# Patient Record
Sex: MALE | Race: WHITE | NOT HISPANIC OR LATINO | Employment: OTHER | URBAN - METROPOLITAN AREA
[De-identification: names, ages, dates, MRNs, and addresses within clinical notes are randomized per-mention and may not be internally consistent; named-entity substitution may affect disease eponyms.]

---

## 2018-01-29 PROBLEM — G20.A1 PARKINSON'S DISEASE (H): Status: ACTIVE | Noted: 2018-01-29

## 2018-01-30 ENCOUNTER — OFFICE VISIT (OUTPATIENT)
Dept: NEUROLOGY | Facility: CLINIC | Age: 73
End: 2018-01-30
Payer: COMMERCIAL

## 2018-01-30 VITALS
SYSTOLIC BLOOD PRESSURE: 132 MMHG | DIASTOLIC BLOOD PRESSURE: 59 MMHG | HEIGHT: 72 IN | WEIGHT: 182 LBS | BODY MASS INDEX: 24.65 KG/M2 | HEART RATE: 59 BPM

## 2018-01-30 DIAGNOSIS — G20.A1 PARKINSON'S DISEASE (H): Primary | ICD-10-CM

## 2018-01-30 RX ORDER — SULFASALAZINE 500 MG/1
2000 TABLET ORAL EVERY MORNING
Status: ON HOLD | COMMUNITY
Start: 2017-12-26 | End: 2019-04-28

## 2018-01-30 RX ORDER — FOLIC ACID 20 MG
800 CAPSULE ORAL
COMMUNITY
End: 2020-11-30

## 2018-01-30 RX ORDER — MULTIPLE VITAMINS W/ MINERALS TAB 9MG-400MCG
1 TAB ORAL EVERY MORNING
COMMUNITY

## 2018-01-30 RX ORDER — POLYETHYLENE GLYCOL 3350, SODIUM SULFATE ANHYDROUS, SODIUM BICARBONATE, SODIUM CHLORIDE, POTASSIUM CHLORIDE 236; 22.74; 6.74; 5.86; 2.97 G/4L; G/4L; G/4L; G/4L; G/4L
POWDER, FOR SOLUTION ORAL
COMMUNITY
Start: 2016-12-19 | End: 2019-04-12

## 2018-01-30 RX ORDER — CARBIDOPA AND LEVODOPA 50; 200 MG/1; MG/1
2 TABLET, EXTENDED RELEASE ORAL AT BEDTIME
COMMUNITY
Start: 2016-07-19 | End: 2018-11-13

## 2018-01-30 RX ORDER — CARBIDOPA AND LEVODOPA 25; 100 MG/1; MG/1
3.5 TABLET ORAL
COMMUNITY
Start: 2012-12-03 | End: 2018-05-09

## 2018-01-30 RX ORDER — PRAMIPEXOLE DIHYDROCHLORIDE 0.25 MG/1
0.62 TABLET ORAL
COMMUNITY
End: 2018-05-09

## 2018-01-30 ASSESSMENT — PAIN SCALES - GENERAL: PAINLEVEL: NO PAIN (0)

## 2018-01-30 NOTE — PATIENT INSTRUCTIONS
1.  Try increasing carbidopa/levodopa 25/100 to 4 tabs three times a day at 5:30 AM, 12 noon, 9 PM.  If not better decrease back to 3.5 tabs three times a day.  2.  Eat at 1 PM  3.  Continue regular exercise.  4.  Think about attending DBS class

## 2018-01-30 NOTE — MR AVS SNAPSHOT
After Visit Summary   1/30/2018    Ace Navarro    MRN: [id number]           Patient Information     Date Of Birth          1945        Visit Information        Provider Department      1/30/2018 10:00 AM Eric Godinez MD [facility] Neurology        Care Instructions    1.  Try increasing carbidopa/levodopa 25/100 to 4 tabs three times a day at 5:30 AM, 12 noon, 9 PM.  If not better decrease back to 3.5 tabs three times a day.  2.  Eat at 1 PM  3.  Continue regular exercise.  4.  Think about attending DBS class          Follow-ups after your visit        Follow-up notes from your care team     Return in about 3 months (around 4/30/2018).      Your next 10 appointments already scheduled     May 01, 2018  9:00 AM CDT   (Arrive by 8:45 AM)   Return Movement Disorder with Eric Godinez MD   [facility] ([facility])    [address] [place] [address] [id number] [phone]              Who to contact     Please call your clinic at [phone] to:    Ask questions about your health    Make or cancel appointments    Discuss your medicines    Learn about your test results    Speak to your doctor   If you have compliments or concerns about an experience at your clinic, or if you wish to file a complaint, please contact [facility] Patient Relations at [phone] or email us at [email or web address]         Additional Information About Your Visit        MyChart Information     [employer] is an electronic gateway that provides easy, online access to your medical records. With [employer], you can request a clinic appointment, read your test results, renew a prescription or communicate with your care team.     To sign up for As[employer]it visit the website at [email or web address]   You will be asked to enter the access code listed below, as well as some personal information. Please follow the directions to  create your username and password.     Your access code is: [email or web address]  Expires: 2018  6:30 AM     Your access code will  in 90 days. If you need help or a new code, please contact your [facility] or call [phone] for assistance.        Care EveryWhere ID     This is your Care EveryWhere ID. This could be used by other organizations to access your [place] medical records  [id number]        Your Vitals Were     Pulse Height BMI (Body Mass Index)             59 1.829 m (6') 24.68 kg/m2          Blood Pressure from Last 3 Encounters:   18 132/59    Weight from Last 3 Encounters:   18 82.6 kg (182 lb)              Today, you had the following     No orders found for display       Primary Care Provider Office Phone # Fax #    Maurilio Bonilla [phone]       [facility] [address] [id number]        Equal Access to Services     ALFREDO [facility] System[facility]YCE : Hadii aad ku hadasho Soomaali, waaxda luqadaha, qaybta kaalmada adeegyada, waxay idiin hayaan adeeg kharash la'rustyn . So [facility] [phone].    ATENCIÓN: Si habla español, tiene a rodriguez disposición servicios gratuitos de asistencia lingüística. Llame al [phone].    We comply with applicable federal civil rights laws and [place] laws. We do not discriminate on the basis of race, color, national origin, age, disability, sex, sexual orientation, or gender identity.            Thank you!     Thank you for choosing [facility]UROLOG[facility]  for your care. Our goal is always to provide you with excellent care. Hearing back from our patients is one way we can continue to improve our services. Please take a few minutes to complete the written survey that you may receive in the mail after your visit with us. Thank you!             Your Updated Medication List - Protect others around you: Learn how to safely use, store and throw away your medicines at www.disposemymeds.org.          This list is  accurate as of 1/30/18 10:33 AM.  Always use your most recent med list.                   Brand Name Dispense Instructions for use Diagnosis    * carbidopa-levodopa  MG per tablet    SINEMET     Take 3.5 tablets by mouth        * carbidopa-levodopa  MG per CR tablet    SINEMET CR     2 tablets        folic acid 20 MG Caps      Take 800 mg by mouth        Multi-vitamin Tabs tablet      Take 1 tablet by mouth daily        PEG-3350/Electrolytes 236 G Solr      As directed. Do not fill until patient contacts the pharmacy.        pramipexole 0.25 MG tablet    MIRAPEX     Take 0.625 mg by mouth        sulfaSALAzine 500 MG tablet    AZULFIDINE     Take 2,000 mg by mouth        * Notice:  This list has 2 medication(s) that are the same as other medications prescribed for you. Read the directions carefully, and ask your doctor or other care provider to review them with you.

## 2018-01-30 NOTE — PROGRESS NOTES
Department of Neurology  Movement Disorders Division   Initial Clinic Evaluation     Patient: Ace Navarro   MRN: [id number]   : 1945   Date of Visit: 2018     Referring Physician: Herbert    Reason for Referral: Parkinson's disease    Impression:   #1  Idiopathic Parkinson's disease-tremor predominant  #2  Mild wearing off phenomena  #3  Probable mild peak dose dyskinesia  #4  Restless leg syndrome    Recommendations:     #1 Try increasing carbidopa levodopa to 25/100 4 pills 3 times a day.  If side effects such as lightheadedness or hallucinations reduced back to 3.5 tablets 3 times a day.  #2 Take the afternoon dose at noon and then eat at 1 PM  #3 Continue regular exercise as he is doing  #4 Think about deep brain stimulation and the impact it would have on his daily living.  Consider attending [facility]'s deep brain stimulation class.  At this time his predominant symptoms are resting tremor and I am not sure that deep brain stimulation would have a major beneficial effect on his quality of life.    Please call or write with questions or concerns,    Sincerely yours,    Eric Godinez MD      Supporting Documentation (below)    History of Present Illness  Mr. Navarro is a 72 year old male whom I have followed for 8 years at the [facility] for his mild idiopathic Parkinson's disease.  He has done very well with this.  He has taken carbidopa levodopa 3 times a day with excellent effect.  Only he is taking carbidopa levodopa 3.5 tablets 3 times a day at 5:32 PM and 9 PM.  He notices that the noontime dose does not work well at times.  He is eating at 1 PM.  Doses after meals.  He does have some minimal dyskinesia.  He does feel a sense of on with the medicines.  He does have some very mild wearing off but it is not bothersome.  His other problem is restless leg syndrome.  He is having augmentation.  He takes pramipexole 0.5 mg at 9 PM are she has been at 10 PM and has a good night  sleep.  However he has had augmentation of his restless leg syndromes 2 was 8:53 PM.  He says it does not bother him and he really does not want further treatment for this.  He is not having any impulse control disorder with his dopamine agonist therapy.    He feels his tremor is increasing and this is visible on exam.  The tremors are resting tremor.  It does not cause great embarrassment.  It is not causing any motor disability until terms of eating drinking or performing fine motor tasks.    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  He continues to be a competitive  and will be going to [place] in a short period of time to play softball.    He denies any significant cognitive decline.  He has had no falls.  He has no autonomic symptoms.    Past Medical History:   No past medical history on file.    Past Surgical History:   No past surgical history on file.    Medications:  No current outpatient prescriptions on file.          Movement Disorder-related Medications                   5:30 AM 1 PM 9 PM 10 pm 12  MN   Carbidopa/levodopa 25/100                                                3.5 3.5 3.5            Carbidopa/levodopa 50/200 CR                      1    Pramiprexole 0.25                             2              Allergies: has no allergies on file.    Social History:   Social History     Social History     Marital status:      Spouse name: N/A     Number of children: N/A     Years of education: N/A     Social History Main Topics     Smoking status: Not on file     Smokeless tobacco: Not on file     Alcohol use Not on file     Drug use: Not on file     Sexual activity: Not on file     Other Topics Concern     Not on file     Social History Narrative       Family History:  No family history on file.    ROS:  General:  Fever : no, Chills: no, Sweats: no, Fatigue: no, Malaise: no, Lack of Appetite : no, Weight loss: no, Weight gain:  no  Cardiovascular  Chest Pains: no, Palpitations: no, Edema: no, Shortness of breath: no, Raynaud's: no, Claudication: no  Respiratory  Cough: no, Hemo ptosis: no, Wheezing: no  Gastrointestinal  Nausea: no, Vomiting: no, Diarrhea: no, Constipation: no, Heartburn: no  Genitourinary  Dysuria: no, Hematuria: no,  Frequency: no, Urgency: no,  Nocturia: no, Incontinence: no, Decreased Libido: no  Erection concerns: no  Women:  Abnormal vaginal bleeding: no, Heavy periods: no, Amenorrhea: no  Musculoskeletal  Back pain: no, Neck Pain: no  Joint pain, swelling, redness: no, Stiffness: no  Skin  Rash: no, Itching: no,  Suspicious lesions: no  Neurologic  Visual loss: no, Double vision: no, Headache: no, Loss of consciousness:  no, Seizure: no, Fainting (syncope): no, Dysarthria: no, Dysphagia:  no, Vertigo:  no, Weakness: no, Atrophy: no, Twitches: no, Lhermitte's: no, Numbness or tingling: no, Handwriting change: no, Tremors: no, Involuntary movements: no, Imbalance: no, Abnormal gait:  no, Falls :no, Memory loss: no, RBD: no, Sleep disorder: no, Hallucination: no, Loss of concentration: no,  Behavioral change: no,  Loss of motivation: no  Psychiatric  Depression: no, Anxiety/Panic: no  Endocrine  Cold intolerance: no, Heat intolerance: no, Excessive thirst: no  Hematologic/Lymphatic  Abnormal bruising, bleeding: no ,Enlarged lymph nodes: no  Allergic/Immunologic  Hives (Urticaria): no, HIV exposure: no      Comprehensive Neurologic Exam    Mental Status Exam   The patient is alert, oriented and exhibits no difficulty with cognition or memory.  A formal short test of mental status was performed. 30/30 short test of Mental status    Neurovascular    Right Left   Carotid Bruit Absent Absent   Dorsalis Pedis Pulse Normal Normal   Posterior Tibial Pulse Normal Normal     Speech   Speech is hypokinetic.    Cranial Nerve Exam                 Right Left   II                                        Normal Normal      III, IV, V!                    Normal Normal   EOM description                               Facial Sensation (V) Normal Normal   Muscles of Mastication (V) Normal Normal   Facial Strength (VII) Normal Normal   Hearing (VIII) Normal Normal   Nystagmus (VIII) Absent Absent   Gag (IX, X) Normal Normal   Sternocleidomastoid (XI) Normal Normal   Trapezius (XI) Normal Normal   Tongue (XII) Normal Normal   Other       Motor Exam  Strength (*/5 grading)  Muscle                  Right Left   Frontalis                                           Normal Normal       Orbicularis Oculi                     Normal Normal     Orbicularis Oris                         Normal Normal   Deltoid Normal Normal   Biceps Normal Normal   Triceps Normal Normal   EDC Normal Normal   Finger Flexors Normal Normal   First Dorsal Interosseous Normal Normal   Hypothenar Normal Normal   Thenar Normal Normal   Iliopsoas Normal Normal   Quadrideps Normal Normal   Anterior Tibial Normal Normal   Gastrocnemius Normal Normal   Extensor Hallucis Longus Normal Normal   Toe Extensors Normal Normal   Toe Flexor Normal Normal   Other       Reflexes   Right Left   Biceps Normal Normal   Triceps Normal Normal   Brachioradialis Normal Normal   Quadriceps Normal Normal   Ankle Normal Normal   Babkinski Flexor Flexor   Other       Tone    Right Left   Neck Normal Normal   Arm +1 +1   Leg Normal Normal   Other       AMR    Right Left   Fingers -1 -1   Hand -1 -1   Leg Normal Normal   Foot Normal Normal   Other       Coordination    Right Left   Finger nose finger Normal Normal   Drift Normal Normal   Heel Amador Normal Normal   Other       Involuntary Movements   (describe)  None    Delete table if none Right Left   Rest Tremor arm +3 +1   Postural tremor arm Normal Normal   Rest Tremor leg +1 +3       Gait and Station  Sitting Normal   Standing Normal   Gait Normal   (describe)    Posture Normal   (describe)    Romberg Absent   Tandem Normal   Other      Sensory Exam   Right Left    Pin Normal Normal   Vibration Normal Normal   Joint position Normal Normal   Other            Coding statement:     Prolonged Services: face-to-face40 min.   Time spent with patient: Greater than 50% of this 40 minute visit was spent in counseling and coordination of care.    Medical decision making is high due to the following components:    Number of diagnoses:[id number]  Management< Medications were adjusted.  Complexity of medical data:Risk  One or more chronic illnesses with severe exacerbation, progression, or side effects of treatment.

## 2018-01-30 NOTE — LETTER
2018       RE: Ace Navarro  [address] [facility] [address]     Dear Colleague,    Thank you for referring your patient, Ace Navarro, to the [facility] NEUROLOGY at [facility]. Please see a copy of my visit note below.    Department of Neurology  Movement Disorders Division   Initial Clinic Evaluation     Patient: Ace Navarro   MRN: [id number]   : 1945   Date of Visit: 2018     Referring Physician: Herbert    Reason for Referral: Parkinson's disease    Impression:   #1  Idiopathic Parkinson's disease-tremor predominant  #2  Mild wearing off phenomena  #3  Probable mild peak dose dyskinesia  #4  Restless leg syndrome    Recommendations:     #1 Try increasing carbidopa levodopa to 25/100 4 pills 3 times a day.  If side effects such as lightheadedness or hallucinations reduced back to 3.5 tablets 3 times a day.  #2 Take the afternoon dose at noon and then eat at 1 PM  #3 Continue regular exercise as he is doing  #4 Think about deep brain stimulation and the impact it would have on his daily living.  Consider attending [facility] Bapt[facility]'s deep brain stimulation class.  At this time his predominant symptoms are resting tremor and I am not sure that deep brain stimulation would have a major beneficial effect on his quality of life.    Please call or write with questions or concerns,    Sincerely yours,    Eric Godinez MD      Supporting Documentation (below)    History of Present Illness  Mr. Navarro is a 72 year old male whom I have followed for 8 years at the [facility] for his mild idiopathic Parkinson's disease.  He has done very well with this.  He has taken carbidopa levodopa 3 times a day with excellent effect.  Only he is taking carbidopa levodopa 3.5 tablets 3 times a day at 5:32 PM and 9 PM.  He notices that the noontime dose does not work well at times.  He is eating at 1 PM.  Doses after meals.  He does have some minimal  dyskinesia.  He does feel a sense of on with the medicines.  He does have some very mild wearing off but it is not bothersome.  His other problem is restless leg syndrome.  He is having augmentation.  He takes pramipexole 0.5 mg at 9 PM are she has been at 10 PM and has a good night sleep.  However he has had augmentation of his restless leg syndromes 2 was 8:53 PM.  He says it does not bother him and he really does not want further treatment for this.  He is not having any impulse control disorder with his dopamine agonist therapy.    He feels his tremor is increasing and this is visible on exam.  The tremors are resting tremor.  It does not cause great embarrassment.  It is not causing any motor disability until terms of eating drinking or performing fine motor tasks.    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  He continues to be a competitive  and will be going to [place] in a short period of time to play softball.    He denies any significant cognitive decline.  He has had no falls.  He has no autonomic symptoms.    Past Medical History:   No past medical history on file.    Past Surgical History:   No past surgical history on file.    Medications:  No current outpatient prescriptions on file.          Movement Disorder-related Medications                   5:30 AM 1 PM 9 PM 10 pm 12  MN   Carbidopa/levodopa 25/100                                                3.5 3.5 3.5            Carbidopa/levodopa 50/200 CR                      1    Pramiprexole 0.25                             2              Allergies: has no allergies on file.    Social History:   Social History     Social History     Marital status:      Spouse name: N/A     Number of children: N/A     Years of education: N/A     Social History Main Topics     Smoking status: Not on file     Smokeless tobacco: Not on file     Alcohol use Not on file     Drug use: Not on file     Sexual  activity: Not on file     Other Topics Concern     Not on file     Social History Narrative       Family History:  No family history on file.    ROS:  General:  Fever : no, Chills: no, Sweats: no, Fatigue: no, Malaise: no, Lack of Appetite : no, Weight loss: no, Weight gain: no  Cardiovascular  Chest Pains: no, Palpitations: no, Edema: no, Shortness of breath: no, Raynaud's: no, Claudication: no  Respiratory  Cough: no, Hemo ptosis: no, Wheezing: no  Gastrointestinal  Nausea: no, Vomiting: no, Diarrhea: no, Constipation: no, Heartburn: no  Genitourinary  Dysuria: no, Hematuria: no,  Frequency: no, Urgency: no,  Nocturia: no, Incontinence: no, Decreased Libido: no  Erection concerns: no  Women:  Abnormal vaginal bleeding: no, Heavy periods: no, Amenorrhea: no  Musculoskeletal  Back pain: no, Neck Pain: no  Joint pain, swelling, redness: no, Stiffness: no  Skin  Rash: no, Itching: no,  Suspicious lesions: no  Neurologic  Visual loss: no, Double vision: no, Headache: no, Loss of consciousness:  no, Seizure: no, Fainting (syncope): no, Dysarthria: no, Dysphagia:  no, Vertigo:  no, Weakness: no, Atrophy: no, Twitches: no, Lhermitte's: no, Numbness or tingling: no, Handwriting change: no, Tremors: no, Involuntary movements: no, Imbalance: no, Abnormal gait:  no, Falls :no, Memory loss: no, RBD: no, Sleep disorder: no, Hallucination: no, Loss of concentration: no,  Behavioral change: no,  Loss of motivation: no  Psychiatric  Depression: no, Anxiety/Panic: no  Endocrine  Cold intolerance: no, Heat intolerance: no, Excessive thirst: no  Hematologic/Lymphatic  Abnormal bruising, bleeding: no ,Enlarged lymph nodes: no  Allergic/Immunologic  Hives (Urticaria): no, HIV exposure: no      Comprehensive Neurologic Exam    Mental Status Exam   The patient is alert, oriented and exhibits no difficulty with cognition or memory.  A formal short test of mental status was performed. 30/30 short test of Mental status    Neurovascular     Right Left   Carotid Bruit Absent Absent   Dorsalis Pedis Pulse Normal Normal   Posterior Tibial Pulse Normal Normal     Speech   Speech is hypokinetic.    Cranial Nerve Exam                 Right Left   II                                        Normal Normal      III, IV, V!                   Normal Normal   EOM description                               Facial Sensation (V) Normal Normal   Muscles of Mastication (V) Normal Normal   Facial Strength (VII) Normal Normal   Hearing (VIII) Normal Normal   Nystagmus (VIII) Absent Absent   Gag (IX, X) Normal Normal   Sternocleidomastoid (XI) Normal Normal   Trapezius (XI) Normal Normal   Tongue (XII) Normal Normal   Other       Motor Exam  Strength (*/5 grading)  Muscle                  Right Left   Frontalis                                           Normal Normal       Orbicularis Oculi                     Normal Normal     Orbicularis Oris                         Normal Normal   Deltoid Normal Normal   Biceps Normal Normal   Triceps Normal Normal   EDC Normal Normal   Finger Flexors Normal Normal   First Dorsal Interosseous Normal Normal   Hypothenar Normal Normal   Thenar Normal Normal   Iliopsoas Normal Normal   Quadrideps Normal Normal   Anterior Tibial Normal Normal   Gastrocnemius Normal Normal   Extensor Hallucis Longus Normal Normal   Toe Extensors Normal Normal   Toe Flexor Normal Normal   Other       Reflexes   Right Left   Biceps Normal Normal   Triceps Normal Normal   Brachioradialis Normal Normal   Quadriceps Normal Normal   Ankle Normal Normal   Babkinski Flexor Flexor   Other       Tone    Right Left   Neck Normal Normal   Arm +1 +1   Leg Normal Normal   Other       AMR    Right Left   Fingers -1 -1   Hand -1 -1   Leg Normal Normal   Foot Normal Normal   Other       Coordination    Right Left   Finger nose finger Normal Normal   Drift Normal Normal   Heel Amador Normal Normal   Other       Involuntary Movements   (describe)  None    Delete table if none Right  Left   Rest Tremor arm +3 +1   Postural tremor arm Normal Normal   Rest Tremor leg +1 +3       Gait and Station  Sitting Normal   Standing Normal   Gait Normal   (describe)    Posture Normal   (describe)    Romberg Absent   Tandem Normal   Other      Sensory Exam   Right Left   Pin Normal Normal   Vibration Normal Normal   Joint position Normal Normal   Other            Coding statement:     Prolonged Services: face-to-face40 min.   Time spent with patient: Greater than 50% of this 40 minute visit was spent in counseling and coordination of care.    Medical decision making is high due to the following components:    Number of diagnoses:[id number]  Management< Medications were adjusted.  Complexity of medical data:Risk  One or more chronic illnesses with severe exacerbation, progression, or side effects of treatment.      Again, thank you for allowing me to participate in the care of your patient.      Sincerely,    Eric Godinez MD

## 2018-01-30 NOTE — NURSING NOTE
Chief Complaint   Patient presents with     RECHECK     Follow up parkinsons   Ely Velazquez MA

## 2018-02-06 ENCOUNTER — TELEPHONE (OUTPATIENT)
Dept: NEUROSURGERY | Facility: CLINIC | Age: 73
End: 2018-02-06

## 2018-02-06 NOTE — TELEPHONE ENCOUNTER
Spoke with pt about our DBS class and pt is interested in attending on April 18. Will send a letter with all pertinent information. No further questions.

## 2018-02-20 ENCOUNTER — TELEPHONE (OUTPATIENT)
Dept: NEUROSURGERY | Facility: CLINIC | Age: 73
End: 2018-02-20

## 2018-02-20 NOTE — TELEPHONE ENCOUNTER
Pt wanted to know that after he attends the April DBS class, how soon it would be before he can have DBS surgery. If pt is interested after he attends, I can f/u with Dr. Godinez to see if now is the right time for pt to go through the workup. Explained the workup process and each appointment in detail, as well as the consensus meeting process.     Pt asked if he could audio record the class, as he may have friends/family that are unable to come (also, class size is limited). My inclination is to say no to this request, as other patients will also be attending the class and their privacy/HIPAA must also be considered. Pt understands. No further questions at this time.

## 2018-04-19 ENCOUNTER — TELEPHONE (OUTPATIENT)
Dept: NEUROSURGERY | Facility: CLINIC | Age: 73
End: 2018-04-19

## 2018-04-19 DIAGNOSIS — G20.A1 PARKINSON'S DISEASE (H): Primary | ICD-10-CM

## 2018-04-19 NOTE — TELEPHONE ENCOUNTER
Patient attended the DBS class on 4/18/2018 and is now calling because he would like to initiate the DBS workup process. Dr. Godinez endorses this plan. Will f/u with pt with potential dates and then send a letter with all pertinent information once appointments are firmly scheduled. Pt in agreement with plan. No further questions.

## 2018-04-20 DIAGNOSIS — F41.9 ANXIETY: Primary | ICD-10-CM

## 2018-04-20 RX ORDER — DIAZEPAM 5 MG
TABLET ORAL
Qty: 2 TABLET | Refills: 0 | Status: SHIPPED | OUTPATIENT
Start: 2018-04-20 | End: 2018-08-01

## 2018-04-25 ENCOUNTER — TELEPHONE (OUTPATIENT)
Dept: NEUROSURGERY | Facility: CLINIC | Age: 73
End: 2018-04-25

## 2018-05-01 ENCOUNTER — OFFICE VISIT (OUTPATIENT)
Dept: NEUROLOGY | Facility: CLINIC | Age: 73
End: 2018-05-01
Payer: COMMERCIAL

## 2018-05-01 VITALS
WEIGHT: 185.9 LBS | DIASTOLIC BLOOD PRESSURE: 83 MMHG | SYSTOLIC BLOOD PRESSURE: 149 MMHG | HEIGHT: 72 IN | HEART RATE: 46 BPM | BODY MASS INDEX: 25.18 KG/M2

## 2018-05-01 DIAGNOSIS — G20.A1 PARKINSON'S DISEASE (H): Primary | ICD-10-CM

## 2018-05-01 ASSESSMENT — PAIN SCALES - GENERAL: PAINLEVEL: NO PAIN (0)

## 2018-05-01 NOTE — LETTER
5/1/2018       RE: Ace Navarro  [address] [facility] [address]     Dear Colleague,    Thank you for referring your patient, Ace Navarro, to the [facility] NEUROLOGY at [facility]. Please see a copy of my visit note below.    Movement Disorder Clinic follow up note    Patient: Ace Navarro  Medical Record Number: [id number]  Encounter Date: May 1, 2018  PCP:Maurilio Bonilla    CC: Parkinson's disease    Impression:     #1  Idiopathic Parkinson's disease    Recommendations:    #1  Patient is proceeding with deep brain stimulation workup.  He is excited about this.  He has attended the deep brain stimulation class and found this a tremendous experience.  He feels he has his expectations set properly and he is very enthusiastic about proceeding.    #2  The patient is interested in research participation.    #3  I will see him back after his deep brain stimulation procedure and take over his medication again.  For now he will continue carbidopa/levodopa 25/100, 3-1/2 tablets in the morning 4 tablets at noon 4 tablets at 9 PM.    Return to clinic:     Interval Hx:: Mr. Ace Navarro reports that he tried going up to carbidopa/levodopa 25/100, 4 tablets 3 times a day.  He found it too much in the morning.  He is taking only 3-1/2 tablets at that time.  Going up to the 4 in the afternoon did help his tremor.  He is continuing to take 4 tablets at noon and 4 at 9 PM.    He is active and feels his softball participation is at the level it is always been.  He is hitting the ball very well.    He is here with his son.  They went to the deep brain stimulation class.  He found this a tremendous experience and wants to go ahead with deep brain stimulation.  Tammie COOK and has made the appointments.  He is coming in for his drug profile 1 week from this Wednesday.      Current medications  Current Outpatient Prescriptions   Medication      carbidopa-levodopa (SINEMET CR)  MG per CR tablet     carbidopa-levodopa (SINEMET)  MG per tablet     folic acid 20 MG CAPS     multivitamin, therapeutic with minerals (MULTI-VITAMIN) TABS tablet     PEG 3350-KCl-NaBcb-NaCl-NaSulf (PEG-3350/ELECTROLYTES) 236 G SOLR     pramipexole (MIRAPEX) 0.25 MG tablet     sulfaSALAzine (AZULFIDINE) 500 MG tablet     diazepam (VALIUM) 5 MG tablet     No current facility-administered medications for this visit.        Examination:  /83 (BP Location: Left arm, Patient Position: Sitting, Cuff Size: Adult Regular)  Pulse (!) 46  Ht 1.829 m (6')  Wt 84.3 kg (185 lb 14.4 oz)  BMI 25.21 kg/m2  General- no distress, no rash, good pulses, no edema  Respiratory-auscultation over the anterior lung fields is clear  Cardiac- regular rate and rhythm    Neurologic  Mental status  Patient is alert, appropriate, speech is fluent and comprehension is intact    Cranial nerve testing  Pupils are equal and reactive to light, visual fields are full to confrontation bilaterally  Extraocular movements are full  Facial sensation is intact, face is symmetric with rest and activation  Palate rises symmetrically, tongue protrudes at midline with normal movements  Sterocleidomastoid and trapezius strength is normal and symmetric    Motor  Bulk and tone are normal  Strength is 5/5 shoulder abduction, finger abduction, arm flexion and extension, hip flexion, plantar and dorsiflexion    Sensation  Intact to pin, vibration   Proprioception intact in great toes and fingers    Tendon reflexes  Testing at brachioradialis, biceps, triceps, patella and achilles bilaterally showed normal reflexes, symmetrically, without Babinski or Zuluaga signs    Coordination  Finger nose finger testing: normalRapid alternating movements are normal.  Gait and Station: normal resting tremor 3+ right arm 2+ left leg.  No rigidity.  No bradykinesia.  \25 minutes spent with patient all counseling    Again, thank  you for allowing me to participate in the care of your patient.      Sincerely,    Eric Godinez MD

## 2018-05-01 NOTE — MR AVS SNAPSHOT
After Visit Summary   5/1/2018    Ace Navarro    MRN: [id number]           Patient Information     Date Of Birth          1945        Visit Information        Provider Department      5/1/2018 9:00 AM Eric Godinez MD [facility] Neurology        Today's Diagnoses     Parkinson's disease (H)    -  1      Care Instructions    #1  Proceed with DBS work-up  #2 I'll see him back at completion of DBS procedure for medication management.          Follow-ups after your visit        Follow-up notes from your care team     Return if symptoms worsen or fail to improve.      Your next 10 appointments already scheduled     May 09, 2018  7:00 AM CDT   (Arrive by 6:45 AM)   New Movement Disorder with NICK Wright CNP [facility] Neurology ([facility])    [address]  [address] [facility] [address] [phone]            May 09, 2018 12:00 PM CDT   MR BRAIN W/O & W CONTRAST with [id number]   [place] [facility] ([facility])    [address] [address]   [phone]           Take your medicines as usual, unless your doctor tells you not to. Bring a list of your current medicines to your exam (including vitamins, minerals and over-the-counter drugs).  You may or may not receive intravenous (IV) contrast for this exam pending the discretion of the Radiologist.  You do not need to do anything special to prepare.  The MRI machine uses a strong magnet. Please wear clothes without metal (snaps, zippers). A sweatsuit works well, or we may give you a hospital gown.  Please remove any body piercings and hair extensions before you arrive. You will also remove watches, jewelry, hairpins, wallets, dentures, partial dental plates and hearing aids. You may wear contact lenses, and you may be able to wear your rings. We have a safe place to keep your personal items, but it is safer to leave them at home.   **IMPORTANT** THE INSTRUCTIONS BELOW ARE ONLY FOR THOSE PATIENTS WHO HAVE BEEN PRESCRIBED SEDATION OR GENERAL ANESTHESIA DURING THEIR MRI PROCEDURE:  IF YOUR DOCTOR PRESCRIBED ORAL SEDATION (take medicine to help you relax during your exam):   You must get the medicine from your doctor (oral medication) before you arrive. Bring the medicine to the exam. Do not take it at home. You ll be told when to take it upon arriving for your exam.   Arrive one hour early. Bring someone who can take you home after the test. Your medicine will make you sleepy. After the exam, you may not drive, take a bus or take a taxi by yourself.  IF YOUR DOCTOR PRESCRIBED IV SEDATION:   Arrive one hour early. Bring someone who can take you home after the test. Your medicine will make you sleepy. After the exam, you may not drive, take a bus or take a taxi by yourself.   No eating 6 hours before your exam. You may have clear liquids up until 4 hours before your exam. (Clear liquids include water, clear tea, black coffee and fruit juice without pulp.)  IF YOUR DOCTOR PRESCRIBED ANESTHESIA (be asleep for your exam):   Arrive 1 1/2 hours early. Bring someone who can take you home after the test. You may not drive, take a bus or take a taxi by yourself.   No eating 8 hours before your exam. You may have clear liquids up until 4 hours before your exam. (Clear liquids include water, clear tea, black coffee and fruit juice without pulp.)   You will spend four to five hours in the recovery room.  Please call the Imaging Department at your exam site with any questions.            May 24, 2018  8:00 AM CDT   (Arrive by 7:45 AM)   Neuropsych Eval with Carole Franklin, PhD [facility] Neuropsychology ([facility])    [address] [address] [phone]            May 24, 2018 12:00 PM CDT   (Arrive by 11:45 AM)   Deep Brain Stimulation with Humberto Briones MD   [facility]  Neurosurgery ([facility])    [address]  3rd Floor  [facility] [address] [phone]              Who to contact     Please call your clinic at [phone] to:    Ask questions about your health    Make or cancel appointments    Discuss your medicines    Learn about your test results    Speak to your doctor            Additional Information About Your Visit        [employer] Information     [employer] gives you secure access to your electronic health record. If you see a primary care provider, you can also send messages to your care team and make appointments. If you have questions, please call your primary care clinic.  If you do not have a primary care provider, please call [phone] and they will assist you.      [employer] is an electronic gateway that provides easy, online access to your medical records. With [employer], you can request a clinic appointment, read your test results, renew a prescription or communicate with your care team.     To access your existing account, please contact your [facility] or call [phone] for assistance.        Care EveryWhere ID     This is your Care EveryWhere ID. This could be used by other organizations to access your [place] medical records  [id number]        Your Vitals Were     Pulse Height BMI (Body Mass Index)             46 1.829 m (6') 25.21 kg/m2          Blood Pressure from Last 3 Encounters:   05/01/18 149/83   01/30/18 132/59    Weight from Last 3 Encounters:   05/01/18 84.3 kg (185 lb 14.4 oz)   01/30/18 82.6 kg (182 lb)              Today, you had the following     No orders found for display       Primary Care Provider Office Phone # Fax #    Maurilio Bonilla [phone]       [facility] [address] [facility] [address]        Equal Access to Services     REBECA HORTON AH: shawn Harmon, janet good  antonette shinaadonald ah. So [facility] [phone].    ATENCIÓN: Si jennifer harrison, tiene a rodriguez disposición servicios gratuitos de asistencia lingüística. Melanie velez [phone].    We comply with applicable federal civil rights laws and [place] laws. We do not discriminate on the basis of race, color, national origin, age, disability, sex, sexual orientation, or gender identity.            Thank you!     Thank you for choosing [facility] NEUROLOGY  for your care. Our goal is always to provide you with excellent care. Hearing back from our patients is one way we can continue to improve our services. Please take a few minutes to complete the written survey that you may receive in the mail after your visit with us. Thank you!             Your Updated Medication List - Protect others around you: Learn how to safely use, store and throw away your medicines at www.disposemymeds.org.          This list is accurate as of 5/1/18  9:27 AM.  Always use your most recent med list.                   Brand Name Dispense Instructions for use Diagnosis    * carbidopa-levodopa  MG per tablet    SINEMET     Take 3.5 tablets by mouth        * carbidopa-levodopa  MG per CR tablet    SINEMET CR     2 tablets        diazepam 5 MG tablet    VALIUM    2 tablet    Take 1 tablet 30 minutes prior to MRI. May take additional tablet immediately before MRI as needed    Anxiety       folic acid 20 MG Caps      Take 800 mg by mouth        Multi-vitamin Tabs tablet      Take 1 tablet by mouth daily        PEG-3350/Electrolytes 236 g Solr      As directed. Do not fill until patient contacts the pharmacy.        pramipexole 0.25 MG tablet    MIRAPEX     Take 0.625 mg by mouth        sulfaSALAzine 500 MG tablet    AZULFIDINE     Take 2,000 mg by mouth        * Notice:  This list has 2 medication(s) that are the same as other medications prescribed for you. Read the directions carefully, and ask your doctor or other care provider to review them with  you.

## 2018-05-01 NOTE — PROGRESS NOTES
Movement Disorder Clinic follow up note    Patient: Ace Navarro  Medical Record Number: [id number]  Encounter Date: May 1, 2018  PCP:Maurilio Bonilla    CC: Parkinson's disease    Impression:     #1  Idiopathic Parkinson's disease    Recommendations:    #1  Patient is proceeding with deep brain stimulation workup.  He is excited about this.  He has attended the deep brain stimulation class and found this a tremendous experience.  He feels he has his expectations set properly and he is very enthusiastic about proceeding.    #2  The patient is interested in research participation.    #3  I will see him back after his deep brain stimulation procedure and take over his medication again.  For now he will continue carbidopa/levodopa 25/100, 3-1/2 tablets in the morning 4 tablets at noon 4 tablets at 9 PM.    Return to clinic:     Interval Hx:: Mr. Ace Navarro reports that he tried going up to carbidopa/levodopa 25/100, 4 tablets 3 times a day.  He found it too much in the morning.  He is taking only 3-1/2 tablets at that time.  Going up to the 4 in the afternoon did help his tremor.  He is continuing to take 4 tablets at noon and 4 at 9 PM.    He is active and feels his softball participation is at the level it is always been.  He is hitting the ball very well.    He is here with his son.  They went to the deep brain stimulation class.  He found this a tremendous experience and wants to go ahead with deep brain stimulation.  Tammie Rider R and has made the appointments.  He is coming in for his drug profile 1 week from this Wednesday.      Current medications  Current Outpatient Prescriptions   Medication     carbidopa-levodopa (SINEMET CR)  MG per CR tablet     carbidopa-levodopa (SINEMET)  MG per tablet     folic acid 20 MG CAPS     multivitamin, therapeutic with minerals (MULTI-VITAMIN) TABS tablet     PEG 3350-KCl-NaBcb-NaCl-NaSulf (PEG-3350/ELECTROLYTES) 236 G SOLR     pramipexole  (MIRAPEX) 0.25 MG tablet     sulfaSALAzine (AZULFIDINE) 500 MG tablet     diazepam (VALIUM) 5 MG tablet     No current facility-administered medications for this visit.        Examination:  /83 (BP Location: Left arm, Patient Position: Sitting, Cuff Size: Adult Regular)  Pulse (!) 46  Ht 1.829 m (6')  Wt 84.3 kg (185 lb 14.4 oz)  BMI 25.21 kg/m2  General- no distress, no rash, good pulses, no edema  Respiratory-auscultation over the anterior lung fields is clear  Cardiac- regular rate and rhythm    Neurologic  Mental status  Patient is alert, appropriate, speech is fluent and comprehension is intact    Cranial nerve testing  Pupils are equal and reactive to light, visual fields are full to confrontation bilaterally  Extraocular movements are full  Facial sensation is intact, face is symmetric with rest and activation  Palate rises symmetrically, tongue protrudes at midline with normal movements  Sterocleidomastoid and trapezius strength is normal and symmetric    Motor  Bulk and tone are normal  Strength is 5/5 shoulder abduction, finger abduction, arm flexion and extension, hip flexion, plantar and dorsiflexion    Sensation  Intact to pin, vibration   Proprioception intact in great toes and fingers    Tendon reflexes  Testing at brachioradialis, biceps, triceps, patella and achilles bilaterally showed normal reflexes, symmetrically, without Babinski or Zuluaga signs    Coordination  Finger nose finger testing: normalRapid alternating movements are normal.  Gait and Station: normal resting tremor 3+ right arm 2+ left leg.  No rigidity.  No bradykinesia.  \25 minutes spent with patient all counseling

## 2018-05-03 ENCOUNTER — TELEPHONE (OUTPATIENT)
Dept: NEUROSURGERY | Facility: CLINIC | Age: 73
End: 2018-05-03

## 2018-05-03 NOTE — TELEPHONE ENCOUNTER
Pt called me earlier today to let me know that he is interested in pursuing the 7T MRI research study. When I spoke with him, I had not realized that his DBS workup is still in progress. I called him back to let him know that he must be approved for surgery/scheduled prior to enrolling in this study. However, I did pass his name on to our research coordinators Kerline Reeder and Sada Mulligan to let them know. They will reach out to him once we know if he is a candidate. Pt understands and is in agreement with plan.

## 2018-05-06 ASSESSMENT — MOVEMENT DISORDERS SOCIETY - UNIFIED PARKINSONS DISEASE RATING SCALE (MDS-UPDRS)
CHEWING_AND_SWALLOWING: NORMAL: NO PROBLEMS.
TREMOR: SLIGHT: SHAKING OR TREMOR OCCURS BUT DOES NOT CAUSE PROBLEMS WITH ANY ACTIVITIES.
HANDWRITING: SLIGHT: MY WRITING IS SLOW, CLUMSY OR UNEVEN, BUT ALL WORDS ARE CLEAR.
HYGIENE: NORMAL: NOT AT ALL (NO PROBLEMS).
SALIVA_AND_DROOLING: MODERATE: I HAVE SOME DROOLING WHEN I AM AWAKE, BUT I USUALLY DO NOT NEED TISSUES OR A HANDKERCHIEF.
EATING_TASKS: NORMAL: NOT AT ALL (NO PROBLEMS).
GETTING_OUT_OF_BED_CAR_DEEP_CHAIR: SLIGHT: I AM SLOW OR AWKWARD, BUT I USUALLY CAN DO IT ON MY FIRST TRY.
TOTAL_SCORE: 11
FREEZING: NORMAL: NOT AT ALL (NO PROBLEMS).
SPEECH: MODERATE: MY SPEECH IS UNCLEAR ENOUGH THAT OTHERS ASK ME TO REPEAT MYSELF EVERY DAY EVEN THOUGH MOST OF MY SPEECH IS UNDERSTOOD.
TURNING_IN_BED: NORMAL: NOT AT ALL (NO PROBLEMS).
WALKING_AND_BALANCE: NORMAL: NOT AT ALL (NO PROBLEMS).
HOBBIES_AND_OTHER_ACTIVITIES: SLIGHT: I AM A BIT SLOW BUT DO THESE ACTIVITIES EASILY.
DRESSING: SLIGHT: I AM SLOW BUT I DO NOT NEED HELP.

## 2018-05-07 ENCOUNTER — TELEPHONE (OUTPATIENT)
Dept: NEUROSURGERY | Facility: CLINIC | Age: 73
End: 2018-05-07

## 2018-05-07 NOTE — TELEPHONE ENCOUNTER
Pt's son Jsoe called. I had discussed the 7T MRI with pt last week, but Jose wanted some clarification on whether they could schedule it. I explained that if pt is a candidate for DBS surgery, then our research coordinator will contact pt to see if he would like to participate in the 7T study. I have given pt's name to our research coordinator Kerline Reeder and the 7T coordinator Sada Mulligan. The 7T cannot be scheduled until we know pt will move forward with surgery. Jose expressed understanding and has no further questions. He is welcome to call with any future concerns.

## 2018-05-09 ENCOUNTER — TELEPHONE (OUTPATIENT)
Dept: NEUROLOGY | Facility: CLINIC | Age: 73
End: 2018-05-09

## 2018-05-09 ENCOUNTER — OFFICE VISIT (OUTPATIENT)
Dept: NEUROLOGY | Facility: CLINIC | Age: 73
End: 2018-05-09
Payer: COMMERCIAL

## 2018-05-09 ENCOUNTER — RADIANT APPOINTMENT (OUTPATIENT)
Dept: MRI IMAGING | Facility: CLINIC | Age: 73
End: 2018-05-09
Attending: PSYCHIATRY & NEUROLOGY
Payer: COMMERCIAL

## 2018-05-09 VITALS
SYSTOLIC BLOOD PRESSURE: 154 MMHG | DIASTOLIC BLOOD PRESSURE: 80 MMHG | WEIGHT: 184 LBS | HEART RATE: 50 BPM | RESPIRATION RATE: 18 BRPM | BODY MASS INDEX: 24.92 KG/M2 | OXYGEN SATURATION: 96 % | HEIGHT: 72 IN

## 2018-05-09 DIAGNOSIS — G20.A1 PARKINSON'S DISEASE (H): ICD-10-CM

## 2018-05-09 DIAGNOSIS — G20.A1 PARKINSON'S DISEASE (H): Primary | ICD-10-CM

## 2018-05-09 PROBLEM — K51.919 ULCERATIVE COLITIS WITH COMPLICATION (H): Status: ACTIVE | Noted: 2018-05-09

## 2018-05-09 RX ORDER — GADOBUTROL 604.72 MG/ML
7.2 INJECTION INTRAVENOUS ONCE
Status: COMPLETED | OUTPATIENT
Start: 2018-05-09 | End: 2018-05-09

## 2018-05-09 RX ORDER — PRAMIPEXOLE DIHYDROCHLORIDE 0.25 MG/1
0.5 TABLET ORAL AT BEDTIME
COMMUNITY
Start: 2018-05-09 | End: 2019-01-04

## 2018-05-09 RX ORDER — CARBIDOPA AND LEVODOPA 25; 100 MG/1; MG/1
4 TABLET ORAL 3 TIMES DAILY
COMMUNITY
Start: 2018-05-09 | End: 2018-11-13

## 2018-05-09 RX ADMIN — GADOBUTROL 7.2 ML: 604.72 INJECTION INTRAVENOUS at 11:42

## 2018-05-09 ASSESSMENT — UNIFIED PARKINSONS DISEASE RATING SCALE (UPDRS)
FREEZING_GAIT: NORMAL
LEG_AGILITY_RIGHT: SLIGHT: ANY OF THE FOLLOWING: A) THE REGULAR RHYTHM IS BROKEN WITH ONE WITH ONE OR TWO INTERRUPTIONS OR HESITATIONS OF THE MOVEMENT B) SLIGHT SLOWING C) THE AMPLITUDE DECREMENTS NEAR THE END OF THE 10 MOVEMENTS.
FREEZING_GAIT: NORMAL
GAIT: NORMAL
FACIAL_EXPRESSION: MODERATE: MASKED FACIES WITH LIPS PARTED SOME OF THE TIME WHEN THE MOUTH IS AT REST.
RIGIDITY_RLE: NORMAL
FACIAL_EXPRESSION: MODERATE: MASKED FACIES WITH LIPS PARTED SOME OF THE TIME WHEN THE MOUTH IS AT REST.
TOETAPPING_LEFT: SLIGHT: ANY OF THE FOLLOWING: A) THE REGULAR RHYTHM IS BROKEN WITH ONE WITH ONE OR TWO INTERRUPTIONS OR HESITATIONS OF THE MOVEMENT B) SLIGHT SLOWING C) THE AMPLITUDE DECREMENTS NEAR THE END OF THE 10 MOVEMENTS.
AXIAL_SCORE: 12
ARISING_CHAIR: NORMAL: ABLE TO ARISE QUICKLY WITHOUT HESITATION.
CONSTANCY_TREMOR_ATREST: SEVERE: TREMOR AT REST IS PRESENT > 75% OF THE ENTIRE EXAMINATION PERIOD.
RIGIDITY_RLE: NORMAL
TOETAPPING_RIGHT: SLIGHT: ANY OF THE FOLLOWING: A) THE REGULAR RHYTHM IS BROKEN WITH ONE WITH ONE OR TWO INTERRUPTIONS OR HESITATIONS OF THE MOVEMENT B) SLIGHT SLOWING C) THE AMPLITUDE DECREMENTS NEAR THE END OF THE 10 MOVEMENTS.
LEG_AGILITY_LEFT: SLIGHT: ANY OF THE FOLLOWING: A) THE REGULAR RHYTHM IS BROKEN WITH ONE WITH ONE OR TWO INTERRUPTIONS OR HESITATIONS OF THE MOVEMENT B) SLIGHT SLOWING C) THE AMPLITUDE DECREMENTS NEAR THE END OF THE 10 MOVEMENTS.
AXIAL_SCORE: 6
ARISING_CHAIR: NORMAL: ABLE TO ARISE QUICKLY WITHOUT HESITATION.
GAIT: NORMAL
FINGER_TAPPING_LEFT: SLIGHT: ANY OF THE FOLLOWING: A) THE REGULAR RHYTHM IS BROKEN WITH ONE WITH ONE OR TWO INTERRUPTIONS OR HESITATIONS OF THE MOVEMENT B) SLIGHT SLOWING C) THE AMPLITUDE DECREMENTS NEAR THE END OF THE 10 MOVEMENTS.
FINGER_TAPPING_RIGHT: SLIGHT: ANY OF THE FOLLOWING: A) THE REGULAR RHYTHM IS BROKEN WITH ONE WITH ONE OR TWO INTERRUPTIONS OR HESITATIONS OF THE MOVEMENT B) SLIGHT SLOWING C) THE AMPLITUDE DECREMENTS NEAR THE END OF THE 10 MOVEMENTS.
PRONATION_SUPINATION_LEFT: SLIGHT: ANY OF THE FOLLOWING: A) THE REGULAR RHYTHM IS BROKEN WITH ONE WITH ONE OR TWO INTERRUPTIONS OR HESITATIONS OF THE MOVEMENT B) SLIGHT SLOWING C) THE AMPLITUDE DECREMENTS NEAR THE END OF THE 10 MOVEMENTS.
RIGIDITY_RUE: MILD: RIGIDITY DETECTED WITHOUT THE ACTIVATION MANEUVER.  FULL RANGE OF MOTION IS EASILY ACHIEVED.
AMPLITUDE_LLE: MILD > 1 CM BUT < 3 CM IN MAXIMAL AMPLITUDE.
HANDMOVEMENTS_RIGHT: SLIGHT: ANY OF THE FOLLOWING: A) THE REGULAR RHYTHM IS BROKEN WITH ONE WITH ONE OR TWO INTERRUPTIONS OR HESITATIONS OF THE MOVEMENT B) SLIGHT SLOWING C) THE AMPLITUDE DECREMENTS NEAR THE END OF THE 10 MOVEMENTS.
POSTURAL_STABILITY: NORMAL:  RECOVERS WITH ONE OR TWO STEPS.
HANDMOVEMENTS_LEFT: SLIGHT: ANY OF THE FOLLOWING: A) THE REGULAR RHYTHM IS BROKEN WITH ONE WITH ONE OR TWO INTERRUPTIONS OR HESITATIONS OF THE MOVEMENT B) SLIGHT SLOWING C) THE AMPLITUDE DECREMENTS NEAR THE END OF THE 10 MOVEMENTS.
LEG_AGILITY_RIGHT: SLIGHT: ANY OF THE FOLLOWING: A) THE REGULAR RHYTHM IS BROKEN WITH ONE WITH ONE OR TWO INTERRUPTIONS OR HESITATIONS OF THE MOVEMENT B) SLIGHT SLOWING C) THE AMPLITUDE DECREMENTS NEAR THE END OF THE 10 MOVEMENTS.
POSTURE: 1 SLIGHT.  NOT QUITE ERECT BUT COULD BE NORMAL FOR OLDER PERSONS.
TOTAL_SCORE: 13
FINGER_TAPPING_RIGHT: SLIGHT: ANY OF THE FOLLOWING: A) THE REGULAR RHYTHM IS BROKEN WITH ONE WITH ONE OR TWO INTERRUPTIONS OR HESITATIONS OF THE MOVEMENT B) SLIGHT SLOWING C) THE AMPLITUDE DECREMENTS NEAR THE END OF THE 10 MOVEMENTS.
TOTAL_SCORE: 17
TOTAL_SCORE_LEFT: 10
RIGIDITY_LLE: NORMAL
AMPLITUDE_RUE: MODERATE: 3 - 10 CM IN MAXIMAL AMPLITUDE.
PRONATION_SUPINATION_LEFT: SLIGHT: ANY OF THE FOLLOWING: A) THE REGULAR RHYTHM IS BROKEN WITH ONE WITH ONE OR TWO INTERRUPTIONS OR HESITATIONS OF THE MOVEMENT B) SLIGHT SLOWING C) THE AMPLITUDE DECREMENTS NEAR THE END OF THE 10 MOVEMENTS.
RIGIDITY_RUE: SLIGHT: RIGIDITY ONLY DETECTED WITH ACTIVATION MANEUVER.
AMPLITUDE_RUE: NORMAL: NO TREMOR.
TOTAL_SCORE: 41
TOTAL_SCORE_LEFT: 5
FINGER_TAPPING_LEFT: SLIGHT: ANY OF THE FOLLOWING: A) THE REGULAR RHYTHM IS BROKEN WITH ONE WITH ONE OR TWO INTERRUPTIONS OR HESITATIONS OF THE MOVEMENT B) SLIGHT SLOWING C) THE AMPLITUDE DECREMENTS NEAR THE END OF THE 10 MOVEMENTS.
HANDMOVEMENTS_LEFT: SLIGHT: ANY OF THE FOLLOWING: A) THE REGULAR RHYTHM IS BROKEN WITH ONE WITH ONE OR TWO INTERRUPTIONS OR HESITATIONS OF THE MOVEMENT B) SLIGHT SLOWING C) THE AMPLITUDE DECREMENTS NEAR THE END OF THE 10 MOVEMENTS.
SPEECH: MILD: LOSS OF MODULATION, DICTION OR VOLUME, WITH A FEW WORDS UNCLEAR, BUT THE OVERALL SENTENCES EASY TO FOLLOW.
SPONTANEITY_OF_MOVEMENT: 2: MILD: MILD GLOBAL SLOWNESS AND POVERTY OF SPONTANEOUS MOVEMENTS.
AMPLITUDE_LUE: NORMAL: NO TREMOR.
AMPLITUDE_LLE: NORMAL: NO TREMOR.
RIGIDITY_NECK: NORMAL
PRONATION_SUPINATION_RIGHT: SLIGHT: ANY OF THE FOLLOWING: A) THE REGULAR RHYTHM IS BROKEN WITH ONE WITH ONE OR TWO INTERRUPTIONS OR HESITATIONS OF THE MOVEMENT B) SLIGHT SLOWING C) THE AMPLITUDE DECREMENTS NEAR THE END OF THE 10 MOVEMENTS.
PRONATION_SUPINATION_RIGHT: SLIGHT: ANY OF THE FOLLOWING: A) THE REGULAR RHYTHM IS BROKEN WITH ONE WITH ONE OR TWO INTERRUPTIONS OR HESITATIONS OF THE MOVEMENT B) SLIGHT SLOWING C) THE AMPLITUDE DECREMENTS NEAR THE END OF THE 10 MOVEMENTS.
TOETAPPING_RIGHT: SLIGHT: ANY OF THE FOLLOWING: A) THE REGULAR RHYTHM IS BROKEN WITH ONE WITH ONE OR TWO INTERRUPTIONS OR HESITATIONS OF THE MOVEMENT B) SLIGHT SLOWING C) THE AMPLITUDE DECREMENTS NEAR THE END OF THE 10 MOVEMENTS.
CONSTANCY_TREMOR_ATREST: NORMAL: NO TREMOR.
TOETAPPING_LEFT: SLIGHT: ANY OF THE FOLLOWING: A) THE REGULAR RHYTHM IS BROKEN WITH ONE WITH ONE OR TWO INTERRUPTIONS OR HESITATIONS OF THE MOVEMENT B) SLIGHT SLOWING C) THE AMPLITUDE DECREMENTS NEAR THE END OF THE 10 MOVEMENTS.
RIGIDITY_NECK: MILD: RIGIDITY DETECTED WITHOUT THE ACTIVATION MANEUVER.  FULL RANGE OF MOTION IS EASILY ACHIEVED.
POSTURAL_STABILITY: SLIGHT: 3-5 STEPS, BUT RECOVERS UNAIDED.
POSTURE: 2 MILD.  DEFINITE FLEXION, SCOLIOSIS OR LEANING OT ONE SIDE, BUT CAN CORRECT POSTURE TO NORMAL WHEN ASKED.
SPONTANEITY_OF_MOVEMENT: 0: NORMAL.  NO PROBLEMS.
SPEECH: MILD: LOSS OF MODULATION, DICTION OR VOLUME, WITH A FEW WORDS UNCLEAR, BUT THE OVERALL SENTENCES EASY TO FOLLOW.
RIGIDITY_LUE: SLIGHT: RIGIDITY ONLY DETECTED WITH ACTIVATION MANEUVER.
RIGIDITY_LUE: NORMAL
TOTAL_SCORE: 6
LEG_AGILITY_LEFT: SLIGHT: ANY OF THE FOLLOWING: A) THE REGULAR RHYTHM IS BROKEN WITH ONE WITH ONE OR TWO INTERRUPTIONS OR HESITATIONS OF THE MOVEMENT B) SLIGHT SLOWING C) THE AMPLITUDE DECREMENTS NEAR THE END OF THE 10 MOVEMENTS.
AMPLITUDE_LUE: MILD > 1 CM BUT < 3 CM IN MAXIMAL AMPLITUDE.
PARKINSONS_MEDS: OFF
AMPLITUDE_RLE: NORMAL: NO TREMOR.
HANDMOVEMENTS_RIGHT: SLIGHT: ANY OF THE FOLLOWING: A) THE REGULAR RHYTHM IS BROKEN WITH ONE WITH ONE OR TWO INTERRUPTIONS OR HESITATIONS OF THE MOVEMENT B) SLIGHT SLOWING C) THE AMPLITUDE DECREMENTS NEAR THE END OF THE 10 MOVEMENTS.
RIGIDITY_LLE: NORMAL
AMPLITUDE_LIP_JAW: MILD: > 1 BUT < 2 CM IN MAXIMAL AMPLITUDE.
PARKINSONS_MEDS: ON
AMPLITUDE_LIP_JAW: NORMAL: NO TREMOR.
AMPLITUDE_RLE: MILD > 1 CM BUT < 3 CM IN MAXIMAL AMPLITUDE.

## 2018-05-09 ASSESSMENT — PAIN SCALES - GENERAL: PAINLEVEL: NO PAIN (0)

## 2018-05-09 NOTE — PATIENT INSTRUCTIONS
May 9, 2018    Dear  Ace Cam Ramon,    Thank you for coming today.  During your visit, we have discussed the following:       __  You have completed the ON/OFF Motor Assessment.    __  Continue with your DBS workup appointments.  __  Once you're done with your workup, we'll discuss you at the DBS Consensus meeting & will let you know the decision.  __  You may contact us with any question.       For questions, you may send us a [employer] message or call [phone]    Fax number: [phone]    NICK Ford, CNP  [facility]

## 2018-05-09 NOTE — PROGRESS NOTES
"PATIENT: Ace Navarro    : 1945    NORA: May 9, 2018    REASON FOR VISIT:  Pre-DBS ON/OFF motor symptom evaluation.      HPI: Mr. Ace Navarro is a 72 year old right - handed  male who came to the [facility] neurology clinic accompanied by his son, Edd, for ON/OFF motor evaluation as part of Deep Brain Stimulation surgery work-up for management of Parkinson's disease.     He was diagnosed with Idiopathic Parkinson's disease in Sep 2010. His symptom started 6 months prior to being diagnosed with right hand tremor.  He has been followed by Dr. Herbert galvan  at [facility] & here at [facility].     His goal for DBS are improve quality of life, improve wearing off, & tremors, which at times wakes him up in the middle of the night.     Today, he reports not sleeping all night due to Restless Leg Syndrome symptoms.      Pt & son have attended the DBS education class.  He pointed out what he learned from the class that he would get DBS benefit up to 5 years, \"will get more on time,\" \"no up & down\" (fluctuation,\" \"would get the best ON time al day, improve tremor & rigidity.\"  Son acknowledged that DBS \"doesn't stop progression.\"  They asked to go over side effects again.  They asked the percentage of medication reduction post - programming.     Current antiparkinsonian medication:     PD Medications 5:30 am 12 pm 9 pm 9:35 pm 11:40 pm   Sinemet 25/100 mg  3.5 4 4     Sinemet 50/200 mg      2   Pramipexole 0.25 mg    2        Last dose of antiparkinsonian medication was taken:   Sinemet 25/100 mg- yesterday - 58 at 12 pm.  Sinemet 50/200 mg & Pramipexole - 5/7 - at HS.     In clinic, OFF motor exam was completed and patient took Sinemet 25/100 mg 4 at 7:40 am.  After 60 minutes, ON motor exam was completed.    Physical Exam:    Vital Signs:  Blood pressure 154/80, pulse 50, resp. rate 18, height 1.829 m (6'), weight 83.5 kg (184 lb), SpO2 96 %., Body mass index is 24.95 kg/(m^2).    MDS Part II  " -- Total Score: 11     -- Over the last week -- 0: Normal -- 1: Slight -- 2: Mild -- 3: Moderate -- 4: Severe     2.1 Speech: 3   2.2 Saliva and drooling: 3   2.3 Chewing and swallowin   2.4 Eating tasks: 0   2.5 Dressin   2.6 Hygiene:  0   2.7 Handwritin   2.8 Doing hobbies and other activities:  1   2.9 Turning in bed:  0   2.10 Tremor:  1   2.11 Getting out of bed/car/deep chair:   1   2.12 Walking and balance: 0   2.13 Freezin     Total:    11        MOTOR TEST: UPDRS Flowsheet was completed in Epic. Exam was videotaped.   Total OFF score = 41  Total ON score = 17    Percentage: 41 - 17 = 24 --> 24 ÷ 41 = 58.5 = 59% motor improvement.      ASSESSMENT/PLAN:    Parkinson's Disease: Mr. Navarro is a 72 year old right - handed male with a 8 year history of tremor-dominant Idiopathic Parkinson's disease who came to the clinic for ON/OFF motor evaluation as part of his Deep Brain Stimulation surgery work-up.    __  Pt & son had good knowledge about DBS.   I briefly went over DBS risks, & benefits; the possibility of 1 - 3 % serious side effects including infection, bleeding, stroke, cognitive & speech impairment, seizures, . . . . & death; the possibility of lead misplacement; appropriate DBS candidates; and DBS programming & the need to return to clinic for reprogramming.  All questions were answered.    __  He was informed that we'll contact him after the DBS team meets & discusses his work-up. He understands the plan.    The total time spent with the patient in ON/OFF motor evaluation & discussing about DBS surgery was 120 minutes and greater than 50% of the time was spent in counseling & coordination of care.    NICK Ford, CNP   [facility]rology [facility]

## 2018-05-09 NOTE — MR AVS SNAPSHOT
After Visit Summary   5/9/2018    Ace Navarro    MRN: [id number]           Patient Information     Date Of Birth          1945        Visit Information        Provider Department      5/9/2018 7:00 AM Isaias Dickens APRN CNP [facility] Neurology        Today's Diagnoses     Parkinson's disease (H)    -  1      Care Instructions    May 9, 2018    Dear Mr. Ace Navarro,    Thank you for coming today.  During your visit, we have discussed the following:       __  You have completed the ON/OFF Motor Assessment.    __  Continue with your DBS workup appointments.  __  Once you're done with your workup, we'll discuss you at the DBS Consensus meeting & will let you know the decision.  __  You may contact us with any question.       For questions, you may send us a [employer] message or call [phone]    Fax number: [phone]    NICK Ford CNP  [facility]                Follow-ups after your visit        Your next 10 appointments already scheduled     May 09, 2018 12:00 PM CDT   MR BRAIN W/O & W CONTRAST with [id number]   [facility] ([facility])    [address] [address]   [phone]           Take your medicines as usual, unless your doctor tells you not to. Bring a list of your current medicines to your exam (including vitamins, minerals and over-the-counter drugs).  You may or may not receive intravenous (IV) contrast for this exam pending the discretion of the Radiologist.  You do not need to do anything special to prepare.  The MRI machine uses a strong magnet. Please wear clothes without metal (snaps, zippers). A sweatsuit works well, or we may give you a hospital gown.  Please remove any body piercings and hair extensions before you arrive. You will also remove watches, jewelry, hairpins, wallets, dentures, partial dental plates and hearing aids. You may wear contact lenses, and you may be able  to wear your rings. We have a safe place to keep your personal items, but it is safer to leave them at home.  **IMPORTANT** THE INSTRUCTIONS BELOW ARE ONLY FOR THOSE PATIENTS WHO HAVE BEEN PRESCRIBED SEDATION OR GENERAL ANESTHESIA DURING THEIR MRI PROCEDURE:  IF YOUR DOCTOR PRESCRIBED ORAL SEDATION (take medicine to help you relax during your exam):   You must get the medicine from your doctor (oral medication) before you arrive. Bring the medicine to the exam. Do not take it at home. You ll be told when to take it upon arriving for your exam.   Arrive one hour early. Bring someone who can take you home after the test. Your medicine will make you sleepy. After the exam, you may not drive, take a bus or take a taxi by yourself.  IF YOUR DOCTOR PRESCRIBED IV SEDATION:   Arrive one hour early. Bring someone who can take you home after the test. Your medicine will make you sleepy. After the exam, you may not drive, take a bus or take a taxi by yourself.   No eating 6 hours before your exam. You may have clear liquids up until 4 hours before your exam. (Clear liquids include water, clear tea, black coffee and fruit juice without pulp.)  IF YOUR DOCTOR PRESCRIBED ANESTHESIA (be asleep for your exam):   Arrive 1 1/2 hours early. Bring someone who can take you home after the test. You may not drive, take a bus or take a taxi by yourself.   No eating 8 hours before your exam. You may have clear liquids up until 4 hours before your exam. (Clear liquids include water, clear tea, black coffee and fruit juice without pulp.)   You will spend four to five hours in the recovery room.  Please call the Imaging Department at your exam site with any questions.            May 24, 2018  8:00 AM CDT   (Arrive by 7:45 AM)   Neuropsych Eval with Carole Franklin, PhD [facility] Neuropsychology ([facility])    [address] [address] [phone]            May 24, 2018  12:00 PM CDT   (Arrive by 11:45 AM)   Deep Brain Stimulation with Humberto Briones MD   [facility] Neurosurgery ([facility])    [address] [address] [phone]              Who to contact     Please call your clinic at [phone] to:    Ask questions about your health    Make or cancel appointments    Discuss your medicines    Learn about your test results    Speak to your doctor            Additional Information About Your Visit        [employer] Information     [employer] gives you secure access to your electronic health record. If you see a primary care provider, you can also send messages to your care team and make appointments. If you have questions, please call your primary care clinic.  If you do not have a primary care provider, please call [phone] and they will assist you.      [employer] is an electronic gateway that provides easy, online access to your medical records. With [employer], you can request a clinic appointment, read your test results, renew a prescription or communicate with your care team.     To access your existing account, please contact your [facility] or call [phone] for assistance.        Care EveryWhere ID     This is your Care EveryWhere ID. This could be used by other organizations to access your [place] medical records  [id number]        Your Vitals Were     Pulse Respirations Height Pulse Oximetry BMI (Body Mass Index)       50 18 1.829 m (6') 96% 24.95 kg/m2        Blood Pressure from Last 3 Encounters:   05/09/18 154/80   05/01/18 149/83   01/30/18 132/59    Weight from Last 3 Encounters:   05/09/18 83.5 kg (184 lb)   05/01/18 84.3 kg (185 lb 14.4 oz)   01/30/18 82.6 kg (182 lb)              Today, you had the following     No orders found for display         Today's Medication Changes          These changes are accurate as of 5/9/18  9:04 AM.  If you have any questions, ask  your nurse or doctor.               These medicines have changed or have updated prescriptions.        Dose/Directions    * carbidopa-levodopa  MG per CR tablet   Commonly known as:  SINEMET CR   This may have changed:  Another medication with the same name was changed. Make sure you understand how and when to take each.   Changed by:  Isaias Dickens APRN CNP        Dose:  2 tablet   2 tablets   Refills:  0       * carbidopa-levodopa  MG per tablet   Commonly known as:  SINEMET   This may have changed:    - how much to take  - how to take this  - additional instructions   Changed by:  Isaias Dickens APRN CNP        3.5 tabs / 4 tabs / 4 tabs   Refills:  0       * Notice:  This list has 2 medication(s) that are the same as other medications prescribed for you. Read the directions carefully, and ask your doctor or other care provider to review them with you.             Primary Care Provider Office Phone # Fax #    Maurilio CRISTIAN Bonilla [phone]       [facility] [address] [address]        Equal Access to Services     [facility]: Hadii chidi joy hadasho Soomaali, waaxda luqadaha, qaybta kaalmada adeegyaelena, janet mas . So [facility] [phone].    ATENCIÓN: Si habla español, tiene a rodriguez disposición servicios gratuitos de asistencia lingüística. [facility] [phone].    We comply with applicable federal civil rights laws and [place] laws. We do not discriminate on the basis of race, color, national origin, age, disability, sex, sexual orientation, or gender identity.            Thank you!     Thank you for choosing [facility] NEUROLOGY  for your care. Our goal is always to provide you with excellent care. Hearing back from our patients is one way we can continue to improve our services. Please take a few minutes to complete the written survey that you may receive in the mail after your visit with us. Thank you!             Your  Updated Medication List - Protect others around you: Learn how to safely use, store and throw away your medicines at www.disposemymeds.org.          This list is accurate as of 5/9/18  9:04 AM.  Always use your most recent med list.                   Brand Name Dispense Instructions for use Diagnosis    * carbidopa-levodopa  MG per CR tablet    SINEMET CR     2 tablets        * carbidopa-levodopa  MG per tablet    SINEMET     3.5 tabs / 4 tabs / 4 tabs        diazepam 5 MG tablet    VALIUM    2 tablet    Take 1 tablet 30 minutes prior to MRI. May take additional tablet immediately before MRI as needed    Anxiety       folic acid 20 MG Caps      Take 800 mg by mouth        Multi-vitamin Tabs tablet      Take 1 tablet by mouth daily        PEG-3350/Electrolytes 236 g Solr      As directed. Do not fill until patient contacts the pharmacy.        pramipexole 0.25 MG tablet    MIRAPEX     Take 0.625 mg by mouth        sulfaSALAzine 500 MG tablet    AZULFIDINE     Take 2,000 mg by mouth        * Notice:  This list has 2 medication(s) that are the same as other medications prescribed for you. Read the directions carefully, and ask your doctor or other care provider to review them with you.

## 2018-05-09 NOTE — TELEPHONE ENCOUNTER
I called Mr. Navarro and let them know that he has MRI of the head was normal for age.  There is nothing on the MRI that would preclude him from having deep brain stimulation.  He is UPDRS score showed a 57% improvement on medication.  He appears to be an excellent candidate for DBS at this point.  He still needs to go through psychometric testing.  He will then meet with Dr. Briones.

## 2018-05-14 ENCOUNTER — TELEPHONE (OUTPATIENT)
Dept: NEUROSURGERY | Facility: CLINIC | Age: 73
End: 2018-05-14

## 2018-05-14 NOTE — TELEPHONE ENCOUNTER
Pt wanted to know if he would be able to play softball if he has DBS surgery. There should be no reason not to play softball. Pt should take care not to get hit in the chest where the battery sits, but otherwise, this activity should not pose any problems. No further questions at this time.

## 2018-05-21 ENCOUNTER — TELEPHONE (OUTPATIENT)
Dept: NEUROSURGERY | Facility: CLINIC | Age: 73
End: 2018-05-21

## 2018-05-21 NOTE — TELEPHONE ENCOUNTER
"Pt has neuropsych evaluation on 5/24/18 at 8:00. He knows he should be well rested for this appointment and wants to know if I think it would be too much if he plays in 3 softball games on 5/23/18. Explained that the appointment will last about 4 hours and will involve pen/paper testing as well as an \"interview\" with Dr. Franklin. He is in the best position to determine if the physical activity involved in the softball games will preclude him from being well rested and functioning at his best the following day. Pt will make the determination. No further questions at this time.   "

## 2018-05-23 ENCOUNTER — TELEPHONE (OUTPATIENT)
Dept: NEUROSURGERY | Facility: CLINIC | Age: 73
End: 2018-05-23

## 2018-05-23 NOTE — TELEPHONE ENCOUNTER
Pt's son Jose had a question about the neuropsychological evaluation scheduled for tomorrow. He is involved with pt's care and would like to be present for the interview portion of the evaluation if appropriate, but he has a work commitment from 8-9 which he can attend to by phone. He will be available after that for the duration of the appointment. I don't know when in the appointment Dr. Franklin will meet with the pt, but I will f/u with Dr. Franklin and get back to Jose. No further questions at this time. Jose is in agreement with plan.     Called Jose back to let him know that Dr. Franklin can do the interview closer to 10:00 tomorrow, so that should work. She does like to include family in this portion of the evaluation. No further questions at this time.

## 2018-05-24 ENCOUNTER — OFFICE VISIT (OUTPATIENT)
Dept: NEUROSURGERY | Facility: CLINIC | Age: 73
End: 2018-05-24
Payer: COMMERCIAL

## 2018-05-24 ENCOUNTER — OFFICE VISIT (OUTPATIENT)
Dept: NEUROPSYCHOLOGY | Facility: CLINIC | Age: 73
End: 2018-05-24
Payer: COMMERCIAL

## 2018-05-24 VITALS
WEIGHT: 182.2 LBS | SYSTOLIC BLOOD PRESSURE: 135 MMHG | DIASTOLIC BLOOD PRESSURE: 84 MMHG | HEART RATE: 50 BPM | OXYGEN SATURATION: 97 % | BODY MASS INDEX: 24.68 KG/M2 | HEIGHT: 72 IN

## 2018-05-24 DIAGNOSIS — G20.A1 PARKINSON'S DISEASE (H): Primary | ICD-10-CM

## 2018-05-24 DIAGNOSIS — F09 MENTAL DISORDER DUE TO GENERAL MEDICAL CONDITION: ICD-10-CM

## 2018-05-24 NOTE — LETTER
5/24/2018       RE: Ace Navarro  [address] [facility] [address]     Dear Colleague,    Thank you for referring your patient, Ace Navarro, to the [facility] NEUROSURGERY at [facility]. Please see a copy of my visit note below.    HISTORY AND PHYSICAL EXAM    Chief Complaint   Patient presents with     CONSULT     Deep Brain Stimulation candidacy evaluation; Parkinson's disease       HISTORY OF PRESENT ILLNESS  We saw Mr. Ace Navarro today in Neurosurgery Clinic as part of his work-up for Deep Brain Stimulation. He is a right-handed 72 year old man with Parkinson's disease. He was diagnosed in 2010 after he developed a right hand tremor. He is followed by Dr. Godienz. His tremor has remained right-sided, rarely ever having tremor in his left hand. He notes occasional tremor in his legs, precariously associated with bowel movements, but this is mild. He stays active by playing softball and boxing. The softball season ends in September.      Past Medical History:   Diagnosis Date     Parkinson's disease (H) 09/2010     RLS (restless legs syndrome)      Ulcerative colitis (H)      Vitiligo        Past Surgical History:   Procedure Laterality Date     ------------OTHER-------------      nasal     TONSILLECTOMY & ADENOIDECTOMY         Family History   Problem Relation Age of Onset     Breast Cancer Mother      HEART DISEASE Father        Social History     Social History     Marital status:      Spouse name: N/A     Number of children: N/A     Years of education: N/A     Occupational History     Not on file.     Social History Main Topics     Smoking status: Former Smoker     Smokeless tobacco: Never Used     Alcohol use No     Drug use: No     Sexual activity: No     Other Topics Concern     Not on file     Social History Narrative    Since he was diagnosed with Parkinson's disease, Mr. Navarro has not had any alcohol. Before that, he would have  alcohol one night a week while he was in a relationship for about seven years. He was never in any chemical dependency treatment programs and was never hospitalized for any alcohol related problems. He endorsed marijuana use when he was in college, and has tried it about once every three years. He has not noticed any benefit for his Parkinson's disease. He was a social smoker in his early 20s, and smoked about a pack of cigarettes a week. He last smoked 40 years ago. He denied gambling or any other compulsive behaviors.         Mr. Navarro completed a Bachelor's degree at [employer]. 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          Allergies   Allergen Reactions     Shellfish-Derived Products Swelling     LOBSTER causes throat swelling/closing  (shrimp/shellfish ok)     Aspirin Nausea       Current Outpatient Prescriptions   Medication     carbidopa-levodopa (SINEMET CR)  MG per CR tablet     carbidopa-levodopa (SINEMET)  MG per tablet     diazepam (VALIUM) 5 MG tablet     folic acid 20 MG CAPS     multivitamin, therapeutic with minerals (MULTI-VITAMIN) TABS tablet     PEG 3350-KCl-NaBcb-NaCl-NaSulf (PEG-3350/ELECTROLYTES) 236 G SOLR     pramipexole (MIRAPEX) 0.25 MG tablet     sulfaSALAzine (AZULFIDINE) 500 MG tablet     No current facility-administered medications for this visit.          REVIEW OF SYSTEMS:  General: POSITIVE for recent fatigue and weight loss. Negative for chills/sweats/fever, difficulty sleeping, or headache.  Eyes: Negative for blurred vision, crossed eyes, double vision, recent eye infections, vision flashes, or vision halos.  Ears/Nose/Mouth/Throat: Negative for bleeding gums, difficulty swallowing, earache, ear discharge, hearing loss, hoarseness, nosebleeds, tinnitus, or sinus problems.  Respiratory:Negative for chronic cough, coughing blood, night sweats, shortness of breath, Tuberculosis, or  wheezing.  Cardiovascular: Negative for chest pain, dyspnea at night, heart murmur, palpitations, pacemaker, pacemaker, poor circulation, swollen legs/feet, or varicose veins.  Gastrointestinal: Negative for melena, hematochezia, chronic diarrhea, heartburn, Hepatitis A/B/C, increasing constipation, Liver Disease, nausea, or vomiting.   Genitourinary: Negative for Urinary retention, genital discharge, urinary incontinence, prostate problems, urgency, or UTI.   Neurological: POSITIVE for tingling in legs. Negative for syncope, headaches, numbness of arms/legs, tingling in hands/arms, memory problems, or seizures.  Psychological: Negative for anxiety, depression, panic attacks, or restlessness.  Skin: Negative for chronic skin itching, color changes in hand/feet when cold, poor scarring, non-healing ulcers, skin rashes/hives, unusual moles.  Musculoskeletal: Negative for arthritis, joint swelling in hands/wrists/hips/knees/joints, muscle tenderness in arms/legs, or osteoporosis.  Endocrine: POSITIVE for loss of libido. Negative for excessive thirst/hunger, intolerance for warm rooms, multiple broken bones, rapid weight gain/loss, galactorrhea, or thyroid issues.  Hematologic/Lymphatic: Negative for easy skin bruising, significant fatigue, prolonged bleeding, tender glands/lymph nodes.  Allergies: Negative for asthma or hay fever.      PHYSICAL EXAM  /84  Pulse 50  Ht 1.829 m (6')  Wt 82.6 kg (182 lb 3.2 oz)  SpO2 97%  BMI 24.71 kg/m2    General: Awake, alert, oriented. Well nourished, well developed, no acute distress.  HEENT: Head normocephalic, atraumatic. No carotid bruit. Neck supple. Good range of motion. No palpable thyroid mass.  Heart: Regular rhythm and rate. No murmurs.  Lungs: Clear to auscultation and percussion bilaterally. No rhonchi, rales or wheeze.  Abdomen: Soft, non-tender, non-distended. No hepatosplenomegaly.  Extremity: Warm with no clubbing or cyanosis. No lower extremity  edema.    Neurological:  Awake, alert and oriented to date, time, place and person. Speech fluent.   Pupils equal, round, reactive to light.  Extraocular movement intact.  Visual fields are full on confrontation.  Hearing is grossly normal to finger rub.   Facial sensation intact.  Face symmetric.  Tongue midline.  Uvula elevates equally.    Motor: full strength throughout.  Sensation: intact to light touch and pinpoint.  Deep tendon reflexes: Trace throughout. Negative for clonus. Negative for Zuluaga's sign. No dysmetria.      IMAGING  MRI brain without/with gadolinium 5/9/2018.  He does have small ventricles.  There is some cortical atrophy but not too significant for DBS surgery.    Impression: Mild chronic small vessel ischemic disease and generalized parenchymal volume loss.        ASSESSMENT   72 year old right handed male with a history of Parkinson's disease.    After our visit today, his work-up for Deep Brain Stimulation will be complete and his case can be discussed.  We did review his MRI brain together.  I did show him the area of atrophy.  However, overall, there is no significant findings on the MRI that would be contraindicated for DBS surgery or make the surgery difficult.    During today's visit, we discussed both phase I and II of DBS surgery.  We discussed that during Phase I, we would place the DBS electrode on the left side under MAC and microelectrode recording.  He would then return one week later for the phase II with placement of the DBS generator/battery over the left chest wall under general anesthesia.  If he is a unilateral candidate or wait and see strategy candidate, then he will undergo another evaluation/discussion prior to proceeding to the right side implantation.  If  he is a bilateral candidate in a staged fashion, he would return three weeks later for the phase I and II combined for the placement of the DBS electrode on the right side under MAC and microelectrode recording  followed by connection to the previously implanted generator/battery.    Briefly, following topic was discussed.    Medtronic  MRI compatible.  Non-rechargeable and rechargeable generator/battery available.  4 contact electrode.  Communication method: have the  or patient controller on the skin directly over the generator/battery.    [employer]  Not yet MRI compatible.  Will become MRI compatible with retro-approval when FDA approves the device for MRI use.  Non-rechargeable generator/battery.  9 contact segmented/directional electrode.  Communication method: Wireless/bluetooth.    [employer]  Not MRI compatible.  Working on generator/battery that will be MRI compatible.  If patient gets an implant and needs an MRI in the future, when the device becomes available, patient can have the upgrade.  Rechargeable generator/battery.  8 contact electrode.  Independent current control at each contacts.  Communication method: Wireless.    I did discuss that the implant technique for these devices are relatively the same which was discussed again.  They all have similar risks associated with the surgery.    Risks, benefits, alternative therapies were discussed with the patient, including but not limited to infection and bleeding (intracranial), injury to the brain, stroke, death, hardware failure and possible need for more surgeries.  Surgical procedure was discussed in detail.  I also discussed possible use of ABE for neuronavigation.  All questions were answered, and he expressed understanding.  A full history and physical exam was performed during today's visit.  His case will be discussed at the Movement Disorder Consensus Group Meeting to determine whether he is a good candidate for DBS surgery.      PLAN:  1.  We will discuss his case at the Movement Disorder Consensus Group Meeting, and we will contact him regarding his DBS candidacy.       I, Nuno Little, am serving as a scribe to document services  personally performed by Humberto Briones MD, PhD, based upon my observations and the provider's statements to me. All documentation has been reviewed and edited by the aforementioned doctor prior to being entered into the official medical record.    I, Humberto Briones, attest that above named individual is acting in scribe capacity, has observed my performance of the services and has documented them in accordance with my direction. The documentation recorded by the scribe accurately reflects the service I personally performed and the decisions made by me. The document was also partially recorded by me and the entire document was edited by me as well.       65 minutes were spent face to face with the patient of which more than 50% of the time was spent counseling and discussing the above issues regarding diagnosis, surgical and device options, and steps for further evaluation.    Again, thank you for allowing me to participate in the care of your patient.      Sincerely,    Humberto Briones MD

## 2018-05-24 NOTE — MR AVS SNAPSHOT
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 If you see a primary care provider, you can also send messages to your care team and make appointments. If you have questions, please call your primary care clinic.  If you do not have a primary care provider, please call [phone] and they will assist you.      [employer] is an electronic gateway that provides easy, online access to your medical records. With [employer], you can request a clinic appointment, read your test results, renew a prescription or communicate with your care team.     To access your existing account, please contact your [facility] or call [phone] for assistance.        Care EveryWhere ID     This is your Care EveryWhere ID. This could be used by other organizations to access your [place] medical records  [id number]         Blood Pressure from Last 3 Encounters:   05/24/18 135/84   05/09/18 154/80   05/01/18 149/83    Weight from Last 3 Encounters:   05/24/18 82.6 kg (182 lb 3.2 oz)   05/09/18 83.5 kg (184 lb)   05/01/18 84.3 kg (185 lb 14.4 oz)              We Performed the Following     NEUROPSYCH TESTING BY TECH     NEUROPSYCH TESTING, PER HR/PSYCHOLOGIST         Primary Care Provider Office Phone # Fax #    Maurilio Bonilla [phone]       [facility] [address] [address]        Equal Access to Services     REBECA HORTON : Karthik joy felicity Flores, waaxda luqadaha, qaybta kaalmada roxann, janet saini laBerthaludivina garsia. So [facility] [phone].    ATENCIÓN: Si habla español, tiene a rodriguez disposición servicios gratuitos de asistencia lingüística. Llame al [phone].    We comply with applicable federal civil rights laws and [place] laws. We do not discriminate on the basis of race, color, national origin, age, disability, sex, sexual orientation, or gender identity.            Thank you!     Thank you for choosing [facility] NEUROPSYCHOLOGY  for your care. Our goal is always to provide you with excellent care. Hearing back from our patients is one way we can continue to improve our services. Please take a few minutes to complete the written survey that you may receive in the mail after your visit with us. Thank you!             Your Updated Medication List - Protect others around you: Learn how to safely use, store and throw away your medicines at www.disposemymeds.org.          This list is accurate as of 5/24/18 11:59 PM.  Always use your most recent med list.                   Brand Name Dispense Instructions for use Diagnosis    * carbidopa-levodopa  MG per CR tablet    SINEMET CR     2 tablets        * carbidopa-levodopa  MG per tablet    SINEMET     3.5 tabs / 4 tabs / 4 tabs        diazepam 5 MG tablet    VALIUM    2 tablet    Take 1 tablet 30 minutes prior to MRI. May take additional tablet immediately before MRI as needed    Anxiety       folic acid 20 MG Caps      Take 800 mg by mouth        Multi-vitamin Tabs tablet      Take 1 tablet by mouth daily        PEG-3350/Electrolytes 236 g Solr      As directed. Do not fill until patient contacts the pharmacy.        pramipexole 0.25 MG tablet    MIRAPEX      Take 2 tablets (0.5 mg) by mouth At Bedtime        sulfaSALAzine 500 MG tablet    AZULFIDINE     Take 2,000 mg by mouth        * Notice:  This list has 2 medication(s) that are the same as other medications prescribed for you. Read the directions carefully, and ask your doctor or other care provider to review them with you.

## 2018-05-24 NOTE — MR AVS SNAPSHOT
After Visit Summary   5/24/2018    Ace Navarro    MRN: [id number]           Patient Information     Date Of Birth          1945        Visit Information        Provider Department      5/24/2018 12:00 PM Humberto Briones MD [facility] Neurosurgery        Today's Diagnoses     Parkinson's disease (H)    -  1       Follow-ups after your visit        Who to contact     Please call your clinic at [phone] to:    Ask questions about your health    Make or cancel appointments    Discuss your medicines    Learn about your test results    Speak to your doctor            Additional Information About Your Visit        MyChart Information     The [employer] gives you secure access to your electronic health record. If you see a primary care provider, you can also send messages to your care team and make appointments. If you have questions, please call your primary care clinic.  If you do not have a primary care provider, please call [phone] and they will assist you.      The [employer] is an electronic gateway that provides easy, online access to your medical records. With [employer], you can request a clinic appointment, read your test results, renew a prescription or communicate with your care team.     To access your existing account, please contact your [facility] or call [phone] for assistance.        Care EveryWhere ID     This is your Care EveryWhere ID. This could be used by other organizations to access your [place] medical records  [id number]        Your Vitals Were     Pulse Height Pulse Oximetry BMI (Body Mass Index)          50 1.829 m (6') 97% 24.71 kg/m2         Blood Pressure from Last 3 Encounters:   05/24/18 135/84   05/09/18 154/80   05/01/18 149/83    Weight from Last 3 Encounters:   05/24/18 82.6 kg (182 lb 3.2 oz)   05/09/18 83.5 kg (184 lb)   05/01/18 84.3 kg (185 lb 14.4 oz)              Today, you had the following     No orders found for  display       Primary Care Provider Office Phone # Fax #    Maurilio Bonilla [phone]       [facility] [address] [id number]        Equal Access to Services     REBECA HORTON : Hadsatish chidi joy pennyo Sodevin, waaxda luqadaha, qaybta kaalmada adeandrewda, janet saini laBerthaludivina garsia. So [facility] [phone].    ATENCIÓN: Si habla español, tiene a rodriguez disposición servicios gratuitos de asistencia lingüística. Llame al [phone].    We comply with applicable federal civil rights laws and [place] laws. We do not discriminate on the basis of race, color, national origin, age, disability, sex, sexual orientation, or gender identity.            Thank you!     Thank you for choosing [facility] NEUROSURGERY  for your care. Our goal is always to provide you with excellent care. Hearing back from our patients is one way we can continue to improve our services. Please take a few minutes to complete the written survey that you may receive in the mail after your visit with us. Thank you!             Your Updated Medication List - Protect others around you: Learn how to safely use, store and throw away your medicines at www.disposemymeds.org.          This list is accurate as of 5/24/18 11:59 PM.  Always use your most recent med list.                   Brand Name Dispense Instructions for use Diagnosis    * carbidopa-levodopa  MG per CR tablet    SINEMET CR     2 tablets        * carbidopa-levodopa  MG per tablet    SINEMET     3.5 tabs / 4 tabs / 4 tabs        diazepam 5 MG tablet    VALIUM    2 tablet    Take 1 tablet 30 minutes prior to MRI. May take additional tablet immediately before MRI as needed    Anxiety       folic acid 20 MG Caps      Take 800 mg by mouth        Multi-vitamin Tabs tablet      Take 1 tablet by mouth daily        PEG-3350/Electrolytes 236 g Solr      As directed. Do not fill until patient contacts the pharmacy.        pramipexole 0.25 MG tablet     MIRAPEX     Take 2 tablets (0.5 mg) by mouth At Bedtime        sulfaSALAzine 500 MG tablet    AZULFIDINE     Take 2,000 mg by mouth        * Notice:  This list has 2 medication(s) that are the same as other medications prescribed for you. Read the directions carefully, and ask your doctor or other care provider to review them with you.

## 2018-05-24 NOTE — PROGRESS NOTES
HISTORY AND PHYSICAL EXAM    Chief Complaint   Patient presents with     CONSULT     Deep Brain Stimulation candidacy evaluation; Parkinson's disease       HISTORY OF PRESENT ILLNESS  We saw Mr. Ace Navarro today in Neurosurgery Clinic as part of his work-up for Deep Brain Stimulation. He is a right-handed 72 year old man with Parkinson's disease. He was diagnosed in 2010 after he developed a right hand tremor. He is followed by Dr. Godinez. His tremor has remained right-sided, rarely ever having tremor in his left hand. He notes occasional tremor in his legs, precariously associated with bowel movements, but this is mild. He stays active by playing softball and boxing. The softball season ends in September.      Past Medical History:   Diagnosis Date     Parkinson's disease (H) 09/2010     RLS (restless legs syndrome)      Ulcerative colitis (H)      Vitiligo        Past Surgical History:   Procedure Laterality Date     ------------OTHER-------------      nasal     TONSILLECTOMY & ADENOIDECTOMY         Family History   Problem Relation Age of Onset     Breast Cancer Mother      HEART DISEASE Father        Social History     Social History     Marital status:      Spouse name: N/A     Number of children: N/A     Years of education: N/A     Occupational History     Not on file.     Social History Main Topics     Smoking status: Former Smoker     Smokeless tobacco: Never Used     Alcohol use No     Drug use: No     Sexual activity: No     Other Topics Concern     Not on file     Social History Narrative    Since he was diagnosed with Parkinson's disease, Mr. Navarro has not had any alcohol. Before that, he would have alcohol one night a week while he was in a relationship for about seven years. He was never in any chemical dependency treatment programs and was never hospitalized for any alcohol related problems. He endorsed marijuana use when he was in college, and has tried it about once every three  years. He has not noticed any benefit for his Parkinson's disease. He was a social smoker in his early 20s, and smoked about a pack of cigarettes a week. He last smoked 40 years ago. He denied gambling or any other compulsive behaviors.         Mr. Navarro completed a Bachelor's degree at [employer]. 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          Allergies   Allergen Reactions     Shellfish-Derived Products Swelling     LOBSTER causes throat swelling/closing  (shrimp/shellfish ok)     Aspirin Nausea       Current Outpatient Prescriptions   Medication     carbidopa-levodopa (SINEMET CR)  MG per CR tablet     carbidopa-levodopa (SINEMET)  MG per tablet     diazepam (VALIUM) 5 MG tablet     folic acid 20 MG CAPS     multivitamin, therapeutic with minerals (MULTI-VITAMIN) TABS tablet     PEG 3350-KCl-NaBcb-NaCl-NaSulf (PEG-3350/ELECTROLYTES) 236 G SOLR     pramipexole (MIRAPEX) 0.25 MG tablet     sulfaSALAzine (AZULFIDINE) 500 MG tablet     No current facility-administered medications for this visit.          REVIEW OF SYSTEMS:  General: POSITIVE for recent fatigue and weight loss. Negative for chills/sweats/fever, difficulty sleeping, or headache.  Eyes: Negative for blurred vision, crossed eyes, double vision, recent eye infections, vision flashes, or vision halos.  Ears/Nose/Mouth/Throat: Negative for bleeding gums, difficulty swallowing, earache, ear discharge, hearing loss, hoarseness, nosebleeds, tinnitus, or sinus problems.  Respiratory:Negative for chronic cough, coughing blood, night sweats, shortness of breath, Tuberculosis, or wheezing.  Cardiovascular: Negative for chest pain, dyspnea at night, heart murmur, palpitations, pacemaker, pacemaker, poor circulation, swollen legs/feet, or varicose veins.  Gastrointestinal: Negative for melena, hematochezia, chronic diarrhea, heartburn, Hepatitis A/B/C, increasing  constipation, Liver Disease, nausea, or vomiting.   Genitourinary: Negative for Urinary retention, genital discharge, urinary incontinence, prostate problems, urgency, or UTI.   Neurological: POSITIVE for tingling in legs. Negative for syncope, headaches, numbness of arms/legs, tingling in hands/arms, memory problems, or seizures.  Psychological: Negative for anxiety, depression, panic attacks, or restlessness.  Skin: Negative for chronic skin itching, color changes in hand/feet when cold, poor scarring, non-healing ulcers, skin rashes/hives, unusual moles.  Musculoskeletal: Negative for arthritis, joint swelling in hands/wrists/hips/knees/joints, muscle tenderness in arms/legs, or osteoporosis.  Endocrine: POSITIVE for loss of libido. Negative for excessive thirst/hunger, intolerance for warm rooms, multiple broken bones, rapid weight gain/loss, galactorrhea, or thyroid issues.  Hematologic/Lymphatic: Negative for easy skin bruising, significant fatigue, prolonged bleeding, tender glands/lymph nodes.  Allergies: Negative for asthma or hay fever.      PHYSICAL EXAM  /84  Pulse 50  Ht 1.829 m (6')  Wt 82.6 kg (182 lb 3.2 oz)  SpO2 97%  BMI 24.71 kg/m2    General: Awake, alert, oriented. Well nourished, well developed, no acute distress.  HEENT: Head normocephalic, atraumatic. No carotid bruit. Neck supple. Good range of motion. No palpable thyroid mass.  Heart: Regular rhythm and rate. No murmurs.  Lungs: Clear to auscultation and percussion bilaterally. No rhonchi, rales or wheeze.  Abdomen: Soft, non-tender, non-distended. No hepatosplenomegaly.  Extremity: Warm with no clubbing or cyanosis. No lower extremity edema.    Neurological:  Awake, alert and oriented to date, time, place and person. Speech fluent.   Pupils equal, round, reactive to light.  Extraocular movement intact.  Visual fields are full on confrontation.  Hearing is grossly normal to finger rub.   Facial sensation intact.  Face  symmetric.  Tongue midline.  Uvula elevates equally.    Motor: full strength throughout.  Sensation: intact to light touch and pinpoint.  Deep tendon reflexes: Trace throughout. Negative for clonus. Negative for Zuluaga's sign. No dysmetria.      IMAGING  MRI brain without/with gadolinium 5/9/2018.  He does have small ventricles.  There is some cortical atrophy but not too significant for DBS surgery.    Impression: Mild chronic small vessel ischemic disease and generalized parenchymal volume loss.        ASSESSMENT   72 year old right handed male with a history of Parkinson's disease.    After our visit today, his work-up for Deep Brain Stimulation will be complete and his case can be discussed.  We did review his MRI brain together.  I did show him the area of atrophy.  However, overall, there is no significant findings on the MRI that would be contraindicated for DBS surgery or make the surgery difficult.    During today's visit, we discussed both phase I and II of DBS surgery.  We discussed that during Phase I, we would place the DBS electrode on the left side under MAC and microelectrode recording.  He would then return one week later for the phase II with placement of the DBS generator/battery over the left chest wall under general anesthesia.  If he is a unilateral candidate or wait and see strategy candidate, then he will undergo another evaluation/discussion prior to proceeding to the right side implantation.  If he is a bilateral candidate in a staged fashion, he would return three weeks later for the phase I and II combined for the placement of the DBS electrode on the right side under MAC and microelectrode recording followed by connection to the previously implanted generator/battery.    Briefly, following topic was discussed.    [employer]  MRI compatible.  Non-rechargeable and rechargeable generator/battery available.  4 contact electrode.  Communication method: have the  or patient  controller on the skin directly over the generator/battery.    [employer]  Not yet MRI compatible.  Will become MRI compatible with retro-approval when FDA approves the device for MRI use.  Non-rechargeable generator/battery.  9 contact segmented/directional electrode.  Communication method: Wireless/bluetooth.    [email or web address]  Not MRI compatible.  Working on generator/battery that will be MRI compatible.  If patient gets an implant and needs an MRI in the future, when the device becomes available, patient can have the upgrade.  Rechargeable generator/battery.  8 contact electrode.  Independent current control at each contacts.  Communication method: Wireless.    I did discuss that the implant technique for these devices are relatively the same which was discussed again.  They all have similar risks associated with the surgery.    Risks, benefits, alternative therapies were discussed with the patient, including but not limited to infection and bleeding (intracranial), injury to the brain, stroke, death, hardware failure and possible need for more surgeries.  Surgical procedure was discussed in detail.  I also discussed possible use of ABE for neuronavigation.  All questions were answered, and he expressed understanding.  A full history and physical exam was performed during today's visit.  His case will be discussed at the Movement Disorder Consensus Group Meeting to determine whether he is a good candidate for DBS surgery.      PLAN:  1.  We will discuss his case at the Movement Disorder Consensus Group Meeting, and we will contact him regarding his DBS candidacy.       I, Nuno Little, am serving as a scribe to document services personally performed by Humberto Briones MD, PhD, based upon my observations and the provider's statements to me. All documentation has been reviewed and edited by the aforementioned doctor prior to being entered into the official medical record.    I, Humberto Briones, attest that above  named individual is acting in scribe capacity, has observed my performance of the services and has documented them in accordance with my direction. The documentation recorded by the scribe accurately reflects the service I personally performed and the decisions made by me. The document was also partially recorded by me and the entire document was edited by me as well.       65 minutes were spent face to face with the patient of which more than 50% of the time was spent counseling and discussing the above issues regarding diagnosis, surgical and device options, and steps for further evaluation.

## 2018-05-24 NOTE — NURSING NOTE
The patient was seen for neuropsychological evaluation at the request of Dr. Eric Godinez for the purpose of diagnostic clarification and treatment planning.  2 hours of face to face testing were provided by this writer.  Please see Dr. Carole Franklin's report for a full interpretation of the findings.
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
## 2018-06-06 ENCOUNTER — TELEPHONE (OUTPATIENT)
Dept: NEUROLOGY | Facility: CLINIC | Age: 73
End: 2018-06-06

## 2018-06-06 NOTE — TELEPHONE ENCOUNTER
"                                                      Deep Brain Stimulation Surgery Surgical Candidacy Form    Referring Provider: Dr. Eric Godinez   Patient Information: Lives in [place], [place]  Name: Ace Navarro  YOB: 1945  Age: 72 year old  Height: 6'0\"  Weight: 182 lbs. 3.2 oz  BMI: 24.76  Blood Pressure: 135/84  Diagnosis: Parkinson's disease  Age of Onset of Symptoms: 64. Year of Onset of Symptoms: 2010.  Age of Diagnosis: 64. Year of Diagnosis: 2010.  Handedness: Right.  Side of Onset: Right upper extremity.     Disease Features: Fluctuations, Tremor and fatigue   Surgery Goals: Decrease ON/OFF fluctuations., Decrease tremor., Medication reduction. and Other: fatigue  To improve quality of life, improve wearing off, & tremors, which at times wakes him up in the middle of the night.    Medications: As of 6/6/18  Current Outpatient Prescriptions   Medication Sig Dispense Refill     carbidopa-levodopa (SINEMET CR)  MG per CR tablet 2 tablets       carbidopa-levodopa (SINEMET)  MG per tablet 3.5 tabs / 4 tabs / 4 tabs       diazepam (VALIUM) 5 MG tablet Take 1 tablet 30 minutes prior to MRI. May take additional tablet immediately before MRI as needed 2 tablet 0     folic acid 20 MG CAPS Take 800 mg by mouth       multivitamin, therapeutic with minerals (MULTI-VITAMIN) TABS tablet Take 1 tablet by mouth daily       PEG 3350-KCl-NaBcb-NaCl-NaSulf (PEG-3350/ELECTROLYTES) 236 G SOLR As directed. Do not fill until patient contacts the pharmacy.       pramipexole (MIRAPEX) 0.25 MG tablet Take 2 tablets (0.5 mg) by mouth At Bedtime       sulfaSALAzine (AZULFIDINE) 500 MG tablet Take 2,000 mg by mouth       PD Medications 5:30 am 12 pm 9 pm 9:35 pm 11:40 pm   Sinemet 25/100 mg  3.5 4 4       Sinemet 50/200 mg          2   Pramipexole 0.25 mg       2       Motor Assessment: 5/9/18  ON: 17  OFF: 41  % Change: 59%   Neuropsychological Evaluation:5/24/18    Cognitive Issues: Results " indicate slowed information processing speed, with inefficiencies in word retrieval, complex attentional processing, and retrieval of visual information. The findings do not reflect dementia at this time, and are only subtly abnormal. .    Psychiatric Issues: He denied any history of psychiatric illness. He endorsed a minimal level of depressive symptomatology. Although he denied significant anxiety, he has had some claustrophobia, and required valium for his MRI. He may benefit from having someone sit with him during the surgery. If he is particularly concerned about it, he may find it helpful to meet with a health psychologist prior to surgery, to learn strategies including deep breathing and relaxation techniques. This does not need to be a requirement prior to surgery, however.  He does not drink alcohol. He denied gambling or other compulsive behaviors.    Depression: No depression.    Cognitive Function: Mild memory difficulties or frontal deficits.    Psychosis: No hallucinations.(Episode of visual hallucinations when first diagnosed with PD, never recurred after a medication change)     MRI Date: 5/9/18    Impression:   Mild chronic small vessel ischemic disease and generalized  parenchymal volume loss.     PMH: -  Past Medical History:   Diagnosis Date     Parkinson's disease (H) 09/2010     RLS (restless legs syndrome)      Past Surgical History:   Procedure Laterality Date     ------------OTHER-------------      nasal     TONSILLECTOMY & ADENOIDECTOMY     10/23/18 DEEP BRAIN STIMULATION Surgery aborted    Soc Hx:  reports that he has quit smoking. He has never used smokeless tobacco. He reports that he does not drink alcohol or use illicit drugs.    Family Hx of Movement Disorders: family history includes Breast Cancer in his mother; HEART DISEASE in his father.    Consult Dr. Eric Godinez     Patient discussed at conference on: 6/7/18, 10/25/18, 11/15/18     DBS Meeting Notes/Further Work-up Needed:  -  1. Candidate - LSTN for right body  2. Consider health psychology but not required  3. [employer] Infinity 5  4. Unilateral for now  ------------------------  Called patient, he is interested in learning about the research opportunities with DEEP BRAIN STIMULATION especially the 7T MRI. He is willing to see a health psychologist to learn techniques to manage anxiety and stress before surgery. I sent him an email with numbers for the Health Psychologists.  -----------------------  10/23/18 Deep Brain Stimulation Surgery Aborted  -----------------------  10/25/18  Meet with Dr. Godinez bring his ex-wife - meet with neurosurgery for post op same day   Options include:  DBS under general  duodopa  Meet with Sudha Prater in follow-up  ----------------------  11/13/18 Visit with Dr. Godinez:  The patient is adamant that he still wants deep brain stimulation.  Today in talking about his goals he says he wants to have less fatigue and do better at softball.  I told him frankly that these were probably not reasonable goals for a major procedure like deep brain stimulation.  As we talked more and discussed with his son he indicates his major goals are to reduce tremor reduce off time and reduce dyskinesia.  These would be more reasonable goals.  I told him that I would take him back to the DBS consensus conference and indicate his desire to do DBS under a sleep anesthesia.  --------------------------  11/15/18  1. Yes candidate for bilateral STN under general anesthesia using [place]  2. Abbott Infinity 7  3. Venecia to call    Called patient to let him know the news. He is very pleased.

## 2018-06-08 ENCOUNTER — TELEPHONE (OUTPATIENT)
Dept: NEUROLOGY | Facility: CLINIC | Age: 73
End: 2018-06-08

## 2018-06-08 NOTE — TELEPHONE ENCOUNTER
----- Message from Tammie Rider RN sent at 6/8/2018 12:25 PM CDT -----  Regarding: Son Jose called to discuss decision  Contact: [phone]  Venecia Whaley.    I had a VM from pt's son Jose. Would like to talk with you about the DBS decision. If you have time, can you f/u with him?    Thanks,  Tammie

## 2018-06-08 NOTE — TELEPHONE ENCOUNTER
From 6/7/18 Consensus:    1. Candidate - LSTN for right body  2. Consider health psychology but not required  3. [employer] Infinity 5  4. Unilateral for now  ------------------------  Called patient, he is interested in learning about the research opportunities with DEEP BRAIN STIMULATION especially the 7T MRI. He is willing to see a health psychologist to learn techniques to manage anxiety and stress before surgery. I sent him an email with the following info:    Health Psychology    Call one of the psychologist to be seen to help you manage & deal with chronic diseases you're experiencing & other stressors in your life.  See below for contact information.  You can leave a voicemail & they'll get back to you and make the appointment.    Tamika Varela, Ph.D., L.P. [phone]  Edgar Zendejas, Ph.D., A.B.P.P., L.P. [phone]  Sudha Prater, Ph.D., L.P. [phone]

## 2018-06-12 ENCOUNTER — TELEPHONE (OUTPATIENT)
Dept: NEUROSURGERY | Facility: CLINIC | Age: 73
End: 2018-06-12

## 2018-06-12 NOTE — TELEPHONE ENCOUNTER
"Pt called to get the names of health psychologists that RN Venecia Smith mentioned in her 6/8 call. I gave him the following names/numbers:    Tamika Varela, Ph.D., L.P. [phone]  Edgar Zendejas, Ph.D., A.B.P.P., L.P. [phone]  Sudha Prater, Ph.D., L.P. [phone]    Pt indicated he would call one of them and schedule an appt. He mentioned that once he is scheduled for surgery, he would benefit from having someone in surgery to \"hold his hand\" if he needs it. I will f/u with our team, as we can provide this assistance during surgery.    Pt would also like to f/u with Dr. Franklin about something in the neuropsych evaluation. I have messaged Dr. Franklin about this.     No further questions. Pt welcome to call with future questions/concerns.   "

## 2018-06-12 NOTE — TELEPHONE ENCOUNTER
Pt left me a VM saying he spoke with Venecia Smith last week about scheduling an appt with a health psychologist but he needed some more information. I got his VM when I returned the call. Venecia's telephone note from 6/8 indicates that she was going to send pt an email with the names and contact information for several health psychologists. I asked him to check his email and to call if he doesn't have anything from Venecia. I can help with names/phone numbers in that case. Left my direct number.

## 2018-06-13 ENCOUNTER — OFFICE VISIT (OUTPATIENT)
Dept: PSYCHOLOGY | Facility: CLINIC | Age: 73
End: 2018-06-13
Payer: COMMERCIAL

## 2018-06-13 DIAGNOSIS — F40.298 SPECIFIC PHOBIA: Primary | ICD-10-CM

## 2018-06-13 NOTE — MR AVS SNAPSHOT
After Visit Summary   6/13/2018    Ace Navarro    MRN: [id number]           Patient Information     Date Of Birth          1945        Visit Information        Provider Department      6/13/2018 12:00 PM Sudha Prater, PhD [facility]        Today's Diagnoses     Specific phobia    -  1       Follow-ups after your visit        Who to contact     Please call your clinic at [phone] to:    Ask questions about your health    Make or cancel appointments    Discuss your medicines    Learn about your test results    Speak to your doctor            Additional Information About Your Visit        MyChart Information     [employer] gives you secure access to your electronic health record. If you see a primary care provider, you can also send messages to your care team and make appointments. If you have questions, please call your primary care clinic.  If you do not have a primary care provider, please call [phone] and they will assist you.      [employer] is an electronic gateway that provides easy, online access to your medical records. With [employer], you can request a clinic appointment, read your test results, renew a prescription or communicate with your care team.     To access your existing account, please contact your [facility] or call [phone] for assistance.        Care EveryWhere ID     This is your Care EveryWhere ID. This could be used by other organizations to access your [place] medical records  [id number]         Blood Pressure from Last 3 Encounters:   05/24/18 135/84   05/09/18 154/80   05/01/18 149/83    Weight from Last 3 Encounters:   05/24/18 82.6 kg (182 lb 3.2 oz)   05/09/18 83.5 kg (184 lb)   05/01/18 84.3 kg (185 lb 14.4 oz)              Today, you had the following     No orders found for display       Primary Care Provider Office Phone # Fax #    Maurilio Bonilla [phone]       [facility]  CLINIC [address] [address]        Equal Access to Services     REBECA HORTON : Hadii chidi joy felicity Mcwilliamsali, wajackieda luambermargaretha, qamaria eta karooseveltelena whitmanorlandoelena, waxay idiin hayrustydonald ayalamirandasalena garsia. So [facility] [phone].    ATENCIÓN: Si habla español, tiene a rodriguez disposición servicios gratuitos de asistencia lingüística. [facility] [phone].    We comply with applicable federal civil rights laws and [place] laws. We do not discriminate on the basis of race, color, national origin, age, disability, sex, sexual orientation, or gender identity.            Thank you!     Thank you for choosing [facility]  for your care. Our goal is always to provide you with excellent care. Hearing back from our patients is one way we can continue to improve our services. Please take a few minutes to complete the written survey that you may receive in the mail after your visit with us. Thank you!             Your Updated Medication List - Protect others around you: Learn how to safely use, store and throw away your medicines at www.disposemymeds.org.          This list is accurate as of 6/13/18 11:59 PM.  Always use your most recent med list.                   Brand Name Dispense Instructions for use Diagnosis    * carbidopa-levodopa  MG per CR tablet    SINEMET CR     2 tablets        * carbidopa-levodopa  MG per tablet    SINEMET     3.5 tabs / 4 tabs / 4 tabs        diazepam 5 MG tablet    VALIUM    2 tablet    Take 1 tablet 30 minutes prior to MRI. May take additional tablet immediately before MRI as needed    Anxiety       folic acid 20 MG Caps      Take 800 mg by mouth        Multi-vitamin Tabs tablet      Take 1 tablet by mouth daily        PEG-3350/Electrolytes 236 g Solr      As directed. Do not fill until patient contacts the pharmacy.        pramipexole 0.25 MG tablet    MIRAPEX     Take 2 tablets (0.5 mg) by mouth At Bedtime        sulfaSALAzine 500 MG tablet    AZULFIDINE      Take 2,000 mg by mouth        * Notice:  This list has 2 medication(s) that are the same as other medications prescribed for you. Read the directions carefully, and ask your doctor or other care provider to review them with you.

## 2018-06-20 ASSESSMENT — ANXIETY QUESTIONNAIRES
3. WORRYING TOO MUCH ABOUT DIFFERENT THINGS: NOT AT ALL
1. FEELING NERVOUS, ANXIOUS, OR ON EDGE: NOT AT ALL
GAD7 TOTAL SCORE: 2
4. TROUBLE RELAXING: SEVERAL DAYS
7. FEELING AFRAID AS IF SOMETHING AWFUL MIGHT HAPPEN: NOT AT ALL
GAD7 TOTAL SCORE: 2
7. FEELING AFRAID AS IF SOMETHING AWFUL MIGHT HAPPEN: NOT AT ALL
5. BEING SO RESTLESS THAT IT IS HARD TO SIT STILL: SEVERAL DAYS
GAD7 TOTAL SCORE: 2
6. BECOMING EASILY ANNOYED OR IRRITABLE: NOT AT ALL
2. NOT BEING ABLE TO STOP OR CONTROL WORRYING: NOT AT ALL

## 2018-06-20 ASSESSMENT — PATIENT HEALTH QUESTIONNAIRE - PHQ9
SUM OF ALL RESPONSES TO PHQ QUESTIONS 1-9: 2
SUM OF ALL RESPONSES TO PHQ QUESTIONS 1-9: 2
10. IF YOU CHECKED OFF ANY PROBLEMS, HOW DIFFICULT HAVE THESE PROBLEMS MADE IT FOR YOU TO DO YOUR WORK, TAKE CARE OF THINGS AT HOME, OR GET ALONG WITH OTHER PEOPLE: NOT DIFFICULT AT ALL

## 2018-06-21 ENCOUNTER — OFFICE VISIT (OUTPATIENT)
Dept: PSYCHOLOGY | Facility: CLINIC | Age: 73
End: 2018-06-21
Payer: COMMERCIAL

## 2018-06-21 DIAGNOSIS — F54 PSYCHOLOGICAL AND BEHAVIORAL FACTORS ASSOCIATED WITH DISORDERS OR DISEASES CLASSIFIED ELSEWHERE: Primary | ICD-10-CM

## 2018-06-21 DIAGNOSIS — F40.298 SPECIFIC PHOBIA: ICD-10-CM

## 2018-06-21 ASSESSMENT — ANXIETY QUESTIONNAIRES: GAD7 TOTAL SCORE: 2

## 2018-06-21 ASSESSMENT — PATIENT HEALTH QUESTIONNAIRE - PHQ9: SUM OF ALL RESPONSES TO PHQ QUESTIONS 1-9: 2

## 2018-06-21 NOTE — MR AVS SNAPSHOT
After Visit Summary   6/21/2018    Ace Navarro    MRN: [id number]           Patient Information     Date Of Birth          1945        Visit Information        Provider Department      6/21/2018 10:00 AM Sudha Prater, PhD [facility]        Today's Diagnoses     Psychological and behavioral factors associated with disorders or diseases classified elsewhere    -  1    Specific phobia           Follow-ups after your visit        Who to contact     Please call your clinic at [phone] to:    Ask questions about your health    Make or cancel appointments    Discuss your medicines    Learn about your test results    Speak to your doctor            Additional Information About Your Visit        MyChart Information     [employer] gives you secure access to your electronic health record. If you see a primary care provider, you can also send messages to your care team and make appointments. If you have questions, please call your primary care clinic.  If you do not have a primary care provider, please call [phone] and they will assist you.      [employer] is an electronic gateway that provides easy, online access to your medical records. With [employer], you can request a clinic appointment, read your test results, renew a prescription or communicate with your care team.     To access your existing account, please contact your [facility] or call [phone] for assistance.        Care EveryWhere ID     This is your Care EveryWhere ID. This could be used by other organizations to access your Mobile medical records  [id number]         Blood Pressure from Last 3 Encounters:   05/24/18 135/84   05/09/18 154/80   05/01/18 149/83    Weight from Last 3 Encounters:   05/24/18 82.6 kg (182 lb 3.2 oz)   05/09/18 83.5 kg (184 lb)   05/01/18 84.3 kg (185 lb 14.4 oz)              Today, you had the following     No orders found for display        Primary Care Provider Office Phone # Fax #    Maurilio Bonilla [phone]       [facility] [address] [address]        Equal Access to Services     REBECA HORTON : Karthik joy felicity Flores, waaxda luqadaha, qaybta kaalmada roxann, janet price geoartemio saini laBerthaludivina garsia. So [facility] [phone].    ATENCIÓN: Si habla español, tiene a rodriguez disposición servicios gratuitos de asistencia lingüística. Zhouame al [phone].    We comply with applicable federal civil rights laws and [place] laws. We do not discriminate on the basis of race, color, national origin, age, disability, sex, sexual orientation, or gender identity.            Thank you!     Thank you for choosing [facility]  for your care. Our goal is always to provide you with excellent care. Hearing back from our patients is one way we can continue to improve our services. Please take a few minutes to complete the written survey that you may receive in the mail after your visit with us. Thank you!             Your Updated Medication List - Protect others around you: Learn how to safely use, store and throw away your medicines at www.disposemymeds.org.          This list is accurate as of 6/21/18 11:59 PM.  Always use your most recent med list.                   Brand Name Dispense Instructions for use Diagnosis    * carbidopa-levodopa  MG per CR tablet    SINEMET CR     2 tablets        * carbidopa-levodopa  MG per tablet    SINEMET     3.5 tabs / 4 tabs / 4 tabs        diazepam 5 MG tablet    VALIUM    2 tablet    Take 1 tablet 30 minutes prior to MRI. May take additional tablet immediately before MRI as needed    Anxiety       folic acid 20 MG Caps      Take 800 mg by mouth        Multi-vitamin Tabs tablet      Take 1 tablet by mouth daily        PEG-3350/Electrolytes 236 g Solr      As directed. Do not fill until patient contacts the pharmacy.        pramipexole 0.25 MG tablet     MIRAPEX     Take 2 tablets (0.5 mg) by mouth At Bedtime        sulfaSALAzine 500 MG tablet    AZULFIDINE     Take 2,000 mg by mouth        * Notice:  This list has 2 medication(s) that are the same as other medications prescribed for you. Read the directions carefully, and ask your doctor or other care provider to review them with you.

## 2018-06-25 ASSESSMENT — PATIENT HEALTH QUESTIONNAIRE - PHQ9
10. IF YOU CHECKED OFF ANY PROBLEMS, HOW DIFFICULT HAVE THESE PROBLEMS MADE IT FOR YOU TO DO YOUR WORK, TAKE CARE OF THINGS AT HOME, OR GET ALONG WITH OTHER PEOPLE: NOT DIFFICULT AT ALL
SUM OF ALL RESPONSES TO PHQ QUESTIONS 1-9: 2
SUM OF ALL RESPONSES TO PHQ QUESTIONS 1-9: 2

## 2018-06-26 ASSESSMENT — PATIENT HEALTH QUESTIONNAIRE - PHQ9: SUM OF ALL RESPONSES TO PHQ QUESTIONS 1-9: 2

## 2018-06-27 ENCOUNTER — TELEPHONE (OUTPATIENT)
Dept: NEUROLOGY | Facility: CLINIC | Age: 73
End: 2018-06-27

## 2018-06-27 NOTE — TELEPHONE ENCOUNTER
Spoke to patient about the following studies:    1) Beddit sleep study  2)  intraoperative recordings  3) Clinical Core study  4) Externalization    Patient is willing to review consents for all studies and I will follow up in one week.    Patient is also interested in 7T; will forward to Sada Mulligan for consent and scheduling.    Kerline Reeder RN, MPH  Research Nurse, Movement Disorders

## 2018-06-28 ENCOUNTER — OFFICE VISIT (OUTPATIENT)
Dept: PSYCHOLOGY | Facility: CLINIC | Age: 73
End: 2018-06-28
Payer: COMMERCIAL

## 2018-06-28 ENCOUNTER — TELEPHONE (OUTPATIENT)
Dept: NEUROLOGY | Facility: CLINIC | Age: 73
End: 2018-06-28

## 2018-06-28 DIAGNOSIS — G20.A1 PARKINSON DISEASE (H): Primary | ICD-10-CM

## 2018-06-28 DIAGNOSIS — F40.298 SPECIFIC PHOBIA: ICD-10-CM

## 2018-06-29 NOTE — PROGRESS NOTES
"Health Psychology - Follow up Visit  Confidential Summary*    DATE OF VISIT:  Jun 21, 2018      REFERRAL SOURCE:  DBS for PD      CHIEF COMPLAINT/REASON FOR VISIT  Cognitive behavioral therapy and behavioral counseling in context of addressing anxiety surrounding possible claustrophobia and the impact of anxiety on his ability to successfully complete DBS surgery.  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      Patient was seen today for a 60 minute individual health and behavior intervention session.    Subjective:  Patient reported that he feels much less concerned about his possible anxiety response during the procedure secondary to our addressing his catastrophic misinterpretation on his prior claustrophobic feelings.  He reported that he now believes that he would be able to withstand the procedure and believes he has the cognitive coping strategies to assist.  We discussed the benefits of \"filling his toolbox\" with anxiety reducing strategies in the event that he has any anxiety.  Spent time in session reviewing a progressive muscle relaxation exercise.  Patient appeared to be able to fully engage in activity and described anxiety reduction.  He was given a link and encouraged to complete an assignment each day to learn which meditations work well for him.      Patient and I reviewed his remaining concerns surrounding DBS.  I attempted to answer some but felt it better to communicate with Venecia Smith for assurance of accuracy of information.      He reported that he is noticing that he believes that he is having less favorable response to medications and middle insomnia is more of an issue of late.      He continues to be active in the gym and softball and has many social relationships.  He described some concern that his thoughts become more tangential when he is \"off\" his medications.      Answers for " HPI/ROS submitted by the patient on 6/20/2018   If you checked off any problems, how difficult have these problems made it for you to do your work, take care of things at home, or get along with other people?: Not difficult at all  PHQ9 TOTAL SCORE: 2  ELIZABETH 7 TOTAL SCORE: 2  PHQ-2 Score: 0    Objective:  Patient was on time for today s session, appropriately groomed and dressed, and demonstrated good eye contact.  He appeared friendly, alert and oriented, and was experiencing some dyskinesia during the session. Mood was euthymic, with appropriate range of affect. Patient denied suicidal or assaultive ideation, plan, or intent.        Assessment:  The patient has a long history of PD and is planning for DBS.  He is being seen to address any anxiety surrounding the procedure.      Plan:  With continued guidance and education from her health care team, there are no major concerns from a psychological standpoint, and patient is probably a good candidate for DBS.  He is being taught more stress coping and meditation skills.  Patient has been provided with my contact information and knows how to contact me or any of my colleagues in Health Psychology for future follow up should she wish to do so.     Time In: 10:00  Time Out: 11:00    Diagnosis:  Axis I Anxiety disorder nos, psych factors associated with physical health   Axis II Deferred   Axis III Obesity (278.00), please see medical records for details   [place] IV Psychosocial and Environmental Stressors: Sarthak Prater, PhD, LP      *In accordance with the Rules of the [place] Board of [employer], it is noted that psychological descriptions and scientific procedures underlying psychological evaluations have limitations.  Absolute predictions cannot be made based on information in this report.

## 2018-06-29 NOTE — TELEPHONE ENCOUNTER
Patient does not have a procedure scheduled at this point in time.  Denied Diazepam refill request and sent to pharmacy.

## 2018-07-01 NOTE — PROGRESS NOTES
Health Psychology - Follow up Visit  Confidential Summary*      REFERRAL SOURCE:  DBS for PD/Neuro      CHIEF COMPLAINT/REASON FOR VISIT  Cognitive behavioral therapy and behavioral counseling in context of addressing anxiety surrounding possible claustrophobia and the impact of anxiety on his ability to successfully complete DBS surgery.  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      Patient was seen today for a 60 minute individual health and behavior intervention session.    Subjective:  Patient seen for 3rd session.  He began with report that he successfully completed all meditation assignments detailed in website sent to him.  He reported that he prefers the 12 minute guided full body meditation and believes he will be able to memorize content so that he is able to utilize during DBS if needed.  He reported that he focused on the inside of his legs and was able to experience a deep relaxation.  He also reported that he was able to complete meditations sitting and remained awake.  Suggested that he can also use when lying down (similiar to during procedure) and that they can assist in sleep disturbance.    Patient reported that sleep continues to be a challenge and that he is up several times during the night and has difficulty falling back to sleep because of movements.  Suggested he review with neuro providers.      Patient reported less anxiety regarding DBS procedure.  He continues to ask occasional detailed questions surrounding procedure.  Compiled all into list and sent to Venecia Smith for review as patient concerned that he may not recall during his neuro visit since he also intends to discuss other aspects of his treatment.    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  He has some voiced concern about the long car ride. Although he will enjoy being with his 3  team mates, he will need to stretch out and take frequent breaks to walk.  He described concern that he has reached the end of his medication effectiveness and will not make it to DBS as scheduled.  Encouraged him to review with providers.      Patient continues to describe hopefulness surrounding DBS.    Answers for HPI/ROS submitted by the patient on 6/25/2018   If you checked off any problems, how difficult have these problems made it for you to do your work, take care of things at home, or get along with other people?: Not difficult at all  PHQ9 TOTAL SCORE: 2  PHQ-2 Score: 0    Objective:  Patient was on time for today s session, appropriately groomed and dressed, and demonstrated good eye contact.  He appeared friendly, alert and oriented, and was experiencing some dyskinesia during the session. Mood was euthymic, with appropriate range of affect. Patient denied suicidal or assaultive ideation, plan, or intent.        Assessment:  The patient has a long history of PD and is planning for DBS.  He is being seen to address anxiety surrounding the procedure.      Plan:  With continued guidance and education from her health care team, there are no major concerns from a psychological standpoint, and patient is probably a good candidate for DBS.  He is being taught stress coping and meditation skills and anxiety provoking cognitions surrounding procedure are being sent to his care team as education may reduce anxiety.      Patient has been provided with my contact information and knows how to contact me or any of my colleagues in Health Psychology for future follow up should she wish to do so.     Time In: 12:00  Time Out: 1:00    Diagnosis:  Axis I Anxiety disorder nos, psych factors associated with physical health   Axis II Deferred   Axis III please see medical records for details   [place] IV Psychosocial and Environmental Stressors: Health, planned surgery         Sudha Prater, PhD, LP      *In accordance with the  Rules of the [place] Board of Psychology, it is noted that psychological descriptions and scientific procedures underlying psychological evaluations have limitations.  Absolute predictions cannot be made based on information in this report.

## 2018-07-02 NOTE — PROGRESS NOTES
WAIS-IV    Raw              Age Scaled   Similarities   23       9   Comprehension        29    13   Letter Num. Seq.   16      8   Digit Span   25    10   Matrix Reasoning     8      8     WIDE RANGE ACHIEVEMENT TEST - 4    Standard  %tile              Grade      Score  Rank              Equiv.  Reading   122     93        >12.9       WECHSLER MEMORY SCALE - REVISED    Raw Score       MAS         Information & Orientation        13   Logical Memory  Immed.   16    7     Logical Memory  30 min.   12    8      TERRAZAS VERBAL LEARNING TEST - REVISED  Form 4                                               Raw                  T                                        Trial 1               9   Trial 2               9   Trial 3  9   Total 1-3     27       56   Learning  0   Delay  7       44   Percent Retained     78       45   True Positives     12   Discrimination Index  9       42   False Positives  3     BRIEF VISUAL MEMORY TEST - REVISED  Form 1                                              Raw                  T                                        Trial 1               1   Trial 2               4   Trial 3  7   Total 1-3     12       56   Learning  6    Delay  3       44   Percent Retained     25       45 (%ile)  True Positives  5   Discrimination Index  4       42 (%ile)  False Positives  1     [place]STON NAMING TEST  Score (Correct+Stim cue)        51      MAS    6       # correct Stimulus Cue:             0           # correct Phonemic Cue:            6        D-KEFS VERBAL FLUENCY  Standard/Alternate    Raw              Age Scaled   Letter Fluency   30      9   Category Fluency        36    11   Switching Fluency    Total Correct   12    10    Switching Acc.   11    10     CLOCK DRAWING  Command  3/3        TRAIL MAKING TEST         Seconds         Errors           MAS                  A    79    0     3  .  B     153    1     6      STROOP                    Raw   +  Sanam =     Total      MAS        Word  92   +  - =     92      9    Color  67  +  - =     67    10    C-W  38  +  - =     38    11      WISCONSIN CARD SORTING TEST - 64  # Categories      5             >16    %ile  # persev err.          4         T        >80        Conc lev resp.       57        T      >80         FMS        0           MCCLENDON JUDGMENT OF LINE ORIENTATION  Form H  Raw     25  MAS         12     DEMENTIA RATING SCALE - 2 - Alternate    Raw MAS Raw     MAS   Attention  37   13   Concept  38    11   Init/Psv  37   11  Memory  22      7   Construct    6   10  Total    140/144   11     MCLEAN DEPRESSION INVENTORY - 2:  8  APATHY SCALE:   10    [facility] = [place] Older Adult Normative Study Age & Ed. Adj. Scaled Score

## 2018-07-02 NOTE — PROGRESS NOTES
Health Psychology                                      Department of Medicine                                           [facility] Mail Code 086    Kati Daley, Ph.D., L.P. [phone]  [address] [place] [address] Tamika Varela, Ph.D.,  L.P. [phone]  [place] [address]  Edgar Zendejas, Ph.D., A.B.P.P., L.P. [phone]      Sudha Prater, PhD, LP [phone]    REFERRAL SOURCE  Neurology    CHIEF COMPLAINT/REASON FOR VISIT  Psychological evaluation and treatment for anxiety related to upcoming DBS surgery.  Specifically, patient has had a history of anxiety associated with enclosed spaces.      Patient was seen today for a 60 minute standard diagnostic assessment in clinical health psychology.      HISTORY OF PRESENT ILLNESS  The patient is a very pleasant 72 year old ,  male who retired as a cable tv contractor.  He was diagnosed with PD at the age of 54.  In September, 2018, he is scheduled to have a deep brain stimulator placed to address his right sided tremor. He reported that tremor often wakes him at night and that he believes he has exhausted the benefit of medications to control his symptoms.    He reported that he is experiencing anxiety associated with the procedure stemming from concerns that he may experience anxiety while awake during the surgery with his head held in place.  He described in detail a situation that occurred in which he was on a tour of a cave and had a panic attack when the lights went out in the cave.  He iss concerned that his anxiety could elevate during the procedure and he would have a stroke from the stress.  He acknowledged some claustrophobia and required a Valium when he had an MRI. Patient reported that he has a prescription for valium and assumes that he will be able to take a medication for anxiety during the procedure but would like to learn tools to better cope with  anticipatory fear and to be able to complete the procedure without incident.      The patient denied depression or worry.      He endorsed some slowing in his cognition and described short term memory decline.  He hopes that DBS may improve these symptoms.  Given this expectation, the patient may need further education to increase the likelihood that his expectations are in line with treatment outcome.      He reported enjoying playing competitive softball and working out at the gym and  described these as his best coping strategies at this point. The patient denied suicidal ideation, is future oriented, and demonstrated appropriate range of affect during today s assessment.    SYSTEMS REVIEW  Tobacco: He was a social smoker in his early 20s, and smoked about a pack of cigarettes a week. He last smoked 40 years ago.Patient denied history or current use.   Alcohol: Since he was diagnosed with Parkinson s disease, Mr. Navarro has not had any alcohol. Before that, he would have alcohol one night a week while he was in a relationship for about seven years. He was never in any chemical dependency treatment programs and was never hospitalized for any alcohol related problems. He endorsed marijuana use when he was in college, and has tried it about once every three years. He has not noticed any benefit for his Parkinson s disease.    Illegal/recreational drug usage:  Patient denied history or current use.     PAST MEDICAL/SURGICAL HISTORY  Patient reported having seen a psychologist/counselor for marital therapy and described this as beneficial to him at the time. Patient is also prescribed and antidepressant  for symptoms of depression and reported his perception that this is helpful.     From medical record: Records indicate a history of tremor predominant idiopathic Parkinson s disease, diagnosed in September 2010, with symptom onset six months prior involving right hand tremor. He has wearing off with his carbidopa  levodopa which is mild, and he has restless leg syndrome. He is interested in undergoing deep brain stimulation (DBS) surgery for management of his symptoms.      Mr. Navarro was diagnosed with Parkinson s disease in 2000. His first symptoms involved shaking in his right hand, and it is his right side which is still affected. He is bothered by shaking in the middle of the night which wakes him up. His fatigue is also quite bothersome. His goals for surgery include reducing his fatigue, reducing his medications, and having more  on  time. He has a good understanding of the surgical procedure. He listed death and paralysis as possible risks of the procedure. He feels confident about proceeding. His son is also supportive, stating that he understands that his father is  all in  with the surgery, and that he has read about it for years. They feel that the risk/reward ratio is good because they hope his quality of life will improve.     Mr. Navarro has not noticed difficulty with cognition, including memory, word finding, comprehension, attention or concentration, decision-making, or organization. His son stated that he has had some subtle difficulty with memory over the years. He has noticed that over the last couple of years if they are having a complex discussion and too much is being said too fast it is hard for him to follow it. He lives by himself, and two of his four sons live close by. He manages his own finances, medications, driving, and cooking, apparently without difficulty. He handles his personal cares independently.     Mr. Navarro denied any history of psychiatric illness. He has not been hospitalized for psychiatric care. He described his mood as generally good, and he has not felt depressed, sad, hopeless, helpless, or worthless. When asked about feelings of guilt, he stated that he tends to go over conversations in his mind, and he can be hard on himself. He sometimes feels that he said something harsh  although his friends tell him that did not happen. He has not felt particularly anxious, and his son noted that he may even worry less about things now, and that he goes with the flow. He described his father as very positive and easy-going, and very loving and caring. He is perhaps more sentimental, welling up over happy things. His appetite has been good. He has lost 10 pounds in the last couple of years. He is more tired recently but remains very active. He exercises every day, including lifting weights for one or two hours, he plays competitive softball, he does boxing for Parkinson s disease, and he does Pilates. His interest level is good. He had visual hallucinations with the first medication that he was given for Parkinson s disease, but these have not recurred since the medication change. He denied auditory hallucinations. He denied suicidal ideation or any history of suicide attempts.      He denied gambling or any other compulsive behaviors.     PAST MEDICAL HISTORY: Medical records indicate a history of ulcerative colitis, restless leg syndrome, Parkinson s disease, bilateral inguinal hernia, vitiligo.     CURRENT MEDICATIONS:  Include carbidopa levodopa, diazepam, folic acid, multivitamins, pramipexole, sulfasalazine.     FAMILY MEDICAL HISTORY:  Significant for a father with cardiovascular disease.     SOCIAL/DEVELOPMENTAL HISTORY  Mr. Navarro completed a Bachelor s degree at [employer]. 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      Patient has been  for several years but remains close with his recent ex-wife.  He described their relationship as very supportive and he frequently socializes with she and her boyfriend.  He has 4 children and they are a close family.  Recreational activities are reported to include pilates, boxing for PD, competitive softball and working out with weights.       FAMILY HISTORY  Patient  denied any known history of psychological or psychiatric disorders in family.    MENTAL STATUS EXAMINATION  Appearance/Behavior: The patient was on time, appropriately groomed and dressed, and demonstrated good eye contact. His speech was clear and consistent, and there was no evidence of psychomotor agitation.   Consciousness/Orientation: Alert and oriented to person, place, time, and situation.   Cooperation/Reliability:  The patient appeared to honestly respond to questions about psychosocial functioning. Patient is deemed a reliable historian.   Mood/Affect: Mood euthymic; appropriate range of affect. Patient reported that he is more emotional now than he ever was in the past but denied current depression or anxiety. He stated that he has experienced claustrophobia in an MRI and cave in the past.      Appetite: Patient described good appetite with some decrease and a 10 pound loss over the past few years.    Sleep: Patient reported sometimes finding it difficult to stay asleep and he believes tremor is the reason he wakes up and occasionally has difficulty falling back to sleep.  He occasionally naps for 15 minutes and feels rested.  Body image:  Patient denied any difficulties with body image.    Speech/Language: Speech was logical and coherent. Speech was of normal rate, rhythm and volume.   Thought Form: Overall logical and organized.  Patient reported that he believes he loses track of conversations after his medications begin to wear off but this was not evident in the interview.   Thought Content:  Appropriate to interview and situation. Patient appeared focused on her health and concerns about dealing with possible anxiety during the DBS procedure.     Perception: Patient denied any difficulties with a thought disorder, including auditory or visual hallucinations. He endorsed some hallucinatory behaviors when first started treating PD but these have not recurred.  He Denied any history of  obsessive-compulsive disorder or of desean.   Cognition/Memory: No difficulties apparent upon interview; not formally assessed.  Summary of results of 5/18 neuropsych:    Current results indicate slowed psychomotor processing speed, with inefficiencies in word retrieval, complex attentional processing, and retrieval of visual information. Language, executive functioning, and visual processing abilities fall within normal limits. He endorsed minimal depressive symptomatology.     This pattern of performance does not reflect dementia at this time, and is only subtly abnormal. The findings are suggestive of frontal and subcortical system involvement, consistent with his history of Parkinson s disease. He appears to be capable of comprehending medical information and making well reasoned decisions for himself. He has a good understanding of the surgical procedure and the risks involved.      Fund of Knowledge: Consistent with age, level of education, and life experience.   Abstract Reasoning: Appropriate; not formally assessed.  Judgment: No difficulties apparent upon interview.  Insight/Motivation: Appropriate to situation. The patient is seeking assistance for coping with claustrophobia that may be experienced during DBS procedure.    Suicide/Assault: Patient denies suicidal or assaultive ideation, plan, or intent.    IMPRESSION/:  The patient is a 72 year old  male who is twice  and is currently living alone.  He has 4 children and they are a close family.  He was diagnosed with PD in 2000 and he is anticipating DBS surgery to address right sided tremor in fall, 2018.  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  He is very active and participates in pilates, weight lifting, competitive softball and enjoys socializing.  He denied current or past depression or excessive worry but endorsed some claustrophobia that has come up when in an MRI and in a cave.  During his May, 2018  neuropsychological evaluation it was suggested that he may benefit from treatment for this specific anxiety and he elected to pursue the recommendation so that he can move forward toward DBS with increased ease.      Time In:  12:00  Time Out: 1:00    DIAGNOSIS:  Axis I Specific phobia, claustrophobia   Axis II Deferred   Axis III Please see medical records for details. PD   Axis IV Psychosocial and Environmental Stressors: Health & upcoming brain surgery     PLAN:  The following recommendations were made to the patient:    1. Follow-up Services: We recommended a follow-up appointment with Dr. Sudha Prater in clinical health psychology for cognitive behavioral therapy to aid with coping tools targeting claustrophobia.    2.  Recommend continued contact with PD educational providers to address expectations for procedure.      The patient appeared amenable to these recommendations and an appointment has been set up with Dr. Sudha Prater  for treatment.  We remain available to the patient as needed.        Sudha Prater, Ph.D., L.P.

## 2018-07-03 ENCOUNTER — TELEPHONE (OUTPATIENT)
Dept: NEUROSURGERY | Facility: CLINIC | Age: 73
End: 2018-07-03

## 2018-07-03 NOTE — TELEPHONE ENCOUNTER
Received notification indirectly from patient's psychologist Sudha Prater via from RN Venecia Smith that pt has many questions about DBS surgery. Dr. Prater provided a list of questions that I reviewed with pt. However, pt said he has been doing some reading and reviewing the DBS class information and he really doesn't have questions at this time. I let patient know that if any questions about surgery come to mind, he is always welcome to call me.     I will f/u with pt once surgery orders are entered and we have dates. Pt in agreement with plan. No further questions.

## 2018-07-06 ENCOUNTER — TELEPHONE (OUTPATIENT)
Dept: NEUROLOGY | Facility: CLINIC | Age: 73
End: 2018-07-06

## 2018-07-06 NOTE — TELEPHONE ENCOUNTER
Patient left me a voicemail 7/6/18 stating he looked over paperwork I sent him (four consents for DBS studies) and said he was not interested at this time. I did call him back to confirm this and when I asked him if there was any reason in particular he changed his mind, he said he had his son look over the papers and decided it wasn't right for him. I asked him if he would still be interested in the 7T MRI scan and he stated that he was. I let him know that someone from [facility] staff will be in touch with him to schedule his 7T, and that he is welcome to call me anytime to discuss studies if he changes his mind before surgery.    Kerline Reeder, RN, MPH  Research Nurse, Movement Disorders

## 2018-07-13 ENCOUNTER — TELEPHONE (OUTPATIENT)
Dept: NEUROSURGERY | Facility: CLINIC | Age: 73
End: 2018-07-13

## 2018-07-13 NOTE — TELEPHONE ENCOUNTER
Pt has been approved for DBS and would like to know when his surgery will be. Explained that once Dr. Briones enters the surgery orders, we can then schedule surgery. Dr. Briones is out of town until 7/23 and I will f/u with him at that time about entering orders. Pt in agreement with plan.

## 2018-07-26 ENCOUNTER — HOSPITAL ENCOUNTER (OUTPATIENT)
Facility: CLINIC | Age: 73
End: 2018-07-26
Attending: NEUROLOGICAL SURGERY | Admitting: NEUROLOGICAL SURGERY
Payer: MEDICARE

## 2018-07-26 DIAGNOSIS — G20.A1 PARKINSON'S DISEASE (H): Primary | ICD-10-CM

## 2018-08-01 ENCOUNTER — TELEPHONE (OUTPATIENT)
Dept: NEUROLOGY | Facility: CLINIC | Age: 73
End: 2018-08-01

## 2018-08-01 DIAGNOSIS — F41.9 ANXIETY: ICD-10-CM

## 2018-08-01 RX ORDER — DIAZEPAM 5 MG
TABLET ORAL
Qty: 2 TABLET | Refills: 0 | Status: SHIPPED | OUTPATIENT
Start: 2018-08-01 | End: 2019-04-12

## 2018-09-05 ENCOUNTER — DOCUMENTATION ONLY (OUTPATIENT)
Dept: NEUROSURGERY | Facility: CLINIC | Age: 73
End: 2018-09-05

## 2018-09-12 ENCOUNTER — OFFICE VISIT (OUTPATIENT)
Dept: PSYCHOLOGY | Facility: CLINIC | Age: 73
End: 2018-09-12
Payer: COMMERCIAL

## 2018-09-12 DIAGNOSIS — F06.4 ANXIETY DISORDER DUE TO GENERAL MEDICAL CONDITION WITH PANIC ATTACK: ICD-10-CM

## 2018-09-12 DIAGNOSIS — G20.A1 PARKINSON DISEASE (H): Primary | ICD-10-CM

## 2018-09-12 DIAGNOSIS — F41.0 ANXIETY DISORDER DUE TO GENERAL MEDICAL CONDITION WITH PANIC ATTACK: ICD-10-CM

## 2018-09-12 NOTE — MR AVS SNAPSHOT
After Visit Summary   9/12/2018    Ace Navarro    MRN: [id number]           Patient Information     Date Of Birth          1945        Visit Information        Provider Department      9/12/2018 11:00 AM Sudha Prater, PhD [facility]        Today's Diagnoses     Parkinson disease (H)    -  1    Anxiety disorder due to general medical condition with panic attack           Follow-ups after your visit        Your next 10 appointments already scheduled     Oct 03, 2018 11:00 AM CDT   (Arrive by 10:45 AM)   Return Visit with Sudha Prater, PhD JJ   [facility] ([facility])    [address] [place] [address]  3rd [facility] [address]   [phone]            Oct 09, 2018 10:00 AM CDT   (Arrive by 9:45 AM)   PAC EVALUATION with NICK Norton [facility] Preoperative Assessment [place] ([facility])    [address] [place] [address]  4th [facility] [address]   [phone]            Oct 09, 2018 11:00 AM CDT   (Arrive by 10:45 AM)   PAC RN ASSESSMENT with  Pac Rn   [facility] Preoperative Assessment [place] ([facility])    [address] [place] [address]  4th [facility] [address]   [phone]            Oct 09, 2018 11:30 AM CDT   (Arrive by 11:15 AM)   PAC Anesthesia Consult with  Pac Anesthesiologist   [facility] Preoperative Assessment [place] ([facility])    [address] [place] [address]  4th [facility] [address]   [phone]            Oct 23, 2018   Procedure with Humberto [facility]iones MD   [facility]    [address] [place] [id number]   [phone]            Oct 23, 2018  8:40 AM CDT   CT HEAD W/O CONTRAST with UUCT1   [facility] [place] ([facility] [place])    [address] [place] [address] [phone]           How do I prepare for  my exam? (Food and drink instructions) No Food and Drink Restrictions.  How do I prepare for my exam? (Other instructions) You do not need to do anything special to prepare for this exam. For a sinus scan: Use your nose spray (nasal decongestant spray) as directed.  What should I wear: Please wear loose clothing, such as a sweat suit or jogging clothes. Avoid snaps, zippers and other metal. We may ask you to undress and put on a hospital gown.  How long does the exam take: Most scans take less than 20 minutes.  What should I bring: Please bring any scans or X-rays taken at other hospitals, if similar tests were done. Also bring a list of your medicines, including vitamins, minerals and over-the-counter drugs. It is safest to leave personal items at home.  Do I need a : No  is needed.  What do I need to tell my doctor? Be sure to tell your doctor: * If you have any allergies. * If there s any chance you are pregnant. * If you are breastfeeding.  What should I do after the exam: No restrictions, You may resume normal activities.  What is this test: A CT (computed tomography) scan is a series of pictures that allows us to look inside your body. The scanner creates images of the body in cross sections, much like slices of bread. This helps us see any problems more clearly.  Who should I call with questions: If you have any questions, please call the Imaging Department where you will have your exam. Directions, parking instructions, and other information is available on our website, [email or web address].            Oct 30, 2018   Procedure with Humberto Briones MD   [facility]    [address] [facility] [address]   [phone]            Nov 14, 2018  9:00 AM CST   (Arrive by 8:45 AM)   Return Visit with NICK [facility] ([facility] [place])    [address] [facility]  [address]rd [facility] [id number] [phone]               Future tests that were ordered for you today     Open Future Orders        Priority Expected Expires Ordered    UA with Microscopic reflex to Culture Routine  9/24/2019 9/24/2018            Who to contact     Please call your clinic at [phone] to:    Ask questions about your health    Make or cancel appointments    Discuss your medicines    Learn about your test results    Speak to your doctor            Additional Information About Your Visit        [employer] Information     [employer] gives you secure access to your electronic health record. If you see a primary care provider, you can also send messages to your care team and make appointments. If you have questions, please call your primary care clinic.  If you do not have a primary care provider, please call [phone] and they will assist you.      [employer] is an electronic gateway that provides easy, online access to your medical records. With [employer], you can request a clinic appointment, read your test results, renew a prescription or communicate with your care team.     To access your existing account, please contact your [facility] or call [phone] for assistance.        Care EveryWhere ID     This is your Care EveryWhere ID. This could be used by other organizations to access your [facility] medical records  [id number]         Blood Pressure from Last 3 Encounters:   05/24/18 135/84   05/09/18 154/80   05/01/18 149/83    Weight from Last 3 Encounters:   05/24/18 82.6 kg (182 lb 3.2 oz)   05/09/18 83.5 kg (184 lb)   05/01/18 84.3 kg (185 lb 14.4 oz)              Today, you had the following     No orders found for display       Primary Care Provider Office Phone # Fax #    Maurilio Bonilla [phone]       [facility] [address] [facility] [address]        Equal Access to Services     REBECA HORTON AH: shawn Harmon, janet good  albert geoartemio lucyanabel ladukedonald ah. So [facility] [phone].    ATENCIÓN: Si maheshla hunter, tiene a rodriguez disposición servicios gratuitos de asistencia lingüística. Melanie velez [phone].    We comply with applicable federal civil rights laws and [place] laws. We do not discriminate on the basis of race, color, national origin, age, disability, sex, sexual orientation, or gender identity.            Thank you!     Thank you for choosing [facility]  for your care. Our goal is always to provide you with excellent care. Hearing back from our patients is one way we can continue to improve our services. Please take a few minutes to complete the written survey that you may receive in the mail after your visit with us. Thank you!             Your Updated Medication List - Protect others around you: Learn how to safely use, store and throw away your medicines at www.disposemymeds.org.          This list is accurate as of 9/12/18 11:59 PM.  Always use your most recent med list.                   Brand Name Dispense Instructions for use Diagnosis    * carbidopa-levodopa  MG per CR tablet    SINEMET CR     2 tablets        * carbidopa-levodopa  MG per tablet    SINEMET     3.5 tabs / 4 tabs / 4 tabs        diazepam 5 MG tablet    VALIUM    2 tablet    Take 1 tablet 30 minutes prior to MRI. May take additional tablet immediately before MRI as needed    Anxiety       folic acid 20 MG Caps      Take 800 mg by mouth        Multi-vitamin Tabs tablet      Take 1 tablet by mouth daily        PEG-3350/Electrolytes 236 g Solr      As directed. Do not fill until patient contacts the pharmacy.        pramipexole 0.25 MG tablet    MIRAPEX     Take 2 tablets (0.5 mg) by mouth At Bedtime        sulfaSALAzine 500 MG tablet    AZULFIDINE     Take 2,000 mg by mouth        * Notice:  This list has 2 medication(s) that are the same as other medications prescribed for you. Read the directions carefully, and ask your doctor or  other care provider to review them with you.

## 2018-09-13 ENCOUNTER — PATIENT OUTREACH (OUTPATIENT)
Dept: NEUROSURGERY | Facility: CLINIC | Age: 73
End: 2018-09-13

## 2018-09-13 NOTE — PROGRESS NOTES
Returned patient's call. He received preop packet and called to discuss content of packet. Left my direct number and requested patient call me back at his convenience.

## 2018-09-17 ENCOUNTER — PATIENT OUTREACH (OUTPATIENT)
Dept: NEUROSURGERY | Facility: CLINIC | Age: 73
End: 2018-09-17

## 2018-09-17 NOTE — PROGRESS NOTES
Telephone Pre-op Teaching            Pre-op folder with specific written instructions    Left STN DBS and left side battery placement  10/23/18 at 8:00, 10/30/18 at 2:40  Dr. Briones    Discussed via telephone pre-op routine and requirements to include:  surgical procedure, post-op recovery and expectations, need for H&P (to be completed at PAC), NPO prior to OR, pre-op antibacterial showers, pain control and importance of follow-up visits. Pt reminded to hold his PD meds the morning of the 10/23/18 surgery but he may take them the morning of the 10/30/18 surgery. Surgery scheduling will coordinate OR time/date and update patient as appropriate.  3C will call with more instructions 24-48 hour pre-op.   Ample time was provided for patient questions and in-depth discussion of topics of heightened interest.  Antibacterial soap solution will be given to patient at PAC; discussed specific instructions for use. Patient verbalized understanding of instructions.  Approximately 15 minutes spent on the telephone with patient discussing and reviewing.  Patient provided triage contact number for questions or concerns that may arise prior to surgery.

## 2018-09-24 DIAGNOSIS — Z01.818 PREOPERATIVE EVALUATION TO RULE OUT SURGICAL CONTRAINDICATION: Primary | ICD-10-CM

## 2018-09-24 RX ORDER — CEFAZOLIN SODIUM 2 G/100ML
2 INJECTION, SOLUTION INTRAVENOUS
Status: CANCELLED | OUTPATIENT
Start: 2018-09-24

## 2018-09-24 RX ORDER — CEFAZOLIN SODIUM 1 G/3ML
1 INJECTION, POWDER, FOR SOLUTION INTRAMUSCULAR; INTRAVENOUS SEE ADMIN INSTRUCTIONS
Status: CANCELLED | OUTPATIENT
Start: 2018-09-24

## 2018-09-25 NOTE — PROGRESS NOTES
Health Psychology - Follow up Visit  Confidential Summary*      REFERRAL SOURCE:  DBS for PD/Neuro      CHIEF COMPLAINT/REASON FOR VISIT  Cognitive behavioral therapy and behavioral counseling in context of addressing anxiety surrounding possible claustrophobia and the impact of anxiety on his ability to successfully complete DBS surgery.  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      Patient was seen today for a 60 minute individual health and behavior intervention session.    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  He reported that his team won a national competition.  He reported that he noticed that his playing has started to suffer from his PD and he is looking forward to the relief he may experience following DBS.  He reported that he continues to have mild anxiety surrounding the procedure.  He was encouraged to make an appointment with the neurology clinic and was offered to provide assistance in making a list of his many unresolved questions.  He again asked if he could have anyone in the room.  Discussed my understanding that his son would be able to join at certain times but encouraged him to discuss with neurology team.  He also asked about recovery time and how long he would have to be away from his activities including pilates, boxing and weight lifting, again encouraged him to ask Neurology.  He reported that his surgery is scheduled for October 23.  He asked that we have one more appointment prior to procedure.  He reported that he understands that Dr. Briones has performed many of these procedures and he is comforted by this.  He would like to talk to someone who has had it.  Again, suggested he speak to neuro.      Patient described the many things that give him mona.  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  He is also looking forward to improving his softball game.     Patient endorsed some mild anxiety surrounding having DBS but reported that knowing that his son will be able to accompany him in the room will help.  He also reported that he continues to practice meditation with the goal of being proficient at using skills for relaxation when during DBS.  He was encouraged to put on his phone (download) and was encouraged to ask if he might be able to listen to tape during procedure.      Objective:  Patient was on time for today s session, appropriately groomed and dressed, and demonstrated good eye contact.  He appeared friendly, alert and oriented, and was experiencing some dyskinesia during the session. Mood was euthymic, with appropriate range of affect. Patient denied suicidal or assaultive ideation, plan, or intent.        Assessment:  The patient has a long history of PD and is planning for DBS.  He is being seen to address anxiety surrounding the procedure.      Plan:  With continued guidance and education from her health care team, there are no major concerns from a psychological standpoint, and patient is probably a good candidate for DBS.  He is being taught stress coping and meditation skills and anxiety provoking cognitions surrounding procedure are being sent to his care team as education may reduce anxiety.      Patient has been provided with my contact information and knows how to contact me or any of my colleagues in Health Psychology for future follow up should she wish to do so.     Time In: 12:00  Time Out: 1:00    Diagnosis:  Axis I Anxiety disorder nos, psych factors associated with physical health   Axis II Deferred   Axis III please see medical records for details   [place] IV Psychosocial and Environmental Stressors: Health, planned surgery         Sudha  Moi, PhD, LP      *In accordance with the Rules of the [place] [employer], it is noted that psychological descriptions and scientific procedures underlying psychological evaluations have limitations.  Absolute predictions cannot be made based on information in this report.

## 2018-10-01 ENCOUNTER — TELEPHONE (OUTPATIENT)
Dept: NEUROLOGY | Facility: CLINIC | Age: 73
End: 2018-10-01

## 2018-10-01 DIAGNOSIS — G20.A1 PARKINSON DISEASE (H): Primary | ICD-10-CM

## 2018-10-03 ENCOUNTER — OFFICE VISIT (OUTPATIENT)
Dept: PSYCHOLOGY | Facility: CLINIC | Age: 73
End: 2018-10-03
Payer: COMMERCIAL

## 2018-10-03 DIAGNOSIS — F06.4 ANXIETY DISORDER DUE TO GENERAL MEDICAL CONDITION WITH PANIC ATTACK: Primary | ICD-10-CM

## 2018-10-03 DIAGNOSIS — F41.0 ANXIETY DISORDER DUE TO GENERAL MEDICAL CONDITION WITH PANIC ATTACK: Primary | ICD-10-CM

## 2018-10-08 ENCOUNTER — PRE VISIT (OUTPATIENT)
Dept: SURGERY | Facility: CLINIC | Age: 73
End: 2018-10-08

## 2018-10-08 NOTE — TELEPHONE ENCOUNTER
RECORDS RECEIVED FROM:   DATE RECEIVED:    NOTES STATUS DETAILS   OFFICE NOTE from Cardiologist N/A    OFFICE NOTE from Pulmonoligist N/A    MEDICATION LIST Internal    DIAGNOSTIC PROCEDURES     ECHO N/A    STRESS TEST N/A    PULMONARY FUNCTION TEST N/A    CORONARY ANGIOGRAM     EKG N/A    IMAGING (DISC & REPORT)      CHEST XRAY N/A

## 2018-10-09 ENCOUNTER — OFFICE VISIT (OUTPATIENT)
Dept: SURGERY | Facility: CLINIC | Age: 73
End: 2018-10-09
Payer: COMMERCIAL

## 2018-10-09 ENCOUNTER — APPOINTMENT (OUTPATIENT)
Dept: SURGERY | Facility: CLINIC | Age: 73
End: 2018-10-09
Payer: COMMERCIAL

## 2018-10-09 ENCOUNTER — ANESTHESIA EVENT (OUTPATIENT)
Dept: SURGERY | Facility: CLINIC | Age: 73
DRG: 057 | End: 2018-10-09
Payer: MEDICARE

## 2018-10-09 VITALS
WEIGHT: 178 LBS | HEART RATE: 45 BPM | HEIGHT: 72 IN | SYSTOLIC BLOOD PRESSURE: 136 MMHG | DIASTOLIC BLOOD PRESSURE: 76 MMHG | OXYGEN SATURATION: 99 % | BODY MASS INDEX: 24.11 KG/M2

## 2018-10-09 DIAGNOSIS — Z01.818 PREOPERATIVE EVALUATION TO RULE OUT SURGICAL CONTRAINDICATION: ICD-10-CM

## 2018-10-09 DIAGNOSIS — Z01.811 PREOP PULMONARY/RESPIRATORY EXAM: Primary | ICD-10-CM

## 2018-10-09 DIAGNOSIS — Z01.811 PREOP PULMONARY/RESPIRATORY EXAM: ICD-10-CM

## 2018-10-09 LAB
ALBUMIN UR-MCNC: NEGATIVE MG/DL
ANION GAP SERPL CALCULATED.3IONS-SCNC: 6 MMOL/L (ref 3–14)
APPEARANCE UR: CLEAR
BILIRUB UR QL STRIP: NEGATIVE
BUN SERPL-MCNC: 12 MG/DL (ref 7–30)
CALCIUM SERPL-MCNC: 9.2 MG/DL (ref 8.5–10.1)
CHLORIDE SERPL-SCNC: 102 MMOL/L (ref 94–109)
CO2 SERPL-SCNC: 30 MMOL/L (ref 20–32)
COLOR UR AUTO: YELLOW
CREAT SERPL-MCNC: 0.92 MG/DL (ref 0.66–1.25)
ERYTHROCYTE [DISTWIDTH] IN BLOOD BY AUTOMATED COUNT: 13 % (ref 10–15)
GFR SERPL CREATININE-BSD FRML MDRD: 82 ML/MIN/1.7M2
GLUCOSE SERPL-MCNC: 105 MG/DL (ref 70–99)
GLUCOSE UR STRIP-MCNC: NEGATIVE MG/DL
HCT VFR BLD AUTO: 47.6 % (ref 40–53)
HGB BLD-MCNC: 15.7 G/DL (ref 13.3–17.7)
HGB UR QL STRIP: NEGATIVE
INR PPP: 1.05 (ref 0.86–1.14)
KETONES UR STRIP-MCNC: NEGATIVE MG/DL
LEUKOCYTE ESTERASE UR QL STRIP: NEGATIVE
MCH RBC QN AUTO: 29.5 PG (ref 26.5–33)
MCHC RBC AUTO-ENTMCNC: 33 G/DL (ref 31.5–36.5)
MCV RBC AUTO: 89 FL (ref 78–100)
NITRATE UR QL: NEGATIVE
PH UR STRIP: 7 PH (ref 5–7)
PLATELET # BLD AUTO: 183 10E9/L (ref 150–450)
POTASSIUM SERPL-SCNC: 5.2 MMOL/L (ref 3.4–5.3)
RBC # BLD AUTO: 5.33 10E12/L (ref 4.4–5.9)
RBC #/AREA URNS AUTO: 1 /HPF (ref 0–2)
SODIUM SERPL-SCNC: 138 MMOL/L (ref 133–144)
SOURCE: NORMAL
SP GR UR STRIP: 1.01 (ref 1–1.03)
UROBILINOGEN UR STRIP-MCNC: 0 MG/DL (ref 0–2)
WBC # BLD AUTO: 6.2 10E9/L (ref 4–11)
WBC #/AREA URNS AUTO: 1 /HPF (ref 0–5)

## 2018-10-09 NOTE — PATIENT INSTRUCTIONS
Preparing for Your Surgery      Name:  Ace Navarro   MRN:  [id number]   :  1945   Today's Date:  10/9/2018     Arriving for surgery:  Surgery date:  10/23/18  Arrival time:  6:00AM  Please come to:       [facility] [address]  [address] [place]  [address]    -   parking is available in front of the hospital from 5:15 am to 8:00 pm    -  Stop at the Information Desk in the lobby    -   Inform the information person that you are here for surgery. An escort to 3c will be provided. If you would not like an escort, please proceed to 3C on the 3rd floor. [phone]     What can I eat or drink?  -  You may have solid food or milk products until 8 hours prior to your surgery.  -  You may have water, apple juice or 7up/Sprite until 2 hours prior to your surgery.    Which medicines can I take?  Stop Aspirin, Multivitamins one week prior to surgery.  Hold Ibuprofen and Naproxen for 24 hours prior to surgery.   -  Do NOT take these medications in the morning, the day of surgery:  Hold all medications the morning of surgery        How do I prepare myself?  -  Take two showers: one the night before surgery; and one the morning of surgery.         Use Scrubcare or Hibiclens to wash from neck down.  You may use your own     shampoo and conditioner. No other hair products.   -  Do NOT use lotion, powder, deodorant, or antiperspirant the day of your surgery.  -  Do NOT wear jewelry.  -  Begin using Incentive Spirometer 1 week prior to surgery.  Use 4 times per day, up    to 5 breaths each time.  Bring Incentive Spirometer to hospital.  - Do not bring your own medications to the hospital, except for inhalers and eye   drops.  -  Bring your ID and insurance card.    Questions or Concerns:  -If you have questions or concerns regarding the day of surgery, please call [phone].     -For questions after surgery please call your surgeons office.           AFTER YOUR  SURGERY  Breathing exercises   Breathing exercises help you recover faster. Take deep breaths and let the air out slowly. This will:     Help you wake up after surgery.    Help prevent complications like pneumonia.  Preventing complications will help you go home sooner.   We may give you a breathing device (incentive spirometer) to encourage you to breathe deeply.   Nausea and vomiting   You may feel sick to your stomach after surgery; if so, let your nurse know.    Pain control:  After surgery, you may have pain. Our goal is to help you manage your pain. Pain medicine will help you feel comfortable enough to do activities that will help you heal.  These activities may include breathing exercises, walking and physical therapy.   To help your health care team treat your pain we will ask: 1) If you have pain  2) where it is located 3) describe your pain in your words  Methods of pain control include medications given by mouth, vein or by nerve block for some surgeries.  Sequential Compression Device (SCD) or Pneumo Boots:  You may need to wear SCD S on your legs or feet. These are wraps connected to a machine that pumps in air and releases it. The repeated pumping helps prevent blood clots from forming. Using an Incentive Spirometer    An incentive spirometer is a device that helps you do deep breathing exercises. These exercises expand your lungs, aid in circulation, and help prevent pneumonia. Deep breathing exercises also help you breathe better and improve the function of your lungs by:    Keeping your lungs clear    Strengthening your breathing muscles    Helping prevent respiratory complications or problems  The incentive spirometer gives you a way to take an active part in your care. A nurse or therapist will teach you breathing exercises. To do these exercises, you will breathe in through your mouth and not your nose. The incentive spirometer only works correctly if you breathe in through your mouth.    Steps  to clear lungs  Step 1. Exhale normally. Then, inhale normally.    Relax and breathe out.  Step 2. Place your lips tightly around the mouthpiece.    Make sure the device is upright and not tilted.  Step 3. Inhale as much air as you can through the mouthpiece (don't breath through your nose).    Inhale slowly and deeply.    Hold your breath long enough to keep the balls or disk raised for at least 3 to 5 seconds, or as instructed by your healthcare provider.  Step 4. Repeat the exercise regularly.  Begin using the Incentive Spirometer one week prior to your surgery, 4 times per day-5 times each.

## 2018-10-09 NOTE — MR AVS SNAPSHOT
After Visit Summary   10/9/2018    Ace Navarro    MRN: [id number]           Patient Information     Date Of Birth          1945        Visit Information        Provider Department      10/9/2018 10:00 AM Chun Marie APRN [facility]        Today's Diagnoses     Preop pulmonary/respiratory exam    -  1      Care Instructions    Preparing for Your Surgery      Name:  Ace Navarro   MRN:  [id number]   :  1945   Today's Date:  10/9/2018     Arriving for surgery:  Surgery date:  10/23/18  Arrival time:  6:00AM  Please come to:       [facility] [address]  [address] [place]  [address]    -   parking is available in front of the hospital from 5:15 am to 8:00 pm    -  Stop at the Information Desk in the lobby    -   Inform the information person that you are here for surgery. An escort to 3c will be provided. If you would not like an escort, please proceed to 3C on the 3rd floor. [phone]     What can I eat or drink?  -  You may have solid food or milk products until 8 hours prior to your surgery.  -  You may have water, apple juice or 7up/Sprite until 2 hours prior to your surgery.    Which medicines can I take?  Stop Aspirin, Multivitamins one week prior to surgery.  Hold Ibuprofen and Naproxen for 24 hours prior to surgery.   -  Do NOT take these medications in the morning, the day of surgery:  Hold all medications the morning of surgery        How do I prepare myself?  -  Take two showers: one the night before surgery; and one the morning of surgery.         Use Scrubcare or Hibiclens to wash from neck down.  You may use your own     shampoo and conditioner. No other hair products.   -  Do NOT use lotion, powder, deodorant, or antiperspirant the day of your surgery.  -  Do NOT wear jewelry.  -  Begin using Incentive Spirometer 1 week prior to surgery.  Use 4 times per day, up     to 5 breaths each time.  Bring Incentive Spirometer to hospital.  - Do not bring your own medications to the hospital, except for inhalers and eye   drops.  -  Bring your ID and insurance card.    Questions or Concerns:  -If you have questions or concerns regarding the day of surgery, please call [phone].     -For questions after surgery please call your surgeons office.           AFTER YOUR SURGERY  Breathing exercises   Breathing exercises help you recover faster. Take deep breaths and let the air out slowly. This will:     Help you wake up after surgery.    Help prevent complications like pneumonia.  Preventing complications will help you go home sooner.   We may give you a breathing device (incentive spirometer) to encourage you to breathe deeply.   Nausea and vomiting   You may feel sick to your stomach after surgery; if so, let your nurse know.    Pain control:  After surgery, you may have pain. Our goal is to help you manage your pain. Pain medicine will help you feel comfortable enough to do activities that will help you heal.  These activities may include breathing exercises, walking and physical therapy.   To help your health care team treat your pain we will ask: 1) If you have pain  2) where it is located 3) describe your pain in your words  Methods of pain control include medications given by mouth, vein or by nerve block for some surgeries.  Sequential Compression Device (SCD) or Pneumo Boots:  You may need to wear SCD S on your legs or feet. These are wraps connected to a machine that pumps in air and releases it. The repeated pumping helps prevent blood clots from forming. Using an Incentive Spirometer    An incentive spirometer is a device that helps you do deep breathing exercises. These exercises expand your lungs, aid in circulation, and help prevent pneumonia. Deep breathing exercises also help you breathe better and improve the function of your lungs by:    Keeping your lungs  clear    Strengthening your breathing muscles    Helping prevent respiratory complications or problems  The incentive spirometer gives you a way to take an active part in your care. A nurse or therapist will teach you breathing exercises. To do these exercises, you will breathe in through your mouth and not your nose. The incentive spirometer only works correctly if you breathe in through your mouth.    Steps to clear lungs  Step 1. Exhale normally. Then, inhale normally.    Relax and breathe out.  Step 2. Place your lips tightly around the mouthpiece.    Make sure the device is upright and not tilted.  Step 3. Inhale as much air as you can through the mouthpiece (don't breath through your nose).    Inhale slowly and deeply.    Hold your breath long enough to keep the balls or disk raised for at least 3 to 5 seconds, or as instructed by your healthcare provider.  Step 4. Repeat the exercise regularly.  Begin using the Incentive Spirometer one week prior to your surgery, 4 times per day-5 times each.          Follow-ups after your visit        Your next 10 appointments already scheduled     Oct 09, 2018 11:30 AM CDT   (Arrive by 11:15 AM)   PAC Anesthesia Consult with  Pac Anesthesiologist   [facility] ([facility])    [address]  4th Floor  [facility] [facility] [address] [phone]            Oct 23, 2018   Procedure with Humberto Briones MD   [facility], Same Day Surgery (--)    [address] [address]   [phone]            Oct 23, 2018  8:40 AM CDT   CT HEAD W/O CONTRAST with UUCT1   [place] ([facility] [address])    [address] [address]   [phone]           How do I prepare for my exam? (Food and drink instructions) No Food and Drink Restrictions.  How do I prepare for my exam? (Other instructions) You do not need to do anything special to prepare  for this exam. For a sinus scan: Use your nose spray (nasal decongestant spray) as directed.  What should I wear: Please wear loose clothing, such as a sweat suit or jogging clothes. Avoid snaps, zippers and other metal. We may ask you to undress and put on a hospital gown.  How long does the exam take: Most scans take less than 20 minutes.  What should I bring: Please bring any scans or X-rays taken at other hospitals, if similar tests were done. Also bring a list of your medicines, including vitamins, minerals and over-the-counter drugs. It is safest to leave personal items at home.  Do I need a : No  is needed.  What do I need to tell my doctor? Be sure to tell your doctor: * If you have any allergies. * If there s any chance you are pregnant. * If you are breastfeeding.  What should I do after the exam: No restrictions, You may resume normal activities.  What is this test: A CT (computed tomography) scan is a series of pictures that allows us to look inside your body. The scanner creates images of the body in cross sections, much like slices of bread. This helps us see any problems more clearly.  Who should I call with questions: If you have any questions, please call the Imaging Department where you will have your exam. Directions, parking instructions, and other information is available on our website, [email or web address].            Oct 30, 2018   Procedure with Humberto Briones MD   [facility], [place], Same Day Surgery (--)    [address] [facility] [address]   [phone]            Nov 14, 2018  9:00 AM CST   (Arrive by 8:45 AM)   Return Visit with NICK Britton CNP [facility] ([facility])    [address]  3rd Floor  [facility] [id number] [phone]            Nov 19, 2018  7:20 AM CST   (Arrive by 7:05 AM)   Deep Brain Stimulation with NICK Wright CNP   [facility] Neurology ([facility])    [address]  [address] [facility] [address]   [phone]            Nov 19, 2018 12:20 PM CST   CT HEAD W/O CONTRAST with UCCT1   [facility] CT ([facility])    [address]  [address] [facility] [address]   [phone]           How do I prepare for my exam? (Food and drink instructions) No Food and Drink Restrictions.  How do I prepare for my exam? (Other instructions) You do not need to do anything special to prepare for this exam. For a sinus scan: Use your nose spray (nasal decongestant spray) as directed.  What should I wear: Please wear loose clothing, such as a sweat suit or jogging clothes. Avoid snaps, zippers and other metal. We may ask you to undress and put on a hospital gown.  How long does the exam take: Most scans take less than 20 minutes.  What should I bring: Please bring any scans or X-rays taken at other hospitals, if similar tests were done. Also bring a list of your medicines, including vitamins, minerals and over-the-counter drugs. It is safest to leave personal items at home.  Do I need a : No  is needed.  What do I need to tell my doctor? Be sure to tell your doctor: * If you have any allergies. * If there s any chance you are pregnant. * If you are breastfeeding.  What should I do after the exam: No restrictions, You may resume normal activities.  What is this test: A CT (computed tomography) scan is a series of pictures that allows us to look inside your body. The scanner creates images of the body in cross sections, much like slices of bread. This helps us see any problems more clearly.  Who should I call with questions: If you have any questions, please call the Imaging Department where you will have your exam. Directions, parking instructions, and other information is available on our website, [email or web address].              Future tests that were ordered for you today     Open Future Orders         Priority Expected Expires Ordered    EKG 12-lead complete w/read - Clinics Routine 10/9/2018 11/8/2018 10/9/2018    ABO/Rh type and screen Routine 10/9/2018 11/8/2018 10/9/2018    Basic metabolic panel Routine 10/9/2018 11/8/2018 10/9/2018    CBC with platelets Routine 10/9/2018 11/8/2018 10/9/2018    INR Routine 10/9/2018 11/8/2018 10/9/2018            Who to contact     Please call your clinic at [phone] to:    Ask questions about your health    Make or cancel appointments    Discuss your medicines    Learn about your test results    Speak to your doctor            Additional Information About Your Visit        [employer] Information     [employer] gives you secure access to your electronic health record. If you see a primary care provider, you can also send messages to your care team and make appointments. If you have questions, please call your primary care clinic.  If you do not have a primary care provider, please call [phone] and they will assist you.      [employer] is an electronic gateway that provides easy, online access to your medical records. With [employer], you can request a clinic appointment, read your test results, renew a prescription or communicate with your care team.     To access your existing account, please contact your [facility] or call [phone] for assistance.        Care EveryWhere ID     This is your Care EveryWhere ID. This could be used by other organizations to access your [place] medical records  [id number]        Your Vitals Were     Pulse Height Pulse Oximetry BMI (Body Mass Index)          45 1.829 m (6') 99% 24.14 kg/m2         Blood Pressure from Last 3 Encounters:   10/09/18 136/76   05/24/18 135/84   05/09/18 154/80    Weight from Last 3 Encounters:   10/09/18 80.7 kg (178 lb)   05/24/18 82.6 kg (182 lb 3.2 oz)   05/09/18 83.5 kg (184 lb)               Primary Care Provider Office Phone # Fax #    Maurilio Bonilla [phone]  [phone]       [facility] [address] [address]        Equal Access to Services     REBECA HORTON : Hadii chidi joy felicity Flores, wajackieda alenmargaretha, vamshita karooseveltda geocandy, janet castro noridonald guardadoartemio lucyanabel tg garsia. So [facility] [phone].    ATENCIÓN: Si habla español, tiene a rodriguez disposición servicios gratuitos de asistencia lingüística. Llame al [phone].    We comply with applicable federal civil rights laws and [place] laws. We do not discriminate on the basis of race, color, national origin, age, disability, sex, sexual orientation, or gender identity.            Thank you!     Thank you for choosing [facility]  for your care. Our goal is always to provide you with excellent care. Hearing back from our patients is one way we can continue to improve our services. Please take a few minutes to complete the written survey that you may receive in the mail after your visit with us. Thank you!             Your Updated Medication List - Protect others around you: Learn how to safely use, store and throw away your medicines at www.disposemymeds.org.          This list is accurate as of 10/9/18 11:12 AM.  Always use your most recent med list.                   Brand Name Dispense Instructions for use Diagnosis    * carbidopa-levodopa  MG per CR tablet    SINEMET CR     Take 2 tablets by mouth At Bedtime        * carbidopa-levodopa  MG per tablet    SINEMET     Take 4 tablets by mouth 3 times daily 4 tabs 0500/ 4 tabs 1200 / 4 tabs 2100        diazepam 5 MG tablet    VALIUM    2 tablet    Take 1 tablet 30 minutes prior to MRI. May take additional tablet immediately before MRI as needed    Anxiety       folic acid 20 MG Caps      Take 800 mg by mouth        Multi-vitamin Tabs tablet      Take 1 tablet by mouth daily        PEG-3350/Electrolytes 236 g Solr      As directed. Do not fill until patient contacts the pharmacy.        pramipexole 0.25 MG  tablet    MIRAPEX     Take 2 tablets (0.5 mg) by mouth At Bedtime        sulfaSALAzine 500 MG tablet    AZULFIDINE     Take 2,000 mg by mouth every morning        * Notice:  This list has 2 medication(s) that are the same as other medications prescribed for you. Read the directions carefully, and ask your doctor or other care provider to review them with you.

## 2018-10-09 NOTE — H&P
Pre-Operative H & P     CC:  Preoperative exam to assess for increased cardiopulmonary risk while undergoing surgery and anesthesia.    Date of Encounter: 10/9/2018  Primary Care Physician:  Maurilio Bonilla    Reason for visit:  Preop pulmonary/respiratory exam  DBS      HPI  Ace Navarro is a 73 year old male who presents for pre-operative H & P in preparation for Left Side Deep Brain Stimulator Placement, Phase One, Placement of Left Side Deep Brain Stimulator Electrode Target Left Subthalamic Nucleus with Microelectrode Recording on 10/23/2018 and  Deep Brain Stimulator Placement, Phase 2, Placement of Deep Brain Stimulator Generator/Battery Over the Left Chest Wall on 10/30/2018 by Dr. Briones in treatment of Parkinson s disease at [facility].     History is obtained from the patient.   Parkinson s disease, he has upper extremity tremors and fatigue. He feels that his activity level is decreasing and interested in surgical interventions.      Past Medical History  Past Medical History:   Diagnosis Date     Parkinson's disease (H) 09/2010     RLS (restless legs syndrome)      Ulcerative colitis (H)      Vitiligo        Past Surgical History  Past Surgical History:   Procedure Laterality Date     ------------OTHER-------------      nasal     TONSILLECTOMY & ADENOIDECTOMY         Hx of Blood transfusions/reactions: none    Hx of abnormal bleeding or anti-platelet use: none    Menstrual history: No LMP for male patient.:     Steroid use in the last year: none    Personal or FH with difficulty with Anesthesia:  None        Prior to Admission Medications  Current Outpatient Prescriptions   Medication Sig Dispense Refill     carbidopa-levodopa (SINEMET CR)  MG per CR tablet Take 2 tablets by mouth At Bedtime        carbidopa-levodopa (SINEMET)  MG per tablet Take 4 tablets by mouth 3 times daily 4 tabs 0500/ 4 tabs 1200 / 4 tabs 2100       diazepam  (VALIUM) 5 MG tablet Take 1 tablet 30 minutes prior to MRI. May take additional tablet immediately before MRI as needed 2 tablet 0     folic acid 20 MG CAPS Take 800 mg by mouth       multivitamin, therapeutic with minerals (MULTI-VITAMIN) TABS tablet Take 1 tablet by mouth daily       PEG 3350-KCl-NaBcb-NaCl-NaSulf (PEG-3350/ELECTROLYTES) 236 G SOLR As directed. Do not fill until patient contacts the pharmacy.       pramipexole (MIRAPEX) 0.25 MG tablet Take 2 tablets (0.5 mg) by mouth At Bedtime       sulfaSALAzine (AZULFIDINE) 500 MG tablet Take 2,000 mg by mouth every morning          Allergies  Allergies   Allergen Reactions     Shellfish-Derived Products Swelling     LOBSTER causes throat swelling/closing  (shrimp/shellfish ok)     Aspirin Nausea       Social History  Social History     Social History     Marital status:      Spouse name: N/A     Number of children: N/A     Years of education: N/A     Occupational History     Not on file.     Social History Main Topics     Smoking status: Never Smoker     Smokeless tobacco: Never Used     Alcohol use No     Drug use: No     Sexual activity: No     Other Topics Concern     Not on file     Social History Narrative    Since he was diagnosed with Parkinson's disease, Mr. Navarro has not had any alcohol. Before that, he would have alcohol one night a week while he was in a relationship for about seven years. He was never in any chemical dependency treatment programs and was never hospitalized for any alcohol related problems. He endorsed marijuana use when he was in college, and has tried it about once every three years. He has not noticed any benefit for his Parkinson's disease. He was a social smoker in his early 20s, and smoked about a pack of cigarettes a week. He last smoked 40 years ago. He denied gambling or any other compulsive behaviors.         Mr. Navarro completed a Bachelor's degree at [employer]. He worked as a contractor and construction,  "retiring in 2013. He has been  twice and he has four children.       Family History  Family History   Problem Relation Age of Onset     Breast Cancer Mother      HEART DISEASE Father          Anesthesia Evaluation     . Pt has had prior anesthetic. Type: General and MAC           ROS/MED HX    ENT/Pulmonary:  - neg pulmonary ROS     Neurologic:     (+)Parkinson's disease features: fatigue and temors, other neuro RLS    Cardiovascular:  - neg cardiovascular ROS   (+) ----. : . . . :. . Previous cardiac testing date:results:date: results:ECG reviewed date:10/9/2018 results: date: results:          METS/Exercise Tolerance:  >4 METS   Hematologic:  - neg hematologic  ROS       Musculoskeletal:  - neg musculoskeletal ROS       GI/Hepatic:     (+) Inflammatory bowel disease,       Renal/Genitourinary:  - ROS Renal section negative       Endo:  - neg endo ROS       Psychiatric:         Infectious Disease:  - neg infectious disease ROS       Malignancy:      - no malignancy   Other:    (+) No chance of pregnancy C-spine cleared: N/A, no H/O Chronic Pain,no other significant disability   - neg other ROS           Physical Exam  Normal systems: cardiovascular, pulmonary and dental    Airway   Mallampati: II  TM distance: >3 FB  Neck ROM: full    Dental     Cardiovascular   Rhythm and rate: regular and normal      Pulmonary    breath sounds clear to auscultation          The complete review of systems is negative other than noted in the HPI or here.       BP: 136/76 Pulse: (!) 45     SpO2: 99 %         178 lbs 0 oz  6' 0\"   Body mass index is 24.14 kg/(m^2).       Physical Exam  Constitutional: Awake, alert, cooperative, no apparent distress, and appears stated age.  Eyes: Pupils equal, round and reactive to light, extra ocular muscles intact, sclera clear, conjunctiva normal.  HENT: Normocephalic, oral pharynx with moist mucus membranes, good dentition. No goiter appreciated.   Respiratory: Clear to auscultation " bilaterally, no crackles or wheezing.  Cardiovascular: Regular rate and rhythm, normal S1 and S2, and no murmur noted.  Carotids +2, no bruits. No edema. Palpable pulses to radial  DP and PT arteries.   GI: Normal bowel sounds, soft, non-distended, non-tender, no masses palpated, no hepatosplenomegaly.  Lymph/Hematologic: No cervical lymphadenopathy and no supraclavicular lymphadenopathy.  Genitourinary:  Deferred   Skin: Warm and dry.  No rashes at anticipated surgical site.   Musculoskeletal: Full ROM of neck. There is no redness, warmth, or swelling of the joints. Gross motor strength is normal.  UE tremors  Neurologic: Awake, alert, oriented to name, place and time. Cranial nerves II-XII are grossly intact. Gait is normal.  Neuropsychiatric: Calm, cooperative. Normal affect.     Labs: (personally reviewed)  Results for LYDIA BURROUGHS (MRN [id number]) as of 10/9/2018 14:38   Ref. Range 10/9/2018 11:43 10/9/2018 11:44 10/9/2018 12:19   Sodium Latest Ref Range: 133 - 144 mmol/L  138    Potassium Latest Ref Range: 3.4 - 5.3 mmol/L  5.2    Chloride Latest Ref Range: 94 - 109 mmol/L  102    Carbon Dioxide Latest Ref Range: 20 - 32 mmol/L  30    Urea Nitrogen Latest Ref Range: 7 - 30 mg/dL  12    Creatinine Latest Ref Range: 0.66 - 1.25 mg/dL  0.92    GFR Estimate Latest Ref Range: >60 mL/min/1.7m2  82    GFR Estimate If Black Latest Ref Range: >60 mL/min/1.7m2  >90    Calcium Latest Ref Range: 8.5 - 10.1 mg/dL  9.2    Anion Gap Latest Ref Range: 3 - 14 mmol/L  6    Glucose Latest Ref Range: 70 - 99 mg/dL  105 (H)    WBC Latest Ref Range: 4.0 - 11.0 10e9/L  6.2    Hemoglobin Latest Ref Range: 13.3 - 17.7 g/dL  15.7    Hematocrit Latest Ref Range: 40.0 - 53.0 %  47.6    Platelet Count Latest Ref Range: 150 - 450 10e9/L  183    RBC Count Latest Ref Range: 4.4 - 5.9 10e12/L  5.33    MCV Latest Ref Range: 78 - 100 fl  89    MCH Latest Ref Range: 26.5 - 33.0 pg  29.5    MCHC Latest Ref Range: 31.5 - 36.5 g/dL  33.0     RDW Latest Ref Range: 10.0 - 15.0 %  13.0    INR Latest Ref Range: 0.86 - 1.14   1.05    ABO Unknown O     RH(D) Unknown Neg     Antibody Screen Unknown Neg     Test Valid Only At Latest Units:     [facility]     Specimen Expires Unknown 10/12/2018     Blood Bank Comment Unknown Preadmit form and...     Color Urine Unknown   Yellow   Appearance Urine Unknown   Clear   Glucose Urine Latest Ref Range: NEG^Negative mg/dL   Negative   Bilirubin Urine Latest Ref Range: NEG^Negative    Negative   Ketones Urine Latest Ref Range: NEG^Negative mg/dL   Negative   Specific Gravity Urine Latest Ref Range: 1.003 - 1.035    1.009   pH Urine Latest Ref Range: 5.0 - 7.0 pH   7.0   Protein Albumin Urine Latest Ref Range: NEG^Negative mg/dL   Negative   Urobilinogen mg/dL Latest Ref Range: 0.0 - 2.0 mg/dL   0.0   Nitrite Urine Latest Ref Range: NEG^Negative    Negative   Blood Urine Latest Ref Range: NEG^Negative    Negative   Leukocyte Esterase Urine Latest Ref Range: NEG^Negative    Negative   Source Unknown   Midstream Urine   WBC Urine Latest Ref Range: 0 - 5 /HPF   1   RBC Urine Latest Ref Range: 0 - 2 /HPF   1       EKG: Personally reviewed but formal cardiology read pending: 10/9/2018 sinus koby with occasional PVC        Outside records reviewed from: care everywhere      ASSESSMENT and PLAN  Ace Navarro is a 73 year old male scheduled to undergo Left Side Deep Brain Stimulator Placement, Phase One, Placement of Left Side Deep Brain Stimulator Electrode Target Left Subthalamic Nucleus with Microelectrode Recording on 10/23/2018 and  Deep Brain Stimulator Placement, Phase 2, Placement of Deep Brain Stimulator Generator/Battery Over the Left Chest Wall on 10/30/2018 by Dr. Briones in treatment of Parkinson s disease. He has the following specific operative considerations:   - RCRI : Low serious cardiac risks.  0.4% risk of major adverse cardiac event.   - Anesthesia considerations:  Refer to PAC assessment  in anesthesia records  - VTE risk: 1.8%  - MICHAEL # of risks 2/8 = low risk  - Post-op delirium risk: high risk due to age   - Risk of PONV score = 2.  If > 2, anti-emetic intervention recommended.     Previous anesthesia without complications  1) Cardiac: No known cardiac diagnosis or symptoms. EKG today sinus koby, occasional PVC. METS >4, he still plays softball and works out every other day  2) Pulmonary: Never smoked, denies pulmonary symptoms  3) GI: UC, no exacerbation of symptoms for over 5 years on sulfasalazine.  4) Neuro: Parkinson s disease, he has upper extremity tremors and fatigue. He also complains of significant restless leg syndrome.            I spent 30 minutes with patient, greater than 50% educating on preop meds, counseling on anesthesia and coordinating care for DBS  Pt optimized for surgery. AVS with information on surgery time/arrival time, meds and NPO status given by nursing staff      Patient was discussed with Dr Pantoja.    NICK Norton CNS  Preoperative Assessment Center  [facility]  [facility]  Phone: [phone]  Fax: [phone]

## 2018-10-15 ENCOUNTER — PATIENT OUTREACH (OUTPATIENT)
Dept: NEUROSURGERY | Facility: CLINIC | Age: 73
End: 2018-10-15

## 2018-10-15 NOTE — PROGRESS NOTES
[facility]:  Care Coordination Note     SITUATION   Ace Navarro is a 73 year old male who is receiving support for:  Clinic Care Coordination - Follow-up  .    BACKGROUND     Pt is scheduled for DBS lead placement surgery on 10/23/18 and battery placement on 10/30/18. He called today to report that he has not received his patient . I explained to patient that he will receive his DBS patient  on the day of his battery placement surgery. It is not something he needs or will receive before that. We also reviewed when pt should stop his PD meds prior to the lead placement on 10/23. Pt will not take his PD meds after 9:00 p.m. Advised pt to use extra caution when moving about his home; advised using a wheelchair when he arrives at the hospital, as being off medication can make walking very difficult. Pt expressed understanding. No further questions.    ASSESSMENT           PLAN          Nursing Interventions:   See above/Education    Follow-up plan:  n/a       VAISHALI RODRIGUEZ

## 2018-10-22 NOTE — PROGRESS NOTES
"Health Psychology - Follow up Visit  Confidential Summary*      REFERRAL SOURCE:  DBS for PD/Neuro      CHIEF COMPLAINT/REASON FOR VISIT  Cognitive behavioral therapy and behavioral counseling in context of addressing anxiety surrounding possible claustrophobia and the impact of anxiety on his ability to successfully complete DBS surgery.  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      Patient was seen today for a 60 minute individual health and behavior intervention session.    Subjective:  Patient seen for last visit prior to DBS phase 1 on 10/23.  Began with report that he continues to experience some mild anxiety surrounding the procedure, but is able to reassure himself.  He reported concern that they may \"screw up and I may end up worse\".  He recognized that the data he has about success does not support his concerns.  He reported that a friend of his recently had a stroke and this event has increased his awareness of his age and the fragility of life.  He will be sure to spend time with his children and grandchildren prior to the procedure.  Overall, he is feeling much less anxious and appears to have adequate coping tools for the procedure as well as following.      He was told that there will be a provider situated in the room during the procedure that will be available to talk with him.  He also reported that he has become skilled in the use of progressive muscle relaxation and believes he will be able to draw upon this skill should he begin to experience anxiety during the procedure.     He discussed his desire to begin dating again in the future.      Objective:  Patient was on time for today s session, appropriately groomed and dressed, and demonstrated good eye contact.  He appeared friendly, alert and oriented, and was experiencing some dyskinesia during the session. Mood was euthymic, with " appropriate range of affect. Patient denied suicidal or assaultive ideation, plan, or intent.        Assessment:  The patient has a long history of PD and is planning for DBS.  He has been followed to address anxiety to decreased the possibility of anxiety during DBS.  Patient appears to have learned appropriate coping tools in treatment and disabling anxiety during DBS is not anticipated.      Plan:  With continued guidance and education from her health care team, there are no major concerns from a psychological standpoint, and patient is probably a good candidate for DBS.    Patient has been provided with my contact information and knows how to contact me or any of my colleagues in Health Psychology for future follow up should she wish to do so.     Time In: 11:00  Time Out: 12:00    Diagnosis:  Axis I Anxiety disorder associated with medical problem with history of panic attack, psych factors associated with physical health   Axis II Deferred   Axis III please see medical records for details   [place] IV Psychosocial and Environmental Stressors: Health, planned surgery         Sudha Prater, PhD, LP      *In accordance with the Rules of the [place] [employer], it is noted that psychological descriptions and scientific procedures underlying psychological evaluations have limitations.  Absolute predictions cannot be made based on information in this report.

## 2018-10-23 ENCOUNTER — APPOINTMENT (OUTPATIENT)
Dept: CT IMAGING | Facility: CLINIC | Age: 73
DRG: 057 | End: 2018-10-23
Attending: STUDENT IN AN ORGANIZED HEALTH CARE EDUCATION/TRAINING PROGRAM
Payer: MEDICARE

## 2018-10-23 ENCOUNTER — HOSPITAL ENCOUNTER (OUTPATIENT)
Dept: CT IMAGING | Facility: CLINIC | Age: 73
DRG: 057 | End: 2018-10-23
Attending: NEUROLOGICAL SURGERY | Admitting: NEUROLOGICAL SURGERY
Payer: MEDICARE

## 2018-10-23 ENCOUNTER — ANESTHESIA (OUTPATIENT)
Dept: SURGERY | Facility: CLINIC | Age: 73
DRG: 057 | End: 2018-10-23
Payer: MEDICARE

## 2018-10-23 ENCOUNTER — HOSPITAL ENCOUNTER (INPATIENT)
Facility: CLINIC | Age: 73
LOS: 1 days | Discharge: HOME | DRG: 057 | End: 2018-10-24
Attending: NEUROLOGICAL SURGERY | Admitting: NEUROLOGICAL SURGERY
Payer: MEDICARE

## 2018-10-23 ENCOUNTER — APPOINTMENT (OUTPATIENT)
Dept: GENERAL RADIOLOGY | Facility: CLINIC | Age: 73
DRG: 057 | End: 2018-10-23
Attending: STUDENT IN AN ORGANIZED HEALTH CARE EDUCATION/TRAINING PROGRAM
Payer: MEDICARE

## 2018-10-23 DIAGNOSIS — G20.A1 PARKINSON DISEASE (H): Primary | ICD-10-CM

## 2018-10-23 DIAGNOSIS — G20.A1 PARKINSON'S DISEASE (H): ICD-10-CM

## 2018-10-23 LAB
ABO + RH BLD: NORMAL
ABO + RH BLD: NORMAL
ANION GAP SERPL CALCULATED.3IONS-SCNC: 8 MMOL/L (ref 3–14)
APTT PPP: 34 SEC (ref 22–37)
BASOPHILS # BLD AUTO: 0 10E9/L (ref 0–0.2)
BASOPHILS NFR BLD AUTO: 0.2 %
BLD GP AB SCN SERPL QL: NORMAL
BLOOD BANK CMNT PATIENT-IMP: NORMAL
BLOOD BANK CMNT PATIENT-IMP: NORMAL
BUN SERPL-MCNC: 14 MG/DL (ref 7–30)
CALCIUM SERPL-MCNC: 8.8 MG/DL (ref 8.5–10.1)
CHLORIDE SERPL-SCNC: 102 MMOL/L (ref 94–109)
CO2 SERPL-SCNC: 28 MMOL/L (ref 20–32)
CREAT SERPL-MCNC: 0.89 MG/DL (ref 0.66–1.25)
DIFFERENTIAL METHOD BLD: ABNORMAL
EOSINOPHIL # BLD AUTO: 0 10E9/L (ref 0–0.7)
EOSINOPHIL NFR BLD AUTO: 0.5 %
ERYTHROCYTE [DISTWIDTH] IN BLOOD BY AUTOMATED COUNT: 13.1 % (ref 10–15)
GFR SERPL CREATININE-BSD FRML MDRD: 84 ML/MIN/1.7M2
GLUCOSE BLDC GLUCOMTR-MCNC: 108 MG/DL (ref 70–99)
GLUCOSE BLDC GLUCOMTR-MCNC: 117 MG/DL (ref 70–99)
GLUCOSE BLDC GLUCOMTR-MCNC: 120 MG/DL (ref 70–99)
GLUCOSE BLDC GLUCOMTR-MCNC: 93 MG/DL (ref 70–99)
GLUCOSE SERPL-MCNC: 101 MG/DL (ref 70–99)
HCT VFR BLD AUTO: 42 % (ref 40–53)
HGB BLD-MCNC: 14.1 G/DL (ref 13.3–17.7)
IMM GRANULOCYTES # BLD: 0 10E9/L (ref 0–0.4)
IMM GRANULOCYTES NFR BLD: 0 %
INR PPP: 1.08 (ref 0.86–1.14)
LYMPHOCYTES # BLD AUTO: 1.1 10E9/L (ref 0.8–5.3)
LYMPHOCYTES NFR BLD AUTO: 19.6 %
MCH RBC QN AUTO: 29.6 PG (ref 26.5–33)
MCHC RBC AUTO-ENTMCNC: 33.6 G/DL (ref 31.5–36.5)
MCV RBC AUTO: 88 FL (ref 78–100)
MONOCYTES # BLD AUTO: 0.5 10E9/L (ref 0–1.3)
MONOCYTES NFR BLD AUTO: 8.7 %
MRSA DNA SPEC QL NAA+PROBE: NEGATIVE
NEUTROPHILS # BLD AUTO: 3.9 10E9/L (ref 1.6–8.3)
NEUTROPHILS NFR BLD AUTO: 71 %
NRBC # BLD AUTO: 0 10*3/UL
NRBC BLD AUTO-RTO: 0 /100
PLATELET # BLD AUTO: 147 10E9/L (ref 150–450)
POTASSIUM SERPL-SCNC: 4 MMOL/L (ref 3.4–5.3)
RBC # BLD AUTO: 4.77 10E12/L (ref 4.4–5.9)
SODIUM SERPL-SCNC: 139 MMOL/L (ref 133–144)
SPECIMEN EXP DATE BLD: NORMAL
SPECIMEN SOURCE: NORMAL
WBC # BLD AUTO: 5.5 10E9/L (ref 4–11)

## 2018-10-23 PROCEDURE — 25000128 H RX IP 250 OP 636: Performed by: NEUROLOGICAL SURGERY

## 2018-10-23 PROCEDURE — 40000170 ZZH STATISTIC PRE-PROCEDURE ASSESSMENT II: Performed by: NEUROLOGICAL SURGERY

## 2018-10-23 PROCEDURE — 36000074 ZZH SURGERY LEVEL 6 1ST 30 MIN - UMMC: Performed by: NEUROLOGICAL SURGERY

## 2018-10-23 PROCEDURE — 25000128 H RX IP 250 OP 636: Performed by: NURSE ANESTHETIST, CERTIFIED REGISTERED

## 2018-10-23 PROCEDURE — 85610 PROTHROMBIN TIME: CPT | Performed by: NEUROLOGICAL SURGERY

## 2018-10-23 PROCEDURE — 80048 BASIC METABOLIC PNL TOTAL CA: CPT | Performed by: NEUROLOGICAL SURGERY

## 2018-10-23 PROCEDURE — 25000125 ZZHC RX 250: Performed by: NEUROLOGICAL SURGERY

## 2018-10-23 PROCEDURE — 40000986 XR SKULL PORT 1/3 VW: Mod: TC

## 2018-10-23 PROCEDURE — 25000125 ZZHC RX 250: Performed by: NURSE ANESTHETIST, CERTIFIED REGISTERED

## 2018-10-23 PROCEDURE — 70450 CT HEAD/BRAIN W/O DYE: CPT | Mod: 77

## 2018-10-23 PROCEDURE — 25000132 ZZH RX MED GY IP 250 OP 250 PS 637: Mod: GY | Performed by: STUDENT IN AN ORGANIZED HEALTH CARE EDUCATION/TRAINING PROGRAM

## 2018-10-23 PROCEDURE — 25000128 H RX IP 250 OP 636: Performed by: STUDENT IN AN ORGANIZED HEALTH CARE EDUCATION/TRAINING PROGRAM

## 2018-10-23 PROCEDURE — 20000004 ZZH R&B ICU UMMC

## 2018-10-23 PROCEDURE — 0WJ13ZZ INSPECTION OF CRANIAL CAVITY, PERCUTANEOUS APPROACH: ICD-10-PCS | Performed by: NEUROLOGICAL SURGERY

## 2018-10-23 PROCEDURE — 27211024 ZZHC OR SUPPLY OTHER OPNP: Performed by: NEUROLOGICAL SURGERY

## 2018-10-23 PROCEDURE — A9270 NON-COVERED ITEM OR SERVICE: HCPCS | Mod: GY | Performed by: NURSE ANESTHETIST, CERTIFIED REGISTERED

## 2018-10-23 PROCEDURE — 85730 THROMBOPLASTIN TIME PARTIAL: CPT | Performed by: NEUROLOGICAL SURGERY

## 2018-10-23 PROCEDURE — 4A11X4G MONITORING OF PERIPHERAL NERVOUS ELECTRICAL ACTIVITY, INTRAOPERATIVE, EXTERNAL APPROACH: ICD-10-PCS | Performed by: NEUROLOGICAL SURGERY

## 2018-10-23 PROCEDURE — 85025 COMPLETE CBC W/AUTO DIFF WBC: CPT | Performed by: NEUROLOGICAL SURGERY

## 2018-10-23 PROCEDURE — 27210794 ZZH OR GENERAL SUPPLY STERILE: Performed by: NEUROLOGICAL SURGERY

## 2018-10-23 PROCEDURE — 87640 STAPH A DNA AMP PROBE: CPT | Performed by: NEUROLOGICAL SURGERY

## 2018-10-23 PROCEDURE — 27810169 ZZH OR IMPLANT GENERAL: Performed by: NEUROLOGICAL SURGERY

## 2018-10-23 PROCEDURE — 37000009 ZZH ANESTHESIA TECHNICAL FEE, EACH ADDTL 15 MIN: Performed by: NEUROLOGICAL SURGERY

## 2018-10-23 PROCEDURE — 37000008 ZZH ANESTHESIA TECHNICAL FEE, 1ST 30 MIN: Performed by: NEUROLOGICAL SURGERY

## 2018-10-23 PROCEDURE — 00000146 ZZHCL STATISTIC GLUCOSE BY METER IP

## 2018-10-23 PROCEDURE — 71000016 ZZH RECOVERY PHASE 1 LEVEL 3 FIRST HR: Performed by: NEUROLOGICAL SURGERY

## 2018-10-23 PROCEDURE — 27210995 ZZH RX 272: Performed by: NEUROLOGICAL SURGERY

## 2018-10-23 PROCEDURE — 25000132 ZZH RX MED GY IP 250 OP 250 PS 637: Mod: GY | Performed by: NURSE ANESTHETIST, CERTIFIED REGISTERED

## 2018-10-23 PROCEDURE — A9270 NON-COVERED ITEM OR SERVICE: HCPCS | Mod: GY | Performed by: STUDENT IN AN ORGANIZED HEALTH CARE EDUCATION/TRAINING PROGRAM

## 2018-10-23 PROCEDURE — 8E09XBG COMPUTER ASSISTED PROCEDURE OF HEAD AND NECK REGION, WITH COMPUTERIZED TOMOGRAPHY: ICD-10-PCS | Performed by: NEUROLOGICAL SURGERY

## 2018-10-23 PROCEDURE — 25000125 ZZHC RX 250: Performed by: ANESTHESIOLOGY

## 2018-10-23 PROCEDURE — 36000076 ZZH SURGERY LEVEL 6 EA 15 ADDTL MIN - UMMC: Performed by: NEUROLOGICAL SURGERY

## 2018-10-23 PROCEDURE — 70450 CT HEAD/BRAIN W/O DYE: CPT

## 2018-10-23 PROCEDURE — 87641 MR-STAPH DNA AMP PROBE: CPT | Performed by: NEUROLOGICAL SURGERY

## 2018-10-23 RX ORDER — HYDRALAZINE HYDROCHLORIDE 20 MG/ML
INJECTION INTRAMUSCULAR; INTRAVENOUS PRN
Status: DISCONTINUED | OUTPATIENT
Start: 2018-10-23 | End: 2018-10-23

## 2018-10-23 RX ORDER — LIDOCAINE HYDROCHLORIDE AND EPINEPHRINE 10; 10 MG/ML; UG/ML
INJECTION, SOLUTION INFILTRATION; PERINEURAL PRN
Status: DISCONTINUED | OUTPATIENT
Start: 2018-10-23 | End: 2018-10-23 | Stop reason: HOSPADM

## 2018-10-23 RX ORDER — PRAMIPEXOLE DIHYDROCHLORIDE 0.5 MG/1
0.5 TABLET ORAL ONCE
Status: COMPLETED | OUTPATIENT
Start: 2018-10-23 | End: 2018-10-23

## 2018-10-23 RX ORDER — POTASSIUM CHLORIDE 1.5 G/1.58G
20-40 POWDER, FOR SOLUTION ORAL
Status: DISCONTINUED | OUTPATIENT
Start: 2018-10-23 | End: 2018-10-24 | Stop reason: HOSPADM

## 2018-10-23 RX ORDER — LABETALOL HYDROCHLORIDE 5 MG/ML
10-40 INJECTION, SOLUTION INTRAVENOUS EVERY 10 MIN PRN
Status: DISCONTINUED | OUTPATIENT
Start: 2018-10-23 | End: 2018-10-24 | Stop reason: HOSPADM

## 2018-10-23 RX ORDER — ACETAMINOPHEN 325 MG/1
650 TABLET ORAL EVERY 4 HOURS PRN
Status: DISCONTINUED | OUTPATIENT
Start: 2018-10-26 | End: 2018-10-24 | Stop reason: HOSPADM

## 2018-10-23 RX ORDER — ONDANSETRON 2 MG/ML
4-8 INJECTION INTRAMUSCULAR; INTRAVENOUS EVERY 6 HOURS PRN
Status: DISCONTINUED | OUTPATIENT
Start: 2018-10-23 | End: 2018-10-24 | Stop reason: HOSPADM

## 2018-10-23 RX ORDER — ACETAMINOPHEN 325 MG/1
975 TABLET ORAL EVERY 8 HOURS
Status: DISCONTINUED | OUTPATIENT
Start: 2018-10-23 | End: 2018-10-24 | Stop reason: HOSPADM

## 2018-10-23 RX ORDER — ONDANSETRON 4 MG/1
4-8 TABLET, ORALLY DISINTEGRATING ORAL EVERY 6 HOURS PRN
Status: DISCONTINUED | OUTPATIENT
Start: 2018-10-23 | End: 2018-10-24 | Stop reason: HOSPADM

## 2018-10-23 RX ORDER — SODIUM CHLORIDE, SODIUM LACTATE, POTASSIUM CHLORIDE, CALCIUM CHLORIDE 600; 310; 30; 20 MG/100ML; MG/100ML; MG/100ML; MG/100ML
INJECTION, SOLUTION INTRAVENOUS CONTINUOUS PRN
Status: DISCONTINUED | OUTPATIENT
Start: 2018-10-23 | End: 2018-10-23

## 2018-10-23 RX ORDER — AMOXICILLIN 250 MG
2 CAPSULE ORAL 2 TIMES DAILY
Status: DISCONTINUED | OUTPATIENT
Start: 2018-10-23 | End: 2018-10-24 | Stop reason: HOSPADM

## 2018-10-23 RX ORDER — LIDOCAINE 40 MG/G
CREAM TOPICAL
Status: DISCONTINUED | OUTPATIENT
Start: 2018-10-23 | End: 2018-10-23 | Stop reason: HOSPADM

## 2018-10-23 RX ORDER — PRAMIPEXOLE DIHYDROCHLORIDE 0.5 MG/1
0.5 TABLET ORAL EVERY EVENING
Status: DISCONTINUED | OUTPATIENT
Start: 2018-10-24 | End: 2018-10-24 | Stop reason: HOSPADM

## 2018-10-23 RX ORDER — BACITRACIN 500 [USP'U]/G
OINTMENT OPHTHALMIC PRN
Status: DISCONTINUED | OUTPATIENT
Start: 2018-10-23 | End: 2018-10-23 | Stop reason: HOSPADM

## 2018-10-23 RX ORDER — LIDOCAINE 40 MG/G
CREAM TOPICAL
Status: DISCONTINUED | OUTPATIENT
Start: 2018-10-23 | End: 2018-10-24 | Stop reason: HOSPADM

## 2018-10-23 RX ORDER — NALOXONE HYDROCHLORIDE 0.4 MG/ML
.1-.4 INJECTION, SOLUTION INTRAMUSCULAR; INTRAVENOUS; SUBCUTANEOUS
Status: DISCONTINUED | OUTPATIENT
Start: 2018-10-23 | End: 2018-10-24 | Stop reason: HOSPADM

## 2018-10-23 RX ORDER — MAGNESIUM SULFATE HEPTAHYDRATE 40 MG/ML
4 INJECTION, SOLUTION INTRAVENOUS EVERY 4 HOURS PRN
Status: DISCONTINUED | OUTPATIENT
Start: 2018-10-23 | End: 2018-10-24 | Stop reason: HOSPADM

## 2018-10-23 RX ORDER — SULFASALAZINE 500 MG/1
2000 TABLET ORAL EVERY MORNING
Status: DISCONTINUED | OUTPATIENT
Start: 2018-10-24 | End: 2018-10-24 | Stop reason: HOSPADM

## 2018-10-23 RX ORDER — POTASSIUM CHLORIDE 750 MG/1
20-40 TABLET, EXTENDED RELEASE ORAL
Status: DISCONTINUED | OUTPATIENT
Start: 2018-10-23 | End: 2018-10-24 | Stop reason: HOSPADM

## 2018-10-23 RX ORDER — MAGNESIUM HYDROXIDE 1200 MG/15ML
LIQUID ORAL PRN
Status: DISCONTINUED | OUTPATIENT
Start: 2018-10-23 | End: 2018-10-23 | Stop reason: HOSPADM

## 2018-10-23 RX ORDER — OXYCODONE HYDROCHLORIDE 5 MG/1
5-10 TABLET ORAL EVERY 4 HOURS PRN
Status: DISCONTINUED | OUTPATIENT
Start: 2018-10-23 | End: 2018-10-24 | Stop reason: HOSPADM

## 2018-10-23 RX ORDER — CEFAZOLIN SODIUM 2 G/100ML
2 INJECTION, SOLUTION INTRAVENOUS SEE ADMIN INSTRUCTIONS
Status: DISCONTINUED | OUTPATIENT
Start: 2018-10-23 | End: 2018-10-23

## 2018-10-23 RX ORDER — POTASSIUM CL/LIDO/0.9 % NACL 10MEQ/0.1L
10 INTRAVENOUS SOLUTION, PIGGYBACK (ML) INTRAVENOUS
Status: DISCONTINUED | OUTPATIENT
Start: 2018-10-23 | End: 2018-10-24 | Stop reason: HOSPADM

## 2018-10-23 RX ORDER — LABETALOL HYDROCHLORIDE 5 MG/ML
INJECTION, SOLUTION INTRAVENOUS PRN
Status: DISCONTINUED | OUTPATIENT
Start: 2018-10-23 | End: 2018-10-23

## 2018-10-23 RX ORDER — SODIUM CHLORIDE 9 MG/ML
INJECTION, SOLUTION INTRAVENOUS CONTINUOUS
Status: DISCONTINUED | OUTPATIENT
Start: 2018-10-23 | End: 2018-10-24 | Stop reason: HOSPADM

## 2018-10-23 RX ORDER — MULTIPLE VITAMINS W/ MINERALS TAB 9MG-400MCG
1 TAB ORAL DAILY
Status: DISCONTINUED | OUTPATIENT
Start: 2018-10-23 | End: 2018-10-24 | Stop reason: HOSPADM

## 2018-10-23 RX ORDER — PROPOFOL 10 MG/ML
INJECTION, EMULSION INTRAVENOUS PRN
Status: DISCONTINUED | OUTPATIENT
Start: 2018-10-23 | End: 2018-10-23

## 2018-10-23 RX ORDER — LIDOCAINE HYDROCHLORIDE 20 MG/ML
INJECTION, SOLUTION INFILTRATION; PERINEURAL PRN
Status: DISCONTINUED | OUTPATIENT
Start: 2018-10-23 | End: 2018-10-23

## 2018-10-23 RX ORDER — CEFAZOLIN SODIUM 1 G/3ML
1 INJECTION, POWDER, FOR SOLUTION INTRAMUSCULAR; INTRAVENOUS EVERY 8 HOURS
Status: DISCONTINUED | OUTPATIENT
Start: 2018-10-23 | End: 2018-10-24 | Stop reason: HOSPADM

## 2018-10-23 RX ORDER — CEFAZOLIN SODIUM 2 G/100ML
2 INJECTION, SOLUTION INTRAVENOUS
Status: COMPLETED | OUTPATIENT
Start: 2018-10-23 | End: 2018-10-23

## 2018-10-23 RX ORDER — CARBIDOPA AND LEVODOPA 25; 100 MG/1; MG/1
4 TABLET ORAL ONCE
Status: COMPLETED | OUTPATIENT
Start: 2018-10-23 | End: 2018-10-23

## 2018-10-23 RX ORDER — CARBIDOPA AND LEVODOPA 50; 200 MG/1; MG/1
2 TABLET, EXTENDED RELEASE ORAL EVERY EVENING
Status: DISCONTINUED | OUTPATIENT
Start: 2018-10-23 | End: 2018-10-24 | Stop reason: HOSPADM

## 2018-10-23 RX ORDER — HYDROMORPHONE HYDROCHLORIDE 1 MG/ML
.3-.5 INJECTION, SOLUTION INTRAMUSCULAR; INTRAVENOUS; SUBCUTANEOUS
Status: DISCONTINUED | OUTPATIENT
Start: 2018-10-23 | End: 2018-10-24 | Stop reason: HOSPADM

## 2018-10-23 RX ORDER — CARBIDOPA AND LEVODOPA 25; 100 MG/1; MG/1
4 TABLET ORAL
Status: DISCONTINUED | OUTPATIENT
Start: 2018-10-23 | End: 2018-10-24 | Stop reason: HOSPADM

## 2018-10-23 RX ORDER — CEFAZOLIN SODIUM 1 G/3ML
1 INJECTION, POWDER, FOR SOLUTION INTRAMUSCULAR; INTRAVENOUS SEE ADMIN INSTRUCTIONS
Status: DISCONTINUED | OUTPATIENT
Start: 2018-10-23 | End: 2018-10-23

## 2018-10-23 RX ORDER — POTASSIUM CHLORIDE 7.45 MG/ML
10 INJECTION INTRAVENOUS
Status: DISCONTINUED | OUTPATIENT
Start: 2018-10-23 | End: 2018-10-24 | Stop reason: HOSPADM

## 2018-10-23 RX ORDER — POLYETHYLENE GLYCOL 3350 17 G/17G
17 POWDER, FOR SOLUTION ORAL 3 TIMES DAILY
Status: DISCONTINUED | OUTPATIENT
Start: 2018-10-23 | End: 2018-10-24 | Stop reason: HOSPADM

## 2018-10-23 RX ORDER — POTASSIUM CHLORIDE 29.8 MG/ML
20 INJECTION INTRAVENOUS
Status: DISCONTINUED | OUTPATIENT
Start: 2018-10-23 | End: 2018-10-24 | Stop reason: HOSPADM

## 2018-10-23 RX ORDER — HYDRALAZINE HYDROCHLORIDE 20 MG/ML
10-20 INJECTION INTRAMUSCULAR; INTRAVENOUS EVERY 30 MIN PRN
Status: DISCONTINUED | OUTPATIENT
Start: 2018-10-23 | End: 2018-10-24 | Stop reason: HOSPADM

## 2018-10-23 RX ADMIN — PROPOFOL 20 MG: 10 INJECTION, EMULSION INTRAVENOUS at 11:27

## 2018-10-23 RX ADMIN — CEFAZOLIN SODIUM 1 G: 1 INJECTION, POWDER, FOR SOLUTION INTRAMUSCULAR; INTRAVENOUS at 17:54

## 2018-10-23 RX ADMIN — HYDRALAZINE HYDROCHLORIDE 5 MG: 20 INJECTION INTRAMUSCULAR; INTRAVENOUS at 12:49

## 2018-10-23 RX ADMIN — LIDOCAINE HYDROCHLORIDE 20 MG: 20 INJECTION, SOLUTION INFILTRATION; PERINEURAL at 11:23

## 2018-10-23 RX ADMIN — POLYETHYLENE GLYCOL 3350 17 G: 17 POWDER, FOR SOLUTION ORAL at 20:43

## 2018-10-23 RX ADMIN — NICARDIPINE HYDROCHLORIDE 5 MG/HR: 0.2 INJECTION, SOLUTION INTRAVENOUS at 13:20

## 2018-10-23 RX ADMIN — HYDRALAZINE HYDROCHLORIDE 5 MG: 20 INJECTION INTRAMUSCULAR; INTRAVENOUS at 13:00

## 2018-10-23 RX ADMIN — MULTIPLE VITAMINS W/ MINERALS TAB 1 TABLET: TAB at 17:54

## 2018-10-23 RX ADMIN — CARBIDOPA AND LEVODOPA 4 TABLET: 25; 100 TABLET ORAL at 19:58

## 2018-10-23 RX ADMIN — CEFAZOLIN SODIUM 2 G: 2 INJECTION, SOLUTION INTRAVENOUS at 12:48

## 2018-10-23 RX ADMIN — SODIUM CHLORIDE, POTASSIUM CHLORIDE, SODIUM LACTATE AND CALCIUM CHLORIDE: 600; 310; 30; 20 INJECTION, SOLUTION INTRAVENOUS at 11:21

## 2018-10-23 RX ADMIN — PRAMIPEXOLE DIHYDROCHLORIDE 0.5 MG: 0.5 TABLET ORAL at 15:23

## 2018-10-23 RX ADMIN — PROPOFOL 30 MG: 10 INJECTION, EMULSION INTRAVENOUS at 11:23

## 2018-10-23 RX ADMIN — HYDRALAZINE HYDROCHLORIDE 5 MG: 20 INJECTION INTRAMUSCULAR; INTRAVENOUS at 13:08

## 2018-10-23 RX ADMIN — CARBIDOPA AND LEVODOPA 4 TABLET: 25; 100 TABLET ORAL at 15:24

## 2018-10-23 RX ADMIN — SODIUM CHLORIDE: 9 INJECTION, SOLUTION INTRAVENOUS at 15:43

## 2018-10-23 RX ADMIN — CARBIDOPA AND LEVODOPA 2 TABLET: 50; 200 TABLET, EXTENDED RELEASE ORAL at 19:58

## 2018-10-23 RX ADMIN — ACETAMINOPHEN 975 MG: 325 TABLET, FILM COATED ORAL at 17:53

## 2018-10-23 RX ADMIN — LABETALOL HYDROCHLORIDE 5 MG: 5 INJECTION INTRAVENOUS at 12:58

## 2018-10-23 RX ADMIN — PROPOFOL 30 MG: 10 INJECTION, EMULSION INTRAVENOUS at 11:25

## 2018-10-23 RX ADMIN — DEXMEDETOMIDINE HYDROCHLORIDE 0.7 MCG: 100 INJECTION, SOLUTION INTRAVENOUS at 12:19

## 2018-10-23 RX ADMIN — PROPOFOL 30 MG: 10 INJECTION, EMULSION INTRAVENOUS at 11:40

## 2018-10-23 RX ADMIN — HYDRALAZINE HYDROCHLORIDE 5 MG: 20 INJECTION INTRAMUSCULAR; INTRAVENOUS at 12:38

## 2018-10-23 ASSESSMENT — ACTIVITIES OF DAILY LIVING (ADL): ADLS_ACUITY_SCORE: 10

## 2018-10-23 NOTE — ANESTHESIA POSTPROCEDURE EVALUATION
Anesthesia POST Procedure Evaluation    Patient: Ace Navarro   MRN:     [id number] Gender:   male   Age:    73 year old :      1945        Preoperative Diagnosis: Parkinson's Disease   Procedure(s):  Left Deep Brain Stimulator Placement, Phase One, Placement of Left Side Deep Brain Stimulator Electrode Target Left Subthalamic Nucleus with Microelectrode Recording   Postop Comments: No value filed.       Anesthesia Type:  Not documented    Reportable Event: NO     PAIN: Uncomplicated   Sign Out status: Comfortable, Well controlled pain     PONV: No PONV   Sign Out status:  No Nausea or Vomiting     Neuro/Psych: Uneventful perioperative course   Sign Out Status: Preoperative baseline; Age appropriate mentation     Airway/Resp.: Uneventful perioperative course   Sign Out Status: Non labored breathing, age appropriate RR; Resp. Status within EXPECTED Parameters     CV: Uneventful perioperative course   Sign Out status: Appropriate BP and perfusion indices; Appropriate HR/Rhythm     Disposition:   Sign Out in:  PACU  Disposition:  Phase II; Home  Recovery Course: Uneventful  Follow-Up: Not required     Comments/Narrative:  Procedure aborted as patient was not able to tolerate pain in legs 2/2 restless legs syndrome           Last Anesthesia Record Vitals:  CRNA VITALS  10/23/2018 1432 - 10/23/2018 1532      10/23/2018             NIBP: 112/64    Pulse: 69          Last PACU/Preop Vitals:  Vitals:    10/23/18 0915 10/23/18 1505 10/23/18 1515   BP: (!) 133/99 (!) 187/153 134/67   Pulse: 54     Resp: 16 18 20   Temp: 36.3  C (97.4  F)     SpO2: 97% 98% 96%         Electronically Signed By: Clayton Dos Santos MD, 2018, 3:32 PM

## 2018-10-23 NOTE — IP AVS SNAPSHOT
[address] [address]    Phone:  [phone]                                       After Visit Summary   10/23/2018    Ace Navarro    MRN: [id number]           After Visit Summary Signature Page     I have received my discharge instructions, and my questions have been answered. I have discussed any challenges I see with this plan with the nurse or doctor.    ..........................................................................................................................................  Patient/Patient Representative Signature      ..........................................................................................................................................  Patient Representative Print Name and Relationship to Patient    ..................................................               ................................................  Date                                   Time    ..........................................................................................................................................  Reviewed by Signature/Title    ...................................................              ..............................................  Date                                               Time          22EPIC Rev 08/18

## 2018-10-23 NOTE — OP NOTE
PATIENT NAME: ACE NAVARRO  YOB: 1945  MRN:   [id number]  ACCOUNT:  [id number]      DATE OF PROCEDURE:  10/23/2018    PREOPERATIVE DIAGNOSIS:  Parkinson's disease.     POSTOPERATIVE DIAGNOSIS:  Parkinson's disease.     PROCEDURES PERFORMED:   1.  Placement of CRW stereotactic headframe.   2.  Stereotactic neuronavigation planning and CT of head.   3.  Stereotactic neuronavigation using the [employer] system for surgical planning, targeting and approach. The target is left subthalamic nucleus (STN).   4.  Left side deep brain stimulator placement, phase I, placement of left side deep brain stimulator electrode, target is left subthalamic nucleus (STN) - Aborted.   5.  Use of intraoperative microelectrode recording.     ATTENDING SURGEON:  Humberto Briones MD, PhD     RESIDENT SURGEON:  Carolyn Tee MD, MS     ANESTHESIA:  Monitored anesthesia care and local anesthetic.     ESTIMATED BLOOD LOSS: 5 mL.     COMPLICATIONS:  Case aborted.  No intracranial electrode placed.  No hardware placed.     IMPLANTS:  None.     FINDINGS:  Procedure aborted per patient's wishes.     INDICATIONS FOR PROCEDURE:  Mr. Ace Navarro is a right-handed 73 year old man with Parkinson's disease.  He was diagnosed in 2010 after he developed a right hand tremor.  He is followed by Dr. Godinez.  His tremor has remained right-sided, rarely ever having tremor in his left hand.  He notes occasional tremor in his legs, precariously associated with bowel movements, but this is mild. He stays active by playing softball and boxing. The softball season ends in September.  We discussed both phase I and II of DBS surgery.  We discussed that during phase I, we would place the DBS electrode on the left side under MAC and microelectrode recording.  He would then return one week later for the phase II with placement of the DBS generator/battery over the left chest wall under general anesthesia.  If he is a unilateral candidate or  wait and see strategy candidate, then he will undergo another evaluation/discussion prior to proceeding to the right side implantation.  If he is a bilateral candidate in a staged fashion, he would return three weeks later for the phase I and II combined for the placement of the DBS electrode on the right side under MAC and microelectrode recording followed by connection to the previously implanted generator/battery.  We also discussed various device options.  He completed his DBS workup and his case was discussed at the Movement Disorder Consensus Group Meeting.  He was considered to be a good DBS candidate and left side STN implantation with Abbott device with wait and see strategy for the right side was recommended.  Risks, benefits, alternative therapies were discussed with the patient, including but not limited to infection and bleeding (intracranial), injury to the brain, stroke, death, hardware failure and possible need for more surgeries.  Surgical procedure was discussed in detail.  I also discussed possible use of ABE for neuronavigation.  All questions were answered, and he expressed understanding.    DESCRIPTION OF PROCEDURE:      CRW head frame placement and stereotactic neuronavigation.      The patient was brought to the operating room and placed in a sitting position in his bed.  Brief timeout was performed for placement of the CRW head frame.  He was given sedation to make him comfortable.  Intended pin sites, two in the front and two in the back of the head, were cleaned and were injected with local anesthetic, 1% Lidocaine with epinephrine.  Total of 27 mL were used.  Then, CRW stereotactic head frame was placed onto his head with the four pins for fixation.  Care was taken so that the fiducial box would fit over the head and the frame.  Once the head frame was on, the fiducial box was easily placed without interference from his forehead.  The patient tolerated the procedure well and his sedation  was lightened and he was awakened.     After the CRW stereotactic head frame was placed, he was taken directly to the CT scanner, at which time CT of the head stereotactic protocol was obtained.  Subsequently, the patient was taken back up to the operating room where he was placed supine on the operative bed with the CRW head frame affixed to the bed as well.  Patient was in a slight sitting up position with the neck in a neutral position and he was made comfortable.  The bed was positioned so that it would be a beach chair reclining position (head up, legs down, body trendelenburg).  All pressure points were well padded.  Care was taken to make sure that the neck was in neutral position and that the [employer] head hernandez device had room for manipulation in case more flexion or extension is needed throughout the case.    At this time, attention was turned to the neuro navigation imaging that was obtained.  The [employer] neuronavigation device was loaded with the CT head that had just been obtained.  The device also had an MRI of the head, stereotactic protocol, that was obtained prior to surgery.  CT of the head was merged with the previously obtained MRI of the brain.  The merge demonstrated good coherence/registration.  Then, using this merged image, neuronavigation was used to stereotactically target the left subthalamic nucleus (STN).  The technique used was the AC-PC line localization technique to target the STN using stereotactic coordinates and the X, Y and Z as well as the ring and arc angles for the CRW head frame were obtained for the left side.  Also, adjustments were made with 7T MRI images to better target the STN.  The entry into the skull, as well as the trajectory of the electrode were checked with a probe's eye view to avoid any sulci, ventricle or vascular structures.     The stereotactic coordinates for the left side was then transferred to the [facility] stereotactic targeting apparatus as well as the  phantom base.  The coordinates were confirmed and double checked for accuracy.  Accuracy was also confirmed using phantom base. The coordinates were X = -9.9, Y = -12.3, Z =+7.1 ring angle = 56.5 degrees anterior, and arc angle = 12.6 degrees to the left.     At this time, attention was turned to the patient.  Using a hair clipper, his hair over the left frontal area was clipped using the surgical clipper.  A semicircular incision was planned on the left side and this was marked.  Then, the surgical area was prepped and draped in routine surgical fashion.  We also prepped the area posterior to this as well as area towards the left parietal area.  The patient was also made comfortable.     Timeout was then performed confirming the patient's identity, procedure to be performed, side and site of surgery identified, imaging corresponding with the patient and administration of preoperative prophylactic antibiotic.    Left-sided electrode placement with microelectrode recording: Target left STN.     The CRW targeting apparatus was attached to the head frame on top of the sterile drapes.  The entry point was marked on the scalp on the left side.  A C-shaped incision was made with a skin knife, after the area was injected with local anesthetic, 1% Lidocaine with epinephrine and 1/4% Marcaine plain, 50:50 mix.  The area posterior to the incision was also injected as a pocket would be created.  Area posterior lateral, towards the left parietal area was also injected as the electrode connector will be tunneled here later.  Total of 13 mL of the above mentioned local anesthetic was used.  The incision was then further carried down to the pericranium.  Hemostasis was obtained using monopolar and bipolar cautery.  Thin layer of pericranial tissue was left behind on the scalp and the skin flap was reflected posteriorly.  Then, using a blunt dissection technique, a pocket was created posteriorly as well as a track that was made  towards the left parietal area for later use.    At this time, the targeting apparatus was again positioned and the entry point to the skull was marked.  Area of the intended kelley hole was cleaned and pericranial tissue removed.  Then using an acorn drill, kelley hole was created over this entry point to expose the dura.  The area was vigorously irrigated and bone wax used to control the bone bleeding.  Dura was coagulated with bipolar cautery for hemostasis, and again no active bleeding was noted.     At this time, the electrode fixation cover was fixed to the skull using two screws.  Care was taken to overlap the pericranium over the edge of the cover to provide a smooth tissue transition.  Then, the electrode drive was attached to the targeting apparatus.  The entry into the dura was again checked.  It appeared that all positions of the Curt Gun electrode hernandez were accessible without any interference from the bone edge.  Then using a Bovie cautery and a #4 Penfield instrument, opening was made into the dura, as well as a small corticectomy.  No active bleeding was noted.    Dr. Gab Velazquez and Dr. Víctor Alvarado of the Neurology Department participated in the recording and neurological testing.  During the microelectrode recording and testing, Dr. Velazquez and Dr. Alvarado took detailed notes of the electrophysiologic data and neurologic exam as well as any stimulation effects.    At this time, the electrode guides were inserted in the center and anterior Curt Gun positions and they were advanced slowly until they were fully inserted at which point the tips would be well above the target.  No abnormal resistance was noted.  Duraseal was used to seal the entry site to provide a seal and to minimize cerebrospinal fluid leak and air entry.  Then, recording microelectrodes were brought in and placed within the guide tubes and they were connected for intraoperative recording.  The tips of the electrode were now 15 mm above  target.  The patient was awakened.  Then, using a micro drive, the electrodes were slowly advanced towards the target, collecting microelectrode recording data for mapping the track.  Throughout the track, good quality expected recording was observed.  The center track had STN that was about 4.56 mm with somatosensory response activity in the elbow.  The anterior track had STN that was about 3.2 mm with somatosensory response in elbow.      Based on the electrophysiology data collected, the medial and posterior Curt Gun location needed exploration and mapping.  The microelectrode recording continued.  The electrodes were then pulled back to the initial position along with the guide tubes with the microdrive and then pulled out of the guide tubes.  The anterior and center guide tubes remained in place to stabilize the brain and new guide tubes were inserted into the medial and posterior Curt Gun positions and into the brain.  DuraSeal was used to seal the dural entry point and prevent CSF leakage.     Just as the microelectrodes were being prepared for insertion, patient began to demonstrate restlessness and confusion.  He was not exactly following commands and he was attempting to move his head.  He also would not answer our questions appropriately.  Some questions he was refusing to answer.  His face remained symmetric and he was able to move all extremities.  He did not know where he was and he did not know why he was in this position.  As we explained to him that he was in the operating room, he stated that he did not want to continue with the surgery.  He wanted us to stop the surgical procedure.  Of note, he has not had any further sedatives since the microelectrode recording began some time ago.  Because he told us that he wanted to stop the surgery and he wished not to continue with the procedure, we decided to abort the case at this time.  It was also becoming apparent that he was becoming more agitated and he  was attempting to move his head out of the frame.  Therefore, we told him that we would stop the surgery.    He was given sedation to make him comfortable and to calm him down from his agitation.  We then began our closure.  First, the guide tubes were pulled out of the brain completely.  Then, the area was generously irrigated.  Hemostasis was achieved.  We then removed the electrode fixation cover by removing the two anchoring screws.  This was done so that no hardware would remain.  Then, gelfoam was used to cover the dural opening and Duraseal was used to seal the kelley hole.    We began closing the wound.  The galeal layer was reapproximated using 3-0 Vicryl sutures in an inverted interrupted fashion and the skin was reapproximated using 4-0 Monocryl suture in a running fashion.  The wound was cleaned and dried and prepped with ChoraPrep.  Then, Dermabond was applied.    Removal of the head frame and end of procedure    First, the stereotactic targeting apparatus was removed.  Then, the sterile drapes were removed.  Subsequently, the patient was taken out of the CRW head frame.  The four pin sites were clean and dry and no active bleeding was noted.  Antibiotic ointment was applied to the pin sites.  Small Primapore dressings were placed at the two frontal pin sites.    The patient was further awakened and taken to the recovery room in stable condition.  At the conclusion of the case, all counts including needle, sponge, and instrument were correct x2.  No complications were noted other than the case being aborted at the request of the patient.     Dr. Briones was present and performed all the portions of the procedure.    -----------------------------------  Carolyn Tee MD, MS  Neurosurgery PGY-2      NEUROSURGERY ATTENDING ATTESTATION: Humberto VEGA M.D., Ph.D., Neurosurgery Attending, was present and scrubbed for the entire case and performed the key and critical portions of the case.

## 2018-10-23 NOTE — IP AVS SNAPSHOT
MRN:[id number]                      After Visit Summary   10/23/2018    Ace Navarro    MRN: [id number]           Thank you!     Thank you for choosing [facility] for your care. Our goal is always to provide you with excellent care. Hearing back from our patients is one way we can continue to improve our services. Please take a few minutes to complete the written survey that you may receive in the mail after you visit with us. Thank you!        Patient Information     Date Of Birth          1945        Designated Caregiver       Most Recent Value    Caregiver    Will someone help with your care after discharge? yes    Name of designated caregiver Jose    Phone number of caregiver [phone]    Caregiver address [address]      About your hospital stay     You were admitted on:  October 23, 2018 You last received care in the:  [address] [facility]    You were discharged on:  October 24, 2018        Reason for your hospital stay       You underwent surgery for DBS placement which was aborted due to intolerance of head frame                  Who to Call     For medical emergencies, please call 911.  For non-urgent questions about your medical care, please call your primary care provider or clinic, [phone]  For questions related to your surgery, please call your surgery clinic        Attending Provider     Provider Humberto Gay MD Neurosurgery       Primary Care Provider Office Phone # Fax #    Maurilio FOSTER Bonilla [phone]      After Care Instructions     Activity       Your activity upon discharge:  Do not do any bending, twisting, strenuous exercise, or heavy lifting (greater than 10 pounds) for 4-6 weeks. Be careful and ask for assistance when walking or going up and down stairs. Avoid any activities that could result in trauma to the surgical wound. Do not drive within 3 months of having your last seizure or while using narcotics or other  sedating medications, such as sleep aids, muscle relaxants, etc.            Diet       Follow this diet upon discharge: Orders Placed This Encounter      Advance Diet as Tolerated: Regular Diet Adult; Regular Diet Adult            Wound care and dressings       Instructions to care for your wound at home:  You should remove your dressings and bandages on post-operative day #2. You should then keep the wound undressed and open to air. You are allowed to take showers and get the wound wet starting on post-operative day #3 but you may not scrub or soak the wound or keep it submerged under water. If you do happen to get the wound wet, be sure to pat dry it rather than scrubbing it with a towel.                  Follow-up Appointments     Adult [facility] Follow-up and recommended labs and tests       Follow up in 2 weeks with Dr Humberto Briones    Appointments on [place] and/or [facility] (with [facility] or [facility] provider or service). Call [phone] if you haven't heard regarding these appointments within 7 days of discharge.                  Your next 10 appointments already scheduled     Nov 15, 2018 11:00 AM CST   (Arrive by 10:45 AM)   Return Visit with NICK Britton CNP   [facility] Neurosurgery ([facility])    [address] [id number] [phone]            Nov 19, 2018  7:20 AM CST   (Arrive by 7:05 AM)   Deep Brain Stimulation with NICK Wright CNP   [facility] Neurology ([facility] [place])    [address] [address]   [phone]            Nov 19, 2018 12:20 PM CST   CT HEAD W/O CONTRAST with UCCT1   [facility] Imaging [place] [facility] ([facility])    [address] [id number] [phone]           How do I prepare for my exam? (Food and drink instructions) No Food and Drink Restrictions.  How do I prepare for my exam?  (Other instructions) You do not need to do anything special to prepare for this exam. For a sinus scan: Use your nose spray (nasal decongestant spray) as directed.  What should I wear: Please wear loose clothing, such as a sweat suit or jogging clothes. Avoid snaps, zippers and other metal. We may ask you to undress and put on a hospital gown.  How long does the exam take: Most scans take less than 20 minutes.  What should I bring: Please bring any scans or X-rays taken at other hospitals, if similar tests were done. Also bring a list of your medicines, including vitamins, minerals and over-the-counter drugs. It is safest to leave personal items at home.  Do I need a : No  is needed.  What do I need to tell my doctor? Be sure to tell your doctor: * If you have any allergies. * If there s any chance you are pregnant. * If you are breastfeeding.  What should I do after the exam: No restrictions, You may resume normal activities.  What is this test: A CT (computed tomography) scan is a series of pictures that allows us to look inside your body. The scanner creates images of the body in cross sections, much like slices of bread. This helps us see any problems more clearly.  Who should I call with questions: If you have any questions, please call the Imaging Department where you will have your exam. Directions, parking instructions, and other information is available on our website, [email or web address].              Future tests that were ordered for you     Basic metabolic panel           CBC with platelets       Last Lab Result: No results found for: HGB            INR           Partial thromboplastin time                 Pending Results     No orders found for last 3 day(s).            Statement of Approval     Ordered          10/24/18 0844  I have reviewed and agree with all the recommendations and orders detailed in this document.  EFFECTIVE NOW     Approved and electronically signed by:  Sada Yañez  "NICK Calzada CNP             Admission Information     Date & Time Provider Department Dept. Phone    10/23/2018 Humberto Briones MD [address] [facility] [phone]      Your Vitals Were     Blood Pressure Pulse Temperature Respirations Height Weight    98/58 54 97.4  F (36.3  C) (Oral) 20 1.803 m (5' 11\") 78.5 kg (173 lb 1 oz)    Pulse Oximetry BMI (Body Mass Index)                95% 24.14 kg/m2          MyChart Information     [facility] gives you secure access to your electronic health record. If you see a primary care provider, you can also send messages to your care team and make appointments. If you have questions, please call your primary care clinic.  If you do not have a primary care provider, please call [phone] and they will assist you.        Care EveryWhere ID     This is your Care EveryWhere ID. This could be used by other organizations to access your [facility] medical records  [id number]        Equal Access to Services     REBECA HORTON : Hadii aad ku hadasho Sogalileoali, waaxda luqadaha, qaybta kaalmada adeegyada, janet mas . So [facility] [phone].    ATENCIÓN: Si habla español, tiene a rodriguez disposición servicios gratuitos de asistencia lingüística. Melanie al [phone].    We comply with applicable federal civil rights laws and [place] laws. We do not discriminate on the basis of race, color, national origin, age, disability, sex, sexual orientation, or gender identity.               Review of your medicines      START taking        Dose / Directions    oxyCODONE IR 5 MG tablet   Commonly known as:  ROXICODONE   Used for:  Parkinson disease (H)        Dose:  5-10 mg   Take 1-2 tablets (5-10 mg) by mouth every 4 hours as needed   Quantity:  30 tablet   Refills:  0         CONTINUE these medicines which have NOT CHANGED        Dose / Directions    * carbidopa-levodopa  MG per CR tablet   Commonly known as:  SINEMET CR        Dose:  2 tablet "   Take 2 tablets by mouth At Bedtime   Refills:  0       * carbidopa-levodopa  MG per tablet   Commonly known as:  SINEMET        Dose:  4 tablet   Take 4 tablets by mouth 3 times daily 4 tabs 0500/ 4 tabs 1200 / 4 tabs 2100   Refills:  0       diazepam 5 MG tablet   Commonly known as:  VALIUM   Used for:  Anxiety        Take 1 tablet 30 minutes prior to MRI. May take additional tablet immediately before MRI as needed   Quantity:  2 tablet   Refills:  0       folic acid 20 MG Caps        Dose:  800 mg   Take 800 mg by mouth   Refills:  0       Multi-vitamin Tabs tablet        Dose:  1 tablet   Take 1 tablet by mouth daily   Refills:  0       PEG-3350/Electrolytes 236 g Solr        As directed. Do not fill until patient contacts the pharmacy.   Refills:  0       pramipexole 0.25 MG tablet   Commonly known as:  MIRAPEX        Dose:  0.5 mg   Take 2 tablets (0.5 mg) by mouth At Bedtime   Refills:  0       sulfaSALAzine 500 MG tablet   Commonly known as:  AZULFIDINE        Dose:  2000 mg   Take 2,000 mg by mouth every morning   Refills:  0       * Notice:  This list has 2 medication(s) that are the same as other medications prescribed for you. Read the directions carefully, and ask your doctor or other care provider to review them with you.         Where to get your medicines      Some of these will need a paper prescription and others can be bought over the counter. Ask your nurse if you have questions.     Bring a paper prescription for each of these medications     oxyCODONE IR 5 MG tablet                Protect others around you: Learn how to safely use, store and throw away your medicines at www.disposemymeds.org.        Information about OPIOIDS     PRESCRIPTION OPIOIDS: WHAT YOU NEED TO KNOW   We gave you an opioid (narcotic) pain medicine. It is important to manage your pain, but opioids are not always the best choice. You should first try all the other options your care team gave you. Take this medicine  for as short a time (and as few doses) as possible.    Some activities can increase your pain, such as bandage changes or therapy sessions. It may help to take your pain medicine 30 to 60 minutes before these activities. Reduce your stress by getting enough sleep, working on hobbies you enjoy and practicing relaxation or meditation. Talk to your care team about ways to manage your pain beyond prescription opioids.    These medicines have risks:    DO NOT drive when on new or higher doses of pain medicine. These medicines can affect your alertness and reaction times, and you could be arrested for driving under the influence (DUI). If you need to use opioids long-term, talk to your care team about driving.    DO NOT operate heavy machinery    DO NOT do any other dangerous activities while taking these medicines.    DO NOT drink any alcohol while taking these medicines.     If the opioid prescribed includes acetaminophen, DO NOT take with any other medicines that contain acetaminophen. Read all labels carefully. Look for the word  acetaminophen  or  Tylenol.  Ask your pharmacist if you have questions or are unsure.    You can get addicted to pain medicines, especially if you have a history of addiction (chemical, alcohol or substance dependence). Talk to your care team about ways to reduce this risk.    All opioids tend to cause constipation. Drink plenty of water and eat foods that have a lot of fiber, such as fruits, vegetables, prune juice, apple juice and high-fiber cereal. Take a laxative (Miralax, milk of magnesia, Colace, Senna) if you don t move your bowels at least every other day. Other side effects include upset stomach, sleepiness, dizziness, throwing up, tolerance (needing more of the medicine to have the same effect), physical dependence and slowed breathing.    Store your pills in a secure place, locked if possible. We will not replace any lost or stolen medicine. If you don t finish your medicine,  please throw away (dispose) as directed by your pharmacist. The [place] [employer] has more information about safe disposal: https://www.pca.state.[place]/living-green/managing-unwanted-medications             Medication List: This is a list of all your medications and when to take them. Check marks below indicate your daily home schedule. Keep this list as a reference.      Medications           Morning Afternoon Evening Bedtime As Needed    * carbidopa-levodopa  MG per CR tablet   Commonly known as:  SINEMET CR   Take 2 tablets by mouth At Bedtime   Last time this was given:  2 tablets on 10/23/2018  7:58 PM                                * carbidopa-levodopa  MG per tablet   Commonly known as:  SINEMET   Take 4 tablets by mouth 3 times daily 4 tabs 0500/ 4 tabs 1200 / 4 tabs 2100   Last time this was given:  4 tablets on 10/24/2018  5:03 AM                                diazepam 5 MG tablet   Commonly known as:  VALIUM   Take 1 tablet 30 minutes prior to MRI. May take additional tablet immediately before MRI as needed                                folic acid 20 MG Caps   Take 800 mg by mouth                                Multi-vitamin Tabs tablet   Take 1 tablet by mouth daily   Last time this was given:  1 tablet on 10/24/2018  7:53 AM                                oxyCODONE IR 5 MG tablet   Commonly known as:  ROXICODONE   Take 1-2 tablets (5-10 mg) by mouth every 4 hours as needed                                PEG-3350/Electrolytes 236 g Solr   As directed. Do not fill until patient contacts the pharmacy.                                pramipexole 0.25 MG tablet   Commonly known as:  MIRAPEX   Take 2 tablets (0.5 mg) by mouth At Bedtime   Last time this was given:  0.5 mg on 10/23/2018  3:23 PM                                sulfaSALAzine 500 MG tablet   Commonly known as:  AZULFIDINE   Take 2,000 mg by mouth every morning   Last time this was given:  2,000 mg on 10/24/2018  7:53  AM                                * Notice:  This list has 2 medication(s) that are the same as other medications prescribed for you. Read the directions carefully, and ask your doctor or other care provider to review them with you.

## 2018-10-23 NOTE — ANESTHESIA CARE TRANSFER NOTE
Patient: Ace Navarro    Procedure(s):  Left Deep Brain Stimulator Placement, Phase One, Placement of Left Side Deep Brain Stimulator Electrode Target Left Subthalamic Nucleus with Microelectrode Recording    Diagnosis: Parkinson's Disease  Diagnosis Additional Information: No value filed.    Anesthesia Type:   MAC     Note:  Airway :Nasal Cannula  Patient transferred to:PACU  Comments: Pt awake, alert, responding appropriately. Stable, report to RN. Handoff Report: Identifed the Patient, Identified the Reponsible Provider, Reviewed the pertinent medical history, Discussed the surgical course, Reviewed Intra-OP anesthesia mangement and issues during anesthesia, Set expectations for post-procedure period and Allowed opportunity for questions and acknowledgement of understanding      Vitals: (Last set prior to Anesthesia Care Transfer)    CRNA VITALS  10/23/2018 1432 - 10/23/2018 1507      10/23/2018             Pulse: 93    SpO2: 95 %                Electronically Signed By: NICK Pena CRNA  October 23, 2018  3:07 PM

## 2018-10-23 NOTE — IP AVS SNAPSHOT
MRN:[id number]                      After Visit Summary   10/23/2018    Ace Navarro    MRN: [id number]           Thank you!     Thank you for choosing [facility] for your care. Our goal is always to provide you with excellent care. Hearing back from our patients is one way we can continue to improve our services. Please take a few minutes to complete the written survey that you may receive in the mail after you visit with us. Thank you!        Patient Information     Date Of Birth          1945        Designated Caregiver       Most Recent Value    Caregiver    Will someone help with your care after discharge? yes    Name of designated caregiver Jose    Phone number of caregiver [phone]    Caregiver address [address]      About your hospital stay     You were admitted on:  October 23, 2018 You last received care in the:  [address] [facility]    You were discharged on:  October 24, 2018        Reason for your hospital stay       You underwent surgery for DBS placement which was aborted due to intolerance of head frame                  Who to Call     For medical emergencies, please call 911.  For non-urgent questions about your medical care, please call your primary care provider or clinic, [phone]  For questions related to your surgery, please call your surgery clinic        Attending Provider     Provider Humberto Gay MD Neurosurgery       Primary Care Provider Office Phone # Fax #    Maurilio FOSTER Bonilla [phone]      After Care Instructions     Activity       Your activity upon discharge:  Do not do any bending, twisting, strenuous exercise, or heavy lifting (greater than 10 pounds) for 4-6 weeks. Be careful and ask for assistance when walking or going up and down stairs. Avoid any activities that could result in trauma to the surgical wound. Do not drive within 3 months of having your last seizure or while using narcotics or  other sedating medications, such as sleep aids, muscle relaxants, etc.            Diet       Follow this diet upon discharge: Orders Placed This Encounter      Advance Diet as Tolerated: Regular Diet Adult; Regular Diet Adult            Wound care and dressings       Instructions to care for your wound at home:  You should remove your dressings and bandages on post-operative day #2. You should then keep the wound undressed and open to air. You are allowed to take showers and get the wound wet starting on post-operative day #3 but you may not scrub or soak the wound or keep it submerged under water. If you do happen to get the wound wet, be sure to pat dry it rather than scrubbing it with a towel.                  Follow-up Appointments     Adult [facility] Follow-up and recommended labs and tests       Follow up in 2 weeks with Dr Humberto Briones    Appointments on [place] and/or [facility] (with [facility] or [facility] provider or service). Call [phone] if you haven't heard regarding these appointments within 7 days of discharge.                  Your next 10 appointments already scheduled     Nov 15, 2018 11:00 AM CST   (Arrive by 10:45 AM)   Return Visit with NICK Britton CNP [facility] Neurosurgery ([facility])    [address] [id number] [phone]            Nov 19, 2018  7:20 AM CST   (Arrive by 7:05 AM)   Deep Brain Stimulation with NICK Wright CNP   [facility] Neurology ([facility] [place])    [address] [id number] [phone]            Nov 19, 2018 12:20 PM CST   CT HEAD W/O CONTRAST with UCCT1   [facility] [place] CT ([facility])    [address] [id number] [phone]           How do I prepare for my exam? (Food and drink instructions) No Food and Drink Restrictions.  How do I prepare for my  exam? (Other instructions) You do not need to do anything special to prepare for this exam. For a sinus scan: Use your nose spray (nasal decongestant spray) as directed.  What should I wear: Please wear loose clothing, such as a sweat suit or jogging clothes. Avoid snaps, zippers and other metal. We may ask you to undress and put on a hospital gown.  How long does the exam take: Most scans take less than 20 minutes.  What should I bring: Please bring any scans or X-rays taken at other hospitals, if similar tests were done. Also bring a list of your medicines, including vitamins, minerals and over-the-counter drugs. It is safest to leave personal items at home.  Do I need a : No  is needed.  What do I need to tell my doctor? Be sure to tell your doctor: * If you have any allergies. * If there s any chance you are pregnant. * If you are breastfeeding.  What should I do after the exam: No restrictions, You may resume normal activities.  What is this test: A CT (computed tomography) scan is a series of pictures that allows us to look inside your body. The scanner creates images of the body in cross sections, much like slices of bread. This helps us see any problems more clearly.  Who should I call with questions: If you have any questions, please call the Imaging Department where you will have your exam. Directions, parking instructions, and other information is available on our website, [email or web address].              Future tests that were ordered for you     Basic metabolic panel           CBC with platelets       Last Lab Result: No results found for: HGB            INR           Partial thromboplastin time                 Pending Results     No orders found for last 3 day(s).            Statement of Approval     Ordered          10/24/18 0844  I have reviewed and agree with all the recommendations and orders detailed in this document.  EFFECTIVE NOW     Approved and electronically signed by:  Pari  "NICK Carranza CNP             Admission Information     Date & Time Provider Department Dept. Phone    10/23/2018 Humberto Briones MD [address] [facility] [place] [phone]      Your Vitals Were     Blood Pressure Pulse Temperature Respirations Height Weight    98/58 54 97.4  F (36.3  C) (Oral) 20 1.803 m (5' 11\") 78.5 kg (173 lb 1 oz)    Pulse Oximetry BMI (Body Mass Index)                95% 24.14 kg/m2          MyChart Information     [employer] gives you secure access to your electronic health record. If you see a primary care provider, you can also send messages to your care team and make appointments. If you have questions, please call your primary care clinic.  If you do not have a primary care provider, please call [phone] and they will assist you.        Care EveryWhere ID     This is your Care EveryWhere ID. This could be used by other organizations to access your [facility] medical records  [id number]        Equal Access to Services     REBECA HORTON : Hadii chidi joy hadasho Soomaali, waaxda luqadaha, qaybta kaalmada adeegyada, janet mas . So [facility] [phone].    ATENCIÓN: Si habla español, tiene a rodriguez disposición servicios gratuitos de asistencia lingüística. Llame al [phone].    We comply with applicable federal civil rights and [place] laws. We do not discriminate on the basis of race, color, national origin, age, disability, sex, sexual orientation or gender identity.                             Review of your medicines      START taking        Dose / Directions    oxyCODONE IR 5 MG tablet   Commonly known as:  ROXICODONE   Used for:  Parkinson disease (H)        Dose:  5-10 mg   Take 1-2 tablets (5-10 mg) by mouth every 4 hours as needed   Quantity:  30 tablet   Refills:  0         CONTINUE these medicines which have NOT CHANGED        Dose / Directions    * carbidopa-levodopa  MG per CR tablet   Commonly known as:  SINEMET CR        Dose:  " 2 tablet   Take 2 tablets by mouth At Bedtime   Refills:  0       * carbidopa-levodopa  MG per tablet   Commonly known as:  SINEMET        Dose:  4 tablet   Take 4 tablets by mouth 3 times daily 4 tabs 0500/ 4 tabs 1200 / 4 tabs 2100   Refills:  0       diazepam 5 MG tablet   Commonly known as:  VALIUM   Used for:  Anxiety        Take 1 tablet 30 minutes prior to MRI. May take additional tablet immediately before MRI as needed   Quantity:  2 tablet   Refills:  0       folic acid 20 MG Caps        Dose:  800 mg   Take 800 mg by mouth   Refills:  0       Multi-vitamin Tabs tablet        Dose:  1 tablet   Take 1 tablet by mouth daily   Refills:  0       PEG-3350/Electrolytes 236 g Solr        As directed. Do not fill until patient contacts the pharmacy.   Refills:  0       pramipexole 0.25 MG tablet   Commonly known as:  MIRAPEX        Dose:  0.5 mg   Take 2 tablets (0.5 mg) by mouth At Bedtime   Refills:  0       sulfaSALAzine 500 MG tablet   Commonly known as:  AZULFIDINE        Dose:  2000 mg   Take 2,000 mg by mouth every morning   Refills:  0       * Notice:  This list has 2 medication(s) that are the same as other medications prescribed for you. Read the directions carefully, and ask your doctor or other care provider to review them with you.         Where to get your medicines      Some of these will need a paper prescription and others can be bought over the counter. Ask your nurse if you have questions.     Bring a paper prescription for each of these medications     oxyCODONE IR 5 MG tablet                Protect others around you: Learn how to safely use, store and throw away your medicines at www.disposemymeds.org.        Information about OPIOIDS     PRESCRIPTION OPIOIDS: WHAT YOU NEED TO KNOW   We gave you an opioid (narcotic) pain medicine. Opioids can cause addiction. If you have a history of chemical dependency of any type, you are at a higher risk of becoming addicted to opioids. Only take this  medicine after all other options have been tried. Take it for as short a time and as few doses as possible.     Do not:    Drive. If you drive while taking these medicines, you could be arrested for driving under the influence (DUI).    Operate heave machinery    Do any other dangerous activities while taking these medicines.     Drink any alcohol while taking these medicines.      Take with any other medicines that contain acetaminophen. Read all labels carefully. Look for the word  acetaminophen  or  Tylenol.  Ask your pharmacist if you have questions or are unsure.    Store your pills in a secure place, locked if possible. We will not replace any lost or stolen medicine. If you don t finish your medicine, please throw away (dispose) as directed by your pharmacist. The [place] Pollution Control Agency has more information about safe disposal: https://www.pca.[facility].mn.us/living-green/managing-unwanted-medications    All opioids tend to cause constipation. Drink plenty of water and eat foods that have a lot of fiber, such as fruits, vegetables, prune juice, apple juice and high-fiber cereal. Take a laxative (Miralax, milk of magnesia, Colace, Senna) if you don t move your bowels at least every other day.                Medication List: This is a list of all your medications and when to take them. Check marks below indicate your daily home schedule. Keep this list as a reference.      Medications           Morning Afternoon Evening Bedtime As Needed    * carbidopa-levodopa  MG per CR tablet   Commonly known as:  SINEMET CR   Take 2 tablets by mouth At Bedtime   Last time this was given:  2 tablets on 10/23/2018  7:58 PM                                * carbidopa-levodopa  MG per tablet   Commonly known as:  SINEMET   Take 4 tablets by mouth 3 times daily 4 tabs 0500/ 4 tabs 1200 / 4 tabs 2100   Last time this was given:  4 tablets on 10/24/2018  5:03 AM                                diazepam 5 MG  tablet   Commonly known as:  VALIUM   Take 1 tablet 30 minutes prior to MRI. May take additional tablet immediately before MRI as needed                                folic acid 20 MG Caps   Take 800 mg by mouth                                Multi-vitamin Tabs tablet   Take 1 tablet by mouth daily   Last time this was given:  1 tablet on 10/24/2018  7:53 AM                                oxyCODONE IR 5 MG tablet   Commonly known as:  ROXICODONE   Take 1-2 tablets (5-10 mg) by mouth every 4 hours as needed                                PEG-3350/Electrolytes 236 g Solr   As directed. Do not fill until patient contacts the pharmacy.                                pramipexole 0.25 MG tablet   Commonly known as:  MIRAPEX   Take 2 tablets (0.5 mg) by mouth At Bedtime   Last time this was given:  0.5 mg on 10/23/2018  3:23 PM                                sulfaSALAzine 500 MG tablet   Commonly known as:  AZULFIDINE   Take 2,000 mg by mouth every morning   Last time this was given:  2,000 mg on 10/24/2018  7:53 AM                                * Notice:  This list has 2 medication(s) that are the same as other medications prescribed for you. Read the directions carefully, and ask your doctor or other care provider to review them with you.

## 2018-10-23 NOTE — BRIEF OP NOTE
[facility], [place]    Brief Operative Note    Pre-operative diagnosis: Parkinson's Disease  Post-operative diagnosis * No post-op diagnosis entered *  Procedure: Procedure(s):  Left Deep Brain Stimulator Placement, Phase One, Placement of Left Side Deep Brain Stimulator Electrode Target Left Subthalamic Nucleus with Microelectrode Recording  Surgeon: Surgeon(s) and Role:     * Humberto Briones MD - Primary     * Carolyn Tee MD - Resident - Assisting  Anesthesia: Combined MAC with Local   Estimated blood loss: 5ml   Drains: None  Specimens: * No specimens in log *  Findings:   None.  Complications: Surgery aborted prior to lead placement per patient's choice. No implants inserted..  Implants: None.

## 2018-10-23 NOTE — OR NURSING
Patient became more agitated as the procedure continued after neurology testing.  Requested to stop procedure.  Anesthesia offered some sedation and patient stated no, wanted to stop procedure.  Procedure aborted per his request.  See surgeon's notes.

## 2018-10-24 VITALS
OXYGEN SATURATION: 99 % | RESPIRATION RATE: 20 BRPM | WEIGHT: 173.06 LBS | HEART RATE: 54 BPM | BODY MASS INDEX: 24.23 KG/M2 | DIASTOLIC BLOOD PRESSURE: 64 MMHG | HEIGHT: 71 IN | SYSTOLIC BLOOD PRESSURE: 115 MMHG | TEMPERATURE: 97.4 F

## 2018-10-24 LAB
ANION GAP SERPL CALCULATED.3IONS-SCNC: 5 MMOL/L (ref 3–14)
BUN SERPL-MCNC: 13 MG/DL (ref 7–30)
CALCIUM SERPL-MCNC: 8.3 MG/DL (ref 8.5–10.1)
CHLORIDE SERPL-SCNC: 106 MMOL/L (ref 94–109)
CO2 SERPL-SCNC: 28 MMOL/L (ref 20–32)
CREAT SERPL-MCNC: 0.92 MG/DL (ref 0.66–1.25)
ERYTHROCYTE [DISTWIDTH] IN BLOOD BY AUTOMATED COUNT: 13.5 % (ref 10–15)
GFR SERPL CREATININE-BSD FRML MDRD: 81 ML/MIN/1.7M2
GLUCOSE SERPL-MCNC: 98 MG/DL (ref 70–99)
HCT VFR BLD AUTO: 38.4 % (ref 40–53)
HGB BLD-MCNC: 12.5 G/DL (ref 13.3–17.7)
MCH RBC QN AUTO: 29.1 PG (ref 26.5–33)
MCHC RBC AUTO-ENTMCNC: 32.6 G/DL (ref 31.5–36.5)
MCV RBC AUTO: 90 FL (ref 78–100)
PHOSPHATE SERPL-MCNC: 3.4 MG/DL (ref 2.5–4.5)
PLATELET # BLD AUTO: 136 10E9/L (ref 150–450)
POTASSIUM SERPL-SCNC: 4.3 MMOL/L (ref 3.4–5.3)
RBC # BLD AUTO: 4.29 10E12/L (ref 4.4–5.9)
SODIUM SERPL-SCNC: 140 MMOL/L (ref 133–144)
WBC # BLD AUTO: 6.1 10E9/L (ref 4–11)

## 2018-10-24 PROCEDURE — A9270 NON-COVERED ITEM OR SERVICE: HCPCS | Mod: GY | Performed by: STUDENT IN AN ORGANIZED HEALTH CARE EDUCATION/TRAINING PROGRAM

## 2018-10-24 PROCEDURE — 25000132 ZZH RX MED GY IP 250 OP 250 PS 637: Mod: GY | Performed by: STUDENT IN AN ORGANIZED HEALTH CARE EDUCATION/TRAINING PROGRAM

## 2018-10-24 PROCEDURE — 36415 COLL VENOUS BLD VENIPUNCTURE: CPT | Performed by: STUDENT IN AN ORGANIZED HEALTH CARE EDUCATION/TRAINING PROGRAM

## 2018-10-24 PROCEDURE — 25000128 H RX IP 250 OP 636: Performed by: STUDENT IN AN ORGANIZED HEALTH CARE EDUCATION/TRAINING PROGRAM

## 2018-10-24 PROCEDURE — 84100 ASSAY OF PHOSPHORUS: CPT | Performed by: STUDENT IN AN ORGANIZED HEALTH CARE EDUCATION/TRAINING PROGRAM

## 2018-10-24 PROCEDURE — 85027 COMPLETE CBC AUTOMATED: CPT | Performed by: STUDENT IN AN ORGANIZED HEALTH CARE EDUCATION/TRAINING PROGRAM

## 2018-10-24 PROCEDURE — 80048 BASIC METABOLIC PNL TOTAL CA: CPT | Performed by: STUDENT IN AN ORGANIZED HEALTH CARE EDUCATION/TRAINING PROGRAM

## 2018-10-24 RX ORDER — OXYCODONE HYDROCHLORIDE 5 MG/1
5-10 TABLET ORAL EVERY 4 HOURS PRN
Qty: 30 TABLET | Refills: 0 | Status: SHIPPED | OUTPATIENT
Start: 2018-10-24 | End: 2019-04-12

## 2018-10-24 RX ADMIN — SULFASALAZINE 2000 MG: 500 TABLET ORAL at 07:53

## 2018-10-24 RX ADMIN — MULTIPLE VITAMINS W/ MINERALS TAB 1 TABLET: TAB at 07:53

## 2018-10-24 RX ADMIN — CARBIDOPA AND LEVODOPA 4 TABLET: 25; 100 TABLET ORAL at 05:03

## 2018-10-24 RX ADMIN — CEFAZOLIN SODIUM 1 G: 1 INJECTION, POWDER, FOR SOLUTION INTRAMUSCULAR; INTRAVENOUS at 01:18

## 2018-10-24 RX ADMIN — ACETAMINOPHEN 975 MG: 325 TABLET, FILM COATED ORAL at 01:18

## 2018-10-24 RX ADMIN — CEFAZOLIN SODIUM 1 G: 1 INJECTION, POWDER, FOR SOLUTION INTRAMUSCULAR; INTRAVENOUS at 10:16

## 2018-10-24 ASSESSMENT — ACTIVITIES OF DAILY LIVING (ADL)
ADLS_ACUITY_SCORE: 10

## 2018-10-24 NOTE — PLAN OF CARE
Problem: Surgery Nonspecified (Adult)  Goal: Signs and Symptoms of Listed Potential Problems Will be Absent, Minimized or Managed (Surgery Nonspecified)  Signs and symptoms of listed potential problems will be absent, minimized or managed by discharge/transition of care (reference Surgery Nonspecified (Adult) CPG).   Outcome: Adequate for Discharge Date Met: 10/24/18  Neuro: At Pt's baseline.   Cardiac: Sinus rhythm/sinus koby. A-febrile.  Resp: Lung sounds clear, on room air.   GI: Passing gas, tolerating regular diet.  : Voiding spont.  Skin: Incision to head intact.   Muscle/Mobility: Up ad lucita.  Pain: Denies pain.  Lines/Drains: PIV removed.     PLAN: Discharge teaching complete with Patient and Patient's son Jose. All questions answered and discharge paperwork discussed with Pt and son. Pt ambulated off unit w/ family in stable condition.

## 2018-10-24 NOTE — PLAN OF CARE
Problem: Patient Care Overview  Goal: Plan of Care/Patient Progress Review  Status: patient intermittently confused overnight.   D/I: Patient on unit 4A Surgical/Neuro ICU s/p: deep brain stimulator was to be placed but patient had anxiety attack and they were not able to complete surgery.  Neuro- intact; alert and oriented x4. Patient did have intermittent periods were he was not oriented to time and place. Had to turn on bed alarm because he was attempting to get out of bed one of the times. Patient moves all extremities spontaneously and to command. He does have a baseline right handed tremor. Eyes are blood shot per patient at baseline and face is flushed per patient from Parkinson medications.   CV- VSS, afebrile. Sinus koby.   Pulm- lung sounds clear. On 2 liters nasal canula with capnography.  GI- regular diet. No bm overnight; did take miralax.  - walks to bathroom.   Skin- left shin abrasion.  Gtts- ns@75, will saline lock due to intaking oral fluids appropriately.  ID- ancef  Pain- scheduled tylenol, otherwise oxycodone and dilaudid.  Electrolytes- replaced per protocol.  See flow sheets for further interventions and assessments.  P: Continue to monitor pt closely, Notify MD of changes/concerns.

## 2018-10-24 NOTE — PLAN OF CARE
Problem: Patient Care Overview  Goal: Plan of Care/Patient Progress Review  Outcome: Improving  Neuro: R hand tremor intermittently otherwise WDL.  Cardiac: Sinus rhythm/sinus bradycardia. A-febrile.  Resp: On 2 L NC, lung sounds clear.  GI: Tolerating regular diet.   : Maldonado removed in PACU, has not Voided since arrival to .  Skin: Incision to R head CDI, primapore x2 intact.   Muscle/Mobility: Repositioning independently.   Pain: Denies pain.  Lines/Drains: L PIV infusing w/ MIV at 75cc/hr.     PLAN: Continue with plan of care, possible discharge tomorrow.

## 2018-10-24 NOTE — DISCHARGE SUMMARY
[facility] Discharge Summary and Instructions    Ace Navarro MRN# [id number]   Age: 73 year old YOB: 1945     Date of Admission:  10/23/2018  Date of Discharge::  10/24/2018 12:03 PM  Admitting Physician:  Humberto Briones MD  Discharge Physician:  Humberto Briones MD          Admission Diagnoses:   S/P deep brain stimulator placement [Z96.89]          Discharge Diagnosis:   S/P deep brain stimulator placement [Z96.89]          Procedures:   10/23/2018  1.  Placement of CRW stereotactic head frame.   2.  Stereotactic neuronavigation planning.   3.  Deep brain stimulator placement, phase 1, placement of left-sided deep brain stimulator electrode in the subthalamic nucleus. Aborted per patient's choice.               Brief History of Illness:       Patient is a 73 year old man with Parkinson's disease who was deemed a good candidate for deep brain stimulator placement targeting the subthalamic nucleus on the left side. Risks, benefits, alternative therapies were discussed with the patient including but not limited to infection, bleeding.  The surgical procedure was discussed in detail, all questions were answered.  He expressed understanding         Hospital Course:   Following surgery the patient was transferred to the Neurosurgical ICU where he remained medically and neurologically stable.  Post operative head CT revealed proper positioning of surgical leads.    The patient was tolerating diet, voiding, ambulating independently.  Patient will follow up for phase II of procedure with Dr Briones.  Discharge instructions discussed with patient who stated understanding.  Patient is aware he is to continue Sinemet medication as prescribed.           Discharge Medications:     Current Discharge Medication List      START taking these medications    Details   oxyCODONE IR (ROXICODONE) 5 MG tablet Take 1-2 tablets (5-10 mg) by mouth every 4 hours as needed  Qty: 30 tablet, Refills:  "0    Associated Diagnoses: Parkinson disease (H)         CONTINUE these medications which have NOT CHANGED    Details   carbidopa-levodopa (SINEMET CR)  MG per CR tablet Take 2 tablets by mouth At Bedtime       carbidopa-levodopa (SINEMET)  MG per tablet Take 4 tablets by mouth 3 times daily 4 tabs 0500/ 4 tabs 1200 / 4 tabs 2100      folic acid 20 MG CAPS Take 800 mg by mouth      multivitamin, therapeutic with minerals (MULTI-VITAMIN) TABS tablet Take 1 tablet by mouth daily      pramipexole (MIRAPEX) 0.25 MG tablet Take 2 tablets (0.5 mg) by mouth At Bedtime      sulfaSALAzine (AZULFIDINE) 500 MG tablet Take 2,000 mg by mouth every morning       diazepam (VALIUM) 5 MG tablet Take 1 tablet 30 minutes prior to MRI. May take additional tablet immediately before MRI as needed  Qty: 2 tablet, Refills: 0    Associated Diagnoses: Anxiety      PEG 3350-KCl-NaBcb-NaCl-NaSulf (PEG-3350/ELECTROLYTES) 236 G SOLR As directed. Do not fill until patient contacts the pharmacy.              /64  Pulse 54  Temp 97.4  F (36.3  C) (Oral)  Resp 20  Ht 1.803 m (5' 11\")  Wt 78.5 kg (173 lb 1 oz)  SpO2 99%  BMI 24.14 kg/m2  Exam:  General: Awake;  Alert, In No Acute Distress  Pulm: Breathing Comfortably on room air  Mental status: Oriented x 3  Cranial Nerves: Cranial Nerves II-XII Intact Bilaterally  Strength: 5/5 throughout  Pronator Drift: Absent  Sensory: Intact to Light Touch  Reflexes: No Hyperreflexia, Zuluaga s or Clonus Present; Toes Down-Going Bilaterally  INCISION: clean, dry, intact without significant erythema, swelling, or drainage; anterior pin sites covered with primapore dressing        Discharge Instructions and Follow-Up:   Discharge diet: Regular   Discharge activity: You may advance activity as tolerated. No strenuous exercise or heay lifting greater than 10 lbs for 4 weeks or until seen and cleared in clinic.   Discharge follow-up: Follow-up with Dr. Humberto Briones MD in 2 weeks "   Wound care: Ok to shower,however no scrubbing of the wound and no soaking of the wound, meaning no bathtubs or swimming pools. Pat dry only. Leave wound open to air.  Sutures are not absorbable and need to be removed in 2 weeks. If patient still at rehab by this time, the sutures may be removed by the rehab physician if he or she considers that the wound has healed completely.     Please call if you have:  1. increased pain, redness, drainage, swelling at your incision  2. fevers > 101.5 F degrees  3. with any questions or concerns.  You may reach the Neurosurgery clinic at [phone] during regular work hours. ER at [phone].    and ask for the Neurosurgery Resident on call at [phone], during off hours or weekends.         Discharge Disposition:   Discharged to home

## 2018-10-24 NOTE — PROGRESS NOTES
Admitted/transferred from: PACU  Reason for admission/transfer: Deep brain stimulator surgery  Patient status upon admission/transfer: Stable, alert and oriented.  Interventions: Pt settled into bed,educated on ICU plan of care.  Plan: Monitor neuro status overnight.  2 RN skin assessment: completed by Mirna Gonzalez RN and Emily Chauhan RN  Result of skin assessment and interventions/actions: L shin abrasian, surgical incisions on scalp. No intervention needed   Height, weight, drug calc weight: done  Patient belongings: At bedside in two patient belonging bags.

## 2018-10-24 NOTE — PROGRESS NOTES
"S:  Doing well, no significant headache or nausea.     O:  Exam:  General: Awake;  Alert, In No Acute Distress  Pulm: Breathing Comfortably on room air  Mental status: Oriented x 3  Cranial Nerves: Cranial Nerves II-XII Intact Bilaterally  Strength: 5/5 throughout  Pronator Drift: Absent  Sensory: Intact to Light Touch  Reflexes: No Hyperreflexia, Zuluaga s or Clonus Present; Toes Down-Going Bilaterally  INCISION: clean, dry, intact without significant erythema, swelling, or drainage; anterior pin sites covered with primapore dressing     Assessment:   #s/p attempted deep brain stimulator placement, aborted due to patient election     Plan by problem:     Neuro:   Continue home Parkinson s medications and restless leg syndrome medications     Cardiovascular: blood pressure goals: SBP < 140    Pulmonary: Incentive spirometry     Gastrointestinal: regular diet. Continue home ulcerative colitis medications     Renal: monitor intake and output     Heme: No issues     Endocrine: No issues    Infectious: kitty-op ancef x 3 doses     DVT prophylaxis: Sequential compression devices    Ulcer prophylaxis: none indicated    DISPO:  Anticipate D/C 10/24    Barriers: kitty-op ancef     Stark \"Ken\" MD Yolanda   Neurosurgery, PGY-2    Please contact neurosurgery resident on call with questions.    Dial * * [phone], enter [phone] when prompted.     "

## 2018-10-25 ENCOUNTER — PATIENT OUTREACH (OUTPATIENT)
Dept: CARE COORDINATION | Facility: CLINIC | Age: 73
End: 2018-10-25

## 2018-10-26 ENCOUNTER — CARE COORDINATION (OUTPATIENT)
Dept: NEUROSURGERY | Facility: CLINIC | Age: 73
End: 2018-10-26

## 2018-10-26 NOTE — PROGRESS NOTES
Spoke with patient   DBS lead placement surgery on 10/23/18 and battery placement on 10/30/18 aborted prior to lead placement.  Pt states doing fine, no headaches, dizziness, bladder or bowel issues.  Callback for any issues, Pt states has Tammie's number.    Voices understanding.

## 2018-10-30 ENCOUNTER — PATIENT OUTREACH (OUTPATIENT)
Dept: NEUROSURGERY | Facility: CLINIC | Age: 73
End: 2018-10-30

## 2018-10-30 NOTE — PROGRESS NOTES
Pt s/p aborted DBS lead placement on 10/23/18. Pt was unable to tolerate the awake procedure and the surgery was stopped at his request. Pt is doing well, denies pain. Denies redness, swelling, tenderness, drainage, incision opening or fever. He is aware of his post op appt with LARRY Casper on 11/15/18 at 11:00. He would like to have an appointment with Dr. Godinez to discuss the plan of care going forward. I will let the RN care coordinator in neurology know. No further questions at this time.

## 2018-11-01 ENCOUNTER — OFFICE VISIT (OUTPATIENT)
Dept: PSYCHOLOGY | Facility: CLINIC | Age: 73
End: 2018-11-01
Payer: COMMERCIAL

## 2018-11-01 DIAGNOSIS — F43.22 ADJUSTMENT DISORDER WITH ANXIETY: Primary | ICD-10-CM

## 2018-11-01 NOTE — MR AVS SNAPSHOT
After Visit Summary   11/1/2018    Ace Navarro    MRN: [id number]           Patient Information     Date Of Birth          1945        Visit Information        Provider Department      11/1/2018 12:00 PM Sudha Prater, PhD [facility]        Today's Diagnoses     Adjustment disorder with anxiety    -  1       Follow-ups after your visit        Your next 10 appointments already scheduled     Nov 13, 2018  7:30 AM CST   (Arrive by 7:15 AM)   Return Movement Disorder with Eric Godinez MD   [facility] ([facility])    [address] [place] [id number] [phone]            Nov 15, 2018 11:00 AM CST   (Arrive by 10:45 AM)   Return Visit with NICK Britton [facility] ([facility])    [address] [place] [id number] [phone]              Who to contact     Please call your clinic at [phone] to:    Ask questions about your health    Make or cancel appointments    Discuss your medicines    Learn about your test results    Speak to your doctor            Additional Information About Your Visit        Virtual Command[employer] Information     [employer] gives you secure access to your electronic health record. If you see a primary care provider, you can also send messages to your care team and make appointments. If you have questions, please call your primary care clinic.  If you do not have a primary care provider, please call [phone] and they will assist you.      [employer] is an electronic gateway that provides easy, online access to your medical records. With [employer], you can request a clinic appointment, read your test results, renew a prescription or communicate with your care team.     To access your existing account, please contact your [facility] or call [phone] for assistance.        Care  EveryWhere ID     This is your Care EveryWhere ID. This could be used by other organizations to access your [facility] medical records  [id number]         Blood Pressure from Last 3 Encounters:   10/24/18 115/64   10/09/18 136/76   05/24/18 135/84    Weight from Last 3 Encounters:   10/24/18 78.5 kg (173 lb 1 oz)   10/09/18 80.7 kg (178 lb)   05/24/18 82.6 kg (182 lb 3.2 oz)              Today, you had the following     No orders found for display       Primary Care Provider Office Phone # Fax #    Maurilio Bonilla [phone]       [facility] [address] [id number]        Equal Access to Services     REBECA HORTON : Hadii chidi franciso Sodevin, waaxda luqadaha, qaybta kaalmada adeegyada, janet garsia. So [facility] [phone].    ATENCIÓN: Si habla español, tiene a rodriguez disposición servicios gratuitos de asistencia lingüística. [facility] [phone].    We comply with applicable federal civil rights laws and [place] laws. We do not discriminate on the basis of race, color, national origin, age, disability, sex, sexual orientation, or gender identity.            Thank you!     Thank you for choosing [facility]  for your care. Our goal is always to provide you with excellent care. Hearing back from our patients is one way we can continue to improve our services. Please take a few minutes to complete the written survey that you may receive in the mail after your visit with us. Thank you!             Your Updated Medication List - Protect others around you: Learn how to safely use, store and throw away your medicines at www.disposemymeds.org.          This list is accurate as of 11/1/18 11:59 PM.  Always use your most recent med list.                   Brand Name Dispense Instructions for use Diagnosis    * carbidopa-levodopa  MG per CR tablet    SINEMET CR     Take 2 tablets by mouth At Bedtime        * carbidopa-levodopa  MG  per tablet    SINEMET     Take 4 tablets by mouth 3 times daily 4 tabs 0500/ 4 tabs 1200 / 4 tabs 2100        diazepam 5 MG tablet    VALIUM    2 tablet    Take 1 tablet 30 minutes prior to MRI. May take additional tablet immediately before MRI as needed    Anxiety       folic acid 20 MG Caps      Take 800 mg by mouth        Multi-vitamin Tabs tablet      Take 1 tablet by mouth daily        oxyCODONE IR 5 MG tablet    ROXICODONE    30 tablet    Take 1-2 tablets (5-10 mg) by mouth every 4 hours as needed    Parkinson disease (H)       PEG-3350/Electrolytes 236 g Solr      As directed. Do not fill until patient contacts the pharmacy.        pramipexole 0.25 MG tablet    MIRAPEX     Take 2 tablets (0.5 mg) by mouth At Bedtime        sulfaSALAzine 500 MG tablet    AZULFIDINE     Take 2,000 mg by mouth every morning        * Notice:  This list has 2 medication(s) that are the same as other medications prescribed for you. Read the directions carefully, and ask your doctor or other care provider to review them with you.

## 2018-11-09 NOTE — PROGRESS NOTES
"Health Psychology - Follow up Visit  Confidential Summary*      REFERRAL SOURCE:  DBS for PD/Neuro      CHIEF COMPLAINT/REASON FOR VISIT  Cognitive behavioral therapy and behavioral counseling in context of addressing anxiety surrounding possible claustrophobia and the impact of anxiety on his ability to successfully complete DBS surgery.  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      Patient was seen today for a 60 minute individual health and behavior intervention session.    Subjective:  Patient seen for first appointment following DBS procedure.  Patient provided the details that he recalls from the procedure however, he is aware that some of what he \"remembers\" may not have occurred and may have been a function of being off his PD medications, in the OR and on anesthesia.  He distinctly recalls hearing the sound of the saw but also is aware of some paranoid concerns that the group of providers in the room were not those he recognized and he believed, at the time, that they were a \"rouge\" group, out to harm him.  He very tearfully described his terror that he thought he was in a medical experiment of some sort.  Discussed the probability that his response was secondary to a dream like state.  He provided many details of things he \"recalls\" about the procedure and we examined the likelihood or the probability that these were true.  For example, he thought he recalled providers laughing and asking for his input as to the proper placement of his electrodes.  He recalls the providers encouraging him to continue and letting him know that procedures are not stopped.  He is aware that these events likely did not occur.     He is concerned that his severe akathisia in his legs may have been associated with the length of time he was off all pd medications since the start of the procedure may have been delayed. "     The patient tearfully described his disappointment in himself and his sadness that he also disappointed both his providers and his son.  He was reassured by a discussion with his son in which the son reassured him that disappointment was not a consideration.  The patient now realizes that his family is supportive of him and understands that the procedure was aborted because the patient was unable to continue.      Spent time discussing his disappointment that he may not be able to have DBS and that he waited a long time and believed he was prepared for the procedure.  He described an encouraging suggestion that his provider has made that he may be a candidate for a non-awake DBS.  Patient was provided with an article discussing the possibility.  He also asked to sign a MIHAI so that he might review the surgery notes with his family.  This was signed and faxed to the medical records department as a call to them revealed that it is not protocol to provide patient with notes from another provider.      Objective:  Patient was on time for today s session, appropriately groomed and dressed, and demonstrated good eye contact.  He appeared friendly, alert and oriented, and was experiencing some dyskinesia during the session. Mood was dysphoric and tearful throughout.  Patient denied suicidal or assaultive ideation, plan, or intent.        Assessment:  The patient has a long history of PD and is planning for DBS.  Patient seen today to process anxiety and negative mood following aborted DBS procedure, midway, at his request.      Plan:  To see Dr. PRASAD on 11/13.  Will see patient following this visit.  Offered to attend if he would find it helpful.  Patient encouraged to bring    Time In: 12:00  Time Out: 1:00    Diagnosis:  Axis I Adjustment disorder with mixed   Axis II Deferred   Axis III please see medical records for details   Belden IV Psychosocial and Environmental Stressors: Health,  surgery         Sudha Prater, PhD,  LP      *In accordance with the Rules of the [place] Board of [employer], it is noted that psychological descriptions and scientific procedures underlying psychological evaluations have limitations.  Absolute predictions cannot be made based on information in this report.

## 2018-11-12 ENCOUNTER — PATIENT OUTREACH (OUTPATIENT)
Dept: CARE COORDINATION | Facility: CLINIC | Age: 73
End: 2018-11-12

## 2018-11-13 ENCOUNTER — OFFICE VISIT (OUTPATIENT)
Dept: NEUROLOGY | Facility: CLINIC | Age: 73
End: 2018-11-13
Payer: COMMERCIAL

## 2018-11-13 VITALS
HEART RATE: 48 BPM | WEIGHT: 173 LBS | HEIGHT: 71 IN | SYSTOLIC BLOOD PRESSURE: 125 MMHG | OXYGEN SATURATION: 99 % | DIASTOLIC BLOOD PRESSURE: 80 MMHG | BODY MASS INDEX: 24.22 KG/M2 | RESPIRATION RATE: 16 BRPM | TEMPERATURE: 97.4 F

## 2018-11-13 DIAGNOSIS — G20.A1 PARKINSON'S DISEASE (H): Primary | ICD-10-CM

## 2018-11-13 RX ORDER — CARBIDOPA AND LEVODOPA 50; 200 MG/1; MG/1
2 TABLET, EXTENDED RELEASE ORAL AT BEDTIME
Qty: 180 TABLET | Refills: 3 | Status: SHIPPED | OUTPATIENT
Start: 2018-11-13 | End: 2019-09-20

## 2018-11-13 RX ORDER — CARBIDOPA AND LEVODOPA 25; 100 MG/1; MG/1
4 TABLET ORAL 3 TIMES DAILY
Qty: 360 TABLET | Refills: 11 | Status: SHIPPED | OUTPATIENT
Start: 2018-11-13 | End: 2019-07-09

## 2018-11-13 ASSESSMENT — UNIFIED PARKINSONS DISEASE RATING SCALE (UPDRS)
RIGIDITY_RLE: NORMAL
FREEZING_GAIT: NORMAL
LEG_AGILITY_LEFT: SLIGHT: ANY OF THE FOLLOWING: A) THE REGULAR RHYTHM IS BROKEN WITH ONE WITH ONE OR TWO INTERRUPTIONS OR HESITATIONS OF THE MOVEMENT B) SLIGHT SLOWING C) THE AMPLITUDE DECREMENTS NEAR THE END OF THE 10 MOVEMENTS.
RIGIDITY_LLE: NORMAL
TOTAL_SCORE: 21
POSTURE: 0 NORMAL, NO PROBLEMS
AXIAL_SCORE: 5
CONSTANCY_TREMOR_ATREST: MILD: TREMOR AT REST IS PRESENT 25-50% OF THE ENTIRE EXAMINATION PERIOD.
TOETAPPING_LEFT: SLIGHT: ANY OF THE FOLLOWING: A) THE REGULAR RHYTHM IS BROKEN WITH ONE WITH ONE OR TWO INTERRUPTIONS OR HESITATIONS OF THE MOVEMENT B) SLIGHT SLOWING C) THE AMPLITUDE DECREMENTS NEAR THE END OF THE 10 MOVEMENTS.
GAIT: NORMAL
PRONATION_SUPINATION_RIGHT: SLIGHT: ANY OF THE FOLLOWING: A) THE REGULAR RHYTHM IS BROKEN WITH ONE WITH ONE OR TWO INTERRUPTIONS OR HESITATIONS OF THE MOVEMENT B) SLIGHT SLOWING C) THE AMPLITUDE DECREMENTS NEAR THE END OF THE 10 MOVEMENTS.
TOTAL_SCORE_LEFT: 6
TOTAL_SCORE: 8
AMPLITUDE_LLE: NORMAL: NO TREMOR.
SPONTANEITY_OF_MOVEMENT: 0: NORMAL.  NO PROBLEMS.
FACIAL_EXPRESSION: MILD: IN ADDITION TO DECREASED EYE-BLINK FREQUENCY, MASKED FACIES PRESENT IN THE LOWER FACE AS WELL, NAMELY FEWER MOVEMENTS AROUND THE MOUTH, SUCH AS LESS SPONTANEOUS SMILING, BUT LIPS NOT PARTED.
LEG_AGILITY_RIGHT: SLIGHT: ANY OF THE FOLLOWING: A) THE REGULAR RHYTHM IS BROKEN WITH ONE WITH ONE OR TWO INTERRUPTIONS OR HESITATIONS OF THE MOVEMENT B) SLIGHT SLOWING C) THE AMPLITUDE DECREMENTS NEAR THE END OF THE 10 MOVEMENTS.
AMPLITUDE_LUE: NORMAL: NO TREMOR.
FINGER_TAPPING_RIGHT: SLIGHT: ANY OF THE FOLLOWING: A) THE REGULAR RHYTHM IS BROKEN WITH ONE WITH ONE OR TWO INTERRUPTIONS OR HESITATIONS OF THE MOVEMENT B) SLIGHT SLOWING C) THE AMPLITUDE DECREMENTS NEAR THE END OF THE 10 MOVEMENTS.
FINGER_TAPPING_LEFT: SLIGHT: ANY OF THE FOLLOWING: A) THE REGULAR RHYTHM IS BROKEN WITH ONE WITH ONE OR TWO INTERRUPTIONS OR HESITATIONS OF THE MOVEMENT B) SLIGHT SLOWING C) THE AMPLITUDE DECREMENTS NEAR THE END OF THE 10 MOVEMENTS.
TOETAPPING_RIGHT: SLIGHT: ANY OF THE FOLLOWING: A) THE REGULAR RHYTHM IS BROKEN WITH ONE WITH ONE OR TWO INTERRUPTIONS OR HESITATIONS OF THE MOVEMENT B) SLIGHT SLOWING C) THE AMPLITUDE DECREMENTS NEAR THE END OF THE 10 MOVEMENTS.
HANDMOVEMENTS_LEFT: SLIGHT: ANY OF THE FOLLOWING: A) THE REGULAR RHYTHM IS BROKEN WITH ONE WITH ONE OR TWO INTERRUPTIONS OR HESITATIONS OF THE MOVEMENT B) SLIGHT SLOWING C) THE AMPLITUDE DECREMENTS NEAR THE END OF THE 10 MOVEMENTS.
POSTURAL_STABILITY: NORMAL:  RECOVERS WITH ONE OR TWO STEPS.
RIGIDITY_RUE: SLIGHT: RIGIDITY ONLY DETECTED WITH ACTIVATION MANEUVER.
HANDMOVEMENTS_RIGHT: SLIGHT: ANY OF THE FOLLOWING: A) THE REGULAR RHYTHM IS BROKEN WITH ONE WITH ONE OR TWO INTERRUPTIONS OR HESITATIONS OF THE MOVEMENT B) SLIGHT SLOWING C) THE AMPLITUDE DECREMENTS NEAR THE END OF THE 10 MOVEMENTS.
SPEECH: MILD: LOSS OF MODULATION, DICTION OR VOLUME, WITH A FEW WORDS UNCLEAR, BUT THE OVERALL SENTENCES EASY TO FOLLOW.
AMPLITUDE_RUE: MILD > 1 CM BUT < 3 CM IN MAXIMAL AMPLITUDE.
AMPLITUDE_RLE: NORMAL: NO TREMOR.
ARISING_CHAIR: NORMAL: ABLE TO ARISE QUICKLY WITHOUT HESITATION.
RIGIDITY_NECK: SLIGHT: RIGIDITY ONLY DETECTED WITH ACTIVATION MANEUVER.
PRONATION_SUPINATION_LEFT: SLIGHT: ANY OF THE FOLLOWING: A) THE REGULAR RHYTHM IS BROKEN WITH ONE WITH ONE OR TWO INTERRUPTIONS OR HESITATIONS OF THE MOVEMENT B) SLIGHT SLOWING C) THE AMPLITUDE DECREMENTS NEAR THE END OF THE 10 MOVEMENTS.
RIGIDITY_LUE: SLIGHT: RIGIDITY ONLY DETECTED WITH ACTIVATION MANEUVER.
AMPLITUDE_LIP_JAW: NORMAL: NO TREMOR.
PARKINSONS_MEDS: ON

## 2018-11-13 ASSESSMENT — PAIN SCALES - GENERAL: PAINLEVEL: NO PAIN (0)

## 2018-11-13 NOTE — PROGRESS NOTES
"Movement Disorder Clinic follow up note    Patient: Ace Navarro  Medical Record Number: [id number]  Encounter Date: November 13, 2018  PCP:Maurilio Bonilla    CC: Parkinson's disease    Impression:  1.  Idiopathic Parkinson's disease  2.  Motor fluctuations with wearing off and mild peak dose dyskinesia  3.  Aborted electrode implantation during deep brain stimulation procedure 10/23/2018    Recommendations:  1.  The patient is adamant that he still wants deep brain stimulation.  Today in talking about his goals he says he wants to have less fatigue and do better at softball.  I told him frankly that these were probably not reasonable goals for a major procedure like deep brain stimulation.  As we talked more and discussed with his son he indicates his major goals are to reduce tremor reduce off time and reduce dyskinesia.  These would be more reasonable goals.  I told him that I would take him back to the DBS consensus conference and indicate his desire to do DBS under a sleep anesthesia.  2.  His current medication is excellent.  No changes are made.  Medication is refilled.    Return to clinic: After DBS consensus conference    Interval Hx:: Mr. Ace Navarro returns to clinic today for follow-up of his Parkinson's disease.  On October 23, 2018 he attempted DBS implantation with Dr. Humberto Briones.  The patient became confused and restless and could not complete the procedure.  The patient states that he went into a dream like state during the procedure that was like a \"nightmare\".  He felt people were angry and hostile with him.  He really did not understand why he was in the operating room.  He then said he snapped out of it and he understood that he was in the operating room and people were friendly.  At that time the procedure had been aborted.  In talking to him it sounds like the periods of coming in and out of sleep and anesthesia in the OR confused him.  He says he cannot go through the " "DBS procedure awake and I agree with him.  He says it was mentioned that this might be able to be done while he is asleep and he very much wants to pursue this.    The patient takes medicine 3 times a day.  He takes for carbidopa/levodopa, 25/100.  He has an on response within 10 minutes.  He often wears off especially around 730.  Then he has severe tremor and restless legs.  He is better when he takes his 9:00 dose.  He takes 2 tablets of long-acting carbidopa levodopa at bedtime.  He takes pramipexole.  He does have restless legs through the night at times.  He has mild peak dose dyskinesia.    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  He is not doing as well at softball as he did previously.  This is concerning to him.    He denies falls.  He has no freezing.  He has some drooling now.  He has no cognitive decline.  He has mild depression but is not on treatment.      Current medications    Movement Disorder-related Medications                   5 AM noon 9 PM 9:30 11:30   Carbidopa/levodopa 25/100 IR                                                4 4 4     Carbidopa/levodopa 50/200 CR                             2   Pramipexole 0.25 mg                              2              Current Outpatient Prescriptions   Medication     carbidopa-levodopa (SINEMET CR)  MG per CR tablet     carbidopa-levodopa (SINEMET)  MG per tablet     diazepam (VALIUM) 5 MG tablet     folic acid 20 MG CAPS     multivitamin, therapeutic with minerals (MULTI-VITAMIN) TABS tablet     PEG 3350-KCl-NaBcb-NaCl-NaSulf (PEG-3350/ELECTROLYTES) 236 G SOLR     pramipexole (MIRAPEX) 0.25 MG tablet     sulfaSALAzine (AZULFIDINE) 500 MG tablet     oxyCODONE IR (ROXICODONE) 5 MG tablet     No current facility-administered medications for this visit.        Examination:  /80  Pulse (!) 48  Temp 97.4  F (36.3  C) (Oral)  Resp 16  Ht 1.803 m (5' 11\")  Wt 78.5 kg (173 lb)  SpO2 99%  BMI 24.13 kg/m2  General- " no distress, no rash, good pulses, no edema  Respiratory-auscultation over the anterior lung fields is clear  Cardiac- regular rate and rhythm    Neurologic  Mental status  Patient is alert, appropriate, speech is fluent and comprehension is intact    Cranial nerve testing  Pupils are equal and reactive to light, visual fields are full to confrontation bilaterally  Extraocular movements are full  Facial sensation is intact, face is symmetric with rest and activation  Palate rises symmetrically, tongue protrudes at midline with normal movements  Sterocleidomastoid and trapezius strength is normal and symmetric    Motor  UPDRS Values 5/9/2018 5/9/2018 11/13/2018   Time: 7:45 AM 8:45 AM 8:00 AM   Medication Off On On   R Brain DBS: None None None   L Brain DBS: None None None   Speech 2 2 2   Facial Expression 3 3 2   Rigidity Neck 2 0 1   Rigidity RUE 2 1 1   Rigidity LUE 1 0 1   Rigidity RLE 0 0 0   Rigidity LLE 0 0 0   Finger Taps R 1 1 1   Finger Taps L 1 1 1   Hand Mvt R 1 1 1   Hand Mvt L 1 1 1   Pron-/Supinate R 1 1 1   Pron-/Supinate L 1 1 1   Toe Tap R 1 1 1   Toe Tap L 1 1 1   Leg Agility R 1 1 1   Leg Agility L 1 1 1   Arise From Chair 0 0 0   Gait 0 0 0   Gait Freezing 0 0 0   Postural Stability 1 0 0   Posture 2 1 0   Global Spont Mvt 2 0 0   Postural Tremor RUE 0 0 0   Postural Tremor LUE 0 0 0   Kinetic Tremor RUE 1 0 0   Kinetic Tremor LUE 0 0 0   Rest Tremor RUE 3 0 2   Rest Tremor LUE 2 0 0   Rest Tremor RLE 2 0 0   Rest Tremor LLE 2 0 0   Rest Tremor Lip/Jaw 2 0 0   Rest Tremor Constancy 4 0 2   Total Right 13 6 8   Total Left 10 5 6   Axial Total 12 6 5   Total 41 17 21   Sensation  Intact to pin, vibration   Proprioception intact in great toes and fingers    Tendon reflexes  Testing at brachioradialis, biceps, triceps, patella and achilles bilaterally showed normal reflexes, symmetrically, without Babinski or Zuluaga signs    Coordination  Finger nose finger testing: normalRapid alternating  movements are normal.  Gait and Station: normal    40 minutes of total time was spent face-to-face with the patient over 50% of which was counseling..

## 2018-11-13 NOTE — LETTER
"11/13/2018       RE: Ace Navarro  [address] [facility] [address]     Dear Colleague,    Thank you for referring your patient, Ace Navarro, to the [facility] at [facility]. Please see a copy of my visit note below.    Movement Disorder Clinic follow up note    Patient: Ace Navarro  Medical Record Number: [id number]  Encounter Date: November 13, 2018  PCP:Maurilio Bonilla    CC: Parkinson's disease    Impression:  1.  Idiopathic Parkinson's disease  2.  Motor fluctuations with wearing off and mild peak dose dyskinesia  3.  Aborted electrode implantation during deep brain stimulation procedure 10/23/2018    Recommendations:  1.  The patient is adamant that he still wants deep brain stimulation.  Today in talking about his goals he says he wants to have less fatigue and do better at softball.  I told him frankly that these were probably not reasonable goals for a major procedure like deep brain stimulation.  As we talked more and discussed with his son he indicates his major goals are to reduce tremor reduce off time and reduce dyskinesia.  These would be more reasonable goals.  I told him that I would take him back to the DBS consensus conference and indicate his desire to do DBS under a sleep anesthesia.  2.  His current medication is excellent.  No changes are made.  Medication is refilled.    Return to clinic: After DBS consensus conference    Interval Hx:: Mr. Ace Navarro returns to clinic today for follow-up of his Parkinson's disease.  On October 23, 2018 he attempted DBS implantation with Dr. Humberto Briones.  The patient became confused and restless and could not complete the procedure.  The patient states that he went into a dream like state during the procedure that was like a \"nightmare\".  He felt people were angry and hostile with him.  He really did not understand why he was in the operating room.  He then said he " snapped out of it and he understood that he was in the operating room and people were friendly.  At that time the procedure had been aborted.  In talking to him it sounds like the periods of coming in and out of sleep and anesthesia in the OR confused him.  He says he cannot go through the DBS procedure awake and I agree with him.  He says it was mentioned that this might be able to be done while he is asleep and he very much wants to pursue this.    The patient takes medicine 3 times a day.  He takes for carbidopa/levodopa, 25/100.  He has an on response within 10 minutes.  He often wears off especially around 730.  Then he has severe tremor and restless legs.  He is better when he takes his 9:00 dose.  He takes 2 tablets of long-acting carbidopa levodopa at bedtime.  He takes pramipexole.  He does have restless legs through the night at times.  He has mild peak dose dyskinesia.    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  He is not doing as well at softball as he did previously.  This is concerning to him.    He denies falls.  He has no freezing.  He has some drooling now.  He has no cognitive decline.  He has mild depression but is not on treatment.      Current medications    Movement Disorder-related Medications                   5 AM noon 9 PM 9:30 11:30   Carbidopa/levodopa 25/100 IR                                                4 4 4     Carbidopa/levodopa 50/200 CR                             2   Pramipexole 0.25 mg                              2              Current Outpatient Prescriptions   Medication     carbidopa-levodopa (SINEMET CR)  MG per CR tablet     carbidopa-levodopa (SINEMET)  MG per tablet     diazepam (VALIUM) 5 MG tablet     folic acid 20 MG CAPS     multivitamin, therapeutic with minerals (MULTI-VITAMIN) TABS tablet     PEG 3350-KCl-NaBcb-NaCl-NaSulf (PEG-3350/ELECTROLYTES) 236 G SOLR     pramipexole (MIRAPEX) 0.25 MG tablet     sulfaSALAzine (AZULFIDINE)  "500 MG tablet     oxyCODONE IR (ROXICODONE) 5 MG tablet     No current facility-administered medications for this visit.        Examination:  /80  Pulse (!) 48  Temp 97.4  F (36.3  C) (Oral)  Resp 16  Ht 1.803 m (5' 11\")  Wt 78.5 kg (173 lb)  SpO2 99%  BMI 24.13 kg/m2  General- no distress, no rash, good pulses, no edema  Respiratory-auscultation over the anterior lung fields is clear  Cardiac- regular rate and rhythm    Neurologic  Mental status  Patient is alert, appropriate, speech is fluent and comprehension is intact    Cranial nerve testing  Pupils are equal and reactive to light, visual fields are full to confrontation bilaterally  Extraocular movements are full  Facial sensation is intact, face is symmetric with rest and activation  Palate rises symmetrically, tongue protrudes at midline with normal movements  Sterocleidomastoid and trapezius strength is normal and symmetric    Motor  UPDRS Values 5/9/2018 5/9/2018 11/13/2018   Time: 7:45 AM 8:45 AM 8:00 AM   Medication Off On On   R Brain DBS: None None None   L Brain DBS: None None None   Speech 2 2 2   Facial Expression 3 3 2   Rigidity Neck 2 0 1   Rigidity RUE 2 1 1   Rigidity LUE 1 0 1   Rigidity RLE 0 0 0   Rigidity LLE 0 0 0   Finger Taps R 1 1 1   Finger Taps L 1 1 1   Hand Mvt R 1 1 1   Hand Mvt L 1 1 1   Pron-/Supinate R 1 1 1   Pron-/Supinate L 1 1 1   Toe Tap R 1 1 1   Toe Tap L 1 1 1   Leg Agility R 1 1 1   Leg Agility L 1 1 1   Arise From Chair 0 0 0   Gait 0 0 0   Gait Freezing 0 0 0   Postural Stability 1 0 0   Posture 2 1 0   Global Spont Mvt 2 0 0   Postural Tremor RUE 0 0 0   Postural Tremor LUE 0 0 0   Kinetic Tremor RUE 1 0 0   Kinetic Tremor LUE 0 0 0   Rest Tremor RUE 3 0 2   Rest Tremor LUE 2 0 0   Rest Tremor RLE 2 0 0   Rest Tremor LLE 2 0 0   Rest Tremor Lip/Jaw 2 0 0   Rest Tremor Constancy 4 0 2   Total Right 13 6 8   Total Left 10 5 6   Axial Total 12 6 5   Total 41 17 21   Sensation  Intact to pin, vibration "   Proprioception intact in great toes and fingers    Tendon reflexes  Testing at brachioradialis, biceps, triceps, patella and achilles bilaterally showed normal reflexes, symmetrically, without Babinski or Zuluaga signs    Coordination  Finger nose finger testing: normalRapid alternating movements are normal.  Gait and Station: normal    40 minutes of total time was spent face-to-face with the patient over 50% of which was counseling.    Sincerely,    Eric Godinez MD

## 2018-11-13 NOTE — NURSING NOTE
Chief Complaint   Patient presents with     Parkinson     [facility] RETURN MOVEMENT DISORDER F/U      Jax Engel, EMT

## 2018-11-13 NOTE — MR AVS SNAPSHOT
After Visit Summary   11/13/2018    Ace Navarro    MRN: [id number]           Patient Information     Date Of Birth          1945        Visit Information        Provider Department      11/13/2018 7:30 AM Eric Godinez MD [facility] Neurology        Today's Diagnoses     Parkinson's disease (H)    -  1      Care Instructions    1. I will ask you to be considered for DBS asleep  2.  We will keep in contact with you          Follow-ups after your visit        Follow-up notes from your care team     Return if symptoms worsen or fail to improve.      Your next 10 appointments already scheduled     Nov 15, 2018 11:00 AM CST   (Arrive by 10:45 AM)   Return Visit with NICK Britton [facility] ([email or web address] [place])    [address] [place] [address] [phone]              Who to contact     Please call your clinic at [phone] to:    Ask questions about your health    Make or cancel appointments    Discuss your medicines    Learn about your test results    Speak to your doctor            Additional Information About Your Visit        [employer] Information     [employer] gives you secure access to your electronic health record. If you see a primary care provider, you can also send messages to your care team and make appointments. If you have questions, please call your primary care clinic.  If you do not have a primary care provider, please call [phone] and they will assist you.      [employer] is an electronic gateway that provides easy, online access to your medical records. With [employer], you can request a clinic appointment, read your test results, renew a prescription or communicate with your care team.     To access your existing account, please contact your [facility] or call [phone] for assistance.        Care EveryWhere ID     This is your Care EveryWhere ID. This could  "be used by other organizations to access your [place] medical records  [id number]        Your Vitals Were     Pulse Temperature Respirations Height Pulse Oximetry BMI (Body Mass Index)    48 97.4  F (36.3  C) (Oral) 16 1.803 m (5' 11\") 99% 24.13 kg/m2       Blood Pressure from Last 3 Encounters:   11/13/18 125/80   10/24/18 115/64   10/09/18 136/76    Weight from Last 3 Encounters:   11/13/18 78.5 kg (173 lb)   10/24/18 78.5 kg (173 lb 1 oz)   10/09/18 80.7 kg (178 lb)              Today, you had the following     No orders found for display         Where to get your medicines      These medications were sent to [id number] - [place] - [address]  [address] [place] [address]    Hours:  m/f 9-9 sat/sun 9-6 Phone:  [phone]     carbidopa-levodopa  MG per tablet    carbidopa-levodopa  MG per CR tablet          Primary Care Provider Office Phone # Fax #    Maurilio CRISTIAN Bonilla [phone]       [facility] [address] [facility] [address]        Equal Access to Services     REBECA HORTON AH: Hadii aad ku hadasho Soomaali, waaxda luqadaha, qaybta kaalmada adeegyada, waxay gloria garsia. So [facility] [phone].    ATENCIÓN: Si habla español, tiene a rodriguez disposición servicios gratuitos de asistencia lingüística. ame al [phone].    We comply with applicable federal civil rights laws and [place] laws. We do not discriminate on the basis of race, color, national origin, age, disability, sex, sexual orientation, or gender identity.            Thank you!     Thank you for choosing [facility] NEUROLOGY  for your care. Our goal is always to provide you with excellent care. Hearing back from our patients is one way we can continue to improve our services. Please take a few minutes to complete the written survey that you may receive in the mail after your visit with us. Thank you!             Your Updated Medication List - Protect " others around you: Learn how to safely use, store and throw away your medicines at www.disposemymeds.org.          This list is accurate as of 11/13/18  7:56 AM.  Always use your most recent med list.                   Brand Name Dispense Instructions for use Diagnosis    * carbidopa-levodopa  MG per CR tablet    SINEMET CR    180 tablet    Take 2 tablets by mouth At Bedtime    Parkinson's disease (H)       * carbidopa-levodopa  MG per tablet    SINEMET    360 tablet    Take 4 tablets by mouth 3 times daily 4 tabs 0500/ 4 tabs 1200 / 4 tabs 2100    Parkinson's disease (H)       diazepam 5 MG tablet    VALIUM    2 tablet    Take 1 tablet 30 minutes prior to MRI. May take additional tablet immediately before MRI as needed    Anxiety       folic acid 20 MG Caps      Take 800 mg by mouth        Multi-vitamin Tabs tablet      Take 1 tablet by mouth daily        oxyCODONE IR 5 MG tablet    ROXICODONE    30 tablet    Take 1-2 tablets (5-10 mg) by mouth every 4 hours as needed    Parkinson disease (H)       PEG-3350/Electrolytes 236 g Solr      As directed. Do not fill until patient contacts the pharmacy.        pramipexole 0.25 MG tablet    MIRAPEX     Take 2 tablets (0.5 mg) by mouth At Bedtime        sulfaSALAzine 500 MG tablet    AZULFIDINE     Take 2,000 mg by mouth every morning        * Notice:  This list has 2 medication(s) that are the same as other medications prescribed for you. Read the directions carefully, and ask your doctor or other care provider to review them with you.

## 2018-11-15 ENCOUNTER — OFFICE VISIT (OUTPATIENT)
Dept: NEUROSURGERY | Facility: CLINIC | Age: 73
End: 2018-11-15
Payer: COMMERCIAL

## 2018-11-15 VITALS
TEMPERATURE: 97.7 F | HEART RATE: 41 BPM | HEIGHT: 71 IN | DIASTOLIC BLOOD PRESSURE: 76 MMHG | SYSTOLIC BLOOD PRESSURE: 115 MMHG | BODY MASS INDEX: 24.13 KG/M2 | RESPIRATION RATE: 18 BRPM

## 2018-11-15 DIAGNOSIS — Z96.89 S/P DEEP BRAIN STIMULATOR PLACEMENT: ICD-10-CM

## 2018-11-15 DIAGNOSIS — G20.A1 PARKINSON DISEASE (H): ICD-10-CM

## 2018-11-15 ASSESSMENT — PAIN SCALES - GENERAL: PAINLEVEL: NO PAIN (0)

## 2018-11-15 NOTE — MR AVS SNAPSHOT
"              After Visit Summary   11/15/2018    Ace Navarro    MRN: [id number]           Patient Information     Date Of Birth          1945        Visit Information        Provider Department      11/15/2018 11:00 AM Margoth Casper APRN [facility] Neurosurgery        Today's Diagnoses     Parkinson disease (H)        S/P deep brain stimulator placement- Procedure was aborted on 10/23/2018 per patient's request           Follow-ups after your visit        Who to contact     Please call your clinic at [phone] to:    Ask questions about your health    Make or cancel appointments    Discuss your medicines    Learn about your test results    Speak to your doctor            Additional Information About Your Visit        [email or web address] Information     [employer] gives you secure access to your electronic health record. If you see a primary care provider, you can also send messages to your care team and make appointments. If you have questions, please call your primary care clinic.  If you do not have a primary care provider, please call [phone] and they will assist you.      [employer] is an electronic gateway that provides easy, online access to your medical records. With [employer], you can request a clinic appointment, read your test results, renew a prescription or communicate with your care team.     To access your existing account, please contact your [facility] or call [phone] for assistance.        Care EveryWhere ID     This is your Care EveryWhere ID. This could be used by other organizations to access your [place] medical records  [id number]        Your Vitals Were     Pulse Temperature Respirations Height BMI (Body Mass Index)       41 97.7  F (36.5  C) (Oral) 18 1.803 m (5' 11\") 24.13 kg/m2        Blood Pressure from Last 3 Encounters:   11/15/18 115/76   11/13/18 125/80   10/24/18 115/64    Weight from Last 3 Encounters:   11/13/18 78.5 kg (173 " lb)   10/24/18 78.5 kg (173 lb 1 oz)   10/09/18 80.7 kg (178 lb)              Today, you had the following     No orders found for display       Primary Care Provider Office Phone # Fax #    Maurilio Bonilla [phone]       [facility] [address] [facility] [address]        Equal Access to Services     [facility]: Hadii aad ku hadasho Soomaali, waaxda luqadaha, qaybta kaalmada adeegyada, waxay idiin hayaan adeeg khmirandasalena laduken . So [facility] [phone].    ATENCIÓN: Si habla español, tiene a rodriguez disposición servicios gratuitos de asistencia lingüística. Zhouame al [phone].    We comply with applicable federal civil rights laws and [place] laws. We do not discriminate on the basis of race, color, national origin, age, disability, sex, sexual orientation, or gender identity.            Thank you!     Thank you for choosing [facility] NEUROSURGERY  for your care. Our goal is always to provide you with excellent care. Hearing back from our patients is one way we can continue to improve our services. Please take a few minutes to complete the written survey that you may receive in the mail after your visit with us. Thank you!             Your Updated Medication List - Protect others around you: Learn how to safely use, store and throw away your medicines at www.disposemymeds.org.          This list is accurate as of 11/15/18 12:18 PM.  Always use your most recent med list.                   Brand Name Dispense Instructions for use Diagnosis    * carbidopa-levodopa  MG per CR tablet    SINEMET CR    180 tablet    Take 2 tablets by mouth At Bedtime    Parkinson's disease (H)       * carbidopa-levodopa  MG per tablet    SINEMET    360 tablet    Take 4 tablets by mouth 3 times daily 4 tabs 0500/ 4 tabs 1200 / 4 tabs 2100    Parkinson's disease (H)       diazepam 5 MG tablet    VALIUM    2 tablet    Take 1 tablet 30 minutes prior to MRI. May take additional tablet immediately before  MRI as needed    Anxiety       folic acid 20 MG Caps      Take 800 mg by mouth        Multi-vitamin Tabs tablet      Take 1 tablet by mouth daily        oxyCODONE IR 5 MG tablet    ROXICODONE    30 tablet    Take 1-2 tablets (5-10 mg) by mouth every 4 hours as needed    Parkinson disease (H)       PEG-3350/Electrolytes 236 g Solr      As directed. Do not fill until patient contacts the pharmacy.        pramipexole 0.25 MG tablet    MIRAPEX     Take 2 tablets (0.5 mg) by mouth At Bedtime        sulfaSALAzine 500 MG tablet    AZULFIDINE     Take 2,000 mg by mouth every morning        * Notice:  This list has 2 medication(s) that are the same as other medications prescribed for you. Read the directions carefully, and ask your doctor or other care provider to review them with you.

## 2018-11-15 NOTE — LETTER
11/15/2018       RE: Ace Navarro  [address] [facility] [address]     Dear Colleague,    Thank you for referring your patient, Ace Navarro, to the [facility] NEUROSURGERY at [facility]. Please see a copy of my visit note below.    NEUROSURGERY PROGRESS NOTE      REASON FOR VISIT: Routine postop wound check.    DATE OF PROCEDURE: 10/23/2018  PREOPERATIVE DIAGNOSIS:  Parkinson's disease.   PROCEDURES PERFORMED:   1.  Placement of CRW stereotactic headframe.   2.  Stereotactic neuronavigation planning and CT of head.   3.  Stereotactic neuronavigation using the [employer] system for surgical planning, targeting and approach. The target is right subthalamic nucleus (STN).   4.  Right side deep brain stimulator placement, phase I, placement of right side deep brain stimulator electrode, target is right subthalamic nucleus (STN) - Aborted.   5.  Use of intraoperative microelectrode recording.   SURGEON:  Humberto Briones MD, PhD     HPI:   This is a 73 year old man with Parkinson's disease (PD), who was deemed a good candidate by Movement Disorder Team for deep brain stimulator placement targeting the subthalamic nucleus on the left side. The patient consented to undergo the procedure by Dr. Briones on the above mentioned date after risks, benefits, alternative therapies were discussed. The procedure itself was without incident, however, he became restless in the middle of the procedure and requested to end it. He was transferred and observed in ICU overnight. He recovered well and was discharged home on POD 1 in good condition.  Since then he presents for routine wound check.    The patient denies pain, fever, chills, redness, swelling, and or drainage from incision. He said he saw Dr. Godinez on 11/13/2018 and he will bring the possibility of completing the DBS under general anesthesia.with the Movement Disorder Team. As of now, he will continue to manage his PD  "with medications under Dr. Godinez.      CURRENT MEDICATIONS:    Current Outpatient Prescriptions:      carbidopa-levodopa (SINEMET CR)  MG per CR tablet, Take 2 tablets by mouth At Bedtime, Disp: 180 tablet, Rfl: 3     carbidopa-levodopa (SINEMET)  MG per tablet, Take 4 tablets by mouth 3 times daily 4 tabs 0500/ 4 tabs 1200 / 4 tabs 2100, Disp: 360 tablet, Rfl: 11     folic acid 20 MG CAPS, Take 800 mg by mouth, Disp: , Rfl:      multivitamin, therapeutic with minerals (MULTI-VITAMIN) TABS tablet, Take 1 tablet by mouth daily, Disp: , Rfl:      PEG 3350-KCl-NaBcb-NaCl-NaSulf (PEG-3350/ELECTROLYTES) 236 G SOLR, As directed. Do not fill until patient contacts the pharmacy., Disp: , Rfl:      pramipexole (MIRAPEX) 0.25 MG tablet, Take 2 tablets (0.5 mg) by mouth At Bedtime, Disp: , Rfl:      sulfaSALAzine (AZULFIDINE) 500 MG tablet, Take 2,000 mg by mouth every morning , Disp: , Rfl:      diazepam (VALIUM) 5 MG tablet, Take 1 tablet 30 minutes prior to MRI. May take additional tablet immediately before MRI as needed (Patient not taking: Reported on 11/15/2018), Disp: 2 tablet, Rfl: 0     oxyCODONE IR (ROXICODONE) 5 MG tablet, Take 1-2 tablets (5-10 mg) by mouth every 4 hours as needed (Patient not taking: Reported on 11/13/2018), Disp: 30 tablet, Rfl: 0      ALLERGIES:  Allergies   Allergen Reactions     Shellfish-Derived Products Swelling     LOBSTER causes throat swelling/closing  (shrimp/shellfish ok)     Aspirin Nausea       PMH, SOC HIST, FAM HIST, PROBLEM LIST:  All reviewed in EPIC.      FOCUS EXAMINATION:  /76  Pulse (!) 41  Temp 97.7  F (36.5  C) (Oral)  Resp 18  Ht 1.803 m (5' 11\")  BMI 24.13 kg/m2  Well developed well nourished male found seated comfortably in exam chair.  No apparent distress. He is accompanied.  He is A&O X3.  Mood and affect WNL. Language and fund of knowledge intact.  Is able to sit and rise independently.     He has a nicely healed incision closed by Monocryl " in subcuticular fashion that require no removal.      ASSESSMENT:  1. Parkinson disease (H)    2. S/P deep brain stimulator placement- Procedure was aborted on 10/23/2018 per patient's request    Ace Navarro's wound is healthy without evidence of infection.      PLAN:  We discussed wound care.  *  Keep it open to air  *  May shower and shampoo with mild soap/shampoo including the incision. No hair conditioners, hair treatments or skin cream for two more weeks.  *  May swim or bathe when all scabbing is gone from the incision.  *  Call our services if you develop fever, chills, redness, swelling, and/or drainage from incision.  We discussed activities.  *  We recommend he return to normal activities as able.    We discussed follow up    *  Follow up with Dr. Godinez as instructed.  *  Follow up with Neurosurgery per Dr. Briones's descretion.      All the patient's questions have been answered and they demonstrate good understanding of the above.  The patient has our contact information and is aware that he should call if he has questions comments or concerns.     We appreciate the opportunity to be of service in the care of this pleasant patient.  Please do call if there is anything more we can do    Margoth Casper, LAURA, APRN, FNP-BC  Department of Neurosurgery

## 2018-11-15 NOTE — PROGRESS NOTES
L STN    X:-11.75 Y:-3.74 Z:-6.48 AP:61.3deg MSP:12.4    Pass 1 track 1 Anteror hole  STN from possibly +7.65, possibly +5.76, but certaionly +4.9.  Bottom at +1.7, with SNR at -0.05  Microstim at -2.0 mm gave tongue contraction at 60-70 uA; at -1.0 mm gave tongue contraction at 50 uA;  at 0.0 gave right upper lip contraction at 70 uA, and at +2 gave right upper lip contraction at 100 uA    Pass 1 track 2 Center hole  STN from +5.76 to bottom at +1.2, with SNR from -0.5  Microstim at -2.0 gave tremor suppression (interpreted as internal capsule effect) at ~40 uA, tongue contraction at 70 uA;  At -1.0mm gave tongue contraction at 70 uA;  at 0.0 gave no effect at 90 uA;  at +1mm gave tongue contraction at 70 and at +2mm gave tongue contraction at 80    At this point, Mr. JARAMILLO chose to terminate the procedure.    Mapping 1 h  No programming

## 2018-11-15 NOTE — NURSING NOTE
Chief Complaint   Patient presents with     RECHECK     ump return patient visit for 2 week post op        Pantera Mattson MA

## 2018-11-15 NOTE — PROGRESS NOTES
NEUROSURGERY PROGRESS NOTE      REASON FOR VISIT: Routine postop wound check.    DATE OF PROCEDURE: 10/23/2018  PREOPERATIVE DIAGNOSIS:  Parkinson's disease.   PROCEDURES PERFORMED:   1.  Placement of CRW stereotactic headframe.   2.  Stereotactic neuronavigation planning and CT of head.   3.  Stereotactic neuronavigation using the [employer] system for surgical planning, targeting and approach. The target is right subthalamic nucleus (STN).   4.  Right side deep brain stimulator placement, phase I, placement of right side deep brain stimulator electrode, target is right subthalamic nucleus (STN) - Aborted.   5.  Use of intraoperative microelectrode recording.   SURGEON:  Humbreto Briones MD, PhD     HPI:   This is a 73 year old man with Parkinson's disease (PD), who was deemed a good candidate by Movement Disorder Team for deep brain stimulator placement targeting the subthalamic nucleus on the left side. The patient consented to undergo the procedure by Dr. Briones on the above mentioned date after risks, benefits, alternative therapies were discussed. The procedure itself was without incident, however, he became restless in the middle of the procedure and requested to end it. He was transferred and observed in ICU overnight. He recovered well and was discharged home on POD 1 in good condition.  Since then he presents for routine wound check.    The patient denies pain, fever, chills, redness, swelling, and or drainage from incision. He said he saw Dr. Godinez on 11/13/2018 and he will bring the possibility of completing the DBS under general anesthesia.with the Movement Disorder Team. As of now, he will continue to manage his PD with medications under Dr. Godinez.      CURRENT MEDICATIONS:    Current Outpatient Prescriptions:      carbidopa-levodopa (SINEMET CR)  MG per CR tablet, Take 2 tablets by mouth At Bedtime, Disp: 180 tablet, Rfl: 3     carbidopa-levodopa (SINEMET)  MG per tablet, Take 4 tablets  "by mouth 3 times daily 4 tabs 0500/ 4 tabs 1200 / 4 tabs 2100, Disp: 360 tablet, Rfl: 11     folic acid 20 MG CAPS, Take 800 mg by mouth, Disp: , Rfl:      multivitamin, therapeutic with minerals (MULTI-VITAMIN) TABS tablet, Take 1 tablet by mouth daily, Disp: , Rfl:      PEG 3350-KCl-NaBcb-NaCl-NaSulf (PEG-3350/ELECTROLYTES) 236 G SOLR, As directed. Do not fill until patient contacts the pharmacy., Disp: , Rfl:      pramipexole (MIRAPEX) 0.25 MG tablet, Take 2 tablets (0.5 mg) by mouth At Bedtime, Disp: , Rfl:      sulfaSALAzine (AZULFIDINE) 500 MG tablet, Take 2,000 mg by mouth every morning , Disp: , Rfl:      diazepam (VALIUM) 5 MG tablet, Take 1 tablet 30 minutes prior to MRI. May take additional tablet immediately before MRI as needed (Patient not taking: Reported on 11/15/2018), Disp: 2 tablet, Rfl: 0     oxyCODONE IR (ROXICODONE) 5 MG tablet, Take 1-2 tablets (5-10 mg) by mouth every 4 hours as needed (Patient not taking: Reported on 11/13/2018), Disp: 30 tablet, Rfl: 0      ALLERGIES:  Allergies   Allergen Reactions     Shellfish-Derived Products Swelling     LOBSTER causes throat swelling/closing  (shrimp/shellfish ok)     Aspirin Nausea       PMH, SOC HIST, FAM HIST, PROBLEM LIST:  All reviewed in EPIC.      FOCUS EXAMINATION:  /76  Pulse (!) 41  Temp 97.7  F (36.5  C) (Oral)  Resp 18  Ht 1.803 m (5' 11\")  BMI 24.13 kg/m2  Well developed well nourished male found seated comfortably in exam chair.  No apparent distress. He is accompanied.  He is A&O X3.  Mood and affect WNL. Language and fund of knowledge intact.  Is able to sit and rise independently.     He has a nicely healed incision closed by Monocryl in subcuticular fashion that require no removal.      ASSESSMENT:  1. Parkinson disease (H)    2. S/P deep brain stimulator placement- Procedure was aborted on 10/23/2018 per patient's request    Ace Navarro's wound is healthy without evidence of infection.      PLAN:  We discussed " wound care.  *  Keep it open to air  *  May shower and shampoo with mild soap/shampoo including the incision. No hair conditioners, hair treatments or skin cream for two more weeks.  *  May swim or bathe when all scabbing is gone from the incision.  *  Call our services if you develop fever, chills, redness, swelling, and/or drainage from incision.  We discussed activities.  *  We recommend he return to normal activities as able.    We discussed follow up    *  Follow up with Dr. Godinez as instructed.  *  Follow up with Neurosurgery per Dr. Briones's descretion.      All the patient's questions have been answered and they demonstrate good understanding of the above.  The patient has our contact information and is aware that he should call if he has questions comments or concerns.     We appreciate the opportunity to be of service in the care of this pleasant patient.  Please do call if there is anything more we can do    Margoth Casper, LAURA, APRN, FNP-BC  Department of Neurosurgery

## 2018-11-20 ENCOUNTER — TELEPHONE (OUTPATIENT)
Dept: NEUROLOGY | Facility: CLINIC | Age: 73
End: 2018-11-20

## 2018-12-11 DIAGNOSIS — G20.A1 PARKINSON DISEASE (H): Primary | ICD-10-CM

## 2019-01-03 ENCOUNTER — TELEPHONE (OUTPATIENT)
Dept: NEUROLOGY | Facility: CLINIC | Age: 74
End: 2019-01-03

## 2019-01-03 NOTE — TELEPHONE ENCOUNTER
[facility] Call Center    Phone Message    May a detailed message be left on voicemail: yes    Reason for Call: Other: Pt calling to say he will be out of the medications by the end of tomorrow (1/4) and would really appreciate the refills being expedited     Action Taken: Message routed to:  [facility] Neu[facility]

## 2019-01-03 NOTE — TELEPHONE ENCOUNTER
[facility] Call Center    Phone Message    May a detailed message be left on voicemail: yes    Reason for Call: Medication Refill Request    Has the patient contacted the pharmacy for the refill? Yes   Name of medication being requested: carbidopa-levodopa (SINEMET CR)  MG per CR tablet         pramipexole (MIRAPEX) 0.25 MG tablet  Provider who prescribed the medication: Dr. Godinez  Pharmacy: [facility] [address] [place], [place] [id number]  Date medication is needed: As soon as possible    Please call pt once the orders have been filled.     Action Taken: Message routed to:  [facility] ([facility] Neurology

## 2019-01-04 DIAGNOSIS — G20.A1 PARKINSON DISEASE (H): Primary | ICD-10-CM

## 2019-01-04 RX ORDER — PRAMIPEXOLE DIHYDROCHLORIDE 0.25 MG/1
0.5 TABLET ORAL AT BEDTIME
Qty: 180 TABLET | Refills: 1 | Status: SHIPPED | OUTPATIENT
Start: 2019-01-04 | End: 2019-07-09

## 2019-01-04 NOTE — TELEPHONE ENCOUNTER
carbidopa-levodopa (SINEMET CR)  MG per CR tablet 180 tablet 3 11/13/2018  No   Sig - Route: Take 2 tablets by mouth At Bedtime - Oral     Called pharmacy and spoke to Rina about above prescription. She stated that she does have refills for it and that it appears that Ace has been transferring his other medications to another pharmacy. I told her I wll call Ace to confirm the pharmacy.

## 2019-01-04 NOTE — TELEPHONE ENCOUNTER
carbidopa-levodopa (SINEMET CR)  MG per CR tablet 180 tablet 3 11/13/2018  No   Sig - Route: Take 2 tablets by mouth At Bedtime - Oral     Called Ace regarding the above prescription refill and asked what pharmacy he is requesting this medication from. He stated [facility] pharmacy. I informed Ace that he will need to have [facility] call [facility] pharmacy to transfer the prescription over. Ace verbalized understanding of this and stated he will do this.

## 2019-01-04 NOTE — TELEPHONE ENCOUNTER
pramipexole (MIRAPEX) 0.25 MG tablet 180 tablet 1 1/4/2019  No   Sig - Route: Take 2 tablets (0.5 mg) by mouth At Bedtime - Oral     Called Ace back and informed him that the above medication has been sent in to be refilled.    Ace said he also needed a refill on his carbidopa/levodopa  mg tablets. I informed him that he has 3 refills left on that medication and he does not need a new prescription.

## 2019-01-04 NOTE — TELEPHONE ENCOUNTER
[facility] Call Center    Phone Message    May a detailed message be left on voicemail: yes    Reason for Call: Other: Pt calling again re: medications and requested to speak to Venecia.     Action Taken: Message routed to:  [facility]

## 2019-01-04 NOTE — TELEPHONE ENCOUNTER
[facility] Call Center    Phone Message    May a detailed message be left on voicemail: yes    Reason for Call: Other: Per call from PT he was given carbidopa-levodopa (SINEMET)  MG per tablet and NOT the carbidopa-levodopa (SINEMET CR)  MG per CR tablet by the Pharmacy.  Per PT per the Pharmacy PT needs to call the clinic for a new RX for the  MG.  PT is requesting for a call back to discuss it ASAP     Action Taken: Message routed to:  [facility] Neurology

## 2019-01-10 ENCOUNTER — PATIENT OUTREACH (OUTPATIENT)
Dept: NEUROSURGERY | Facility: CLINIC | Age: 74
End: 2019-01-10

## 2019-01-10 NOTE — PROGRESS NOTES
Received message that pt has questions about his upcoming DBS procedures. I left a VM with my direct number and requested that he call me at his earliest convenience.     1/10/19 3:39: Pt called back and we discussed his questions:    1. Has Dr. Briones done an asleep DBS surgery before? He has not done an asleep procedure for a movement disorder patient, but he has for epilepsy patients.    2. What will be different about the surgery? The plan is to do both sides at the same time. Dr. Briones will remove the bur hole cover that is currently in place (following the aborted surgery) and then he will create another bur hole on the other side. The DBS electrodes will be placed one at a time. Recording in the OR will be limited since he will be asleep, but they may attempt to do some microelectrode recording. The implanted devices will be the same whether the pt is awake or asleep.     No further questions at this time. Pt has my direct number and is welcome to call.

## 2019-02-01 ENCOUNTER — PATIENT OUTREACH (OUTPATIENT)
Dept: NEUROSURGERY | Facility: CLINIC | Age: 74
End: 2019-02-01

## 2019-02-01 NOTE — PROGRESS NOTES
Returned pt's call. He wanted to know if he should contact his insurance company about authorization for his deep brain stimulation surgery in April. I let pt know that our prior authorization team will submit the necessary documentation to his insurance when we are closer to the surgery date. We will know in advance if there is a denial and we will proceed from there. However, I do not anticipate a denial. Pt expressed understanding and is in agreement with plan. No further questions.

## 2019-03-12 ENCOUNTER — TELEPHONE (OUTPATIENT)
Dept: NEUROLOGY | Facility: CLINIC | Age: 74
End: 2019-03-12

## 2019-03-12 NOTE — TELEPHONE ENCOUNTER
[facility] Call Center    Phone Message    May a detailed message be left on voicemail: yes    Reason for Call: Other: Ace would like to know if Dr. Godinez would like to see him in clinic prior to his upcoming DBS procedure .     Action Taken: Message routed to:  [facility] neurology

## 2019-03-13 NOTE — TELEPHONE ENCOUNTER
"Called patient to let him know that there is no reason for him to see Dr. Godinez before his upcoming surgery. He wanted to know if there is going to be a \"machine\" used during the surgery. He would like Tammie Rider to call him back to discuss this.  "

## 2019-03-16 ENCOUNTER — DOCUMENTATION ONLY (OUTPATIENT)
Dept: NEUROSURGERY | Facility: CLINIC | Age: 74
End: 2019-03-16

## 2019-03-16 DIAGNOSIS — Z01.818 PREOPERATIVE EVALUATION TO RULE OUT SURGICAL CONTRAINDICATION: Primary | ICD-10-CM

## 2019-03-19 ENCOUNTER — TELEPHONE (OUTPATIENT)
Dept: NEUROLOGY | Facility: CLINIC | Age: 74
End: 2019-03-19
Payer: COMMERCIAL

## 2019-03-19 ENCOUNTER — TELEPHONE (OUTPATIENT)
Dept: NEUROLOGY | Facility: CLINIC | Age: 74
End: 2019-03-19

## 2019-03-19 DIAGNOSIS — Z53.9 ERRONEOUS ENCOUNTER--DISREGARD: Primary | ICD-10-CM

## 2019-03-19 PROCEDURE — 99207 ZZC NO CHARGE LOS: CPT | Performed by: PSYCHIATRY & NEUROLOGY

## 2019-03-19 NOTE — TELEPHONE ENCOUNTER
Ace gave verbal consent for Dr. Godinez to use his filming for educational purposes.   I informed him I will be mailing a consent form to sign as well.

## 2019-04-09 ENCOUNTER — PRE VISIT (OUTPATIENT)
Dept: SURGERY | Facility: CLINIC | Age: 74
End: 2019-04-09

## 2019-04-09 NOTE — TELEPHONE ENCOUNTER
FUTURE VISIT INFORMATION      SURGERY INFORMATION:    Date: 4/26/19, 5/10/19    Location:  [facility] OR    Surgeon:  Humberto Briones    Anesthesia Type:  General    RECORDS REQUESTED FROM:       Primary Care Provider: Maurilio Allen    Pertinent Medical History: Parkinson disease    Most recent EKG+ Tracing: 10/9/18

## 2019-04-12 ENCOUNTER — ANESTHESIA EVENT (OUTPATIENT)
Dept: SURGERY | Facility: CLINIC | Age: 74
DRG: 026 | End: 2019-04-12
Payer: MEDICARE

## 2019-04-12 ENCOUNTER — OFFICE VISIT (OUTPATIENT)
Dept: SURGERY | Facility: CLINIC | Age: 74
End: 2019-04-12
Payer: MEDICARE

## 2019-04-12 VITALS
WEIGHT: 180 LBS | TEMPERATURE: 97.6 F | HEIGHT: 71 IN | HEART RATE: 48 BPM | DIASTOLIC BLOOD PRESSURE: 87 MMHG | BODY MASS INDEX: 25.2 KG/M2 | SYSTOLIC BLOOD PRESSURE: 141 MMHG | RESPIRATION RATE: 16 BRPM | OXYGEN SATURATION: 98 %

## 2019-04-12 DIAGNOSIS — Z01.818 PREOP EXAMINATION: Primary | ICD-10-CM

## 2019-04-12 DIAGNOSIS — Z01.818 PREOPERATIVE EVALUATION TO RULE OUT SURGICAL CONTRAINDICATION: ICD-10-CM

## 2019-04-12 DIAGNOSIS — Z01.818 PREOP EXAMINATION: ICD-10-CM

## 2019-04-12 DIAGNOSIS — G20.A1 PARKINSON DISEASE (H): ICD-10-CM

## 2019-04-12 DIAGNOSIS — G20.A1 PARKINSONS DISEASE (H): ICD-10-CM

## 2019-04-12 LAB
ALBUMIN UR-MCNC: NEGATIVE MG/DL
ANION GAP SERPL CALCULATED.3IONS-SCNC: 3 MMOL/L (ref 3–14)
APPEARANCE UR: CLEAR
APTT PPP: 33 SEC (ref 22–37)
BILIRUB UR QL STRIP: NEGATIVE
BUN SERPL-MCNC: 12 MG/DL (ref 7–30)
CALCIUM SERPL-MCNC: 9.4 MG/DL (ref 8.5–10.1)
CHLORIDE SERPL-SCNC: 102 MMOL/L (ref 94–109)
CO2 SERPL-SCNC: 32 MMOL/L (ref 20–32)
COLOR UR AUTO: YELLOW
CREAT SERPL-MCNC: 0.82 MG/DL (ref 0.66–1.25)
ERYTHROCYTE [DISTWIDTH] IN BLOOD BY AUTOMATED COUNT: 12.8 % (ref 10–15)
GFR SERPL CREATININE-BSD FRML MDRD: 89 ML/MIN/{1.73_M2}
GLUCOSE SERPL-MCNC: 101 MG/DL (ref 70–99)
GLUCOSE UR STRIP-MCNC: NEGATIVE MG/DL
HCT VFR BLD AUTO: 45.6 % (ref 40–53)
HGB BLD-MCNC: 14.4 G/DL (ref 13.3–17.7)
HGB UR QL STRIP: NEGATIVE
INR PPP: 1.01 (ref 0.86–1.14)
KETONES UR STRIP-MCNC: NEGATIVE MG/DL
LEUKOCYTE ESTERASE UR QL STRIP: NEGATIVE
MCH RBC QN AUTO: 28.5 PG (ref 26.5–33)
MCHC RBC AUTO-ENTMCNC: 31.6 G/DL (ref 31.5–36.5)
MCV RBC AUTO: 90 FL (ref 78–100)
MUCOUS THREADS #/AREA URNS LPF: PRESENT /LPF
NITRATE UR QL: NEGATIVE
PH UR STRIP: 7 PH (ref 5–7)
PLATELET # BLD AUTO: 165 10E9/L (ref 150–450)
POTASSIUM SERPL-SCNC: 4.7 MMOL/L (ref 3.4–5.3)
RBC # BLD AUTO: 5.06 10E12/L (ref 4.4–5.9)
RBC #/AREA URNS AUTO: 0 /HPF (ref 0–2)
SODIUM SERPL-SCNC: 136 MMOL/L (ref 133–144)
SOURCE: ABNORMAL
SP GR UR STRIP: 1.01 (ref 1–1.03)
UROBILINOGEN UR STRIP-MCNC: 0 MG/DL (ref 0–2)
WBC # BLD AUTO: 4.5 10E9/L (ref 4–11)
WBC #/AREA URNS AUTO: <1 /HPF (ref 0–5)

## 2019-04-12 RX ORDER — POLYETHYLENE GLYCOL 3350 17 G/17G
1 POWDER, FOR SOLUTION ORAL
Status: ON HOLD | COMMUNITY
End: 2019-04-28

## 2019-04-12 ASSESSMENT — MIFFLIN-ST. JEOR: SCORE: 1593.6

## 2019-04-12 ASSESSMENT — LIFESTYLE VARIABLES: TOBACCO_USE: 0

## 2019-04-12 NOTE — RESULT ENCOUNTER NOTE
Josi Bello,    Your test results are attached. Your PTT and INR (bleeding times) are normal and fine for surgery.          Madelin Daniel DNP, RN, ANP-C

## 2019-04-12 NOTE — ANESTHESIA PREPROCEDURE EVALUATION
Anesthesia Pre-Procedure Evaluation    Patient: Ace Navarro   MRN:     [id number] Gender:   male   Age:    73 year old :      1945        Preoperative Diagnosis: Parkinson Disease   Procedure(s):  O-Arm/Stealth Assisted Bilateral Deep Brain Stimulator Placement, Phase I, Placement Of Bilateral Deep Brain Stimulator Electrodes, Target Bilateral Subthalamic Nucleus With Microelectrode Recording     Past Medical History:   Diagnosis Date     Parkinson's disease (H) 2010     RLS (restless legs syndrome)      Ulcerative colitis (H)      Vitiligo       Past Surgical History:   Procedure Laterality Date     ------------OTHER-------------      nasal     OPTICAL TRACKING SYSTEM INSERTION DEEP BRAIN STIMULATION Left 10/23/2018    Procedure: Left Deep Brain Stimulator Placement, Phase One, Placement of Left Side Deep Brain Stimulator Electrode Target Left Subthalamic Nucleus with Microelectrode Recording;  Surgeon: Humberto Briones MD;  Location: [facility] OR     TONSILLECTOMY & ADENOIDECTOMY            Anesthesia Evaluation     . Pt has had prior anesthetic. Type: General and MAC    No history of anesthetic complications          ROS/MED HX    ENT/Pulmonary:      (-) tobacco use   Neurologic:     (+)Parkinson's disease features: bilateral hand tremors (R>L), facial tremors, fatigue, other neuro RLS    Cardiovascular: Comment: Chronic bradycardia    (+) ----. : . . . :. . Previous cardiac testing date:results:date: results:ECG reviewed date:10/9/2018 results:Sinus bradycardia with PVCs date: results:          METS/Exercise Tolerance:  >4 METS   Hematologic:  - neg hematologic  ROS      (-) history of blood clots and History of Transfusion   Musculoskeletal:  - neg musculoskeletal ROS       GI/Hepatic:     (+) Other GI/Hepatic ulcerative colitis- no flares in 13 years      Renal/Genitourinary:  - ROS Renal section negative       Endo:  - neg endo ROS       Psychiatric:  - neg psychiatric ROS      "  Infectious Disease:  - neg infectious disease ROS       Malignancy:      - no malignancy   Other: Comment: vitiligo   (+) no H/O Chronic Pain,                       PHYSICAL EXAM:   Mental Status/Neuro: A/A/O   Airway: Facies: Feasible  Mallampati: I  Mouth/Opening: Full  TM distance: > 6 cm  Neck ROM: Full   Respiratory: Auscultation: CTAB     Resp. Rate: Normal     Resp. Effort: Normal      CV: Rhythm: Regular  Rate: Age appropriate  Heart: Normal Sounds   Comments:      Dental: Normal                  Lab Results   Component Value Date    WBC 6.1 10/24/2018    HGB 12.5 (L) 10/24/2018    HCT 38.4 (L) 10/24/2018     (L) 10/24/2018     10/24/2018    POTASSIUM 4.3 10/24/2018    CHLORIDE 106 10/24/2018    CO2 28 10/24/2018    BUN 13 10/24/2018    CR 0.92 10/24/2018    GLC 98 10/24/2018    HAIR 8.3 (L) 10/24/2018    PHOS 3.4 10/24/2018    PTT 34 10/23/2018    INR 1.08 10/23/2018       Preop Vitals  BP Readings from Last 3 Encounters:   11/15/18 115/76   11/13/18 125/80   10/24/18 115/64    Pulse Readings from Last 3 Encounters:   11/15/18 (!) 41   11/13/18 (!) 48   10/23/18 54      Resp Readings from Last 3 Encounters:   11/15/18 18   11/13/18 16   10/24/18 20    SpO2 Readings from Last 3 Encounters:   11/13/18 99%   10/24/18 99%   10/09/18 99%      Temp Readings from Last 1 Encounters:   11/15/18 97.7  F (36.5  C) (Oral)    Ht Readings from Last 1 Encounters:   11/15/18 1.803 m (5' 11\")      Wt Readings from Last 1 Encounters:   11/13/18 78.5 kg (173 lb)    Estimated body mass index is 24.13 kg/m  as calculated from the following:    Height as of 11/15/18: 1.803 m (5' 11\").    Weight as of 11/13/18: 78.5 kg (173 lb).     LDA:  Peripheral IV 10/23/18 Left Lower forearm (Active)   Number of days: 171            Assessment:   ASA SCORE: 3    NPO Status: > 6 hours since completed Solid Foods   Documentation: H&P complete; Preop Testing complete; Consents complete   Proceeding: Proceed without further " delay  Tobacco Use:  NO Active use of Tobacco/UNKNOWN Tobacco use status     Plan:   Anes. Type:  General   Pre-Induction: Midazolam IV; Acetaminophen PO   Induction:  IV (Standard)   Airway: Oral ETT   Access/Monitoring: PIV   Maintenance: Balanced   Emergence: Procedure Site   Logistics: Same Day Surgery     Postop Pain/Sedation Strategy:  Standard-Options: Opioids PRN     PONV Management:  Adult Risk Factors:, Non-Smoker, Postop Opioids  Prevention: Ondansetron     CONSENT: Direct conversation   Plan and risks discussed with: Patient   Blood Products: Consented (ALL Blood Products)                  PAC Discussion and Assessment    ASA Classification: 3  Case is suitable for: [facility]  Anesthetic techniques and relevant risks discussed: GA  Invasive monitoring and risk discussed:   Types:   Possibility and Risk of blood transfusion discussed:   NPO instructions given:   Additional anesthetic preparation and risks discussed:   Needs early admission to pre-op area:   Other:     PAC Resident/NP Anesthesia Assessment:  Ace Navarro is a 73 year old male scheduled for O-Arm/Stealth Assisted Bilateral Deep Brain Stimulator Placement, Phase I, Placement Of Bilateral Deep Brain Stimulator Electrodes, Target Bilateral Subthalamic Nucleus With Microelectrode Recording on 4/26/2019 and Deep Brain Stimulator Placement, Phase II, Placement Of Deep Brain Stimulator Generator/Battery Over The Left Chest Wall on 5/10  by Dr. Briones in management of tremors r/t parkinson's disease.  PAC referral for risk assessment and optimization for anesthesia with comorbid conditions of: chronic bradycardia, restless leg syndrome, ulcerative colitis and vitiligo.    Pre-operative considerations:  1.  Cardiac:  Functional status- METS >4.  He exercises regularly doing yoga, walking, pilates, weight training and biking.  He has chronic bradycardia.  Intermediate risk surgery with 0.4% risk of major adverse cardiac event.   2.  Pulm:  Airway  feasible.  MICHAEL risk: low.  Never smoker.   3.  GI:  Risk of PONV score = 2.  If > 2, anti-emetic intervention recommended.  Ulcerative colitis is well controlled with sulfasalazine.  4. Neuro:  He has tremors related to parkinson's.  He denies any gait instability.    5. Anesthesia:  Left DBS placement was attempted under MAC on 10/23/2018, but he couldn't tolerate the procedure so it was aborted.  He has elected to proceed with bilateral DBS under GA for both phase 1 and phase 2 rather than attempting MAC again.      VTE risk: 1.8%    Patient is optimized and is acceptable candidate for the proposed procedure.  No further diagnostic evaluation is needed.     **For further details of assessment, testing, and physical exam please see H and P completed on 4/4/2019 by Dr. Bonilla at [facility] (scanned into chart here and viewable in Care Everywhere).           Madelin Daniel DNP, RN, APRN      Reviewed and Signed by PAC Mid-Level Provider/Resident  Mid-Level Provider/Resident: Madelin Daniel DNP, RN, APRN  Date: 4/12/2019  Time: 1031    Attending Anesthesiologist Anesthesia Assessment:  73 year old for DBS in management of tremors and parkinson's disease. Patient has no significant cardiac or pulmonary disease. Attempted this under MAC, but became anxious and wanted to stop. Now planned for GA.     Patient/case discussed with LEONCIO/resident; agree with above assessment. No need to see patient. Patient is appropriate for the planned procedure without further work-up or medical management.    Paz Bergman MD        Anesthesiologist:   Date:   Time:   Pass/Fail:   Disposition:     PAC Pharmacist Assessment:        Pharmacist:   Date:   Time:        Madelin Daniel, NICK CNP

## 2019-04-12 NOTE — PATIENT INSTRUCTIONS
Preparing for Your Surgery      Name:  Ace Navarro   MRN:  [id number]   :  1945   Today's Date:  2019     Arriving for surgery:  Surgery date:  19  Arrival time:  0530  Please come to:       [facility] [address]  [address] [place]  [address]    - ? parking is available in front of the hospital      -    Please proceed to [address] on the 3rd floor. [phone]?     - ?If you are in need of directions, wheelchair or escort please stop at the Information Desk in the lobby.  Inform the information person that you are here for surgery; a wheelchair and escort to [address] will be provided.?     What can I eat or drink?  -  You may have solid food or milk products until 8 hours prior to your surgery. 19, 11:30PM  -  You may have water, apple juice or 7up/Sprite until 2 hours prior to your surgery. 19, 5:30AM    Which medicines can I take?  Stop Aspirin, vitamins and supplements one week prior to surgery.  Hold Ibuprofen for 24 hours and/or Naproxen for 48 hours prior to surgery.   -  Do NOT take these medications in the morning, the day of surgery:  Hold medications the morning of surgery except for Sulfasalazine.  How do I prepare myself?  -  Take two showers: one the night before surgery; and one the morning of surgery.         Use Scrubcare or Hibiclens to wash from neck down.  You may use your own shampoo and conditioner. No other hair products.   -  Do NOT use lotion, powder, deodorant, or antiperspirant the day of your surgery.  -  Do NOT wear jewelry.  - Do not bring your own medications to the hospital, except for inhalers and eye   drops.  -  Bring your ID and insurance card.    Questions or Concerns:  -If you have questions or concerns regarding the day of surgery, please call [phone].     -For questions after surgery please call your surgeons office.           AFTER YOUR SURGERY  Breathing exercises   Breathing  exercises help you recover faster. Take deep breaths and let the air out slowly. This will:     Help you wake up after surgery.    Help prevent complications like pneumonia.  Preventing complications will help you go home sooner.   We may give you a breathing device (incentive spirometer) to encourage you to breathe deeply.   Nausea and vomiting   You may feel sick to your stomach after surgery; if so, let your nurse know.    Pain control:  After surgery, you may have pain. Our goal is to help you manage your pain. Pain medicine will help you feel comfortable enough to do activities that will help you heal.  These activities may include breathing exercises, walking and physical therapy.   To help your health care team treat your pain we will ask: 1) If you have pain  2) where it is located 3) describe your pain in your words  Methods of pain control include medications given by mouth, vein or by nerve block for some surgeries.  We may give you a pain control pump that will:  1) Deliver the medicine through a tube placed in your vein  2) Control the amount of medicine you receive  3) Allow you to push a button to deliver a dose of pain medicine  Sequential Compression Device (SCD) or Pneumo Boots:  You may need to wear SCD S on your legs or feet. These are wraps connected to a machine that pumps in air and releases it. The repeated pumping helps prevent blood clots from forming.

## 2019-04-15 ENCOUNTER — TELEPHONE (OUTPATIENT)
Dept: NEUROLOGY | Facility: CLINIC | Age: 74
End: 2019-04-15

## 2019-04-15 DIAGNOSIS — G20.A1 PARKINSON DISEASE (H): Primary | ICD-10-CM

## 2019-04-15 NOTE — TELEPHONE ENCOUNTER
[facility] Call Center    Phone Message    May a detailed message be left on voicemail: yes    Reason for Call: Other: Ace would like a call back. No details were given.      Action Taken: Message routed to:  [facility]

## 2019-04-22 ENCOUNTER — TELEPHONE (OUTPATIENT)
Dept: NEUROSURGERY | Facility: CLINIC | Age: 74
End: 2019-04-22

## 2019-04-22 NOTE — TELEPHONE ENCOUNTER
Pt called with questions about his appointments on 5/24/19. I confirmed time and purpose of each appointment. He also wanted to know if he should continue taking his PD meds until that time, following his DBS surgery. I confirmed he should continue taking his medications as usual. However, if his symptoms change after surgery and he feels like he needs a medication adjustment, he should contact his neurology team. He will receive specific instructions re medications for his 5/24 programming appointment. Pt expressed understanding and has no further questions at this time.

## 2019-04-24 ENCOUNTER — PATIENT OUTREACH (OUTPATIENT)
Dept: NEUROSURGERY | Facility: CLINIC | Age: 74
End: 2019-04-24

## 2019-04-24 NOTE — PROGRESS NOTES
Pt wanted to know if he should hold his Parkinson's medications the morning of his DBS surgery or take them, as Dr. Briones is doing his procedure under general anesthesia.     I discussed this with Dr. Briones and he advises that pt should hold his PD meds the morning of surgery, as usual for DBS surgery. Pt will take last doses of carbidopa-levodopa and mirapex around 9:00/9:30 pm and will not take any PD meds the morning of surgery. Pt expressed understanding.     No further questions at this time.

## 2019-04-26 ENCOUNTER — APPOINTMENT (OUTPATIENT)
Dept: GENERAL RADIOLOGY | Facility: CLINIC | Age: 74
DRG: 026 | End: 2019-04-26
Attending: STUDENT IN AN ORGANIZED HEALTH CARE EDUCATION/TRAINING PROGRAM
Payer: MEDICARE

## 2019-04-26 ENCOUNTER — APPOINTMENT (OUTPATIENT)
Dept: CT IMAGING | Facility: CLINIC | Age: 74
DRG: 026 | End: 2019-04-26
Attending: STUDENT IN AN ORGANIZED HEALTH CARE EDUCATION/TRAINING PROGRAM
Payer: MEDICARE

## 2019-04-26 ENCOUNTER — ANESTHESIA (OUTPATIENT)
Dept: SURGERY | Facility: CLINIC | Age: 74
DRG: 026 | End: 2019-04-26
Payer: MEDICARE

## 2019-04-26 ENCOUNTER — APPOINTMENT (OUTPATIENT)
Dept: GENERAL RADIOLOGY | Facility: CLINIC | Age: 74
DRG: 026 | End: 2019-04-26
Attending: NEUROLOGICAL SURGERY
Payer: MEDICARE

## 2019-04-26 ENCOUNTER — HOSPITAL ENCOUNTER (INPATIENT)
Facility: CLINIC | Age: 74
LOS: 2 days | Discharge: HOME | DRG: 026 | End: 2019-04-28
Attending: NEUROLOGICAL SURGERY | Admitting: NEUROLOGICAL SURGERY
Payer: MEDICARE

## 2019-04-26 DIAGNOSIS — Z96.89 S/P DEEP BRAIN STIMULATOR PLACEMENT: Primary | ICD-10-CM

## 2019-04-26 LAB
ABO + RH BLD: NORMAL
ABO + RH BLD: NORMAL
ANION GAP SERPL CALCULATED.3IONS-SCNC: 4 MMOL/L (ref 3–14)
APTT PPP: 35 SEC (ref 22–37)
BLD GP AB SCN SERPL QL: NORMAL
BLOOD BANK CMNT PATIENT-IMP: NORMAL
BUN SERPL-MCNC: 15 MG/DL (ref 7–30)
CALCIUM SERPL-MCNC: 9 MG/DL (ref 8.5–10.1)
CHLORIDE SERPL-SCNC: 104 MMOL/L (ref 94–109)
CO2 SERPL-SCNC: 29 MMOL/L (ref 20–32)
CREAT SERPL-MCNC: 0.96 MG/DL (ref 0.66–1.25)
ERYTHROCYTE [DISTWIDTH] IN BLOOD BY AUTOMATED COUNT: 13.1 % (ref 10–15)
ERYTHROCYTE [DISTWIDTH] IN BLOOD BY AUTOMATED COUNT: 13.1 % (ref 10–15)
GFR SERPL CREATININE-BSD FRML MDRD: 79 ML/MIN/{1.73_M2}
GLUCOSE BLDC GLUCOMTR-MCNC: 79 MG/DL (ref 70–99)
GLUCOSE BLDC GLUCOMTR-MCNC: 85 MG/DL (ref 70–99)
GLUCOSE SERPL-MCNC: 94 MG/DL (ref 70–99)
HCT VFR BLD AUTO: 39.4 % (ref 40–53)
HCT VFR BLD AUTO: 42.5 % (ref 40–53)
HGB BLD-MCNC: 12.8 G/DL (ref 13.3–17.7)
HGB BLD-MCNC: 13.8 G/DL (ref 13.3–17.7)
INR PPP: 1.09 (ref 0.86–1.14)
MCH RBC QN AUTO: 29 PG (ref 26.5–33)
MCH RBC QN AUTO: 29.4 PG (ref 26.5–33)
MCHC RBC AUTO-ENTMCNC: 32.5 G/DL (ref 31.5–36.5)
MCHC RBC AUTO-ENTMCNC: 32.5 G/DL (ref 31.5–36.5)
MCV RBC AUTO: 89 FL (ref 78–100)
MCV RBC AUTO: 91 FL (ref 78–100)
PLATELET # BLD AUTO: 145 10E9/L (ref 150–450)
PLATELET # BLD AUTO: 149 10E9/L (ref 150–450)
POTASSIUM SERPL-SCNC: 4.2 MMOL/L (ref 3.4–5.3)
RBC # BLD AUTO: 4.35 10E12/L (ref 4.4–5.9)
RBC # BLD AUTO: 4.76 10E12/L (ref 4.4–5.9)
SODIUM SERPL-SCNC: 138 MMOL/L (ref 133–144)
SPECIMEN EXP DATE BLD: NORMAL
WBC # BLD AUTO: 4.4 10E9/L (ref 4–11)
WBC # BLD AUTO: 7.3 10E9/L (ref 4–11)

## 2019-04-26 PROCEDURE — 25800030 ZZH RX IP 258 OP 636: Performed by: STUDENT IN AN ORGANIZED HEALTH CARE EDUCATION/TRAINING PROGRAM

## 2019-04-26 PROCEDURE — C1778 LEAD, NEUROSTIMULATOR: HCPCS | Performed by: NEUROLOGICAL SURGERY

## 2019-04-26 PROCEDURE — 25000128 H RX IP 250 OP 636: Performed by: STUDENT IN AN ORGANIZED HEALTH CARE EDUCATION/TRAINING PROGRAM

## 2019-04-26 PROCEDURE — 25000128 H RX IP 250 OP 636: Performed by: NURSE ANESTHETIST, CERTIFIED REGISTERED

## 2019-04-26 PROCEDURE — 25000125 ZZHC RX 250: Performed by: NEUROLOGICAL SURGERY

## 2019-04-26 PROCEDURE — A9270 NON-COVERED ITEM OR SERVICE: HCPCS | Performed by: STUDENT IN AN ORGANIZED HEALTH CARE EDUCATION/TRAINING PROGRAM

## 2019-04-26 PROCEDURE — 85027 COMPLETE CBC AUTOMATED: CPT | Performed by: STUDENT IN AN ORGANIZED HEALTH CARE EDUCATION/TRAINING PROGRAM

## 2019-04-26 PROCEDURE — 86850 RBC ANTIBODY SCREEN: CPT | Performed by: STUDENT IN AN ORGANIZED HEALTH CARE EDUCATION/TRAINING PROGRAM

## 2019-04-26 PROCEDURE — 86901 BLOOD TYPING SEROLOGIC RH(D): CPT | Performed by: STUDENT IN AN ORGANIZED HEALTH CARE EDUCATION/TRAINING PROGRAM

## 2019-04-26 PROCEDURE — 37000008 ZZH ANESTHESIA TECHNICAL FEE, 1ST 30 MIN: Performed by: NEUROLOGICAL SURGERY

## 2019-04-26 PROCEDURE — 25000125 ZZHC RX 250: Performed by: NURSE ANESTHETIST, CERTIFIED REGISTERED

## 2019-04-26 PROCEDURE — 40000277 XR SURGERY CARM FLUORO LESS THAN 5 MIN W STILLS

## 2019-04-26 PROCEDURE — 00000146 ZZHCL STATISTIC GLUCOSE BY METER IP

## 2019-04-26 PROCEDURE — 20000004 ZZH R&B ICU UMMC

## 2019-04-26 PROCEDURE — 25000565 ZZH ISOFLURANE, EA 15 MIN: Performed by: NEUROLOGICAL SURGERY

## 2019-04-26 PROCEDURE — 36415 COLL VENOUS BLD VENIPUNCTURE: CPT | Performed by: STUDENT IN AN ORGANIZED HEALTH CARE EDUCATION/TRAINING PROGRAM

## 2019-04-26 PROCEDURE — 27210794 ZZH OR GENERAL SUPPLY STERILE: Performed by: NEUROLOGICAL SURGERY

## 2019-04-26 PROCEDURE — 37000009 ZZH ANESTHESIA TECHNICAL FEE, EACH ADDTL 15 MIN: Performed by: NEUROLOGICAL SURGERY

## 2019-04-26 PROCEDURE — 71000017 ZZH RECOVERY PHASE 1 LEVEL 3 EA ADDTL HR: Performed by: NEUROLOGICAL SURGERY

## 2019-04-26 PROCEDURE — 27810169 ZZH OR IMPLANT GENERAL: Performed by: NEUROLOGICAL SURGERY

## 2019-04-26 PROCEDURE — 27211024 ZZHC OR SUPPLY OTHER OPNP: Performed by: NEUROLOGICAL SURGERY

## 2019-04-26 PROCEDURE — 93005 ELECTROCARDIOGRAM TRACING: CPT

## 2019-04-26 PROCEDURE — 27210995 ZZH RX 272: Performed by: NEUROLOGICAL SURGERY

## 2019-04-26 PROCEDURE — 80048 BASIC METABOLIC PNL TOTAL CA: CPT | Performed by: STUDENT IN AN ORGANIZED HEALTH CARE EDUCATION/TRAINING PROGRAM

## 2019-04-26 PROCEDURE — 25000128 H RX IP 250 OP 636: Performed by: ANESTHESIOLOGY

## 2019-04-26 PROCEDURE — 36000076 ZZH SURGERY LEVEL 6 EA 15 ADDTL MIN - UMMC: Performed by: NEUROLOGICAL SURGERY

## 2019-04-26 PROCEDURE — 85610 PROTHROMBIN TIME: CPT | Performed by: STUDENT IN AN ORGANIZED HEALTH CARE EDUCATION/TRAINING PROGRAM

## 2019-04-26 PROCEDURE — 25000128 H RX IP 250 OP 636: Performed by: NEUROLOGICAL SURGERY

## 2019-04-26 PROCEDURE — 86900 BLOOD TYPING SEROLOGIC ABO: CPT | Performed by: STUDENT IN AN ORGANIZED HEALTH CARE EDUCATION/TRAINING PROGRAM

## 2019-04-26 PROCEDURE — 00H03MZ INSERTION OF NEUROSTIMULATOR LEAD INTO BRAIN, PERCUTANEOUS APPROACH: ICD-10-PCS | Performed by: NEUROLOGICAL SURGERY

## 2019-04-26 PROCEDURE — 25000125 ZZHC RX 250: Performed by: STUDENT IN AN ORGANIZED HEALTH CARE EDUCATION/TRAINING PROGRAM

## 2019-04-26 PROCEDURE — 93010 ELECTROCARDIOGRAM REPORT: CPT | Performed by: INTERNAL MEDICINE

## 2019-04-26 PROCEDURE — 87640 STAPH A DNA AMP PROBE: CPT | Performed by: STUDENT IN AN ORGANIZED HEALTH CARE EDUCATION/TRAINING PROGRAM

## 2019-04-26 PROCEDURE — 40000985 XR SKULL PORT 1/3 VW

## 2019-04-26 PROCEDURE — 25800030 ZZH RX IP 258 OP 636: Performed by: NURSE ANESTHETIST, CERTIFIED REGISTERED

## 2019-04-26 PROCEDURE — 25800030 ZZH RX IP 258 OP 636: Performed by: ANESTHESIOLOGY

## 2019-04-26 PROCEDURE — 40000170 ZZH STATISTIC PRE-PROCEDURE ASSESSMENT II: Performed by: NEUROLOGICAL SURGERY

## 2019-04-26 PROCEDURE — 71000016 ZZH RECOVERY PHASE 1 LEVEL 3 FIRST HR: Performed by: NEUROLOGICAL SURGERY

## 2019-04-26 PROCEDURE — 25000132 ZZH RX MED GY IP 250 OP 250 PS 637: Performed by: STUDENT IN AN ORGANIZED HEALTH CARE EDUCATION/TRAINING PROGRAM

## 2019-04-26 PROCEDURE — 87641 MR-STAPH DNA AMP PROBE: CPT | Performed by: STUDENT IN AN ORGANIZED HEALTH CARE EDUCATION/TRAINING PROGRAM

## 2019-04-26 PROCEDURE — 8E09XBG COMPUTER ASSISTED PROCEDURE OF HEAD AND NECK REGION, WITH COMPUTERIZED TOMOGRAPHY: ICD-10-PCS | Performed by: NEUROLOGICAL SURGERY

## 2019-04-26 PROCEDURE — 85730 THROMBOPLASTIN TIME PARTIAL: CPT | Performed by: STUDENT IN AN ORGANIZED HEALTH CARE EDUCATION/TRAINING PROGRAM

## 2019-04-26 PROCEDURE — 70450 CT HEAD/BRAIN W/O DYE: CPT

## 2019-04-26 PROCEDURE — 36000074 ZZH SURGERY LEVEL 6 1ST 30 MIN - UMMC: Performed by: NEUROLOGICAL SURGERY

## 2019-04-26 PROCEDURE — 4A10X4G MONITORING OF CENTRAL NERVOUS ELECTRICAL ACTIVITY, INTRAOPERATIVE, EXTERNAL APPROACH: ICD-10-PCS | Performed by: NEUROLOGICAL SURGERY

## 2019-04-26 DEVICE — IMPLANTABLE DEVICE: Type: IMPLANTABLE DEVICE | Site: BRAIN | Status: FUNCTIONAL

## 2019-04-26 DEVICE — IMP BUR HOLE COVER GUARDIAN 6010ANS: Type: IMPLANTABLE DEVICE | Site: CRANIAL | Status: FUNCTIONAL

## 2019-04-26 RX ORDER — HYDRALAZINE HYDROCHLORIDE 20 MG/ML
10-20 INJECTION INTRAMUSCULAR; INTRAVENOUS EVERY 30 MIN PRN
Status: DISCONTINUED | OUTPATIENT
Start: 2019-04-26 | End: 2019-04-26

## 2019-04-26 RX ORDER — ONDANSETRON 2 MG/ML
4 INJECTION INTRAMUSCULAR; INTRAVENOUS EVERY 30 MIN PRN
Status: DISCONTINUED | OUTPATIENT
Start: 2019-04-26 | End: 2019-04-26

## 2019-04-26 RX ORDER — SODIUM CHLORIDE, SODIUM LACTATE, POTASSIUM CHLORIDE, CALCIUM CHLORIDE 600; 310; 30; 20 MG/100ML; MG/100ML; MG/100ML; MG/100ML
INJECTION, SOLUTION INTRAVENOUS CONTINUOUS
Status: DISCONTINUED | OUTPATIENT
Start: 2019-04-26 | End: 2019-04-26 | Stop reason: HOSPADM

## 2019-04-26 RX ORDER — SODIUM CHLORIDE, SODIUM LACTATE, POTASSIUM CHLORIDE, CALCIUM CHLORIDE 600; 310; 30; 20 MG/100ML; MG/100ML; MG/100ML; MG/100ML
INJECTION, SOLUTION INTRAVENOUS CONTINUOUS
Status: DISCONTINUED | OUTPATIENT
Start: 2019-04-26 | End: 2019-04-26

## 2019-04-26 RX ORDER — SODIUM CHLORIDE 9 MG/ML
INJECTION, SOLUTION INTRAVENOUS CONTINUOUS
Status: ACTIVE | OUTPATIENT
Start: 2019-04-26 | End: 2019-04-27

## 2019-04-26 RX ORDER — ONDANSETRON 2 MG/ML
4 INJECTION INTRAMUSCULAR; INTRAVENOUS EVERY 6 HOURS PRN
Status: DISCONTINUED | OUTPATIENT
Start: 2019-04-26 | End: 2019-04-26

## 2019-04-26 RX ORDER — HYDROMORPHONE HYDROCHLORIDE 1 MG/ML
.3-.5 INJECTION, SOLUTION INTRAMUSCULAR; INTRAVENOUS; SUBCUTANEOUS EVERY 5 MIN PRN
Status: DISCONTINUED | OUTPATIENT
Start: 2019-04-26 | End: 2019-04-26

## 2019-04-26 RX ORDER — CEFAZOLIN SODIUM 2 G/100ML
2 INJECTION, SOLUTION INTRAVENOUS
Status: COMPLETED | OUTPATIENT
Start: 2019-04-26 | End: 2019-04-26

## 2019-04-26 RX ORDER — LIDOCAINE HYDROCHLORIDE AND EPINEPHRINE 10; 10 MG/ML; UG/ML
INJECTION, SOLUTION INFILTRATION; PERINEURAL PRN
Status: DISCONTINUED | OUTPATIENT
Start: 2019-04-26 | End: 2019-04-26

## 2019-04-26 RX ORDER — SULFASALAZINE 500 MG/1
2000 TABLET ORAL EVERY MORNING
Status: DISCONTINUED | OUTPATIENT
Start: 2019-04-27 | End: 2019-04-26

## 2019-04-26 RX ORDER — PRAMIPEXOLE DIHYDROCHLORIDE 0.5 MG/1
0.5 TABLET ORAL AT BEDTIME
Status: DISCONTINUED | OUTPATIENT
Start: 2019-04-26 | End: 2019-04-28 | Stop reason: HOSPADM

## 2019-04-26 RX ORDER — ACETAMINOPHEN 325 MG/1
975 TABLET ORAL EVERY 8 HOURS
Status: DISCONTINUED | OUTPATIENT
Start: 2019-04-26 | End: 2019-04-28 | Stop reason: HOSPADM

## 2019-04-26 RX ORDER — POLYETHYLENE GLYCOL 3350 17 G/17G
17 POWDER, FOR SOLUTION ORAL
Status: DISCONTINUED | OUTPATIENT
Start: 2019-04-27 | End: 2019-04-28 | Stop reason: HOSPADM

## 2019-04-26 RX ORDER — CARBIDOPA AND LEVODOPA 25; 100 MG/1; MG/1
4 TABLET ORAL 3 TIMES DAILY
Status: DISCONTINUED | OUTPATIENT
Start: 2019-04-26 | End: 2019-04-28 | Stop reason: HOSPADM

## 2019-04-26 RX ORDER — LABETALOL HYDROCHLORIDE 5 MG/ML
10-40 INJECTION, SOLUTION INTRAVENOUS EVERY 10 MIN PRN
Status: DISCONTINUED | OUTPATIENT
Start: 2019-04-26 | End: 2019-04-28 | Stop reason: HOSPADM

## 2019-04-26 RX ORDER — LIDOCAINE 40 MG/G
CREAM TOPICAL
Status: DISCONTINUED | OUTPATIENT
Start: 2019-04-26 | End: 2019-04-26

## 2019-04-26 RX ORDER — ACETAMINOPHEN 325 MG/1
650 TABLET ORAL EVERY 4 HOURS PRN
Status: DISCONTINUED | OUTPATIENT
Start: 2019-04-29 | End: 2019-04-28 | Stop reason: HOSPADM

## 2019-04-26 RX ORDER — PROPOFOL 10 MG/ML
INJECTION, EMULSION INTRAVENOUS PRN
Status: DISCONTINUED | OUTPATIENT
Start: 2019-04-26 | End: 2019-04-26

## 2019-04-26 RX ORDER — POTASSIUM CHLORIDE 7.45 MG/ML
10 INJECTION INTRAVENOUS
Status: DISCONTINUED | OUTPATIENT
Start: 2019-04-26 | End: 2019-04-28 | Stop reason: HOSPADM

## 2019-04-26 RX ORDER — CEFAZOLIN SODIUM 1 G/3ML
1 INJECTION, POWDER, FOR SOLUTION INTRAMUSCULAR; INTRAVENOUS SEE ADMIN INSTRUCTIONS
Status: DISCONTINUED | OUTPATIENT
Start: 2019-04-26 | End: 2019-04-26

## 2019-04-26 RX ORDER — CARBIDOPA AND LEVODOPA 50; 200 MG/1; MG/1
2 TABLET, EXTENDED RELEASE ORAL AT BEDTIME
Status: DISCONTINUED | OUTPATIENT
Start: 2019-04-26 | End: 2019-04-28 | Stop reason: HOSPADM

## 2019-04-26 RX ORDER — EPHEDRINE SULFATE 50 MG/ML
INJECTION, SOLUTION INTRAMUSCULAR; INTRAVENOUS; SUBCUTANEOUS PRN
Status: DISCONTINUED | OUTPATIENT
Start: 2019-04-26 | End: 2019-04-26

## 2019-04-26 RX ORDER — ONDANSETRON 4 MG/1
4 TABLET, ORALLY DISINTEGRATING ORAL EVERY 30 MIN PRN
Status: DISCONTINUED | OUTPATIENT
Start: 2019-04-26 | End: 2019-04-26 | Stop reason: HOSPADM

## 2019-04-26 RX ORDER — HYDRALAZINE HYDROCHLORIDE 20 MG/ML
10 INJECTION INTRAMUSCULAR; INTRAVENOUS ONCE
Status: COMPLETED | OUTPATIENT
Start: 2019-04-26 | End: 2019-04-26

## 2019-04-26 RX ORDER — POTASSIUM CHLORIDE 29.8 MG/ML
20 INJECTION INTRAVENOUS
Status: DISCONTINUED | OUTPATIENT
Start: 2019-04-26 | End: 2019-04-28 | Stop reason: HOSPADM

## 2019-04-26 RX ORDER — POTASSIUM CHLORIDE 1500 MG/1
20-40 TABLET, EXTENDED RELEASE ORAL
Status: DISCONTINUED | OUTPATIENT
Start: 2019-04-26 | End: 2019-04-28 | Stop reason: HOSPADM

## 2019-04-26 RX ORDER — METOCLOPRAMIDE 5 MG/1
5 TABLET ORAL EVERY 6 HOURS PRN
Status: DISCONTINUED | OUTPATIENT
Start: 2019-04-26 | End: 2019-04-28 | Stop reason: HOSPADM

## 2019-04-26 RX ORDER — GLYCOPYRROLATE 0.2 MG/ML
INJECTION, SOLUTION INTRAMUSCULAR; INTRAVENOUS PRN
Status: DISCONTINUED | OUTPATIENT
Start: 2019-04-26 | End: 2019-04-26

## 2019-04-26 RX ORDER — ONDANSETRON 2 MG/ML
4-8 INJECTION INTRAMUSCULAR; INTRAVENOUS EVERY 6 HOURS PRN
Status: DISCONTINUED | OUTPATIENT
Start: 2019-04-26 | End: 2019-04-28 | Stop reason: HOSPADM

## 2019-04-26 RX ORDER — PROCHLORPERAZINE MALEATE 5 MG
5 TABLET ORAL EVERY 6 HOURS PRN
Status: DISCONTINUED | OUTPATIENT
Start: 2019-04-26 | End: 2019-04-26

## 2019-04-26 RX ORDER — ONDANSETRON 4 MG/1
4-8 TABLET, ORALLY DISINTEGRATING ORAL EVERY 6 HOURS PRN
Status: DISCONTINUED | OUTPATIENT
Start: 2019-04-26 | End: 2019-04-28 | Stop reason: HOSPADM

## 2019-04-26 RX ORDER — ONDANSETRON 2 MG/ML
4 INJECTION INTRAMUSCULAR; INTRAVENOUS EVERY 30 MIN PRN
Status: DISCONTINUED | OUTPATIENT
Start: 2019-04-26 | End: 2019-04-26 | Stop reason: HOSPADM

## 2019-04-26 RX ORDER — ONDANSETRON 4 MG/1
4 TABLET, ORALLY DISINTEGRATING ORAL EVERY 30 MIN PRN
Status: DISCONTINUED | OUTPATIENT
Start: 2019-04-26 | End: 2019-04-26

## 2019-04-26 RX ORDER — FENTANYL CITRATE 50 UG/ML
25-50 INJECTION, SOLUTION INTRAMUSCULAR; INTRAVENOUS
Status: DISCONTINUED | OUTPATIENT
Start: 2019-04-26 | End: 2019-04-26

## 2019-04-26 RX ORDER — POTASSIUM CL/LIDO/0.9 % NACL 10MEQ/0.1L
10 INTRAVENOUS SOLUTION, PIGGYBACK (ML) INTRAVENOUS
Status: DISCONTINUED | OUTPATIENT
Start: 2019-04-26 | End: 2019-04-28 | Stop reason: HOSPADM

## 2019-04-26 RX ORDER — SODIUM CHLORIDE, SODIUM LACTATE, POTASSIUM CHLORIDE, CALCIUM CHLORIDE 600; 310; 30; 20 MG/100ML; MG/100ML; MG/100ML; MG/100ML
INJECTION, SOLUTION INTRAVENOUS CONTINUOUS PRN
Status: DISCONTINUED | OUTPATIENT
Start: 2019-04-26 | End: 2019-04-26

## 2019-04-26 RX ORDER — NALOXONE HYDROCHLORIDE 0.4 MG/ML
.1-.4 INJECTION, SOLUTION INTRAMUSCULAR; INTRAVENOUS; SUBCUTANEOUS
Status: DISCONTINUED | OUTPATIENT
Start: 2019-04-26 | End: 2019-04-26

## 2019-04-26 RX ORDER — MAGNESIUM SULFATE HEPTAHYDRATE 40 MG/ML
4 INJECTION, SOLUTION INTRAVENOUS EVERY 4 HOURS PRN
Status: DISCONTINUED | OUTPATIENT
Start: 2019-04-26 | End: 2019-04-28 | Stop reason: HOSPADM

## 2019-04-26 RX ORDER — CEFAZOLIN SODIUM 1 G/3ML
1 INJECTION, POWDER, FOR SOLUTION INTRAMUSCULAR; INTRAVENOUS EVERY 8 HOURS
Status: COMPLETED | OUTPATIENT
Start: 2019-04-26 | End: 2019-04-27

## 2019-04-26 RX ORDER — PROCHLORPERAZINE MALEATE 5 MG
5 TABLET ORAL EVERY 6 HOURS PRN
Status: DISCONTINUED | OUTPATIENT
Start: 2019-04-26 | End: 2019-04-28 | Stop reason: HOSPADM

## 2019-04-26 RX ORDER — DEXMEDETOMIDINE HYDROCHLORIDE 4 UG/ML
0.2-1.2 INJECTION, SOLUTION INTRAVENOUS CONTINUOUS
Status: DISCONTINUED | OUTPATIENT
Start: 2019-04-26 | End: 2019-04-26

## 2019-04-26 RX ORDER — LIDOCAINE 40 MG/G
CREAM TOPICAL
Status: DISCONTINUED | OUTPATIENT
Start: 2019-04-26 | End: 2019-04-28 | Stop reason: HOSPADM

## 2019-04-26 RX ORDER — POTASSIUM CHLORIDE 1.5 G/1.58G
20-40 POWDER, FOR SOLUTION ORAL
Status: DISCONTINUED | OUTPATIENT
Start: 2019-04-26 | End: 2019-04-28 | Stop reason: HOSPADM

## 2019-04-26 RX ORDER — HYDRALAZINE HYDROCHLORIDE 20 MG/ML
20 INJECTION INTRAMUSCULAR; INTRAVENOUS
Status: DISCONTINUED | OUTPATIENT
Start: 2019-04-26 | End: 2019-04-28 | Stop reason: HOSPADM

## 2019-04-26 RX ORDER — MULTIPLE VITAMINS W/ MINERALS TAB 9MG-400MCG
1 TAB ORAL EVERY MORNING
Status: DISCONTINUED | OUTPATIENT
Start: 2019-04-27 | End: 2019-04-28 | Stop reason: HOSPADM

## 2019-04-26 RX ORDER — CEFAZOLIN SODIUM 1 G/3ML
1 INJECTION, POWDER, FOR SOLUTION INTRAMUSCULAR; INTRAVENOUS EVERY 8 HOURS
Status: DISCONTINUED | OUTPATIENT
Start: 2019-04-26 | End: 2019-04-26

## 2019-04-26 RX ORDER — PROPOFOL 10 MG/ML
INJECTION, EMULSION INTRAVENOUS CONTINUOUS PRN
Status: DISCONTINUED | OUTPATIENT
Start: 2019-04-26 | End: 2019-04-26

## 2019-04-26 RX ORDER — FENTANYL CITRATE 50 UG/ML
INJECTION, SOLUTION INTRAMUSCULAR; INTRAVENOUS PRN
Status: DISCONTINUED | OUTPATIENT
Start: 2019-04-26 | End: 2019-04-26

## 2019-04-26 RX ORDER — NALOXONE HYDROCHLORIDE 0.4 MG/ML
.1-.4 INJECTION, SOLUTION INTRAMUSCULAR; INTRAVENOUS; SUBCUTANEOUS
Status: ACTIVE | OUTPATIENT
Start: 2019-04-26 | End: 2019-04-27

## 2019-04-26 RX ORDER — LABETALOL 20 MG/4 ML (5 MG/ML) INTRAVENOUS SYRINGE
10
Status: DISCONTINUED | OUTPATIENT
Start: 2019-04-26 | End: 2019-04-26

## 2019-04-26 RX ORDER — LABETALOL 20 MG/4 ML (5 MG/ML) INTRAVENOUS SYRINGE
20 ONCE
Status: DISCONTINUED | OUTPATIENT
Start: 2019-04-27 | End: 2019-04-27 | Stop reason: CLARIF

## 2019-04-26 RX ORDER — PROCHLORPERAZINE 25 MG
12.5 SUPPOSITORY, RECTAL RECTAL EVERY 12 HOURS PRN
Status: DISCONTINUED | OUTPATIENT
Start: 2019-04-26 | End: 2019-04-28 | Stop reason: HOSPADM

## 2019-04-26 RX ORDER — METOCLOPRAMIDE HYDROCHLORIDE 5 MG/ML
5 INJECTION INTRAMUSCULAR; INTRAVENOUS EVERY 6 HOURS PRN
Status: DISCONTINUED | OUTPATIENT
Start: 2019-04-26 | End: 2019-04-28 | Stop reason: HOSPADM

## 2019-04-26 RX ORDER — LIDOCAINE HYDROCHLORIDE 20 MG/ML
INJECTION, SOLUTION INFILTRATION; PERINEURAL PRN
Status: DISCONTINUED | OUTPATIENT
Start: 2019-04-26 | End: 2019-04-26

## 2019-04-26 RX ORDER — ONDANSETRON 4 MG/1
4 TABLET, ORALLY DISINTEGRATING ORAL EVERY 6 HOURS PRN
Status: DISCONTINUED | OUTPATIENT
Start: 2019-04-26 | End: 2019-04-26

## 2019-04-26 RX ADMIN — SODIUM CHLORIDE, POTASSIUM CHLORIDE, SODIUM LACTATE AND CALCIUM CHLORIDE: 600; 310; 30; 20 INJECTION, SOLUTION INTRAVENOUS at 08:45

## 2019-04-26 RX ADMIN — PROPOFOL 50 MG: 10 INJECTION, EMULSION INTRAVENOUS at 15:41

## 2019-04-26 RX ADMIN — CARBIDOPA AND LEVODOPA 4 TABLET: 25; 100 TABLET ORAL at 19:36

## 2019-04-26 RX ADMIN — PROPOFOL 20 MG: 10 INJECTION, EMULSION INTRAVENOUS at 17:48

## 2019-04-26 RX ADMIN — SUGAMMADEX 160 MG: 100 INJECTION, SOLUTION INTRAVENOUS at 17:51

## 2019-04-26 RX ADMIN — GLYCOPYRROLATE 0.2 MG: 0.2 INJECTION, SOLUTION INTRAMUSCULAR; INTRAVENOUS at 08:41

## 2019-04-26 RX ADMIN — CEFAZOLIN 1 G: 1 INJECTION, POWDER, FOR SOLUTION INTRAMUSCULAR; INTRAVENOUS at 21:30

## 2019-04-26 RX ADMIN — PRAMIPEXOLE DIHYDROCHLORIDE 0.5 MG: 0.5 TABLET ORAL at 22:26

## 2019-04-26 RX ADMIN — CEFAZOLIN 1 G: 1 INJECTION, POWDER, FOR SOLUTION INTRAMUSCULAR; INTRAVENOUS at 13:15

## 2019-04-26 RX ADMIN — GLYCOPYRROLATE 0.2 MG: 0.2 INJECTION, SOLUTION INTRAMUSCULAR; INTRAVENOUS at 09:07

## 2019-04-26 RX ADMIN — PROPOFOL 20 MG: 10 INJECTION, EMULSION INTRAVENOUS at 17:42

## 2019-04-26 RX ADMIN — FENTANYL CITRATE 50 MCG: 50 INJECTION, SOLUTION INTRAMUSCULAR; INTRAVENOUS at 09:06

## 2019-04-26 RX ADMIN — ACETAMINOPHEN 975 MG: 325 TABLET, FILM COATED ORAL at 21:29

## 2019-04-26 RX ADMIN — PROPOFOL 100 MCG/KG/MIN: 10 INJECTION, EMULSION INTRAVENOUS at 11:55

## 2019-04-26 RX ADMIN — Medication 150 MCG/HR: at 08:45

## 2019-04-26 RX ADMIN — PROPOFOL 125 MCG/KG/MIN: 10 INJECTION, EMULSION INTRAVENOUS at 08:40

## 2019-04-26 RX ADMIN — PROPOFOL 90 MCG/KG/MIN: 10 INJECTION, EMULSION INTRAVENOUS at 14:20

## 2019-04-26 RX ADMIN — GLYCOPYRROLATE 0.2 MG: 0.2 INJECTION, SOLUTION INTRAMUSCULAR; INTRAVENOUS at 13:29

## 2019-04-26 RX ADMIN — PROPOFOL 100 MG: 10 INJECTION, EMULSION INTRAVENOUS at 08:41

## 2019-04-26 RX ADMIN — HYDRALAZINE HYDROCHLORIDE 10 MG: 20 INJECTION INTRAMUSCULAR; INTRAVENOUS at 19:29

## 2019-04-26 RX ADMIN — SODIUM CHLORIDE, POTASSIUM CHLORIDE, SODIUM LACTATE AND CALCIUM CHLORIDE: 600; 310; 30; 20 INJECTION, SOLUTION INTRAVENOUS at 08:28

## 2019-04-26 RX ADMIN — FENTANYL CITRATE 125 MCG: 50 INJECTION, SOLUTION INTRAMUSCULAR; INTRAVENOUS at 08:41

## 2019-04-26 RX ADMIN — SODIUM CHLORIDE, POTASSIUM CHLORIDE, SODIUM LACTATE AND CALCIUM CHLORIDE: 600; 310; 30; 20 INJECTION, SOLUTION INTRAVENOUS at 11:45

## 2019-04-26 RX ADMIN — SODIUM CHLORIDE: 9 INJECTION, SOLUTION INTRAVENOUS at 19:00

## 2019-04-26 RX ADMIN — CEFAZOLIN 1 G: 1 INJECTION, POWDER, FOR SOLUTION INTRAMUSCULAR; INTRAVENOUS at 11:15

## 2019-04-26 RX ADMIN — ROCURONIUM BROMIDE 40 MG: 10 INJECTION INTRAVENOUS at 08:41

## 2019-04-26 RX ADMIN — ONDANSETRON 4 MG: 2 INJECTION INTRAMUSCULAR; INTRAVENOUS at 17:10

## 2019-04-26 RX ADMIN — CEFAZOLIN SODIUM 2 G: 2 INJECTION, SOLUTION INTRAVENOUS at 09:15

## 2019-04-26 RX ADMIN — CEFAZOLIN 1 G: 1 INJECTION, POWDER, FOR SOLUTION INTRAMUSCULAR; INTRAVENOUS at 15:15

## 2019-04-26 RX ADMIN — Medication 10 MG: at 10:13

## 2019-04-26 RX ADMIN — LIDOCAINE HYDROCHLORIDE 60 MG: 20 INJECTION, SOLUTION INFILTRATION; PERINEURAL at 08:41

## 2019-04-26 RX ADMIN — CEFAZOLIN 1 G: 1 INJECTION, POWDER, FOR SOLUTION INTRAMUSCULAR; INTRAVENOUS at 17:10

## 2019-04-26 RX ADMIN — CARBIDOPA AND LEVODOPA 2 TABLET: 50; 200 TABLET, EXTENDED RELEASE ORAL at 22:26

## 2019-04-26 ASSESSMENT — MIFFLIN-ST. JEOR
SCORE: 1578.13
SCORE: 1589.13

## 2019-04-26 NOTE — ANESTHESIA POSTPROCEDURE EVALUATION
Anesthesia POST Procedure Evaluation    Patient: Ace Navarro   MRN:     [id number] Gender:   male   Age:    73 year old :      1945        Preoperative Diagnosis: Parkinson Disease   Procedure(s):  O-Arm/Stealth Assisted Bilateral Deep Brain Stimulator Placement, Phase I, Placement Of Bilateral Deep Brain Stimulator Electrodes, Target Bilateral Subthalamic Nucleus With Microelectrode Recording   Postop Comments: No value filed.       Anesthesia Type:  General  No value filed.    Reportable Event: NO     PAIN: Uncomplicated   Sign Out status: Comfortable, Well controlled pain     PONV: No PONV   Sign Out status:  No Nausea or Vomiting     Neuro/Psych: Uneventful perioperative course   Sign Out Status: Preoperative baseline; Age appropriate mentation     Airway/Resp.: Uneventful perioperative course   Sign Out Status: Non labored breathing, age appropriate RR; Resp. Status within EXPECTED Parameters     CV: Uneventful perioperative course   Sign Out status: Appropriate BP and perfusion indices; Appropriate HR/Rhythm     Disposition:   Sign Out in:  PACU  Disposition:  Floor  Recovery Course: Uneventful  Follow-Up: Not required           Last Anesthesia Record Vitals:  CRNA VITALS  2019 1738 - 2019 1836      2019             Resp Rate (observed):  7  (Abnormal)           Last PACU Vitals:  Vitals Value Taken Time   /90 2019  6:31 PM   Temp 36.7  C (98.1  F) 2019  6:15 PM   Pulse 48 2019  6:31 PM   Resp 35 2019  6:35 PM   SpO2 96 % 2019  6:35 PM   Temp src     NIBP     Pulse     SpO2     Resp     Temp     Ht Rate     Temp 2     Vitals shown include unvalidated device data.      Electronically Signed By: Reid Gilman MD, 2019, 6:36 PM

## 2019-04-26 NOTE — BRIEF OP NOTE
[facility], [place]    Brief Operative Note    Pre-operative diagnosis: Parkinson Disease  Post-operative diagnosis Parkinson Disease  Procedure: Procedure(s):  O-Arm/Stealth Assisted Bilateral Deep Brain Stimulator Placement, Phase I, Placement Of Bilateral Deep Brain Stimulator Electrodes, Target Bilateral Subthalamic Nucleus With Microelectrode Recording  Surgeon: Surgeon(s) and Role:     * Humberto Briones MD - Primary     * Carolyn Tee MD - Resident - Assisting  Anesthesia: General   Estimated blood loss: 15ml  Drains: None  Specimens: * No specimens in log *  Findings:  Read op note.  Complications: None.  Implants:    Implant Name Type Inv. Item Serial No.  Lot No. LRB No. Used   IMP BUR HOLE COVER GUARDIAN 6010ANS Metallic Hardware/[place] IMP BUR HOLE COVER GUARDIAN 6010ANS  [employer] [id number] Right 2   IMP BUR HOLE COVER GUARDIAN 6010ANS Metallic Hardware/[place] IMP BUR HOLE COVER GUARDIAN 6010ANS  [employer] [id number] Left 1   KIT DBS LEAD DBS 8CH 40CM 1-3,3-1 SPACE 1.5 BLACK 6173ANS Leads KIT DBS LEAD DBS 8CH 40CM 1-3,3-1 SPACE 1.5 BLACK 6173ANS [id number] [employer]  Right 1   KIT DBS LEAD DBS 8CH 40CM 1-3,3-1 SPACE 1.5 BLACK 6173ANS Leads KIT DBS LEAD DBS 8CH 40CM 1-3,3-1 SPACE 1.5 BLACK 6173ANS [id number] [employer]  Left 1

## 2019-04-26 NOTE — PROCEDURES
Intraoperative JACKIE and test stimulation for DBS placement    Procedure Note    Patient Name:Ace Navarro  MRN: [id number]  Date of Service: 4/26/2019    Procedures: 1. Bilateral intraoperative microelectrode recording for guidance of deep brain stimulator lead placement. 2. Bilateral test macrostimulation.    Physician performing procedure: Rina Patterson MD    Surgical Procedure: Bilateral subthalamic nucleus deep brain stimulator placement un.    Surgeon: Humberto Briones MD, PhD    Patient identification: Ace Navarro is 73-year-old man with Parkinson disease undergoing bilateral subthalamic nucleus deep brain stimulator placement for treatment of Parkinson disease for treatment of motor fluctuations with wearing off, and tremors which awaken him in the middle of the night. A previous attempt at DBS implantation had to be aborted on 10/23/18 due to severe confusion and restlessness during the procedure. For this reason, the decision to proceed with bilateral STN implantation under general anesthesia (with limited JACKIE) today was made.    Description of Procedure:  Prior to surgery the patient underwent stereotactic MR scanning for localization of the subthalamic nuclei. The images were imported into the Stealth system for computation and reformatting and trajectory planning. The initial anatomic targets corresponded to the dorsal lateral subthalamic nuclei as directly visualized on T2 weightedand susceptibility weighted images. Trajectories were planned to these targets on T1 weighted gadolinium enhanced images that avoided the ventricles, sulci, and cortical veins as visualized by the gadolinium. These target and trajectories were then entered into the [facility] system, which was used for these case. Please see details in Dr. Briones's note.     After the surgical procedure was initiated and the bur holes were made, intraoperative microelectrode recording was performed in a very limited fashion due  to general anesthesia.. The left side was first mapped and implanted. The first microelectrode penetration was made to the anatomic target. The recordings were quite limited and muddy sounding. Thalamic cells were noted, including bursting cells, between 14.5 mm and 6.5 mm above target. Intermittent neuronal activity was appreciated throughout the recording, but none of the cells had characteristic STN features. There was several low frequency cells noted at 5.5 mm above and 1.5 mm above target. Decreased background was noted at 0.5mm above target. A 20-25 Hz regular cell was noted at 1.5 mm below target (SNr under general anesthesia?)        We then proceeded to do test macrostimulation from the microelectrode at 4 mm above target. At pulse width 60 microseconds, 130 Hz, he had no side effects up to 6.0 milliamps. However, of note, the patient remained intubated and under general anesthesia.     An O-arm radiographic image was subsequently obtained in the OR and was sent both to the Stealth system and the DAQRI system. According to my merge on the Stealth system the canula was approximately 2 mm medial than the ideal target. However, the [place] system suggested the trajectory was both medial and posterior to the ideal target. Imaging also suggested that the lead should be placed slightly deeper. Since the surgery was being done using the [place] system, Dr. Briones suggested the lead be placed ~2mm anteriolateral from the first trajectory, and also we moved it 3 mm deeper based on the imaging. Thus, a 1.5mm spacing Abbott segmented lead was placed 2 mm anterolateral and 3 mm deeper than the original trajectory.      An Abbott 1.5mm spacing segmented lead deep brain stimulator (DBS) lead was inserted with the bottom of contact 1 at 1 mm above target. Because there was some debate about the quality of the MRI merge on the [place] system, and given the lack of potentially informative information from JACKIE in the context of general  anesthesia, I chose to place a lead with 1.5 mm inter-electrode spacing.    Test macrostimulation from the lead was then performed with 1 negative, 2 positive, 60 microseconds, 130 Hz, and there were no side effects up to 7.0mA.    These side effect thresholds were thought to be reasonable (especially given he was under general anesthesia) and the lead was fastened into place with the tip at target.    Next the right brain was mapped and implanted. The first microelectrode penetration was made to the anatomic target. This again was significantly limited in quality because the patient was under general anesthesia. Several thalamic cells were nted betwee 13.4 mm and 7.5 mm above target. There was a quiet area between 6.6 and 5.4 mm above target. No clear STN physiology was appreciated. Many slow cells were noted between 3 mm above and 0.5 mm below target. Decreased background at 1mm below target, and possible SNR cellular activity at 1.5 mm below target.       We then proceeded to do test macrostimulation from the microelectrode at 4 mm above target. At pulse width 120 microseconds, 130 Hz, he had no very severe left facial contraction consistent with spread of stimulation into capsular fibers. At pulse width 60 microseconds, 130 Hz, 3.0mA he again that side effects, but this time more mild facial twitching. Of note, the patient remained intubated and under general anesthesia when these side effects were noted.     An O-arm radiograph was obtained in the OR with the microelectrode canula in place. This suggested that the trajectory was near the lateral border of the STN, which would explain the unacceptable low side effect thresholds for capsule. The merged O-arm image (again done on both the Stealth and [facility] systems) again suggested that the location was not deep enough The decision to place the right lead 2mm medial from the initial trajectory was made given these intolerable side effect thresholds, as well as 2 mm  deeper with the bottom of contact 1 at target. While this is expected to place the somewhat more on the medial aspect of the STN, this is intentional give the severe capsular side effect with the initial trajectory, even with general anesthesia.    An Abbott segmented lead  With 1.5 inter-electrode spacing was placed with the bottom of contact 1 at target.      Test macrostimulation from the lead was then performed with 1 negative, 2 positive, 60 microseconds, 130 Hz, and he had no side effects to 6.0 mA.  At 2 negative, 3 positive, 60 microseconds, 130 Hz, he again had no side effects to an amplitude of 7.0 mA.     These side effect thresholds were thought to be reasonable and the lead was fastened into place with the bottom of contact 1 at target.    The patient tolerated the procedure without complications.     Physician time: A total of 3 hours and 45 minutes was spent to perform the above bilateral intraoperative microelectrode recording and test stimulation.    Rina Patterson MD    of Neurology   Movement Disorders Division   Medical Director, Deep Brain Stimulation Program

## 2019-04-26 NOTE — ANESTHESIA CARE TRANSFER NOTE
Patient: Ace Navarro    Procedure(s):  O-Arm/Stealth Assisted Bilateral Deep Brain Stimulator Placement, Phase I, Placement Of Bilateral Deep Brain Stimulator Electrodes, Target Bilateral Subthalamic Nucleus With Microelectrode Recording    Diagnosis: Parkinson Disease  Diagnosis Additional Information: No value filed.    Anesthesia Type:   No value filed.     Note:  Airway :Face Mask  Patient transferred to:PACU  Comments: VSS. Breathing spontaneously at a regular rate with adequate tidal volumes and maintaining O2 sats on 6L facemask. Denies nausea or pain. No apparent complications from anesthesia.     Zoë Nicole MD  [facility] Anesthesia Resident  Handoff Report: Identifed the Patient, Identified the Reponsible Provider, Reviewed the pertinent medical history, Discussed the surgical course, Reviewed Intra-OP anesthesia mangement and issues during anesthesia, Set expectations for post-procedure period and Allowed opportunity for questions and acknowledgement of understanding      Vitals: (Last set prior to Anesthesia Care Transfer)    CRNA VITALS  4/26/2019 1747 - 4/26/2019 1817      4/26/2019             Resp Rate (observed):  --                Electronically Signed By: Zoë Mackay MD  April 26, 2019  6:17 PM

## 2019-04-27 ENCOUNTER — APPOINTMENT (OUTPATIENT)
Dept: OCCUPATIONAL THERAPY | Facility: CLINIC | Age: 74
DRG: 026 | End: 2019-04-27
Attending: STUDENT IN AN ORGANIZED HEALTH CARE EDUCATION/TRAINING PROGRAM
Payer: MEDICARE

## 2019-04-27 ENCOUNTER — APPOINTMENT (OUTPATIENT)
Dept: CT IMAGING | Facility: CLINIC | Age: 74
DRG: 026 | End: 2019-04-27
Attending: STUDENT IN AN ORGANIZED HEALTH CARE EDUCATION/TRAINING PROGRAM
Payer: MEDICARE

## 2019-04-27 ENCOUNTER — APPOINTMENT (OUTPATIENT)
Dept: PHYSICAL THERAPY | Facility: CLINIC | Age: 74
DRG: 026 | End: 2019-04-27
Attending: STUDENT IN AN ORGANIZED HEALTH CARE EDUCATION/TRAINING PROGRAM
Payer: MEDICARE

## 2019-04-27 LAB
ANION GAP SERPL CALCULATED.3IONS-SCNC: 11 MMOL/L (ref 3–14)
BUN SERPL-MCNC: 9 MG/DL (ref 7–30)
CALCIUM SERPL-MCNC: 8.5 MG/DL (ref 8.5–10.1)
CHLORIDE SERPL-SCNC: 104 MMOL/L (ref 94–109)
CO2 SERPL-SCNC: 26 MMOL/L (ref 20–32)
CREAT SERPL-MCNC: 0.81 MG/DL (ref 0.66–1.25)
GFR SERPL CREATININE-BSD FRML MDRD: 89 ML/MIN/{1.73_M2}
GLUCOSE BLDC GLUCOMTR-MCNC: 106 MG/DL (ref 70–99)
GLUCOSE SERPL-MCNC: 106 MG/DL (ref 70–99)
MAGNESIUM SERPL-MCNC: 1.9 MG/DL (ref 1.6–2.3)
MRSA DNA SPEC QL NAA+PROBE: NEGATIVE
PHOSPHATE SERPL-MCNC: 3.3 MG/DL (ref 2.5–4.5)
POTASSIUM SERPL-SCNC: 3.5 MMOL/L (ref 3.4–5.3)
SODIUM SERPL-SCNC: 140 MMOL/L (ref 133–144)
SP GR UR STRIP: 1.01 (ref 1–1.03)
SPECIMEN SOURCE: NORMAL
TROPONIN I SERPL-MCNC: <0.015 UG/L (ref 0–0.04)

## 2019-04-27 PROCEDURE — 25000125 ZZHC RX 250: Performed by: STUDENT IN AN ORGANIZED HEALTH CARE EDUCATION/TRAINING PROGRAM

## 2019-04-27 PROCEDURE — A9270 NON-COVERED ITEM OR SERVICE: HCPCS | Performed by: STUDENT IN AN ORGANIZED HEALTH CARE EDUCATION/TRAINING PROGRAM

## 2019-04-27 PROCEDURE — 80048 BASIC METABOLIC PNL TOTAL CA: CPT | Performed by: STUDENT IN AN ORGANIZED HEALTH CARE EDUCATION/TRAINING PROGRAM

## 2019-04-27 PROCEDURE — 97530 THERAPEUTIC ACTIVITIES: CPT | Mod: GO | Performed by: OCCUPATIONAL THERAPIST

## 2019-04-27 PROCEDURE — 36415 COLL VENOUS BLD VENIPUNCTURE: CPT | Performed by: STUDENT IN AN ORGANIZED HEALTH CARE EDUCATION/TRAINING PROGRAM

## 2019-04-27 PROCEDURE — 70450 CT HEAD/BRAIN W/O DYE: CPT

## 2019-04-27 PROCEDURE — 97165 OT EVAL LOW COMPLEX 30 MIN: CPT | Mod: GO | Performed by: OCCUPATIONAL THERAPIST

## 2019-04-27 PROCEDURE — 25000132 ZZH RX MED GY IP 250 OP 250 PS 637: Performed by: STUDENT IN AN ORGANIZED HEALTH CARE EDUCATION/TRAINING PROGRAM

## 2019-04-27 PROCEDURE — 40000739 ZZH STATISTIC STROKE CODE W/O ACCESS

## 2019-04-27 PROCEDURE — 81003 URINALYSIS AUTO W/O SCOPE: CPT | Performed by: STUDENT IN AN ORGANIZED HEALTH CARE EDUCATION/TRAINING PROGRAM

## 2019-04-27 PROCEDURE — 97535 SELF CARE MNGMENT TRAINING: CPT | Mod: GO | Performed by: OCCUPATIONAL THERAPIST

## 2019-04-27 PROCEDURE — 25000128 H RX IP 250 OP 636: Performed by: STUDENT IN AN ORGANIZED HEALTH CARE EDUCATION/TRAINING PROGRAM

## 2019-04-27 PROCEDURE — 97116 GAIT TRAINING THERAPY: CPT | Mod: GP

## 2019-04-27 PROCEDURE — 97112 NEUROMUSCULAR REEDUCATION: CPT | Mod: GP

## 2019-04-27 PROCEDURE — 97161 PT EVAL LOW COMPLEX 20 MIN: CPT | Mod: GP

## 2019-04-27 PROCEDURE — 97530 THERAPEUTIC ACTIVITIES: CPT | Mod: GP

## 2019-04-27 PROCEDURE — 84100 ASSAY OF PHOSPHORUS: CPT | Performed by: STUDENT IN AN ORGANIZED HEALTH CARE EDUCATION/TRAINING PROGRAM

## 2019-04-27 PROCEDURE — 93005 ELECTROCARDIOGRAM TRACING: CPT

## 2019-04-27 PROCEDURE — 12000001 ZZH R&B MED SURG/OB UMMC

## 2019-04-27 PROCEDURE — 83735 ASSAY OF MAGNESIUM: CPT | Performed by: STUDENT IN AN ORGANIZED HEALTH CARE EDUCATION/TRAINING PROGRAM

## 2019-04-27 PROCEDURE — 00000146 ZZHCL STATISTIC GLUCOSE BY METER IP

## 2019-04-27 PROCEDURE — 84484 ASSAY OF TROPONIN QUANT: CPT | Performed by: STUDENT IN AN ORGANIZED HEALTH CARE EDUCATION/TRAINING PROGRAM

## 2019-04-27 PROCEDURE — 93010 ELECTROCARDIOGRAM REPORT: CPT | Performed by: INTERNAL MEDICINE

## 2019-04-27 RX ADMIN — CARBIDOPA AND LEVODOPA 4 TABLET: 25; 100 TABLET ORAL at 07:40

## 2019-04-27 RX ADMIN — CARBIDOPA AND LEVODOPA 2 TABLET: 50; 200 TABLET, EXTENDED RELEASE ORAL at 21:58

## 2019-04-27 RX ADMIN — CEFAZOLIN 1 G: 1 INJECTION, POWDER, FOR SOLUTION INTRAMUSCULAR; INTRAVENOUS at 05:42

## 2019-04-27 RX ADMIN — HYDRALAZINE HYDROCHLORIDE 20 MG: 20 INJECTION INTRAMUSCULAR; INTRAVENOUS at 03:13

## 2019-04-27 RX ADMIN — PRAMIPEXOLE DIHYDROCHLORIDE 0.5 MG: 0.5 TABLET ORAL at 21:59

## 2019-04-27 RX ADMIN — MULTIPLE VITAMINS W/ MINERALS TAB 1 TABLET: TAB at 07:41

## 2019-04-27 RX ADMIN — CEFAZOLIN 1 G: 1 INJECTION, POWDER, FOR SOLUTION INTRAMUSCULAR; INTRAVENOUS at 14:08

## 2019-04-27 RX ADMIN — Medication 12.5 MG: at 01:05

## 2019-04-27 RX ADMIN — CARBIDOPA AND LEVODOPA 4 TABLET: 25; 100 TABLET ORAL at 20:10

## 2019-04-27 RX ADMIN — Medication 2 G: at 05:42

## 2019-04-27 RX ADMIN — ACETAMINOPHEN 975 MG: 325 TABLET, FILM COATED ORAL at 20:10

## 2019-04-27 RX ADMIN — POLYETHYLENE GLYCOL 3350 17 G: 17 POWDER, FOR SOLUTION ORAL at 07:40

## 2019-04-27 RX ADMIN — CARBIDOPA AND LEVODOPA 4 TABLET: 25; 100 TABLET ORAL at 14:09

## 2019-04-27 RX ADMIN — ACETAMINOPHEN 975 MG: 325 TABLET, FILM COATED ORAL at 14:09

## 2019-04-27 RX ADMIN — ACETAMINOPHEN 975 MG: 325 TABLET, FILM COATED ORAL at 05:42

## 2019-04-27 RX ADMIN — POTASSIUM CHLORIDE 20 MEQ: 1.5 POWDER, FOR SOLUTION ORAL at 05:42

## 2019-04-27 ASSESSMENT — VISUAL ACUITY
OU: NORMAL ACUITY

## 2019-04-27 ASSESSMENT — ACTIVITIES OF DAILY LIVING (ADL)
ADLS_ACUITY_SCORE: 19
ADLS_ACUITY_SCORE: 17
ADLS_ACUITY_SCORE: 19
ADLS_ACUITY_SCORE: 19
ADLS_ACUITY_SCORE: 18
IADL_COMMENTS: OT: PT WAS IND
ADLS_ACUITY_SCORE: 17

## 2019-04-27 NOTE — PHARMACY
Pharmacy Stroke Code Response  Pharmacist responded as part of the Stroke Code Team activation to patient care area ED.  The Stroke Team determined that the patient was not a candidate for IV alteplase therapy and the pharmacy team was dismissed at 13:30.     Love Corrigan, PharmD  April 27, 2019

## 2019-04-27 NOTE — PROGRESS NOTES
S: Intermittently agitated overnight.  With mitts and video monitoring in place.      O:  Exam:  General: Awake;  Alert, In No Acute Distress  Mental status: Oriented x 3  Cranial Nerves: Cranial Nerves II-XII Intact Bilaterally  Strength:      Del Tr Bi WE WF Gr  R 5 5 5 5 5 5  L 5 5 5 5 5 5     HF KE KF DF PF   R 5 5 5 5 5   L 5 5 5 5 5     Pronator Drift: Absent  Sensory: Intact to Light Touch  INCISION: covered with dermabond    Assessment:   Mr. Navarro is a 73 year old man with history of Parkinson s disease who underwent bilateral STN DBS, stage I, doing well post-op.    Plan by problem:     Neuro:   Continue home sinemet and pramipexole   Sutures absorbable     Cardiovascular: blood pressure goals: SBP < 140    Pulmonary:  Incentive spirometry     Gastrointestinal: regular diet; hold home sulfasalazine until after phase 2     Renal: monitor intake and output     Heme: no issues     Endocrine: No issues    Infectious Monitor for fever; ancef x 3 doses     PT/OT: not indicated     DVT prophylaxis: Sequential compression devices    Ulcer prophylaxis: none indicated    DISPO:  Anticipate D/C 4/27/19    Barriers: completion of antibiotics    Triston Puckett MD, PhD  Neurosurgery PGY-3      Please contact neurosurgery resident on call with questions.    Dial * * [phone], enter [phone] when prompted.       I have reviewed the history. I have seen and examined the patient myself and agree with the assessment and plan above.  JASON Wyatt MD

## 2019-04-27 NOTE — PLAN OF CARE
Status  D/I: Patient on unit 4A Surgical/Neuro ICU s/p DBS  Neuro- Patient intermittently confused on the time. Forgetful. Patient very restless, pulling at lines and attempting to get out of bed. Patient in mits, has bed alarm on and PVM, so far he has been redirectable. 5/5 strength in all extremities, all extremities rigid, BUE tremors. PERRLA, FC, no vision or hearing changes  Pulm- Patient on 1 L, CTA  GI- Hypoactive sounds and not passing gas yet, will continue to monitor. Patient tolerated clears overnight denies N/V.  - Maldonado catheter with high outputs. MDs aware. Tested for urine specific gravity which came back on the low end of normal.   Skin- Incision sites on bilateral head, well approximated and dressings are CDI.  Gtts- NS @ 75    Lines- 1 PIV  Electrolytes- replaced per protocol.  Social-  Family at bedside and updated.  See flow sheets for further interventions and assessments.  P: Continue to monitor pt closely, Notify MD of changes/concerns. With patient's restlessness/confusion need to make sure he is safe before transfer out.

## 2019-04-27 NOTE — PLAN OF CARE
Admitted/transfered from: PACU  Reason for admission/transfer: DBS, needs neuro monitoring  Patient status upon admission/transfer: Stable, confused and lethergic but otherwise baseline  Interventions: Frequent neuro exams  Plan: Monitor overnight  2 RN skin assessment completed by: Leslie Lomeli and Alessandra Prasad  Result of skin assessment: Intact  Interventions/actions: Settled patient, explained that he would be woken every hour for neuro checks.   Height/weight, drug calc weight: done  Patient belongings: 2 bags a a brief case remain in patients room.   MDRO education (if appicable): MRSA sample sent

## 2019-04-27 NOTE — CONSULTS
[facility], [place]      Neurology Stroke Code    Patient Name: Ace Navarro  : 1945 MRN#: [id number]    STROKE DATA     Stroke Code:  Time called:  2019 1322  Time patient seen:  2019 1323  Onset of symptoms:  2019 ~1315  Last known normal (pt's baseline):  2019 1300  Head CT read by Dr. Hirsch at:  2019 1336  Stroke Code de-escalated at 2019 1349 after discussion with Dr. Hirsch due to symptoms not likely caused by stroke and presence of contraindications for both intravenous and intra-arterial stroke treatments.      TPA treatment:  Not given due to stroke mimic: procedural-related vs. behavioral/delayed response time vs. baseline deficits, major surgery / trauma in past 14 days.    National Institutes of Health Stroke Scale (at presentation)  NIHSS done at:  time patient seen      Score    Level of consciousness:  (0)   Alert, keenly responsive    LOC questions:  (0)   Answers both questions correctly    LOC commands:  (0)   Performs both tasks correctly    Best gaze:  (0)   Normal    Visual:  (0)   No visual loss    Facial palsy:  (0)   Normal symmetrical movements    Motor arm (left):  (0)   No drift    Motor arm (right):  (0)   No drift    Motor leg (left):  (0)   No drift    Motor leg (right):  (0)   No drift    Limb ataxia:  (0)   Absent    Sensory:  (0)   Normal- no sensory loss    Best language:  (0)   Normal- no aphasia    Dysarthria:  (0)   Normal    Extinction and inattention:  (0)   No abnormality        NIHSS Total Score:  0           ASSESSMENT & RECOMMENDATONS     Stroke code activated due to visual field deficit and concern for left sided neglect.    73-year-old male with possible history of Parkinson's disease who presented for bilateral STN DBS placement on 2019.  Postop, patient was in his normal state of health with nonfocal neurologic exam (besides parkinsonian features) as of 1 PM .  Was working with  occupational therapy when staff noticed that patient was seemingly not attending to his left side as much while walking and concern for a left-sided visual field deficit (specifically left upper quadrant but seemed having difficulty on the left side of his visual field while trying to get into a chair, based on patient turning to the left side to see where to put his hands/where the chair was specifically was; Occupational Therapy + Nursing performed visual field testing and patient repeatedly had a left visual field deficit, more notably present in the Upper Fld. and Lower Fld.).  Last known well 1 PM, symptom confirmed onset at  ~1:20 p.m. stroke code called, at the time of code with previous providers and stroke team present, patient had no visual field deficits nor was he neglecting his left side.  NIH of 0, fairly strong and symmetric in all extremities without localizing new deficit (was able to follow simple two-step commands, did have some parkinsonian features).  CT had performed due to recent intracranial procedure, no focal/concerning abnormalities visualized. HDS. BG normal. Overall, unclear etiology of patient's transient symptoms, possibly related to behavioral/alternative reporting versus transient HD instability/hypotension vs. procedurally-related (although this is not clearly consistent either given suspected anatomical location and DBS placement), less likely + not consistent with vascular/ischemic etiology.     Recommendations:   - No further neurologic work up or imaging needed from a Stroke standpoint at this time, if symptoms recur, would consider MRI/MRA Stroke complete  - Defer to Neurosurgery regarding further evaluation from their standpoint in light of recent procedure and discharge plan  - If patient remains in the hospital as of tomorrow, he will be seen formally by inpatient Stroke team at that time    The patient will be managed by the Neurosurgery team and  followed by the Stroke  Consult service    The patient was discussed with the Fellow, Dr. Hirsch.  The staff is Dr. De La Cruz.    Nawaf Simmons MD   Pager: [phone]

## 2019-04-27 NOTE — PLAN OF CARE
Neuro: AOX4, intermittently disoriented to time. Able to move all extremities. Sensation intact. Denies numbness and tingling. Pupils equal/reactive. Last know normal at 1300, with visual fields intact. At 1315 OT noticed left sided arm weakness and left visual filed cut. This bedside RN, did not notice weakness in any extremities. Did notice a left visual field cut. Stroked code called immediately. Pt taken to CT scan, all VSS remained stable. BG stable. Post CT symptoms appeared better, MD in room. Report given to Verito, RN to take over cares.   Cardiac: baseline sinus koby, afebrile.   Respiratory: 1L to room air, LS clear/equal.   GI: abd soft, advanced to regular diet, tolerating well.   : alvarez removed, voided small amount X 1.   Plan: continue to monitor and notify MD of any changes or concerns.

## 2019-04-27 NOTE — OR NURSING
Call made to Dr LACY Sparks, updated on neuro status of pt- can choose correct word with choice. CRISOSTOMO, gaze fields intact, no drift, face symmetrical. MD stated he will see pt later on.

## 2019-04-27 NOTE — PROGRESS NOTES
04/27/19 1000   Quick Adds   Type of Visit Initial PT Evaluation  (Connie Kiser, PT, DP T)       Present no   Language english    Living Environment   Lives With alone   Living Arrangements house  (single story home (no basement))   Home Accessibility stairs to enter home   Number of Stairs, Main Entrance 2   Stair Railings, Main Entrance none   Transportation Anticipated car, drives self   Living Environment Comment Pt lives alone in a rambler style home. 2 LIZ - unsure if there is a handrail or not. Pt was driving prior to sx. Does report that sister and brother live close by    Self-Care   Usual Activity Tolerance good   Current Activity Tolerance fair   Regular Exercise Yes   Activity/Exercise Type strength training   Exercise Amount/Frequency 2 times/wk   Equipment Currently Used at Home none   Activity/Exercise/Self-Care Comment Pt lifts weights 2x/week. indep with ADLs   Functional Level Prior   Ambulation 0-->independent   Transferring 0-->independent   Toileting 0-->independent   Bathing 0-->independent   Communication 0-->understands/communicates without difficulty   Swallowing 0-->swallows foods/liquids without difficulty   Cognition 1 - attention or memory deficits   Fall history within last six months no   Which of the above functional risks had a recent onset or change? transferring;ambulation   Prior Functional Level Comment indep with mobility prior to admit    General Information   Onset of Illness/Injury or Date of Surgery - Date 04/26/19   Referring Physician Triston Puckett MD   Patient/Family Goals Statement return home   Pertinent History of Current Problem (include personal factors and/or comorbidities that impact the POC) 73 year old man with history of Parkinson s disease who underwent bilateral STN DBS, stage I, doing well post-op.   Precautions/Limitations fall precautions   General Info Comments activity: ambulate with A   Cognitive Status Examination    Orientation person;place   Level of Consciousness alert   Follows Commands and Answers Questions 100% of the time;75% of the time   Personal Safety and Judgment impaired   Memory intact   Cognitive Comment Pt oriented to self, able to say this is hospital but not specific to the name, reports may 1975 as month and year.    Pain Assessment   Patient Currently in Pain No   Integumentary/Edema   Integumentary/Edema Comments incisions on top of head without bandage or drainage. no edema noted.   Posture    Posture Forward head position   Range of Motion (ROM)   ROM Comment WFL throughout   Strength   Strength Comments grossly 4-5/5/ marco UE and LEs    Bed Mobility   Bed Mobility Comments HOB minimally elevated, supine>sit with SBA and strong UE use on bedraill   Transfer Skills   Transfer Comments STS with minor UE use and CGA    Gait   Gait Comments ambulates with intermittent UE support, dec gait speed,    Balance   Balance Comments not formally assessed,    Sensory Examination   Sensory Perception no deficits were identified   Coordination   Coordination Comments ataxic FTN. resting tremor R UE, WNL heel to shin    Muscle Tone   Muscle Tone no deficits were identified   General Therapy Interventions   Planned Therapy Interventions ADL retraining;balance training;bed mobility training;gait training;strengthening;transfer training;progressive activity/exercise;home program guidelines   Clinical Impression   Criteria for Skilled Therapeutic Intervention yes, treatment indicated   PT Diagnosis de functional mobility    Influenced by the following impairments pain, fatigue, post op, deconditioning    Functional limitations due to impairments activity tolerance, gait, balance, transfers    Clinical Presentation Evolving/Changing   Clinical Presentation Rationale PMH, clinical judgement, current mobility    Clinical Decision Making (Complexity) Low complexity   Therapy Frequency` 3 times/week   Predicted Duration of  "Therapy Intervention (days/wks) 5/4/19   Anticipated Equipment Needs at Discharge   (TBD)   Anticipated Discharge Disposition Home with Assist   Risk & Benefits of therapy have been explained Yes   Patient, Family & other staff in agreement with plan of care Yes   Clinical Impression Comments PT samuel completed, tx indicated    [facility]-PAC TM \"6 Clicks\"   2016, Trustees of [facility], under license to [employer].  All rights reserved.   6 Clicks Short Forms Basic Mobility Inpatient Short Form   [facility] AM-PAC  \"6 Clicks\" V.2 Basic Mobility Inpatient Short Form   1. Turning from your back to your side while in a flat bed without using bedrails? 4 - None   2. Moving from lying on your back to sitting on the side of a flat bed without using bedrails? 3 - A Little   3. Moving to and from a bed to a chair (including a wheelchair)? 3 - A Little   4. Standing up from a chair using your arms (e.g., wheelchair, or bedside chair)? 3 - A Little   5. To walk in hospital room? 4 - None   6. Climbing 3-5 steps with a railing? 3 - A Little   Basic Mobility Raw Score (Score out of 24.Lower scores equate to lower levels of function) 20   Total Evaluation Time   Total Evaluation Time (Minutes) 7     "

## 2019-04-27 NOTE — PLAN OF CARE
Discharge Planner OT   Patient plan for discharge: home   Current status: Pt following 1 steps commands, OT educated pt and pt's son on assist pt would need at home for med set up, cooking, finances. ADLS w/in crani precautions. VSS throughout.  Barriers to return to prior living situation: medical status  Recommendations for discharge: rehab pending pt progress vs Home w/ 24/7 A and OP OT and PT  Rationale for recommendations: At first pt's son stating that pt w/ no deficits at baseline. Then at end of OT tx sessions after OT stating all cognitive concerns, pt's son stating that is baseline. Pt's son affirming family can be present 24/7 for 2-3 days. OT will continue conversation for recommendations.        Entered by: Emilia Arevalo 04/27/2019 12:47 PM

## 2019-04-27 NOTE — PLAN OF CARE
Discharge Planner PT   Patient plan for discharge: not discussed   Current status: PT eval completed, tx indicated. Pt A & O to self, states hospital, but unable to name specific name, reports May 1973 as date. Pt performed bed mobility with HOB slightly elevated and SBA. Sits at EOB with supervision. Completed STSs and SPT bed>chair with CGA. Pt with intermittent resting tremor in R UE. Ambulated >500' with intermittent UE support on IV pole. Dec speed. Completed dynamic balance drills - compensated appropriately with no overt LOB.   Barriers to return to prior living situation: medical needs, cognition, lives alone   Recommendations for discharge: dc home with 24/7 support otherwise pt may benefit from short rehab stay to promote safe progression of mobility/cognition.  Rationale for recommendations: Pt requiring minimal A for mobility today, however concerned about cognition throughout session. Would benefit from ongoing PT to safely progress mobility.        Entered by: Connie Kiser 04/27/2019 10:43 AM

## 2019-04-28 VITALS
DIASTOLIC BLOOD PRESSURE: 68 MMHG | BODY MASS INDEX: 25.06 KG/M2 | OXYGEN SATURATION: 93 % | TEMPERATURE: 97.4 F | SYSTOLIC BLOOD PRESSURE: 113 MMHG | RESPIRATION RATE: 16 BRPM | HEIGHT: 71 IN | WEIGHT: 179.01 LBS | HEART RATE: 41 BPM

## 2019-04-28 PROCEDURE — 25000132 ZZH RX MED GY IP 250 OP 250 PS 637: Performed by: STUDENT IN AN ORGANIZED HEALTH CARE EDUCATION/TRAINING PROGRAM

## 2019-04-28 PROCEDURE — 25000125 ZZHC RX 250: Performed by: STUDENT IN AN ORGANIZED HEALTH CARE EDUCATION/TRAINING PROGRAM

## 2019-04-28 PROCEDURE — A9270 NON-COVERED ITEM OR SERVICE: HCPCS | Performed by: STUDENT IN AN ORGANIZED HEALTH CARE EDUCATION/TRAINING PROGRAM

## 2019-04-28 RX ORDER — POLYETHYLENE GLYCOL 3350 17 G/17G
17 POWDER, FOR SOLUTION ORAL 2 TIMES DAILY PRN
Qty: 10 PACKET | Refills: 11 | Status: SHIPPED | OUTPATIENT
Start: 2019-04-28 | End: 2019-07-09

## 2019-04-28 RX ORDER — OXYCODONE HYDROCHLORIDE 5 MG/1
5 TABLET ORAL EVERY 6 HOURS PRN
Qty: 30 TABLET | Refills: 0 | Status: SHIPPED | OUTPATIENT
Start: 2019-04-28 | End: 2019-07-09

## 2019-04-28 RX ORDER — ACETAMINOPHEN 325 MG/1
650 TABLET ORAL EVERY 4 HOURS PRN
Qty: 60 TABLET | Refills: 0 | Status: SHIPPED | OUTPATIENT
Start: 2019-04-29 | End: 2020-03-24

## 2019-04-28 RX ADMIN — CARBIDOPA AND LEVODOPA 4 TABLET: 25; 100 TABLET ORAL at 08:27

## 2019-04-28 RX ADMIN — MULTIPLE VITAMINS W/ MINERALS TAB 1 TABLET: TAB at 08:27

## 2019-04-28 RX ADMIN — Medication 2 G: at 03:07

## 2019-04-28 RX ADMIN — ACETAMINOPHEN 975 MG: 325 TABLET, FILM COATED ORAL at 05:30

## 2019-04-28 ASSESSMENT — ACTIVITIES OF DAILY LIVING (ADL)
ADLS_ACUITY_SCORE: 17

## 2019-04-28 ASSESSMENT — VISUAL ACUITY
OU: NORMAL ACUITY

## 2019-04-28 NOTE — PROGRESS NOTES
D: Pt admitted for stage 1 deep brain stimulator, was admitted after possible stroke d/t left visual field cut and possible weakness which resolved spontaneously, head CT neg.  Pt has been stable, set to discharge home today.     I/A: Pt stable, AVSS except for chronic/BL bradycardia. Pt impulsive and shuffled gait - baseline. Assisted pt with voiding in urinal, dressing and PIV removal.     P: Pt discharged with family and all belongings.  No oxycodone RX sent with him as pt denied pain, was not taking in the hospital; pt in agreement not to send with him home.  NST discharged pt to front door in W/C with family, belongings.     Tracie Muniz RN

## 2019-04-28 NOTE — PROGRESS NOTES
At 1605 pt with appearance of AFlutter on tele monitor for 6-10 seconds and spontaneously return back to SB, pt asymptomatic no noted pill rolling or tremors near leads. Neurosurgery notified, 12 lead EKG done and Sinus koby. Troponin negative. Ok for 6A.

## 2019-04-28 NOTE — PLAN OF CARE
Card: Bradycardic. Hr 40-50's. Bradycardic at baseline. BP q4hr. Sys in 110's.  Neuro: Q2hr neuro continued. POD#2. Follows commands. A&Ox4. Forgetful at baseline.  Resp: RA sats >92%.  GI: Regular diet. Bm 4/27  : Retention. Bladder scanned >350. Straight cath. 375. Minimal fluid intake overnight.    VM continued for risk of falls. Non-compliant with repeated instruction to stay in bed unless assisted by staff. Unable to use call light in order to call staff. >5 attempts to self transfer out of bed.    Continue to monitor  status for urinary retention. Neuro checks continued for recent DBS.

## 2019-04-28 NOTE — PLAN OF CARE
Physical Therapy Discharge Summary    Reason for therapy discharge:    Discharged to home.    Progress towards therapy goal(s). See goals on Care Plan in [facility] electronic health record for goal details.  Goals partially met.  Barriers to achieving goals:   discharge from facility.    Therapy recommendation(s):    Continued therapy is recommended.  Rationale/Recommendations:  depending on progress pt may benefit from PT. Pt discharged day following eval, in the AM, therefore unable to assess pt prior to discharge.  No further therapy is recommended.

## 2019-04-28 NOTE — PROGRESS NOTES
"S: stroke code called for nursing finding of left sided neglect and left sided visual field cut. Patient was found to be intact. CT head negative. atrial flutter during the day not seen on EKG. patient kept overnight given medical issues overnight and POD#1. Transferred to floor overnight.     O:  Exam:  Temp:  [97.8  F (36.6  C)-99.1  F (37.3  C)] 98.1  F (36.7  C)  Pulse:  [43-73] 43  Heart Rate:  [45-68] 57  Resp:  [9-20] 16  BP: ()/() 110/67  SpO2:  [90 %-96 %] 93 %    General: Awake;  Alert, In No Acute Distress  Mental status: Oriented x 3  Cranial Nerves: face symmetric. No peripheral visual field deficit. No dysarthria.   Strength:      Del Tr Bi WE WF Gr  R 5 5 5 5 5 5  L 5 5 5 5 5 5     HF KE KF DF PF   R 5 5 5 5 5   L 5 5 5 5 5     Pronator Drift: Absent  Sensory: Intact to Light Touch  INCISION: covered with dermabond and clean/dry/intact     Assessment:   Mr. Navarro is a 73 year old man with history of Parkinson s disease who underwent bilateral STN DBS, stage I, with delayed recovery but otherwise doing well.     Plan by problem:     Neuro:   Continue home sinemet and pramipexole   Sutures absorbable     Cardiovascular: blood pressure goals: SBP < 140    Pulmonary:  Incentive spirometry     Gastrointestinal: regular diet; hold home sulfasalazine until after phase 2     Renal: monitor intake and output     Heme: no issues     Endocrine: No issues    Infectious Monitor for fever; ancef x 3 doses     PT/OT: not indicated     DVT prophylaxis: Sequential compression devices    Ulcer prophylaxis: none indicated    DISPO:  Anticipate D/C 4/27/19    Barriers: none    Stark \"Ken\" MD Yolanda   Neurosurgery, PGY-2    Please contact neurosurgery resident on call with questions.    Dial * * *[phone], enter [phone] when prompted.     I have reviewed the history. I have seen and examined the patient myself and agree with the assessment and plan above.  JASON Wyatt MD      "

## 2019-04-28 NOTE — DISCHARGE SUMMARY
[facility] Discharge Summary and Instructions    Ace Navarro MRN# [id number]   Age: 73 year old YOB: 1945     Date of Admission:  4/26/2019  Date of Discharge::  4/28/2019  Admitting Physician:  Humberto Briones MD  Discharge Physician:  Williams Wyatt MD           Admission Diagnoses:   Parkinson Disease          Discharge Diagnosis:   Parkinson Disease  S/P deep brain stimulator placement          Procedures:   4/26/19: O-Arm/Stealth Assisted Bilateral Deep Brain Stimulator Placement, Phase I, Placement Of Bilateral Deep Brain Stimulator Electrodes, Target Bilateral Subthalamic Nucleus With Microelectrode Recording           Brief History of Illness:   Ace Navarro is a 74 yo male with history of parkinson disease who presented to the hospital for elective bilateral deep brain stimulator lead placement of the sub-thalamic nucleus. Of note, the patient had a prior operation where the procedure was aborted due to inability to tolerate the procedure awake. He provided consent to proceed with the above stated operation after a discussion of the risks, benefits, and alternatives.            Hospital Course:   4/26: OR as above. Overnight patient was agitated and placed on video monitoring. Mitts placed.   4/27: stroke code called for nursing finding of left sided neglect and left sided visual field cut. Patient was found to be intact. CT head negative. atrial flutter during the day on telemetry. This was not seen on EKG. Troponin was negative. Patient kept overnight given medical issues overnight and POD#1. Transferred to floor.   4/28: patient found ready for discharge. He was tolerating an oral diet, voiding without a alvarez, ambulating, and pain was controlled on oral medications  Physical examination:   Temp:  [97.8  F (36.6  C)-99.1  F (37.3  C)] 98.1  F (36.7  C)  Pulse:  [43-73] 43  Heart Rate:  [45-68] 57  Resp:  [9-20] 16  BP: ()/() 110/67  SpO2:  [90  %-96 %] 93 %  General: Awake;  Alert, In No Acute Distress  Mental status: Oriented x 3  Cranial Nerves: face symmetric. No peripheral visual field deficit. No dysarthria.   Strength: 5/5 throughout in bilateral upper and lower extremities   Pronator Drift: Absent  Sensory: Intact to Light Touch  INCISION: covered with dermabond and clean/dry/intact   Follow-up plan:   1) please return for your second phase II surgery on 5/10/19        Discharge Medications:     Current Discharge Medication List      START taking these medications    Details   acetaminophen (TYLENOL) 325 MG tablet Take 2 tablets (650 mg) by mouth every 4 hours as needed for mild pain  Qty: 60 tablet, Refills: 0    Associated Diagnoses: S/P deep brain stimulator placement      oxyCODONE (ROXICODONE) 5 MG tablet Take 1 tablet (5 mg) by mouth every 6 hours as needed for severe pain  Qty: 30 tablet, Refills: 0    Associated Diagnoses: S/P deep brain stimulator placement      polyethylene glycol (MIRALAX/GLYCOLAX) packet Take 17 g by mouth 2 times daily as needed for constipation  Qty: 10 packet, Refills: 11    Associated Diagnoses: S/P deep brain stimulator placement         CONTINUE these medications which have NOT CHANGED    Details   carbidopa-levodopa (SINEMET CR)  MG per CR tablet Take 2 tablets by mouth At Bedtime  Qty: 180 tablet, Refills: 3    Associated Diagnoses: Parkinson's disease (H)      carbidopa-levodopa (SINEMET)  MG per tablet Take 4 tablets by mouth 3 times daily 4 tabs 0500/ 4 tabs 1200 / 4 tabs 2100  Qty: 360 tablet, Refills: 11    Associated Diagnoses: Parkinson's disease (H)      folic acid 20 MG CAPS Take 800 mg by mouth daily (with lunch)       multivitamin, therapeutic with minerals (MULTI-VITAMIN) TABS tablet Take 1 tablet by mouth every morning       pramipexole (MIRAPEX) 0.25 MG tablet Take 2 tablets (0.5 mg) by mouth At Bedtime  Qty: 180 tablet, Refills: 1    Associated Diagnoses: Parkinson disease (H)          STOP taking these medications       polyethylene glycol (MIRALAX) powder Comments:   Reason for Stopping:         sulfaSALAzine (AZULFIDINE) 500 MG tablet Comments:   Reason for Stopping:                   Discharge Instructions and Follow-Up:   Discharge diet: Regular   Discharge activity:  Do not do any bending, twisting, strenuous exercise, or heavy lifting (greater than 10 pounds) for 4-6 weeks. Be careful and ask for assistance when walking or going up and down stairs. Avoid any activities that could result in trauma to the surgical wound. Do not drive while using narcotics or other sedating medications, such as sleep aids, muscle relaxants, etc.    Discharge follow-up: 1) please return for your second phase II surgery on 5/10/19    Wound care:    You should keep the wound undressed and open to air. You are allowed to take showers and get the wound wet starting on 4/28/19 but you may not scrub or soak the wound or keep it submerged under water. If you do happen to get the wound wet, be sure to pat dry it rather than scrubbing it with a towel.               Please call if you have:  1. increased pain, redness, drainage, swelling at your incision  2. fevers > 101.5 F degrees  3. with any questions or concerns.  You may reach the Neurosurgery clinic at [phone] during regular work hours. ER at [phone].    and ask for the Neurosurgery Resident on call at [phone], during off hours or weekends.         Discharge Disposition:   Discharged to home

## 2019-04-29 ENCOUNTER — PATIENT OUTREACH (OUTPATIENT)
Dept: NEUROSURGERY | Facility: CLINIC | Age: 74
End: 2019-04-29

## 2019-04-29 ENCOUNTER — TELEPHONE (OUTPATIENT)
Dept: NEUROLOGY | Facility: CLINIC | Age: 74
End: 2019-04-29

## 2019-04-29 LAB
INTERPRETATION ECG - MUSE: NORMAL
INTERPRETATION ECG - MUSE: NORMAL

## 2019-04-29 NOTE — PROGRESS NOTES
Left  for pt's son John returning his call re sulfazalazine. Per Dr. Briones, pt should not resume this medication until after the next surgery. Pt will be given instructions about starting it at that time. Left my direct number should he have any further questions.

## 2019-04-29 NOTE — TELEPHONE ENCOUNTER
Patient's son, John, called my office stating his father had DBS surgery last week and there was a note saying that pt should not restart his medication sulfasalazine until hearing from Dr. Briones. Patient is wondering when he can start medication.    I sent a message to Dr. Briones's nurse, Tammie Rider, and she wrote back that this is correct, he should not restart this medication until after his IPG surgery.  (His surgery is 5/10.)    I called John back to let him know of this schedule and he expressed understanding, and had no further questions.  Tammie Rider RN updated.    Kerline Reeder RN, MPH  Research Nurse, Movement Disorders

## 2019-04-29 NOTE — PROGRESS NOTES
[facility]: Post-Discharge Note  SITUATION                                                      Admission:    Admission Date: 04/26/19   Reason for Admission: Bilateral DBS lead placement  Discharge:   Discharge Date: 04/27/19  Discharge Diagnosis: Parkinson's disease  Discharge Service: Neurosurgery  Discharge Plan: routine post op follow up    BACKGROUND                                                      Neurosurgery Discharge Coordination Note     Attending physician: Dr. Briones  Discharge to: Home     Current status: Patient states he  feels very good since surgery and denies pain. Denies redness, swelling, increased tenderness, drainage, incision opening or elevated temp. Reports Incision CDI without signs of infection.  Denies current bowel or bladder issues.    Pt instructed to not take sulfasalazine until after the next surgery. He expressed understanding. No further questions at this time.     Discharge instructions and medications reviewed with patient.  Follow up appointments/imaging/tests needed: DBS battery placement on 5/10/19 at 2:10 p.m, arrive on 3C at 12:00 p.m.  RN triage/on call number given: [phone]/ [phone]        ASSESSMENT      Discharge Assessment  Patient reports symptoms are: Improved  Does the patient have all of their medications?: Yes  Does patient know what their new medications are for?: Yes  Does patient have a follow-up appointment scheduled?: Yes  Does patient have any other questions or concerns?: No    Post-op  Did the patient have surgery or a procedure: Yes  Incision: closed;healing  Drainage: No  Bleeding: none  Fever: No  Chills: No  Redness: No  Warmth: No  Swelling: No  Incision site pain: No  Closure: suture;dissolving  Eating & Drinking: eating and drinking without complaints/concerns  PO Intake: regular diet  Bowel Function: normal  Urinary Status: voiding without complaint/concerns        PLAN                                                       Outpatient Plan:  Routine post op f/u    Future Appointments   Date Time Provider Department Center   5/24/2019  7:00 AM Isaias Dickens APRN CNP [facility]   5/24/2019 10:00 AM Margoth Casper APRN CNP [facility]   5/24/2019 12:00 PM [facility]           VAISHALI RODRIGUEZ RN

## 2019-04-29 NOTE — PROGRESS NOTES
Left  for pt checking on his status following bilateral DBS lead placement surgery on 4/26/19. Left my direct number and will f/u if I do not hear back.

## 2019-04-29 NOTE — PLAN OF CARE
Occupational Therapy Discharge Summary     Reason for therapy discharge:    Discharged to home w/ A from family.     Progress towards therapy goal(s). See goals on Care Plan in [facility] electronic health record for goal details.  Goals partially met.  Barriers to achieving goals:   discharge from facility.     Therapy recommendation(s):    Continued therapy is recommended.  Rationale/Recommendations:  Continued OT at [facility] to increase ind in ADLS/IADLS and work towards unmet goals.

## 2019-05-07 ENCOUNTER — PATIENT OUTREACH (OUTPATIENT)
Dept: NEUROSURGERY | Facility: CLINIC | Age: 74
End: 2019-05-07

## 2019-05-07 NOTE — PROGRESS NOTES
Returned pt's call. He went to the eye doctor and was prescribed an eyelid cleanser and an eye lubricant (Genteal Tears). He wanted to know if there is any issue with using these before surgery, or with applying warm compresses over his eyes. No contraindication. Pt expressed understanding. No further questions.

## 2019-05-07 NOTE — PROGRESS NOTES
Received call from pt's son, confirming date/time/arrival time of battery placement surgery. Reviewed all  preop instructions including NPO, preop shower, and medications. Pt's son understands DBS battery placement is an outpatient procedure and pt will go home the same day.     Pt's son indicates pt hs been doing well since surgery. No further questions at this time. Pt/family are welcome to call with any concerns or questions.

## 2019-05-08 NOTE — OR NURSING
Can Dr Briones update pt's H&P on DOS?   Received: Today   Message Contents   Keli Daniel, AMARI Rider, AMARI Stroud,     It looks like pt had some issues after his recent surgery. Can Dr Briones update his H&P on DOS 5/10?     Thanks.     Keli Daniel, RN, BSN   [facility] Preadmission Nursing   [phone]

## 2019-05-09 NOTE — OR NURSING
RE: Can Dr Briones update pt's H&P on DOS?   Received: Today   Message Contents   Tammie Rider, Keli Esquivel RN             Yes, that will be fine.     Thanks,   Tammie    Previous Messages      ----- Message -----   From: Keli Daniel RN   Sent: 5/8/2019   4:29 PM   To: Tammie Rider RN   Subject: Can Dr Briones update pt's H&P on DOS?               Judd Cho,     It looks like pt had some issues after his recent surgery. Can Dr Briones update his H&P on DOS 5/10?     Thanks.     Keli Daniel, RN, BSN   [facility] Preadmission Nursing   [phone]

## 2019-05-10 ENCOUNTER — ANESTHESIA (OUTPATIENT)
Dept: SURGERY | Facility: CLINIC | Age: 74
End: 2019-05-10
Payer: MEDICARE

## 2019-05-10 ENCOUNTER — HOSPITAL ENCOUNTER (OUTPATIENT)
Facility: CLINIC | Age: 74
Discharge: HOME | End: 2019-05-10
Attending: NEUROLOGICAL SURGERY | Admitting: NEUROLOGICAL SURGERY
Payer: MEDICARE

## 2019-05-10 ENCOUNTER — ANESTHESIA EVENT (OUTPATIENT)
Dept: SURGERY | Facility: CLINIC | Age: 74
End: 2019-05-10
Payer: MEDICARE

## 2019-05-10 VITALS
TEMPERATURE: 97.4 F | OXYGEN SATURATION: 99 % | BODY MASS INDEX: 24.17 KG/M2 | HEIGHT: 71 IN | SYSTOLIC BLOOD PRESSURE: 143 MMHG | RESPIRATION RATE: 14 BRPM | WEIGHT: 172.62 LBS | DIASTOLIC BLOOD PRESSURE: 85 MMHG | HEART RATE: 51 BPM

## 2019-05-10 DIAGNOSIS — Z96.89 S/P DEEP BRAIN STIMULATOR PLACEMENT: Primary | ICD-10-CM

## 2019-05-10 LAB — GLUCOSE BLDC GLUCOMTR-MCNC: 100 MG/DL (ref 70–99)

## 2019-05-10 PROCEDURE — 71000027 ZZH RECOVERY PHASE 2 EACH 15 MINS: Performed by: NEUROLOGICAL SURGERY

## 2019-05-10 PROCEDURE — 71000014 ZZH RECOVERY PHASE 1 LEVEL 2 FIRST HR: Performed by: NEUROLOGICAL SURGERY

## 2019-05-10 PROCEDURE — 25000132 ZZH RX MED GY IP 250 OP 250 PS 637: Performed by: NEUROLOGICAL SURGERY

## 2019-05-10 PROCEDURE — 36000059 ZZH SURGERY LEVEL 3 EA 15 ADDTL MIN UMMC: Performed by: NEUROLOGICAL SURGERY

## 2019-05-10 PROCEDURE — C1767 GENERATOR, NEURO NON-RECHARG: HCPCS | Performed by: NEUROLOGICAL SURGERY

## 2019-05-10 PROCEDURE — 25000128 H RX IP 250 OP 636: Performed by: NEUROLOGICAL SURGERY

## 2019-05-10 PROCEDURE — 27211024 ZZHC OR SUPPLY OTHER OPNP: Performed by: NEUROLOGICAL SURGERY

## 2019-05-10 PROCEDURE — A9270 NON-COVERED ITEM OR SERVICE: HCPCS | Performed by: NEUROLOGICAL SURGERY

## 2019-05-10 PROCEDURE — 37000008 ZZH ANESTHESIA TECHNICAL FEE, 1ST 30 MIN: Performed by: NEUROLOGICAL SURGERY

## 2019-05-10 PROCEDURE — 25000125 ZZHC RX 250: Performed by: NURSE ANESTHETIST, CERTIFIED REGISTERED

## 2019-05-10 PROCEDURE — C1883 ADAPT/EXT, PACING/NEURO LEAD: HCPCS | Performed by: NEUROLOGICAL SURGERY

## 2019-05-10 PROCEDURE — 82962 GLUCOSE BLOOD TEST: CPT

## 2019-05-10 PROCEDURE — 37000009 ZZH ANESTHESIA TECHNICAL FEE, EACH ADDTL 15 MIN: Performed by: NEUROLOGICAL SURGERY

## 2019-05-10 PROCEDURE — 25000566 ZZH SEVOFLURANE, EA 15 MIN: Performed by: NEUROLOGICAL SURGERY

## 2019-05-10 PROCEDURE — 25000128 H RX IP 250 OP 636: Performed by: NURSE ANESTHETIST, CERTIFIED REGISTERED

## 2019-05-10 PROCEDURE — 27210794 ZZH OR GENERAL SUPPLY STERILE: Performed by: NEUROLOGICAL SURGERY

## 2019-05-10 PROCEDURE — 25000125 ZZHC RX 250: Performed by: NEUROLOGICAL SURGERY

## 2019-05-10 PROCEDURE — 25800030 ZZH RX IP 258 OP 636: Performed by: ANESTHESIOLOGY

## 2019-05-10 PROCEDURE — 36000057 ZZH SURGERY LEVEL 3 1ST 30 MIN - UMMC: Performed by: NEUROLOGICAL SURGERY

## 2019-05-10 PROCEDURE — 40000170 ZZH STATISTIC PRE-PROCEDURE ASSESSMENT II: Performed by: NEUROLOGICAL SURGERY

## 2019-05-10 DEVICE — LEAD EXTENSION W/EXTEND TECHNOLOGY 50CM 6371ANS: Type: IMPLANTABLE DEVICE | Site: SKULL | Status: FUNCTIONAL

## 2019-05-10 DEVICE — GENERATOR DBS SYSTEM INFINITY 7 IPG 6662ANS: Type: IMPLANTABLE DEVICE | Site: CHEST  WALL | Status: FUNCTIONAL

## 2019-05-10 RX ORDER — SODIUM CHLORIDE, SODIUM LACTATE, POTASSIUM CHLORIDE, CALCIUM CHLORIDE 600; 310; 30; 20 MG/100ML; MG/100ML; MG/100ML; MG/100ML
INJECTION, SOLUTION INTRAVENOUS CONTINUOUS
Status: DISCONTINUED | OUTPATIENT
Start: 2019-05-10 | End: 2019-05-10 | Stop reason: HOSPADM

## 2019-05-10 RX ORDER — ONDANSETRON 2 MG/ML
INJECTION INTRAMUSCULAR; INTRAVENOUS PRN
Status: DISCONTINUED | OUTPATIENT
Start: 2019-05-10 | End: 2019-05-10

## 2019-05-10 RX ORDER — CEFAZOLIN SODIUM 2 G/100ML
2 INJECTION, SOLUTION INTRAVENOUS
Status: COMPLETED | OUTPATIENT
Start: 2019-05-10 | End: 2019-05-10

## 2019-05-10 RX ORDER — CEPHALEXIN 500 MG/1
500 CAPSULE ORAL 3 TIMES DAILY
Qty: 21 CAPSULE | Refills: 0 | Status: SHIPPED | OUTPATIENT
Start: 2019-05-10 | End: 2019-05-24

## 2019-05-10 RX ORDER — AMOXICILLIN 250 MG
1-2 CAPSULE ORAL 2 TIMES DAILY
Qty: 30 TABLET | Refills: 0 | Status: SHIPPED | OUTPATIENT
Start: 2019-05-10 | End: 2020-03-24

## 2019-05-10 RX ORDER — LIDOCAINE 40 MG/G
CREAM TOPICAL
Status: DISCONTINUED | OUTPATIENT
Start: 2019-05-10 | End: 2019-05-10 | Stop reason: HOSPADM

## 2019-05-10 RX ORDER — HYDROCODONE BITARTRATE AND ACETAMINOPHEN 5; 325 MG/1; MG/1
1 TABLET ORAL
Status: DISCONTINUED | OUTPATIENT
Start: 2019-05-10 | End: 2019-05-10 | Stop reason: HOSPADM

## 2019-05-10 RX ORDER — ONDANSETRON 4 MG/1
4 TABLET, ORALLY DISINTEGRATING ORAL EVERY 30 MIN PRN
Status: DISCONTINUED | OUTPATIENT
Start: 2019-05-10 | End: 2019-05-10 | Stop reason: HOSPADM

## 2019-05-10 RX ORDER — FENTANYL CITRATE 50 UG/ML
25-50 INJECTION, SOLUTION INTRAMUSCULAR; INTRAVENOUS
Status: DISCONTINUED | OUTPATIENT
Start: 2019-05-10 | End: 2019-05-10 | Stop reason: HOSPADM

## 2019-05-10 RX ORDER — FENTANYL CITRATE 50 UG/ML
INJECTION, SOLUTION INTRAMUSCULAR; INTRAVENOUS PRN
Status: DISCONTINUED | OUTPATIENT
Start: 2019-05-10 | End: 2019-05-10

## 2019-05-10 RX ORDER — NALOXONE HYDROCHLORIDE 0.4 MG/ML
.1-.4 INJECTION, SOLUTION INTRAMUSCULAR; INTRAVENOUS; SUBCUTANEOUS
Status: DISCONTINUED | OUTPATIENT
Start: 2019-05-10 | End: 2019-05-10 | Stop reason: HOSPADM

## 2019-05-10 RX ORDER — GLYCOPYRROLATE 0.2 MG/ML
INJECTION, SOLUTION INTRAMUSCULAR; INTRAVENOUS PRN
Status: DISCONTINUED | OUTPATIENT
Start: 2019-05-10 | End: 2019-05-10

## 2019-05-10 RX ORDER — CEFAZOLIN SODIUM 1 G/3ML
1 INJECTION, POWDER, FOR SOLUTION INTRAMUSCULAR; INTRAVENOUS SEE ADMIN INSTRUCTIONS
Status: DISCONTINUED | OUTPATIENT
Start: 2019-05-10 | End: 2019-05-10 | Stop reason: HOSPADM

## 2019-05-10 RX ORDER — EPHEDRINE SULFATE 50 MG/ML
INJECTION, SOLUTION INTRAMUSCULAR; INTRAVENOUS; SUBCUTANEOUS PRN
Status: DISCONTINUED | OUTPATIENT
Start: 2019-05-10 | End: 2019-05-10

## 2019-05-10 RX ORDER — ONDANSETRON 2 MG/ML
4 INJECTION INTRAMUSCULAR; INTRAVENOUS EVERY 30 MIN PRN
Status: DISCONTINUED | OUTPATIENT
Start: 2019-05-10 | End: 2019-05-10 | Stop reason: HOSPADM

## 2019-05-10 RX ORDER — SULFASALAZINE 500 MG/1
TABLET ORAL
Refills: 3 | COMMUNITY
Start: 2019-04-17

## 2019-05-10 RX ORDER — DEXAMETHASONE SODIUM PHOSPHATE 10 MG/ML
INJECTION, SOLUTION INTRAMUSCULAR; INTRAVENOUS PRN
Status: DISCONTINUED | OUTPATIENT
Start: 2019-05-10 | End: 2019-05-10

## 2019-05-10 RX ORDER — LIDOCAINE HYDROCHLORIDE 20 MG/ML
INJECTION, SOLUTION INFILTRATION; PERINEURAL PRN
Status: DISCONTINUED | OUTPATIENT
Start: 2019-05-10 | End: 2019-05-10

## 2019-05-10 RX ORDER — HYDROCODONE BITARTRATE AND ACETAMINOPHEN 5; 325 MG/1; MG/1
1-2 TABLET ORAL EVERY 4 HOURS PRN
Qty: 10 TABLET | Refills: 0 | Status: SHIPPED | OUTPATIENT
Start: 2019-05-10 | End: 2019-07-09

## 2019-05-10 RX ORDER — ACETAMINOPHEN 325 MG/1
650 TABLET ORAL
Status: DISCONTINUED | OUTPATIENT
Start: 2019-05-10 | End: 2019-05-10 | Stop reason: HOSPADM

## 2019-05-10 RX ORDER — PROPOFOL 10 MG/ML
INJECTION, EMULSION INTRAVENOUS PRN
Status: DISCONTINUED | OUTPATIENT
Start: 2019-05-10 | End: 2019-05-10

## 2019-05-10 RX ADMIN — CEFAZOLIN SODIUM 2 G: 2 INJECTION, SOLUTION INTRAVENOUS at 14:50

## 2019-05-10 RX ADMIN — PROPOFOL 110 MG: 10 INJECTION, EMULSION INTRAVENOUS at 14:41

## 2019-05-10 RX ADMIN — ROCURONIUM BROMIDE 20 MG: 10 INJECTION INTRAVENOUS at 15:14

## 2019-05-10 RX ADMIN — SUGAMMADEX 150 MG: 100 INJECTION, SOLUTION INTRAVENOUS at 16:29

## 2019-05-10 RX ADMIN — Medication 5 MG: at 16:14

## 2019-05-10 RX ADMIN — SODIUM CHLORIDE, POTASSIUM CHLORIDE, SODIUM LACTATE AND CALCIUM CHLORIDE: 600; 310; 30; 20 INJECTION, SOLUTION INTRAVENOUS at 14:24

## 2019-05-10 RX ADMIN — Medication 5 MG: at 14:56

## 2019-05-10 RX ADMIN — ROCURONIUM BROMIDE 50 MG: 10 INJECTION INTRAVENOUS at 14:41

## 2019-05-10 RX ADMIN — DEXAMETHASONE SODIUM PHOSPHATE 4 MG: 10 INJECTION, SOLUTION INTRAMUSCULAR; INTRAVENOUS at 14:50

## 2019-05-10 RX ADMIN — ONDANSETRON 4 MG: 2 INJECTION INTRAMUSCULAR; INTRAVENOUS at 14:52

## 2019-05-10 RX ADMIN — LIDOCAINE HYDROCHLORIDE 100 MG: 20 INJECTION, SOLUTION INFILTRATION; PERINEURAL at 14:41

## 2019-05-10 RX ADMIN — ACETAMINOPHEN 650 MG: 325 TABLET, FILM COATED ORAL at 18:22

## 2019-05-10 RX ADMIN — GLYCOPYRROLATE 0.2 MG: 0.2 INJECTION, SOLUTION INTRAMUSCULAR; INTRAVENOUS at 16:10

## 2019-05-10 RX ADMIN — FENTANYL CITRATE 100 MCG: 50 INJECTION, SOLUTION INTRAMUSCULAR; INTRAVENOUS at 14:41

## 2019-05-10 RX ADMIN — ROCURONIUM BROMIDE 20 MG: 10 INJECTION INTRAVENOUS at 15:25

## 2019-05-10 RX ADMIN — PROPOFOL 30 MG: 10 INJECTION, EMULSION INTRAVENOUS at 15:14

## 2019-05-10 RX ADMIN — Medication 5 MG: at 15:30

## 2019-05-10 RX ADMIN — GLYCOPYRROLATE 0.2 MG: 0.2 INJECTION, SOLUTION INTRAMUSCULAR; INTRAVENOUS at 14:37

## 2019-05-10 RX ADMIN — FENTANYL CITRATE 50 MCG: 50 INJECTION, SOLUTION INTRAMUSCULAR; INTRAVENOUS at 15:17

## 2019-05-10 ASSESSMENT — MIFFLIN-ST. JEOR: SCORE: 1560.13

## 2019-05-10 NOTE — H&P
NEUROSURGERY    HISTORY AND PHYSICAL EXAM    Chief Complaint   Patient presents with            Ongoing Deep Brain Stimulation surgery, Parkinson's disease.  S/p bilateral deep brain stimulator placement, phase I, placement of bilateral deep brain stimulator electrode, target bilateral subthalamic nucleus, under general anesthesia and with ABE on 4/26/2019     HISTORY OF PRESENT ILLNESS  Mr. Ace Navarro returns to day for his ongoing deep brain stimulator surgery.  He is s/p bilateral deep brain stimulator placement, phase I, placement of bilateral deep brain stimulator electrode, target bilateral subthalamic nucleus, under general anesthesia and with ABE on 4/26/2019.  Patient tolerated the procedure well and there were no complications.  His postoperative course was uneventful other than episodes of confusion and anxiety but he was discharged to home on POD#1.  His postoperative CT head was without any concerning findings.  He denies any fevers, chills, nausea, vomiting, dizziness, weakness, numbness or seizure like activity.  He also had no issues with his incisions.  They have remained clean/dry and intact.  He is here for his phase II surgery.  Briefly, patient is a right-handed 73 year old man with Parkinson's disease.  He was diagnosed in 2010 after he developed a right hand tremor.  He is followed by Dr. Godinez.  His tremor has remained right-sided, rarely ever having tremor in his left hand.  He notes occasional tremor in his legs, precariously associated with bowel movements, but this is mild. He stays active by playing softball and boxing. The softball season ends in September.  His case was discussed at the [employer] meeting and he was considered to be a good DBS candidate.  On 10/23/2018, he was undergoing left side deep brain stimulator placement, phase I.  However, this was aborted as patient became anxious and desired to stop the surgery.  Patient and family  subsequently desired to have the DBS implanted and sought alternative option.  Image based DBS placement with limited JACKIE using ABE stereotactic placement was offered.  He subsequently underwent placement of the bilateral electrodes.      Past Medical History:   Diagnosis Date     Parkinson's disease (H) 09/2010     RLS (restless legs syndrome)      Ulcerative colitis (H)      Vitiligo      Past Surgical History:   Procedure Laterality Date     ------------OTHER-------------      nasal     HERNIA REPAIR       OPTICAL TRACKING SYSTEM INSERTION DEEP BRAIN STIMULATION Left 10/23/2018    Procedure: Left Deep Brain Stimulator Placement, Phase One, Placement of Left Side Deep Brain Stimulator Electrode Target Left Subthalamic Nucleus with Microelectrode Recording;  Surgeon: Humberto Briones MD;  Location: [facility] OR     OPTICAL TRACKING SYSTEM INSERTION DEEP BRAIN STIMULATION BILATERAL Bilateral 4/26/2019    Procedure: O-Arm/Stealth Assisted Bilateral Deep Brain Stimulator Placement, Phase I, Placement Of Bilateral Deep Brain Stimulator Electrodes, Target Bilateral Subthalamic Nucleus With Microelectrode Recording;  Surgeon: Humberto Briones MD;  Location: [facility] OR     TONSILLECTOMY & ADENOIDECTOMY       Social History     Socioeconomic History     Marital status:      Spouse name: Not on file     Number of children: 4     Years of education: Not on file     Highest education level: Not on file   Occupational History     Not on file   Social Needs     Financial resource strain: Not on file     Food insecurity:     Worry: Not on file     Inability: Not on file     Transportation needs:     Medical: Not on file     Non-medical: Not on file   Tobacco Use     Smoking status: Never Smoker     Smokeless tobacco: Never Used   Substance and Sexual Activity     Alcohol use: No     Drug use: No     Sexual activity: Never   Lifestyle     Physical activity:     Days per week: Not on file     Minutes per session: Not on  file     Stress: Not on file   Relationships     Social connections:     Talks on phone: Not on file     Gets together: Not on file     Attends Confucianist service: Not on file     Active member of club or organization: Not on file     Attends meetings of clubs or organizations: Not on file     Relationship status: Not on file     Intimate partner violence:     Fear of current or ex partner: Not on file     Emotionally abused: Not on file     Physically abused: Not on file     Forced sexual activity: Not on file   Other Topics Concern     Parent/sibling w/ CABG, MI or angioplasty before 65F 55M? Not Asked   Social History Narrative    Since he was diagnosed with Parkinson's disease, Mr. Navarro has not had any alcohol. Before that, he would have alcohol one night a week while he was in a relationship for about seven years. He was never in any chemical dependency treatment programs and was never hospitalized for any alcohol related problems. He endorsed marijuana use when he was in college, and has tried it about once every three years. He has not noticed any benefit for his Parkinson's disease. He was a social smoker in his early 20s, and smoked about a pack of cigarettes a week. He last smoked 40 years ago. He denied gambling or any other compulsive behaviors.         Mr. Navarro completed a Bachelor's degree at [employer]. 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     Family History   Problem Relation Age of Onset     Breast Cancer Mother      Myocardial Infarction Father      Coronary Artery Disease Father      No Known Problems Sister      No Known Problems Brother      No Known Problems Sister         Allergies   Allergen Reactions     Shellfish-Derived Products Swelling     LOBSTER causes throat swelling/closing  (shrimp/shellfish ok)     Aspirin Nausea     Current Facility-Administered Medications   Medication     ceFAZolin (ANCEF) 1 g  vial to attach to  ml bag for ADULT or 50 ml bag for PEDS     ceFAZolin (ANCEF) intermittent infusion 2 g in 100 mL dextrose PRE-MIX     lactated ringers infusion     lidocaine (LMX4) cream     lidocaine 1 % 0.1-1 mL     sodium chloride (PF) 0.9% PF flush 3 mL     sodium chloride (PF) 0.9% PF flush 3 mL     Current Outpatient Prescriptions   Medication     carbidopa-levodopa (SINEMET CR)  MG per CR tablet     carbidopa-levodopa (SINEMET)  MG per tablet     diazepam (VALIUM) 5 MG tablet     folic acid 20 MG CAPS     multivitamin, therapeutic with minerals (MULTI-VITAMIN) TABS tablet     PEG 3350-KCl-NaBcb-NaCl-NaSulf (PEG-3350/ELECTROLYTES) 236 G SOLR     pramipexole (MIRAPEX) 0.25 MG tablet     sulfaSALAzine (AZULFIDINE) 500 MG tablet      No current facility-administered medications for this visit.        REVIEW OF SYSTEMS: Updated 5/10/2019.  Also per HPI.  General: POSITIVE for recent fatigue and weight loss. Negative for chills/sweats/fever, difficulty sleeping, or headache.  Eyes: Negative for blurred vision, crossed eyes, double vision, recent eye infections, vision flashes, or vision halos.  Ears/Nose/Mouth/Throat: Negative for bleeding gums, difficulty swallowing, earache, ear discharge, hearing loss, hoarseness, nosebleeds, tinnitus, or sinus problems.  Respiratory:Negative for chronic cough, coughing blood, night sweats, shortness of breath, Tuberculosis, or wheezing.  Cardiovascular: Negative for chest pain, dyspnea at night, heart murmur, palpitations, pacemaker, pacemaker, poor circulation, swollen legs/feet, or varicose veins.  Gastrointestinal: Negative for melena, hematochezia, chronic diarrhea, heartburn, Hepatitis A/B/C, increasing constipation, Liver Disease, nausea, or vomiting.   Genitourinary: Negative for Urinary retention, genital discharge, urinary incontinence, prostate problems, urgency, or UTI.   Neurological: POSITIVE for tingling in legs. Negative for syncope,  "headaches, numbness of arms/legs, tingling in hands/arms, memory problems, or seizures.  Psychological: Negative for anxiety, depression, panic attacks, or restlessness.  Skin: Negative for chronic skin itching, color changes in hand/feet when cold, poor scarring, non-healing ulcers, skin rashes/hives, unusual moles.  Musculoskeletal: Negative for arthritis, joint swelling in hands/wrists/hips/knees/joints, muscle tenderness in arms/legs, or osteoporosis.  Endocrine: POSITIVE for loss of libido. Negative for excessive thirst/hunger, intolerance for warm rooms, multiple broken bones, rapid weight gain/loss, galactorrhea, or thyroid issues.  Hematologic/Lymphatic: Negative for easy skin bruising, significant fatigue, prolonged bleeding, tender glands/lymph nodes.  Allergies: Negative for asthma or hay fever.        PHYSICAL EXAM  Vital signs:  Temp: 97.8  F (36.6  C) Temp src: Oral BP: 133/88 Pulse: 55   Resp: 16 SpO2: 96 % O2 Device: None (Room air)   Height: 180.3 cm (5' 11\") Weight: 78.3 kg (172 lb 9.9 oz)  Estimated body mass index is 24.08 kg/m  as calculated from the following:    Height as of this encounter: 1.803 m (5' 11\").    Weight as of this encounter: 78.3 kg (172 lb 9.9 oz).    General: Awake, alert, oriented. Well nourished, well developed, no acute distress.  HEENT: Head normocephalic, atraumatic. No carotid bruit. Neck supple. Good range of motion. No palpable thyroid mass.  Heart: Regular rhythm and rate. No murmurs.  Lungs: Clear to auscultation and percussion bilaterally. No rhonchi, rales or wheeze.  Abdomen: Soft, non-tender, non-distended. No hepatosplenomegaly.  Extremity: Warm with no clubbing or cyanosis. No lower extremity edema.  Incisions:  Bifrontal incisions clean/dry and intact.  Dermabond still in place.    Neurological:  Awake, alert and oriented to date, time, place and person. Speech fluent.   Pupils equal, round, reactive to light.  Extraocular movement intact.  Visual fields are " full on confrontation.  Hearing is grossly normal to finger rub.   Facial sensation intact.  Face symmetric.  Tongue midline.  Uvula elevates equally.    Motor: full strength throughout.  Sensation: intact to light touch and pinpoint.  Deep tendon reflexes: Trace throughout. Negative for clonus. Negative for Zuluaga's sign. No dysmetria.      ASSESSMENT   73 year old right handed male with a history of Parkinson's disease.  S/p bilateral deep brain stimulator placement, phase I, placement of bilateral deep brain stimulator electrode, target bilateral subthalamic nucleus, under general anesthesia and with ABE on 4/26/2019.    Patient did well with his phase I surgery under general anesthesia and image based placement of the bilateral DBS electrodes.  He reports that his tremor, especially on the right side, is improved.  He denies having any issues since the surgery.    We discussed the phase II of DBS surgery, placement of the DBS generator/battery over the left chest wall under general anesthesia.  Risks, benefits, alternative therapies were discussed with the patient, including but not limited to infection and bleeding (intracranial), injury to the brain, stroke, death, hardware failure and possible need for more surgeries.  Surgical procedure was discussed in detail.  All questions were answered, and he expressed understanding.  A full history and physical exam was performed.      PLAN:  1.  Deep brain stimulator placement, phase II, placement of deep brain stimulator generator/battery over the left chest wall.

## 2019-05-10 NOTE — BRIEF OP NOTE
[facility], [place]  Neurosurgery Brief Operative Note    Pre-operative diagnosis: Parkinson Disease   Post-operative diagnosis Parkinson Disease   Procedure: Procedure(s):  Deep Brain Stimulator Placement, Phase II, Placement Of Deep Brain Stimulator Generator/Battery Over The Left Chest Wall   Surgeon: Humberto Briones MD   Assistants(s): None.  No residents were available   Anesthesia: General   Estimated blood loss: 5 ml   Drains: None   Specimens: None   Findings: All impedance values within normal.  No problems found.    Condition:  Complications:  Implants: Stable  None.  1.  Abbott DBS extension wire, REF 6371, S/N [id number] for the left side.  2.  Abbott DBS extension wire, REF 6371, S/N [id number] for the right side.  3.  Abbott Infinity 7 generator/battery, REF 6662, S/N CAL266.1

## 2019-05-10 NOTE — ANESTHESIA CARE TRANSFER NOTE
Patient: Ace Navarro    Procedure(s):  Deep Brain Stimulator Placement, Phase II, Placement Of Deep Brain Stimulator Generator/Battery Over The Left Chest Wall    Diagnosis: Parkinson Disease  Diagnosis Additional Information: No value filed.    Anesthesia Type:   No value filed.     Note:  Airway :Face Mask  Patient transferred to:PACU  Comments: To PACU on supplemental O2 with + air exchange, placed on monitor. Report given to RN, questions answered, VSS, patient comfortable.Handoff Report: Identifed the Patient, Identified the Reponsible Provider, Reviewed the pertinent medical history, Discussed the surgical course, Reviewed Intra-OP anesthesia mangement and issues during anesthesia, Set expectations for post-procedure period and Allowed opportunity for questions and acknowledgement of understanding      Vitals: (Last set prior to Anesthesia Care Transfer)    CRNA VITALS  5/10/2019 1609 - 5/10/2019 1643      5/10/2019             Pulse:  44  (Abnormal)     SpO2:  99 %    Resp Rate (observed):  11            Electronically Signed By: NICK Boggs CRNA  May 10, 2019  4:43 PM

## 2019-05-10 NOTE — ANESTHESIA PREPROCEDURE EVALUATION
Anesthesia Pre-Procedure Evaluation    Patient: Ace Navarro   MRN:     [id number] Gender:   male   Age:    73 year old :      1945        Preoperative Diagnosis: Parkinson Disease   Procedure(s):  Deep Brain Stimulator Placement, Phase II, Placement Of Deep Brain Stimulator Generator/Battery Over The Left Chest Wall     Past Medical History:   Diagnosis Date     Parkinson's disease (H) 2010     RLS (restless legs syndrome)      Ulcerative colitis (H)      Vitiligo       Past Surgical History:   Procedure Laterality Date     ------------OTHER-------------      nasal     HERNIA REPAIR       OPTICAL TRACKING SYSTEM INSERTION DEEP BRAIN STIMULATION Left 10/23/2018    Procedure: Left Deep Brain Stimulator Placement, Phase One, Placement of Left Side Deep Brain Stimulator Electrode Target Left Subthalamic Nucleus with Microelectrode Recording;  Surgeon: Humberto Briones MD;  Location: [facility] OR     OPTICAL TRACKING SYSTEM INSERTION DEEP BRAIN STIMULATION BILATERAL Bilateral 2019    Procedure: O-Arm/Stealth Assisted Bilateral Deep Brain Stimulator Placement, Phase I, Placement Of Bilateral Deep Brain Stimulator Electrodes, Target Bilateral Subthalamic Nucleus With Microelectrode Recording;  Surgeon: Humberto Briones MD;  Location: [facility] OR     TONSILLECTOMY & ADENOIDECTOMY                 JZG [facility] AN PHYSICAL EXAM    Lab Results   Component Value Date    WBC 7.3 2019    HGB 12.8 (L) 2019    HCT 39.4 (L) 2019     (L) 2019     2019    POTASSIUM 3.5 2019    CHLORIDE 104 2019    CO2 26 2019    BUN 9 2019    CR 0.81 2019     (H) 2019    HAIR 8.5 2019    PHOS 3.3 2019    MAG 1.9 2019    PTT 35 2019    INR 1.09 2019       Preop Vitals  BP Readings from Last 3 Encounters:   05/10/19 133/88   19 113/68   19 141/87    Pulse Readings from Last 3 Encounters:   05/10/19 55  "  04/28/19 (!) 41   04/12/19 (!) 48      Resp Readings from Last 3 Encounters:   05/10/19 16   04/28/19 16   04/12/19 16    SpO2 Readings from Last 3 Encounters:   05/10/19 96%   04/28/19 93%   04/12/19 98%      Temp Readings from Last 1 Encounters:   05/10/19 36.6  C (97.8  F) (Oral)    Ht Readings from Last 1 Encounters:   05/10/19 1.803 m (5' 11\")      Wt Readings from Last 1 Encounters:   05/10/19 78.3 kg (172 lb 9.9 oz)    Estimated body mass index is 24.08 kg/m  as calculated from the following:    Height as of this encounter: 1.803 m (5' 11\").    Weight as of this encounter: 78.3 kg (172 lb 9.9 oz).     LDA:  Peripheral IV 05/10/19 Left Hand (Active)   Site Assessment WDL 5/10/2019  1:46 PM   Line Status Saline locked 5/10/2019  1:46 PM   Phlebitis Scale 0-->no symptoms 5/10/2019  1:46 PM   Infiltration Scale 0 5/10/2019  1:46 PM   Number of days: 0       ETT (adult) 7.5 (Active)   Number of days: 0            Assessment:   ASA SCORE: 3       Documentation: H&P complete; Preop Testing complete; Consents complete   Proceeding: Proceed without further delay  Tobacco Use:  NO Active use of Tobacco/UNKNOWN Tobacco use status     Plan:   Anes. Type:  General   Pre-Induction: Acetaminophen PO   Induction:  IV (Standard)   Airway: Oral ETT   Access/Monitoring: PIV   Maintenance: Balanced   Emergence: Procedure Site   Logistics: Same Day Surgery     Postop Pain/Sedation Strategy:  Standard-Options: Opioids PRN     PONV Management:  Adult Risk Factors:, Non-Smoker, Postop Opioids     CONSENT: Direct conversation                               Mono Mars MD  "

## 2019-05-10 NOTE — ANESTHESIA POSTPROCEDURE EVALUATION
Anesthesia POST Procedure Evaluation    Patient: Ace Navarro   MRN:     [id number] Gender:   male   Age:    73 year old :      1945        Preoperative Diagnosis: Parkinson Disease   Procedure(s):  Deep Brain Stimulator Placement, Phase II, Placement Of Deep Brain Stimulator Generator/Battery Over The Left Chest Wall   Postop Comments: No value filed.       Anesthesia Type:  General  No value filed.    Reportable Event: NO     PAIN:        Disposition:        Comments/Narrative:  Evaluation location: PACU    Mental status: Awake and oriented, responding to questions appropriately  Airway: Nasal cannula  Pain: Well controlled/None  Nausea: None  Vitals: Stable    Disposition: Appropriate to be discharged to home    ### Note may not be fully accurate (as some portions are automatically generated upon opening) ###             Last Anesthesia Record Vitals:  CRNA VITALS  5/10/2019 1609 - 5/10/2019 1709      5/10/2019             Pulse:  44  (Abnormal)     SpO2:  99 %    Resp Rate (observed):  11          Last PACU Vitals:  Vitals Value Taken Time   /86 5/10/2019  5:43 PM   Temp 36.4  C (97.6  F) 5/10/2019  5:30 PM   Pulse 49 5/10/2019  5:43 PM   Resp 12 5/10/2019  5:30 PM   SpO2 98 % 5/10/2019  5:51 PM   Temp src     NIBP     Pulse     SpO2     Resp     Temp     Ht Rate     Temp 2     Vitals shown include unvalidated device data.      Electronically Signed By: Bernardo Leonardo MD, May 10, 2019, 5:52 PM

## 2019-05-10 NOTE — DISCHARGE INSTRUCTIONS
"[facility], [place]: Same-Day Surgery   Adult Discharge Orders & Instructions     For 24 hours after surgery    1. Get plenty of rest.  A responsible adult must stay with you for at least 24 hours after you leave the hospital.   2. Do not drive or use heavy equipment.  If you have weakness or tingling, don't drive or use heavy equipment until this feeling goes away.  3. Do not drink alcohol.  4. Avoid strenuous or risky activities.  Ask for help when climbing stairs.   5. You may feel lightheaded.  IF so, sit for a few minutes before standing.  Have someone help you get up.   6. If you have nausea (feel sick to your stomach): Drink only clear liquids such as apple juice, ginger ale, broth or 7-Up.  Rest may also help.  Be sure to drink enough fluids.  Move to a regular diet as you feel able.  7. You may have a slight fever. Call the doctor if your fever is over 100 F (37.7 C) (taken under the tongue) or lasts longer than 24 hours.  8. You may have a dry mouth, a sore throat, muscle aches or trouble sleeping.  These should go away after 24 hours.  9. Do not make important or legal decisions.   Call your doctor for any of the followin.  Signs of infection (fever, growing tenderness at the surgery site, a large amount of drainage or bleeding, severe pain, foul-smelling drainage, redness, swelling).    2. It has been over 8 to 10 hours since surgery and you are still not able to urinate (pass water).    3.  Headache for over 24 hours.    To contact a doctor, call Dr. Birones's clinic at [phone]         Or call hospital  at [phone] and ask for the \"neurosurgery resident on-call\"   (answered 24 hours a day)      Emergency Department:    [facility]: [phone]       (TTY for hearing impaired: [phone])          "

## 2019-05-10 NOTE — OR NURSING
"BP (!) 150/95   Pulse 51   Temp 97.6  F (36.4  C) (Oral)   Resp 12   Ht 1.803 m (5' 11\")   Wt 78.3 kg (172 lb 9.9 oz)   SpO2 97%   BMI 24.08 kg/m      Dr Leonardo at bedside to evaluate patient, okay with discharge to phase 2.  "

## 2019-05-15 NOTE — OP NOTE
PATIENT NAME: LYDIA NAVARRO  YOB: 1945  MRN:   [id number]  ACCOUNT:  [id number]      DATE OF PROCEDURE:  04/26/2019    PREOPERATIVE DIAGNOSIS:    1.  Parkinson's disease.  2.  Previously aborted left side deep brain stimulator placement surgery on 10/23/2018.    POSTOPERATIVE DIAGNOSIS:    1.  Parkinson's disease.  2.  Previously aborted left side deep brain stimulator placement surgery on 10/23/2018.    PROCEDURES PERFORMED:   1.  ABE robotic surgical assistant, assisted stereotactic placement of two bilateral deep brain stimulator electrodes, targets right and left side subthalamic nucleus  2.  Placement of CRW stereotactic headframe.  3.  Stereotactic neuronavigation planning CT of the head using CT O-arm and bone fiducials  4.  Stereotactic neuronavigation using ABE robotic surgical assistant, system and Ustreamalth system for surgical planning, targeting and approach: targets are right and left side subthalamic nucleus.  5.  Use of intraoperative microelectrode recording.   6.  Use of CT O-arm for placement localization and confirmation..    ATTENDING SURGEON:  Humberto Briones MD, PhD     RESIDENT SURGEON:  Carolyn Tee MD    ANESTHESIA:  General anesthesia    ESTIMATED BLOOD LOSS:  15 mL    COMPLICATIONS:  None.    FINDINGS:    1.  Left side final electrode location - after 45 degrees counterclock rotation, anterior Curt Gun position at 2 mm below target.  2.  Right side final electrode location - medial Curt Gun position at 2 mm below target.    IMPLANTS:    LEFT side:  1.  Abbott DBS electrode, Infinity 1.5 - REF 6173, S/N [id number]  2.  Abbott Guardian - REF 6010, Lot# [id number]  RIGHT side:  1.  Abbott DBS electrode, Infinity 1.5 - REF 6173, S/N [id number]  2.  Abbott Guardian - REF 6010, Lot# [id number]    INDICATIONS FOR PROCEDURE:  Mr. Navarro is a right-handed 73 year old man with Parkinson's disease.  He was diagnosed in 2010 after he developed a right hand tremor.  He is followed  by Dr. Godinez.  His tremor has remained right-sided, rarely ever having tremor in his left hand.  He notes occasional tremor in his legs, precariously associated with bowel movements, but this is mild. He stays active by playing softball and boxing. The softball season ends in September.  His case was discussed at the [employer] meeting and he was considered to be a good DBS candidate.  On 10/23/2018, he was undergoing left side deep brain stimulator placement, phase I.  However, this was aborted as patient became anxious and desired to stop the surgery.  Patient and family subsequently desired to have the DBS implanted and sought alternative option.  Image based DBS placement under general anesthesia with limited JACKIE using ABE stereotactic placement was offered.  We discussed that during phase I, we would place the bilateral DBS electrode under general anesthesia with limited JACKIE and using ABE and Stealth for neuronavigation.  He would then return one week later for the phase II with placement of the DBS generator/battery over the left chest wall.  Risks, benefits, alternative therapies were discussed with the patient, including but not limited to infection and bleeding.  Surgical procedure was discussed in detail.  All questions were answered, and he and his family expressed understanding.    DESCRIPTION OF PROCEDURE:      CRW head frame placement and ABE and Stealth stereotactic neuronavigation.      Informed consent was obtained and the patient was brought into the operating room and was laid supine on the operative bed.  With the placement of endotracheal tube, general anesthesia was obtained.  Anesthesia maintained the patient on Propofol, Precedex and Fentanyl.  Bilateral sequential compression devices, an arterial line, and a Maldonado catheter were placed.      He was identified and a brief timeout was performed for the placement of the CRW head frame.  The intended pin sites, two  in the front and two in the back of the head, were cleaned and were injected with local anesthetic, 1% lidocaine with epinephrine.  A total of 20 mL were used during this portion of the surgical case.  The CRW stereotactic head frame was placed onto his head with 4 pins for fixation.      The patient was then positioned and the bed adjusted so that his head would be slightly elevated.  The CRW head frame was then attached and connected to the ABE, robotic surgical assistant, CRW head frame hernandez.  Therefore, the patient's head was now secured to the ABE frame.  We also confirmed that the patient's head was in a neutral midline position.  ABE time out was then performed.    Patient's entire head was prepped and cleaned in routine surgical fashion.  Time out was then performed confirming the patient's identity, procedure to be performed, site and side of the surgery, imaging corresponding with the patient and the administration of preoperative prophylactic antibiotic.    Using a #15 blade, five small stab incisions were made in the bilateral frontoparietal region of the head in a random orientation, after 5 mL of local anesthetic, 1% Lidocaine with epinephrine mixed with 1/4% Marcaine plain, 50:50 mix, was injected into each area.  Then, five Medtronic bone fiducials were secured to the skull.  Care was taken to stay away from the midline and the bilateral frontal area where the DBS electrodes would be implanted as well as the region where the electrodes will be buried.      At this time, intraoperative O-arm was brought into the field and CT of the head with the bone fiducials were obtained.  Then, ABE stereotactic head registration was performed. The ABE system was loaded with the CT of the head that was just obtained as well as the MRI of the brain which was obtained preoperatively.  The two image sets were merged with good coherence. Then, using a bone fiducial point registration protocol, patient's head was  registered to the stereotactic images in the ABE.  The registration was confirmed to be good with good coherence noted.     Attention was then turned to the Stealth neuronavigation device.  The device also had an MRI of the head, stereotactic protocol, that was obtained prior to surgery.  Then, neuronavigation was used to stereotactically target the left and right subthalamic nucleus.  The technique used was the AC-PC line localization technique to target the STN using stereotactic coordinates.  We utilized the T1 and T2 sequences of the MRI to identify the bilateral STN and target it.  We also had 7 Rima MRI available as a reference to confirm our targeting.  The AC-PC coordinates for the left STN target and the cortical entry point were obtained.  The AC-PC coordinates for the right STN target and the cortical entry point were also obtained.  With these coordinates, the trajectory and approach to the left and right STN was determined.  The entry into the skull, as well as the trajectory of the electrode were checked with a probe's eye view to avoid any sulci, ventricle or vascular structures.     We then turned to the ABE stereotactic navigation system.  The AC and PC points were identified and set, which matched those of the Stealth navigation.  This allowed the two navigation systems to have parallel coordinate systems.  Then, the left and right STN target and cortical entry points from the Stealth were entered into ABE to recreate the left and right STN stereotactic approaches.  The entry into the skull, as well as the trajectory of the electrode were checked with a probe's eye view to avoid any sulci, ventricle or vascular structures.     Preparation of bilateral frontal kelley holes    At this time, attention was turned back to the patient.  Using a hair clipper, his hair over the left and right frontal area was clipped.  A semicircular C-shaped incisions were planned on the left and right side.  We used  the ABE laser pointer to identify and lorenzo on the scalp the entry points for left and right side trajectories.  Then, the surgical area was prepped and draped in routine surgical fashion.  We also prepped the area posterior to this as well as area towards the left parietal area.  We then placed a sterile clear drape over the surgical area.    Attention was first turned to the left frontal area.  A C-shaped incision was made with a skin knife, after the area was injected with local anesthetic, 1% Lidocaine with epinephrine and 1/4% Marcaine plain, 50:50 mix.  The area posterior to the incision was also injected as a pocket would be created.  Area posterior lateral, towards the left parietal area was also injected as the electrode connector will be tunneled here later.  The incision was then further carried down to the pericranium.  Hemostasis was obtained using monopolar and bipolar cautery.  Thin layer of pericranial tissue was left behind on the scalp and the skin flap was reflected posteriorly.  Then, using a blunt dissection technique, a pocket was created posteriorly as well as a track that was made towards the left parietal area for later use.    Attention was then turned to the right frontal area.  A C-shaped incision was made with a skin knife, after the area was injected with local anesthetic, 1% Lidocaine with epinephrine and 1/4% Marcaine plain, 50:50 mix.  The area posterior to the incision was also injected as a pocket would be created.  Area posterior lateral, towards the left parietal area but more at the vertex was also injected as the electrode connector will be tunneled here later.  The incision was then further carried down to the pericranium.  Hemostasis was obtained using monopolar and bipolar cautery.  Thin layer of pericranial tissue was left behind on the scalp and the skin flap was reflected posteriorly.  Then, using a blunt dissection technique, a pocket was created posteriorly as well as a  track that was made towards the left parietal area for later use.  Total of 14 mL of the above mentioned local anesthetic was used for both sites.    ABE was navigated to the left side trajectory with the laser tool and the entry point to the skull was marked.  Area of the intended kelley hole was cleaned and pericranial tissue removed.  Then using an acorn drill, kelley hole was created over this entry point to expose the dura.  The area was vigorously irrigated and bone wax used to control the bone bleeding.  Dura was coagulated with bipolar cautery for hemostasis, and again no active bleeding was noted.  At this time, the electrode fixation cover was fixed to the skull using two screws.  Care was taken to overlap the pericranium over the edge of the cover to provide a smooth tissue transition.  Then, the electrode drive was attached to the targeting apparatus.  The entry into the dura was again checked.  It appeared that all positions of the Curt Gun electrode hernandez were accessible without any interference from the bone edge.  Gelfoam with thrombin was placed to cover the dura for now.    ABE was then navigated to the right side trajectory with the laser tool and the entry point to the skull was marked.  Area of the intended kelley hole was cleaned and pericranial tissue removed.  Then using an acorn drill, kelley hole was created over this entry point to expose the dura.  The area was vigorously irrigated and bone wax used to control the bone bleeding.  Dura was coagulated with bipolar cautery for hemostasis, and again no active bleeding was noted.  At this time, the electrode fixation cover was fixed to the skull using two screws.  Care was taken to overlap the pericranium over the edge of the cover to provide a smooth tissue transition.  Then, the electrode drive was attached to the targeting apparatus.  The entry into the dura was again checked.  It appeared that all positions of the Curt Gun electrode hernandez were  accessible without any interference from the bone edge.  Gelfoam with thrombin was placed to cover the dura for now.    Left-sided electrode placement with limited microelectrode recording: Target left STN.     The ABE arm with the microdrive adaptor was navigated to the left STN trajectory position.  The distance of the ABE arm adaptor was set at the same height as the CRW arc system which is 160 mm.  Then using a Bovie cautery and a #4 Penfield instrument, opening was made into the dura, as well as a small corticectomy.  No active bleeding was noted.    Dr. Rina Patterson of the Neurology Department participated in the recording and neurological testing.  During the microelectrode recording and testing, Dr. Patterson and her scribe took detailed notes of the electrophysiologic data and neurologic exam as well as any stimulation effects.    At this time, the electrode guide was inserted in the center Curt Gun position and it was advanced slowly until it was fully inserted at which point the tip would be well above the target.  No abnormal resistance was noted.  Duraseal was used to seal the entry site to provide a seal and to minimize cerebrospinal fluid leak and air entry.  Then, recording microelectrode was brought in and placed within the guide tube and it was connected for intraoperative recording.  The tip of the electrode was now 15 mm above target.  The patient remained sedated with BIS monitor which was indicating approximately 40 at this time.  Then, using a micro drive, the electrode was slowly advanced towards the target, collecting microelectrode recording data for mapping the track.  The center track yielded low density cell activity.  Then, we did encounter a region which had cell activity that resembled STN.  We encountered about 6 mm of STN.  However, the overall cell activity was not clear and difficult to interpret.  Stimulation yielded no noticeable effects.       We continued with placement of  the permanent DBS electrode in the center Curt Gun position.  The electrode drive was then positioned to the desired position with the micro drive and the electrode pulled out of the guide tube.  The permanent DBS electrode, Abbott Infinity 1.5, was brought into the field and placed in the center guide tube with the tip being placed at 1.0 mm above the target depth.  The electrode demonstrated good impedance values.  The electrode was tested and stimulation caused no adverse effects.    At this time, intraoperative O-arm was brought into the field and CT of the head with the DBS electrode in place was obtained.  Then, ABE system was loaded with the CT of the head that was just obtained.  The images were merged with good coherence.  Based on the localization of the electrode, it was thought that the electrode was posterior and medial to the desired location.      Therefore, approximate 2 mm anterolateral placement was planned.  This could be achieved by 45 degrees counter clockwise rotation of the Curt Gun with the X-Y stage rotation and placement of the electrode in the anterior Curt Gun location.  The recording electrode was pulled out of the guide tube.  With the center guide tube in place, the X-Y stage and thus the Curt Gun was rotated 45 degrees counter clockwise.  Second electrode guide tube was inserted in the anterior Curt Gun position and it was advanced slowly until it was fully inserted at which point the tip would be well above the target.  No abnormal resistance was noted.  Duraseal was used to seal the entry site to provide a seal and to minimize cerebrospinal fluid leak and air entry.  The electrode drive was then positioned to the desired depth position for the DBS electrode.  The permanent DBS electrode, Abbott Infinity 1.5, was brought into the field and placed in the anterior guide tube with the tip being placed at 2.0 mm below the target depth.  The electrode demonstrated good impedance values.    At  this time, intraoperative O-arm was brought into the field and CT of the head with the DBS electrode in place was obtained.  Then, ABE system was loaded with the CT of the head that was just obtained.  The images were merged with good coherence.  Based on the localization of the electrode, it was thought that the electrode was in a reasonable location.     With imaging confirmation, the electrode was secured at this location.  The guide tubes were pulled out of the brain.  Duraseal was again used to seal any opening.  The area was generously irrigated.  Hemostasis was also obtained.  The electrode was marked so as to make sure that the electrode has not moved with each manipulation.  The electrode clamp was applied to hold the electrode.  Then, the electrode stylet was removed. The electrode was then removed from the electrode hernandez.  The cap cover was finally placed and secured in place.    The connecting portion of the electrode was covered with a protective covering/dead-end connector, and this apparatus was inserted into the subgaleal space/pocket towards the left parietal area that was created at the beginning of the case.  The excess wire was also buried posteriorly in the previously created pocket.  The wound was then generously irrigated.    The galeal layer was reapproximated using 3-0 Vicryl sutures and the skin was reapproximated using 4-0 Monocryl suture in a running fashion.    Right-sided electrode placement with limited microelectrode recording: Target right STN.     The ABE arm with the microdrive adaptor was navigated to the right STN trajectory position.  The distance of the ABE arm adaptor was set at the same height as the CRW arc system which is 160 mm.  Then using a Bovie cautery and a #4 Penfield instrument, opening was made into the dura, as well as a small corticectomy.  No active bleeding was noted.    At this time, the electrode guide was inserted in the center Curt Gun position and it  was advanced slowly until it was fully inserted at which point the tip would be well above the target.  No abnormal resistance was noted.  Duraseal was used to seal the entry site to provide a seal and to minimize cerebrospinal fluid leak and air entry.  Then, recording microelectrode was brought in and placed within the guide tube and it was connected for intraoperative recording.  The tip of the electrode was now 15 mm above target.  The patient remained sedated with BIS monitor which was indicating approximately 40 at this time.  Then, using a micro drive, the electrode was slowly advanced towards the target, collecting microelectrode recording data for mapping the track.  The center track yielded low density cell activity.  Then, we did encounter a region which had cell activity that resembled STN.  However, no distinct borders were clearly determined due to reduced cell activity.  We encountered about 3 mm of STN.  However, the overall cell activity was not clear and difficult to interpret.  Stimulation yielded facial contractions at low thresholds.    We decided to localize the current location before placing the permanent electrode.  The intraoperative O-arm was brought into the field and CT of the head with the DBS electrode in place was obtained.  Then, ABE system was loaded with the CT of the head that was just obtained.  The images were merged with good coherence.  Based on the localization of the electrode, it was thought that the electrode was lateral to the desired location.  This correlated with the stimulation effect.    Therefore, it was thought that medial Curt Gun location may be a better location for the permanent electrode.  The recording electrode was pulled out of the guide tube.  With the center guide tube in place, second electrode guide tube was inserted in the medial Curt Gun position and it was advanced slowly until it was fully inserted at which point the tip would be well above the  target.  No abnormal resistance was noted.  Duraseal was used to seal the entry site to provide a seal and to minimize cerebrospinal fluid leak and air entry.  The electrode drive was then positioned to the desired depth position for the DBS electrode.  The permanent DBS electrode, Abbott Infinity 1.5, was brought into the field and placed in the medial guide tube with the tip being placed at 0.05 mm above the target depth.  The electrode demonstrated good impedance values.  Test stimulation showed no adverse effects up to 6.0 mA.  The electrode was then advanced an additional 2 mm to approximately 2.0 mm below the target.    At this time, intraoperative O-arm was brought into the field and CT of the head with the DBS electrode in place was obtained.  Then, ABE system was loaded with the CT of the head that was just obtained.  The images were merged with good coherence.  Based on the localization of the electrode, it was thought that the electrode was in a reasonable location.     With imaging confirmation, the electrode was secured at this location.  The guide tubes were pulled out of the brain.  Duraseal was again used to seal any opening.  The area was generously irrigated.  Hemostasis was also obtained.  The electrode was marked so as to make sure that the electrode has not moved with each manipulation.  The electrode clamp was applied to hold the electrode.  Then, the electrode stylet was removed. The electrode was then removed from the electrode hernandez.  The cap cover was finally placed and secured in place.    The connecting portion of the electrode was covered with a protective covering/dead-end connector, and this apparatus was inserted into the subgaleal space/pocket towards the left parietal area which was more so near the vertex that was created at the beginning of the case.  The excess wire was also buried posteriorly in the previously created pocket.  The wound was then generously irrigated.    The  galeal layer was reapproximated using 3-0 Vicryl sutures and the skin was reapproximated using 4-0 Monocryl suture in a running fashion.    Both left and right frontal incisions were cleaned and dried and prepped with ChloraPrep.  Then Dermabond was applied.    Removal of the head frame and end of procedure    ABE arm was moved to the home position.  The sterile drapes were removed.  Subsequently, the patient was taken out of the CRW head frame.  The four pin sites were clean and dry and no active bleeding was noted.  Antibiotic ointment was applied to the pin sites.    Patient was extubated and taken to the recovery room in a stable condition.  At the end of the case, all counts including needle, sponge and instrument counts were correct x 2.  There were no complications.    IDavid M.D., Ph.D., Neurosurgery Attending, was present and scrubbed for the entire case and performed the key and critical portions of the case.      DAVID BRADEN MD, PHD

## 2019-05-16 ENCOUNTER — PATIENT OUTREACH (OUTPATIENT)
Dept: NEUROSURGERY | Facility: CLINIC | Age: 74
End: 2019-05-16

## 2019-05-16 NOTE — PROGRESS NOTES
[facility]: Post-Discharge Note  SITUATION                                                      Admission:    Admission Date: 05/10/19   Reason for Admission: DBS battery placement  Discharge:   Discharge Date: 05/10/19  Discharge Diagnosis: Parkinson's disease  Discharge Service: Neurosurgery  Discharge Plan: routine post op follow up    BACKGROUND                                                      Neurosurgery Discharge Coordination Note     Attending physician: Dr. Briones  Discharge to: Home     Current status: Patient states he  feels very good. Denies pain. Denies redness, swelling, increased tenderness, drainage, incision opening or elevated temp. Reports Incision CDI without signs of infection.  Denies current bowel or bladder issues.    Discharge instructions and medications reviewed with patient.  Follow up appointments/imaging/tests needed: 2 week post op with LARRY Casper, 5/24/19. Will message  to f/u with pt.     RN triage/on call number given: [phone]/ [phone]      ASSESSMENT      Discharge Assessment  Patient reports symptoms are: Improved  Does the patient have all of their medications?: Yes  Does patient know what their new medications are for?: Yes  Does patient have a follow-up appointment scheduled?: Yes  Does patient have any other questions or concerns?: No    Post-op  Did the patient have surgery or a procedure: Yes  Incision: closed;healing  Drainage: No  Bleeding: none  Fever: No  Chills: No  Redness: No  Warmth: No  Swelling: No  Incision site pain: No  Closure: suture;dissolving  Eating & Drinking: eating and drinking without complaints/concerns  PO Intake: regular diet  Bowel Function: normal  Urinary Status: voiding without complaint/concerns        PLAN                                                      Outpatient Plan:  Routine post op follow up    Future Appointments   Date Time Provider Department Center   5/24/2019  7:00 AM Isaias Dickens  APRN CNP [facility]   5/24/2019 10:00 AM Margoth Casepr, APRN CNP [facility]   5/24/2019 12:00 PM [facility]           VAISHALI RODRIGUEZ RN

## 2019-05-16 NOTE — PROGRESS NOTES
Left VM to check on pt's status following DBS battery placement on 5/10/19. Left my direct number and will f/u if I do not hear back.

## 2019-05-20 ENCOUNTER — PATIENT OUTREACH (OUTPATIENT)
Dept: NEUROSURGERY | Facility: CLINIC | Age: 74
End: 2019-05-20

## 2019-05-20 NOTE — PROGRESS NOTES
"Pt called and states he feels like he is \"maxed out\" on his PD medications. Pt recently had bilateral DBS surgery and will be programmed on 5/24.     I reminded him that at his programming appointment, Alycia Dickens will determine if he can reduce his PD med dose, reduce the frequency, or both. Also reminded him that even with DBS stimulation, he will still need to take PD medications. He understands he will be coming to the programming appt off his PD meds and will bring them to the appointment.     No further questions at this time.   "

## 2019-05-21 ENCOUNTER — TELEPHONE (OUTPATIENT)
Dept: NEUROSURGERY | Facility: CLINIC | Age: 74
End: 2019-05-21

## 2019-05-21 NOTE — TELEPHONE ENCOUNTER
Left VM for pt's son. Reviewed chart and found letter from Venecia Smith to pt re his programming appointment on 5/24/19. Left the following information for pt's son:    Programming appt 5/24/19 at 7:00 a.m.    Do not take pramipexole or any formulation of cabidopa-levodopa for 12 hours prior to the appointment.     Do not eat breakfast foods with a significant amount of protein the morning of the programming appointment.    Bring the patient  with you to the appointment b/c pt will learn how to use it.                                Please call with any additional questions.

## 2019-05-21 NOTE — TELEPHONE ENCOUNTER
Received VM from pt's son, Jose. He has questions about pt's appointments on Friday, 5/24, including the medication instructions for pt's programming appt. Will route to neurology RNs for f/u.

## 2019-05-23 ENCOUNTER — TELEPHONE (OUTPATIENT)
Dept: NEUROSURGERY | Facility: CLINIC | Age: 74
End: 2019-05-23

## 2019-05-23 NOTE — TELEPHONE ENCOUNTER
Pt's son Jose received my message yesterday but had f/u questions. I called him back and confirmed all of pt's appointment times for 5/24.     Additionally, LARRY Dickens advises that pt take his usual 9:00 pm dose of carbidopa-levodopa and Mirapex at 7:00 pm, and to skip the long acting carbidopa-levodopa at bedtime.     No further questions at this time. Pt and family have my contact information and are welcome to call with any questions.       ----- Message from NICK Wright CNP sent at 5/23/2019  9:38 AM CDT -----  Regarding: RE: med question for programming tomorrow  It would be okay if he takes the 9 pm Mirapex and C/L at 7 pm.  I agree with not taking the long acting C/L at bedtime.  Thank you Tammie.       ----- Message -----  From: Tammie Rider RN  Sent: 5/23/2019   8:47 AM  To: NICK Wright CNP  Subject: med question for programming tomorrow            Alycia Whaley.    Pt's son Jose had questions about med instructions for tomorrow. Venecia instructed pt to hold carbidopa-levodopa and mirapex for 12 hours before his 7:00 am appointment tomorrow. He usually takes these meds at 9:00 pm.     Jose would like to know if pt should take these meds at 7:00 pm, or just skip them altogether. If he does not take them, he won't get very good sleep, per pt's son. Pt will skip the long acting carbidopa-levodopa.     They understand that the idea is that pt is off for programming, but they want some guidance on the best approach.     If you let me know what you advise, I'll call Jose back.     Thanks,  Tammie

## 2019-05-24 ENCOUNTER — ANCILLARY PROCEDURE (OUTPATIENT)
Dept: CT IMAGING | Facility: CLINIC | Age: 74
End: 2019-05-24
Attending: NURSE PRACTITIONER
Payer: MEDICARE

## 2019-05-24 ENCOUNTER — OFFICE VISIT (OUTPATIENT)
Dept: NEUROLOGY | Facility: CLINIC | Age: 74
End: 2019-05-24
Payer: MEDICARE

## 2019-05-24 ENCOUNTER — OFFICE VISIT (OUTPATIENT)
Dept: NEUROSURGERY | Facility: CLINIC | Age: 74
End: 2019-05-24
Payer: MEDICARE

## 2019-05-24 VITALS
SYSTOLIC BLOOD PRESSURE: 119 MMHG | OXYGEN SATURATION: 95 % | WEIGHT: 172.4 LBS | HEART RATE: 46 BPM | DIASTOLIC BLOOD PRESSURE: 74 MMHG | HEIGHT: 71 IN | BODY MASS INDEX: 24.14 KG/M2

## 2019-05-24 VITALS
DIASTOLIC BLOOD PRESSURE: 74 MMHG | WEIGHT: 172.4 LBS | HEIGHT: 71 IN | HEART RATE: 46 BPM | OXYGEN SATURATION: 95 % | BODY MASS INDEX: 24.14 KG/M2 | SYSTOLIC BLOOD PRESSURE: 119 MMHG

## 2019-05-24 DIAGNOSIS — G20.A1 PARKINSON'S DISEASE (H): ICD-10-CM

## 2019-05-24 DIAGNOSIS — G20.A1 PARKINSON DISEASE (H): ICD-10-CM

## 2019-05-24 DIAGNOSIS — Z48.89 ENCOUNTER FOR POST SURGICAL WOUND CHECK: ICD-10-CM

## 2019-05-24 DIAGNOSIS — Z96.89 S/P DEEP BRAIN STIMULATOR PLACEMENT: Primary | ICD-10-CM

## 2019-05-24 DIAGNOSIS — Z96.89 S/P DEEP BRAIN STIMULATOR PLACEMENT: ICD-10-CM

## 2019-05-24 ASSESSMENT — MOVEMENT DISORDERS SOCIETY - UNIFIED PARKINSONS DISEASE RATING SCALE (MDS-UPDRS)
GETTING_OUT_OF_BED_CAR_DEEP_CHAIR: NORMAL: NOT AT ALL (NO PROBLEMS).
FREEZING: NORMAL: NOT AT ALL (NO PROBLEMS).
SALIVA_AND_DROOLING: MODERATE: I HAVE SOME DROOLING WHEN I AM AWAKE, BUT I USUALLY DO NOT NEED TISSUES OR A HANDKERCHIEF.
SPEECH: NORMAL: NOT AT ALL (NO PROBLEMS).
DRESSING: NORMAL: NOT AT ALL (NO PROBLEMS).
CHEWING_AND_SWALLOWING: NORMAL: NO PROBLEMS.
HYGIENE: NORMAL: NOT AT ALL (NO PROBLEMS).
WALKING_AND_BALANCE: NORMAL: NOT AT ALL (NO PROBLEMS).
HANDWRITING: NORMAL: NOT AT ALL (NO PROBLEMS).
HOBBIES_AND_OTHER_ACTIVITIES: NORMAL:  NOT AT ALL (NO PROBLEMS).
EATING_TASKS: NORMAL: NOT AT ALL (NO PROBLEMS).
TOTAL_SCORE: 4
TREMOR: SLIGHT: SHAKING OR TREMOR OCCURS BUT DOES NOT CAUSE PROBLEMS WITH ANY ACTIVITIES.
TURNING_IN_BED: NORMAL: NOT AT ALL (NO PROBLEMS).

## 2019-05-24 ASSESSMENT — UNIFIED PARKINSONS DISEASE RATING SCALE (UPDRS)
TOTAL_SCORE_LEFT: 3
AMPLITUDE_RLE: NORMAL: NO TREMOR.
PARKINSONS_MEDS: OFF
LEG_AGILITY_LEFT: SLIGHT: ANY OF THE FOLLOWING: A) THE REGULAR RHYTHM IS BROKEN WITH ONE WITH ONE OR TWO INTERRUPTIONS OR HESITATIONS OF THE MOVEMENT B) SLIGHT SLOWING C) THE AMPLITUDE DECREMENTS NEAR THE END OF THE 10 MOVEMENTS.
AMPLITUDE_RUE: NORMAL: NO TREMOR.
RIGIDITY_RLE: NORMAL
SPONTANEITY_OF_MOVEMENT: 1: SLIGHT: SLIGHT GLOBAL SLOWNESS AND POVERTY OF SPONTANEOUS MOVEMENTS.
HANDMOVEMENTS_RIGHT: SLIGHT: ANY OF THE FOLLOWING: A) THE REGULAR RHYTHM IS BROKEN WITH ONE WITH ONE OR TWO INTERRUPTIONS OR HESITATIONS OF THE MOVEMENT B) SLIGHT SLOWING C) THE AMPLITUDE DECREMENTS NEAR THE END OF THE 10 MOVEMENTS.
AMPLITUDE_LLE: NORMAL: NO TREMOR.
TOETAPPING_LEFT: NORMAL
POSTURE: 1 SLIGHT.  NOT QUITE ERECT BUT COULD BE NORMAL FOR OLDER PERSONS.
FREEZING_GAIT: NORMAL
TOTAL_SCORE: 6
RIGIDITY_LLE: NORMAL
FINGER_TAPPING_LEFT: NORMAL
AMPLITUDE_LUE: NORMAL: NO TREMOR.
PRONATION_SUPINATION_RIGHT: SLIGHT: ANY OF THE FOLLOWING: A) THE REGULAR RHYTHM IS BROKEN WITH ONE WITH ONE OR TWO INTERRUPTIONS OR HESITATIONS OF THE MOVEMENT B) SLIGHT SLOWING C) THE AMPLITUDE DECREMENTS NEAR THE END OF THE 10 MOVEMENTS.
GAIT: NORMAL
TOETAPPING_RIGHT: NORMAL
PRONATION_SUPINATION_LEFT: SLIGHT: ANY OF THE FOLLOWING: A) THE REGULAR RHYTHM IS BROKEN WITH ONE WITH ONE OR TWO INTERRUPTIONS OR HESITATIONS OF THE MOVEMENT B) SLIGHT SLOWING C) THE AMPLITUDE DECREMENTS NEAR THE END OF THE 10 MOVEMENTS.
RIGIDITY_RUE: MILD: RIGIDITY DETECTED WITHOUT THE ACTIVATION MANEUVER.  FULL RANGE OF MOTION IS EASILY ACHIEVED.
HANDMOVEMENTS_LEFT: SLIGHT: ANY OF THE FOLLOWING: A) THE REGULAR RHYTHM IS BROKEN WITH ONE WITH ONE OR TWO INTERRUPTIONS OR HESITATIONS OF THE MOVEMENT B) SLIGHT SLOWING C) THE AMPLITUDE DECREMENTS NEAR THE END OF THE 10 MOVEMENTS.
FACIAL_EXPRESSION: MODERATE: MASKED FACIES WITH LIPS PARTED SOME OF THE TIME WHEN THE MOUTH IS AT REST.
SPEECH: MILD: LOSS OF MODULATION, DICTION OR VOLUME, WITH A FEW WORDS UNCLEAR, BUT THE OVERALL SENTENCES EASY TO FOLLOW.
LEG_AGILITY_RIGHT: SLIGHT: ANY OF THE FOLLOWING: A) THE REGULAR RHYTHM IS BROKEN WITH ONE WITH ONE OR TWO INTERRUPTIONS OR HESITATIONS OF THE MOVEMENT B) SLIGHT SLOWING C) THE AMPLITUDE DECREMENTS NEAR THE END OF THE 10 MOVEMENTS.
POSTURAL_STABILITY: NORMAL:  RECOVERS WITH ONE OR TWO STEPS.
CONSTANCY_TREMOR_ATREST: NORMAL: NO TREMOR.
AMPLITUDE_LIP_JAW: NORMAL: NO TREMOR.
RIGIDITY_LUE: NORMAL
ARISING_CHAIR: NORMAL: ABLE TO ARISE QUICKLY WITHOUT HESITATION.
AXIAL_SCORE: 8
FINGER_TAPPING_RIGHT: SLIGHT: ANY OF THE FOLLOWING: A) THE REGULAR RHYTHM IS BROKEN WITH ONE WITH ONE OR TWO INTERRUPTIONS OR HESITATIONS OF THE MOVEMENT B) SLIGHT SLOWING C) THE AMPLITUDE DECREMENTS NEAR THE END OF THE 10 MOVEMENTS.
TOTAL_SCORE: 17
RIGIDITY_NECK: SLIGHT: RIGIDITY ONLY DETECTED WITH ACTIVATION MANEUVER.

## 2019-05-24 ASSESSMENT — MIFFLIN-ST. JEOR
SCORE: 1559.13
SCORE: 1559.13

## 2019-05-24 ASSESSMENT — PAIN SCALES - GENERAL
PAINLEVEL: NO PAIN (0)
PAINLEVEL: NO PAIN (0)

## 2019-05-24 NOTE — PATIENT INSTRUCTIONS
May 24, 2019    Dear Mr. Ace Navarro,    Thank you for coming today.  During your visit, we have discussed the following:     __ Your DBS electrical system function (impedance) is normal. The battery life is also good.  __ Your bilateral DBS has been turned on and programmed.       Left Body    Amplitude:  2.0 mA     Right Body    Amplitude:  2.5 mA   Patient :  Upper Limit: 2.0 mA  Lower Limit: 0.0 mA   Patient :  Upper Limit: 2.5 mA  Lower Limit: 0.0 mA     __ Reduce your Sinemet 25/100 mg from 4 tablets 3 x a day to 3 tablets 3 x a day.    __  If you experience dyskinesias, which are dance like movements, you can reduce your Sinemet to 2 tablets 3 x a day.       PD Medications 5 am 12 pm 9 pm 11 pm   Sinemet 25/100 mg  3 3 3    Sinemet 50/200 mg     2   Pramipexole 0.25 mg    2      __ Please call if your symptoms worsen or you experience side effects.    __ For your subsequent DBS programming visits, come ON medication.     To contact our clinic, you may call [phone] or use [employer] message.     It's good to see you!  I'll see you in 1 month.      NICK Ford, CNP  [facility]

## 2019-05-24 NOTE — PROGRESS NOTES
"PATIENT: Ace Navarro    : 1945    NORA: 2019    REASON FOR VISIT: Bilateral hemisphere monopolar review & initial deep brain stimulation (DBS) programming for Parkinson's disease (PD.)     HPI:  Mr. Ace Navarro is a 73 year old right - handed  male who came to the [facility] neurology clinic accompanied by his son for DBS initial programming.  Patient was previously scheduled for DBS placement surgery on 10/23/2018, which was aborted due to patient not tolerating the procedure.  He recently underwent Bilateral Subthalamic Nucleus (STN) DBS lead placement on 2019 and left chest infraclavicular Infinity 7 generator placement on 5/10/2019 by Dr. Briones.  The procedure was performed under general anesthesia with ABE, robotic surgical assistance.     Today, pt and his son were surprised that he didn't have any tremor.  His son said that every time he had seen his father, he had some tremor, including yesterday.       Lesion effect:   He had good tremor control for a week.  He still had tremor but, it wasn't as bad.     Pre-programming antiparkinsonian medications:   PD Medications 5 am 12 pm 9 pm 11 pm   Sinemet 25/100 mg  4 4 4    Sinemet 50/200 mg     2   Pramipexole 0.25 mg    2      Last antiparkinsonian dose taken:  Last evening at 7 pm; he took Sinemet 50/200 mg and Pramipexole 0.5 mg.     PHYSICAL EXAM:  Vital Signs:  Blood pressure 119/74, pulse (!) 46, height 1.803 m (5' 11\"), weight 78.2 kg (172 lb 6.4 oz), SpO2 95 %.  Body mass index is 24.04 kg/m .    Mr. Navarro is a pleasant  male who is well-groomed and well-developed sitting comfortably in the exam room without any distress.  Affect is appropriate.    Incision Site:  Bilateral DBS insertion sites look dry and intact.  There is still some surgical glue on the incision.  Today, he will see the neurosurgery nurse practitioner, Margoth Santos CNP.     MDS Part II  -- Over the last week -- 0: Normal -- 1: " Slight -- 2: Mild -- 3: Moderate -- 4: Severe     2.1 Speech: 0   2.2 Saliva and drooling: 3   2.3 Chewing and swallowin   2.4 Eating tasks: 0   2.5 Dressin   2.6 Hygiene:  0   2.7 Handwritin   2.8 Doing hobbies and other activities:  0   2.9 Turning in bed:  0   2.10 Tremor:  1   2.11 Getting out of bed/car/deep chair:   0   2.12 Walking and balance: 0   2.13 Freezin     Total:    4         MOVEMENT DISORDERS ASSESSMENT:  UPDRS 3   UPDRS Values 2019   Time: 7:31 AM   Medication Off   R Brain DBS: Off   L Brain DBS: Off   Speech 2   Facial Expression 3   Rigidity Neck 1   Rigidity RUE 2   Rigidity LUE 0   Rigidity RLE 0   Rigidity LLE 0   Finger Taps R 1   Finger Taps L 0   Hand Mvt R 1   Hand Mvt L 1   Pron-/Supinate R 1   Pron-/Supinate L 1   Toe Tap R 0   Toe Tap L 0   Leg Agility R 1   Leg Agility L 1   Arise From Chair 0   Gait 0   Gait Freezing 0   Postural Stability 0   Posture 1   Global Spont Mvt 1   Postural Tremor RUE 0   Postural Tremor LUE 0   Kinetic Tremor RUE 0   Kinetic Tremor LUE 0   Rest Tremor RUE 0   Rest Tremor LUE 0   Rest Tremor RLE 0   Rest Tremor LLE 0   Rest Tremor Lip/Jaw 0   Rest Tremor Constancy 0   Total Right 6   Total Left 3   Axial Total 8   Total 17       DBS Interrogation & Analysis:    Implanted generator:  Left chest Infinity 7  Lead placement:  Bilateral Subthalamic Nucleus (STN)  Bilateral lead placement date:  2019  Bilateral generator placement date:  5/10/2019    Battery Service Life:  OK.  3.00 volts.    Left Brain  Electrode Impedance Check:   Left Lead: No Problems Found.       Right Brain   Electrode Impedance Check:    Right Lead: No Problems Found.    See scanned report for impedance details.      MONOPOLAR REVIEW    Left Hemisphere   Contacts Amplitude PW   msec Rate  hertz Assessment Adverse Effect   Case +, 1 - 0.0 60 130 RUE rigidity = 2.     0.5   No change.      1.0   No change.      1.5   RUE rigidity = 1.     2.0   No rigidity.    "   2.5    Felt dizzy.        Dizziness got much worse.  Things in he room started moving back and forth.    Case+, 2 - 0.0 60 130 It a little longer for rigidity to recur.  RUE rigidity = 2.     0.5   No change.      1.0   Slight improvement.      1.5   RUE rigidity = 1.  With mental activation, it gets worse.      2.0   RUE rigidity = 1.  With mental activation, it gets worse. He reports his RLS feeling were better.      2.5   No rigidity.      3.0   No change.      3.5    Slight dizziness that persisted.  Not too bothersome.      3.7    Dizziness got much worse.  \"I'm feeling \"spacey.\"   Case+, 3 - 0.0 60 130 RUE rigidity = 2.     0.5   No change.      1.0   RUE rigidity = 1.      1.5   No rigidity.      2.0   No change.      2.5   No change.      3.0   No change.      3.5   No change.      4.0   No change.      4.5    He started getting dizzy.    4.8    Dizziness got much worse.   Things moving in the room.    Case+, 4 - 0.0   RUE rigidity = 2.     0.5   No change.      1.0   No change.      1.5   Slight improvement.     2.0   Same as above.      2.5   RUE rigidity = 1. \"I'm feeling something.  Hard to tell.\"    3.0   No change. Felt dizzy. \"Slight.\" Persisted.     3.5   No change.  Dizziness got worse.  \"Things in the room are shaking.\"         Right Hemisphere    Contacts Amplitude PW   msec Rate  hertz Assessment Adverse Effect   Case+, 9 - 0.0 60 130      0.5        1.0        1.5        2.0        2.5        3.0        3.5        4.0    He had slight persistent dizziness.     4.4    Dizziness got much worse.    Case+, 10 - 0.0 60 130      0.5        1.0        1.5        2.0   \"My legs are feeling good.\"     2.5        3.0        3.5    Persistent dizziness.  \"Slight to mild.\"    3.8    Dizziness worsened.  Pt started sweating.    Case+, 11 - 0.0 60 130      0.5        1.0        1.5        2.0        2.5        3.0        3.5        4.0        4.5    Slight dizziness.  \"Hard to tell.\"    5.0    \"Moderate " "dizziness.\"  Persistent.    Case+, 12 - 0.0        0.5        1.0        2.0        3.0        4.0        5.0        6.0        6.5    No side effects.                              __  Pt didn't have tremor today, which is unusual for him as the most bothersome symptom had been tremors.  Since he had mild rigidity in his left body, it was used as a target to improve during monopolar review.    __  Right STN monopolar survey completed just to see the threshold as he barley has PD motor symptoms.  The higher threshold is a little unusual for STN.  It would be worth to have a lead reconstruction to see the location.    __  Left STN monopolar survey showed great rigidity improvement in the middle 2 contacts.  Since contact 3 had wider therapeutic window, it was used for final programming.      Left STN    C +, 3abc -  Amplitude:  2.50 Amplitude  PW:  60 msec  Rate:  130 Hz    Therapy impedance:  875 Ohms     Right STN    C +, 10abc -  Amplitude:  2.00 mA  PW:  60 msec  Rate:  130 Hz    Therapy impedance:  975 Ohms   Patient :  Upper Limit: 2.50 mA  Lower Limit: 0.00 mA   Patient :  Upper Limit: 2.00 mA  Lower Limit: 0.00 mA       __  Pt took Sinemet 25/100 mg 3 tablets at 9 am.  Since pt was very tired, he rested on the exam table after taking his pills.  Due to RLS not letting him sleep, he also took Pramipexole 0.5 mg at 9:20 am.     __  While pt was asleep, I went over patient controller with patient's son.  He was shown how to check battery, turn DBS on & off, and adjust voltage.  He was able to demonstrate independently.    __  About 1 hour after taking Sinemet dose, patient had slight to mild body dyskinesias.  It wasn't too bothersome.  Patient and son were informed to monitor worsening dyskinesias.     __  For now, he'll reduce his Sinemet 25/100 mg from 4 to 3 tabs TID.  If dyskinesias persist, will drop dose to 2 tabs TID.     __  Will stay on the same dose of Sinemet 50/200 mg and " Pramipexole 0.5 mg at HS.    __  Instructed to call if there is any questions, side effect from today's programming, or worsening symptoms.     __ Will return for DBS programming optimization follow up in 1 month.   He was instructed to come ON meds.    The total amount of time spent with patient in DBS programming was 180 minutes.     NICK Ford, CNP  [facility]     7:04 AM  10:01 AM

## 2019-05-24 NOTE — LETTER
5/24/2019       RE: Ace Navarro  [address] [facility] [address]     Dear Colleague,    Thank you for referring your patient, Ace Navarro, to the [facility] NEUROSURGERY at [facility]. Please see a copy of my visit note below.    NEUROSURGERY PROGRESS NOTE    REASON FOR VISIT: Routine postop wound check.    DATE OF PROCEDURE: 5/10/2019  PREOPERATIVE DIAGNOSIS: Parkinson disease.  PROCEDURES: Deep Brain Stimulator Placement, Phase II, Placement Of Deep Brain Stimulator Generator/Battery Over The Left Chest Wall  IMPLANTS:  1.  Abbott DBS extension wire, REF 6371, S/N [id number] for the left side.            2.  Abbott DBS extension wire, REF 6371, S/N [id number] for the right side.            3.  Abbott Infinity 7 generator/battery, REF 6662, S/N [id number]  SURGEON: Humberto Briones MD    HPI: Mr. Navarro is a 74 yo male with history of parkinson disease who presented to [facility] on 4/26/2019 for Phase I of bilateral deep brain stimulator lead placement of the sub-thalamic nucleus and then Phase II on 5/10/019 without complications.  Of note, the patient had a prior operation where the procedure was aborted due to inability to tolerate the procedure awake. He did well after the phase II surgery and was discharged home on the same day. Since then he presents with his son for routine wound check.  He is also scheduled to see Neurology after this visit for a DBS programming.    The patient denies fever, chills, pain, redness, swelling, and/or drainage from incision.     CURRENT MEDICATIONS:    Current Outpatient Medications:      acetaminophen (TYLENOL) 325 MG tablet, Take 2 tablets (650 mg) by mouth every 4 hours as needed for mild pain, Disp: 60 tablet, Rfl: 0     carbidopa-levodopa (SINEMET CR)  MG per CR tablet, Take 2 tablets by mouth At Bedtime, Disp: 180 tablet, Rfl: 3     carbidopa-levodopa (SINEMET)  MG per tablet, Take 4 tablets by mouth 3 times  "daily 4 tabs 0500/ 4 tabs 1200 / 4 tabs 2100, Disp: 360 tablet, Rfl: 11     folic acid 20 MG CAPS, Take 800 mg by mouth daily (with lunch) , Disp: , Rfl:      multivitamin, therapeutic with minerals (MULTI-VITAMIN) TABS tablet, Take 1 tablet by mouth every morning , Disp: , Rfl:      polyethylene glycol (MIRALAX/GLYCOLAX) packet, Take 17 g by mouth 2 times daily as needed for constipation, Disp: 10 packet, Rfl: 11     pramipexole (MIRAPEX) 0.25 MG tablet, Take 2 tablets (0.5 mg) by mouth At Bedtime, Disp: 180 tablet, Rfl: 1     sulfaSALAzine (AZULFIDINE) 500 MG tablet, TAKE 4 TABLETS BY MOUTH ONCE DAILY., Disp: , Rfl: 3     HYDROcodone-acetaminophen (NORCO) 5-325 MG tablet, Take 1-2 tablets by mouth every 4 hours as needed for moderate to severe pain (Patient not taking: Reported on 5/24/2019), Disp: 10 tablet, Rfl: 0     senna-docusate (SENOKOT-S/PERICOLACE) 8.6-50 MG tablet, Take 1-2 tablets by mouth 2 times daily (Patient not taking: Reported on 5/24/2019), Disp: 30 tablet, Rfl: 0    ALLERGIES:  Allergies   Allergen Reactions     Shellfish-Derived Products Swelling     LOBSTER causes throat swelling/closing  (shrimp/shellfish ok)     Aspirin Nausea       PMH, SOC HIST, FAM HIST, PROBLEM LIST:  All reviewed in EPIC.    FOCUS EXAMINATION:  /74   Pulse (!) 46   Ht 1.803 m (5' 11\")   Wt 78.2 kg (172 lb 6.4 oz)   SpO2 95%   BMI 24.04 kg/m     General: Well developed well nourished male lying comfortably in exam bed.  No apparent distress.   He is A&O X3.  Mood and affect WNL. Language and fund of knowledge intact.    He has a nicely healed incision below the * clavicle. The incision was closed by Monocryl that require no removal.    ASSESSMENT:  1. Parkinson disease (H)    2. S/P deep brain stimulator placement on     3. Encounter for post surgical wound check      Ace Navarro is doing well.The wound is healthy without evidence of infection.    PLAN:  We discussed wound care..  *  Keep it open to " air.  *  May continue to shower and shampoo with mild soap/shampoo including the incision. No hair conditioners, hair treatments or skin cream for two more weeks.  *  May swim or bathe when all scabbing is gone from the incision.  *  Call our services if you develop fever, chills, redness, swelling, and/or drainage from incision.  We discussed activities.  *  We he return to normal activities as able.  We discussed follow up    *  Follow-up with neurology for DBS programming as directed..   *  We are comfortable releasing him to PRN follow up.  We have not made a specific return appointment but will be happy to see him again should the need arise.    All the patient's questions have been answered and they demonstrate good understanding of the above. The patient has our contact information and is aware that he should call if he has questions or concerns.     This note was completed using Dragon voice recognition software.  Although reviewed after completion, some word and grammatical errors may occur.    Margoth Casper, LAURA, APRN, FNP-BC  Department of Neurosurgery

## 2019-05-24 NOTE — NURSING NOTE
Chief Complaint   Patient presents with     DBS Programming     [facility] NEW - DBS INITIAL PROGRAMMING       Prashanth Paul, EMT

## 2019-05-24 NOTE — NURSING NOTE
Chief Complaint   Patient presents with     RECHECK     [facility] RETURN 2 WK POST OP       Quynh Cabral, EMT

## 2019-05-24 NOTE — PROGRESS NOTES
NEUROSURGERY PROGRESS NOTE      REASON FOR VISIT: Routine postop wound check.      DATE OF PROCEDURE: 5/10/2019  PREOPERATIVE DIAGNOSIS: Parkinson disease.  PROCEDURES: Deep Brain Stimulator Placement, Phase II, Placement Of Deep Brain Stimulator Generator/Battery Over The Left Chest Wall  IMPLANTS:  1.  Abbott DBS extension wire, REF 6371, S/N [id number] for the left side.            2.  Abbott DBS extension wire, REF 6371, S/N [id number] for the right side.            3.  Abbott Infinity 7 generator/battery, REF 6662, S/N [id number]  SURGEON: Humberto Briones MD      HPI: Mr. Navarro is a 72 yo male with history of parkinson disease who presented to [facility]/2019 for Phase I of bilateral deep brain stimulator lead placement of the sub-thalamic nucleus and then Phase II on 5/10/019 without complications.  Of note, the patient had a prior operation where the procedure was aborted due to inability to tolerate the procedure awake. He did well after the phase II surgery and was discharged home on the same day. Since then he presents with his son for routine wound check.  He is also scheduled to see Neurology after this visit for a DBS programming.    The patient denies fever, chills, pain, redness, swelling, and/or drainage from incision.       CURRENT MEDICATIONS:    Current Outpatient Medications:      acetaminophen (TYLENOL) 325 MG tablet, Take 2 tablets (650 mg) by mouth every 4 hours as needed for mild pain, Disp: 60 tablet, Rfl: 0     carbidopa-levodopa (SINEMET CR)  MG per CR tablet, Take 2 tablets by mouth At Bedtime, Disp: 180 tablet, Rfl: 3     carbidopa-levodopa (SINEMET)  MG per tablet, Take 4 tablets by mouth 3 times daily 4 tabs 0500/ 4 tabs 1200 / 4 tabs 2100, Disp: 360 tablet, Rfl: 11     folic acid 20 MG CAPS, Take 800 mg by mouth daily (with lunch) , Disp: , Rfl:      multivitamin, therapeutic with minerals (MULTI-VITAMIN) TABS tablet, Take 1 tablet by mouth every morning , Disp: , Rfl:       "polyethylene glycol (MIRALAX/GLYCOLAX) packet, Take 17 g by mouth 2 times daily as needed for constipation, Disp: 10 packet, Rfl: 11     pramipexole (MIRAPEX) 0.25 MG tablet, Take 2 tablets (0.5 mg) by mouth At Bedtime, Disp: 180 tablet, Rfl: 1     sulfaSALAzine (AZULFIDINE) 500 MG tablet, TAKE 4 TABLETS BY MOUTH ONCE DAILY., Disp: , Rfl: 3     HYDROcodone-acetaminophen (NORCO) 5-325 MG tablet, Take 1-2 tablets by mouth every 4 hours as needed for moderate to severe pain (Patient not taking: Reported on 5/24/2019), Disp: 10 tablet, Rfl: 0     senna-docusate (SENOKOT-S/PERICOLACE) 8.6-50 MG tablet, Take 1-2 tablets by mouth 2 times daily (Patient not taking: Reported on 5/24/2019), Disp: 30 tablet, Rfl: 0      ALLERGIES:  Allergies   Allergen Reactions     Shellfish-Derived Products Swelling     LOBSTER causes throat swelling/closing  (shrimp/shellfish ok)     Aspirin Nausea       PMH, SOC HIST, FAM HIST, PROBLEM LIST:  All reviewed in EPIC.      FOCUS EXAMINATION:  /74   Pulse (!) 46   Ht 1.803 m (5' 11\")   Wt 78.2 kg (172 lb 6.4 oz)   SpO2 95%   BMI 24.04 kg/m    General: Well developed well nourished male lying comfortably in exam bed.  No apparent distress.   He is A&O X3.  Mood and affect WNL. Language and fund of knowledge intact.    He has a nicely healed incision below the * clavicle. The incision was closed by Monocryl that require no removal.      ASSESSMENT:  1. Parkinson disease (H)    2. S/P deep brain stimulator placement on     3. Encounter for post surgical wound check      Ace Navarro is doing well.The wound is healthy without evidence of infection.      PLAN:  We discussed wound care..  *  Keep it open to air.  *  May continue to shower and shampoo with mild soap/shampoo including the incision. No hair conditioners, hair treatments or skin cream for two more weeks.  *  May swim or bathe when all scabbing is gone from the incision.  *  Call our services if you develop fever, " chills, redness, swelling, and/or drainage from incision.  We discussed activities.  *  We he return to normal activities as able.  We discussed follow up    *  Follow-up with neurology for DBS programming as directed..   *  We are comfortable releasing him to PRN follow up.  We have not made a specific return appointment but will be happy to see him again should the need arise.    All the patient's questions have been answered and they demonstrate good understanding of the above. The patient has our contact information and is aware that he should call if he has questions or concerns.     This note was completed using Dragon voice recognition software.  Although reviewed after completion, some word and grammatical errors may occur.      Margoth Casper, LAURA, APRN, FNP-BC  Department of Neurosurgery

## 2019-05-29 ENCOUNTER — PATIENT OUTREACH (OUTPATIENT)
Dept: NEUROSURGERY | Facility: CLINIC | Age: 74
End: 2019-05-29

## 2019-05-29 NOTE — PROGRESS NOTES
"Pt called b/c he received a letter from his insurance company stating he will owe $12, 681 for his recent surgery. I will f/u with our prior authorization team to confirm authorization for the procedures. Authorization does not mean every cost associated with surgery is necessarily covered, however. Patients may still have a co-pay or be responsible for a certain percentage of a procedure's cost, per their individual plan. Pt will fax the letter to me and I will forward it to [employer] for assistance. Pt in agreement with plan.     Received fax from pt with [employer] explanation of benefits. There is a note that states \"According to our records, the patient has Medicare coverage.  After Medicare has processed these services, resubmit the claim with Medicare Explanation of Benefits.\"     Pt confirms that Medicare is his primary insurance. He did not notice the above note on the [employer] explanation of benefits. I believe claim will be submitted for payment to Medicare first, but will confirm with [employer]. Pt in agreement with plan.     5/30/2019: Spoke with pt and let him know that I spoke with [employer] at [phone]. The representative indicated that they initially had BCBS as the primary insurance rather than Medicare. However, they realized the error and then submitted to Medicare. The representative indicated that Medicare will process the submission and if there are costs that are not covered, Medicare will forward to [employer]. I gave pt the above phone number should he have any further questions about billing issues. Pt expressed understanding. No further questions at this time.   "

## 2019-05-30 NOTE — OP NOTE
PATIENT NAME: ACE NAVARRO  YOB: 1945  MRN:   [id number]  ACCOUNT:  [id number]      DATE OF PROCEDURE: 05/10/2019    PREOPERATIVE DIAGNOSIS:    1.  Parkinson's disease  2.  Previously aborted left side deep brain stimulator placement surgery on 10/23/2018  3.  S/p bilateral deep brain stimulator placement, phase I, placement of bilateral deep brain stimulator electrodes, target bilateral subthalamic nucleus, under general anesthesia and with ABE on 4/26/2019    POSTOPERATIVE DIAGNOSIS:    1.  Parkinson's disease  2.  Previously aborted left side deep brain stimulator placement surgery on 10/23/2018  3.  S/p bilateral deep brain stimulator placement, phase I, placement of bilateral deep brain stimulator electrodes, target bilateral subthalamic nucleus, under general anesthesia and with ABE on 4/26/2019    PROCEDURES PERFORMED:  1.  Deep brain stimulator placement, phase II, placement of new deep brain stimulator generator/battery over the left chest wall.  2.  Placement of two extension wires and connection of the left and right side deep brain stimulator electrodes, two electrode arrays, to the new generator/battery.  3.  Electrical analysis of the deep brain stimulator system.    ATTENDING SURGEON:  Humberto Briones M.D., Ph.D.    ASSISTANT SURGEON:  None. No residents participated in this case.  No residents were available.    ANESTHESIA:  General anesthesia.    ESTIMATED BLOOD LOSS: 5 mL.    COMPLICATIONS: None.    IMPLANTS:   1.  Abbott DBS extension wire, REF 6371, S/N [id number] for the left side.  2.  Abbott DBS extension wire, REF 6371, S/N [id number] for the right side.  3.  Abbott Infinity 7 generator/battery, REF 6662, S/N [id number]    INDICATIONS FOR PROCEDURE:  Mr. Ace Navarro returns today for his ongoing deep brain stimulator surgery.  He is s/p bilateral deep brain stimulator placement, phase I, placement of bilateral deep brain stimulator electrode, target bilateral  subthalamic nucleus, under general anesthesia and with ABE on 4/26/2019.  Patient tolerated the procedure well and there were no complications.  His postoperative course was uneventful other than episodes of confusion and anxiety but he was discharged to home on POD#1.  His postoperative CT head was without any concerning findings.  He denies any fevers, chills, nausea, vomiting, dizziness, weakness, numbness or seizure like activity.  He also had no issues with his incisions.  They have remained clean/dry and intact.  He is here for his phase II surgery.  Briefly, patient is a right-handed 73 year old man with Parkinson's disease.  He was diagnosed in 2010 after he developed a right hand tremor.  He is followed by Dr. Godinez.  His tremor has remained right-sided, rarely ever having tremor in his left hand.  He notes occasional tremor in his legs, precariously associated with bowel movements, but this is mild. He stays active by playing softball and boxing. The softball season ends in September.  His case was discussed at the Movement Disorder [employer] meeting and he was considered to be a good DBS candidate.  On 10/23/2018, he was undergoing left side deep brain stimulator placement, phase I.  However, this was aborted as patient became anxious and desired to stop the surgery.  Patient and family subsequently desired to have the DBS implanted and sought alternative option.  Image based DBS placement with limited JACKIE using ABE stereotactic placement was offered.  He subsequently underwent placement of the bilateral electrodes.  Patient did well with his phase I surgery under general anesthesia and image based placement of the bilateral DBS electrodes.  He reports that his tremor, especially on the right side, is improved.  He denies having any issues since the surgery.  We discussed the phase II of DBS surgery, placement of the DBS generator/battery over the left chest wall under general anesthesia.   Risks, benefits, alternative therapies were discussed with the patient, including but not limited to infection and bleeding (intracranial), injury to the brain, stroke, death, hardware failure and possible need for more surgeries.  Surgical procedure was discussed in detail.  All questions were answered, and he expressed understanding.     DESCRIPTION OF PROCEDURE:  The patient was brought to the operating room and was laid supine on the operating table.  The patient was identified by me and with placement of endotracheal tube, general anesthesia was obtained.  All pressure points were well padded.  His left chest area as well as the left neck and left parietal region of the head was visualized as his head was turned to the right, and a hair clipper was used to remove some hair over the palpable wire connector portion in his left lateral parietal and high parietal near the vertex area from his phase I surgery.  Two separate connectors could be palpated.  The area mentioned above were then prepped and draped in routine surgical fashion.  A time out was performed confirming the patient's identity, procedure to be performed, site and side of the surgery and the administration of preoperative prophylactic antibiotic.    At this time, a total of 20 mL of 1% Lidocaine with epinephrine and 1/4% Marcaine plain, 50:50 mix, was injected into the three intended incision sites, one over the left chest wall, several centimeters below the clavicle and one over the left lateral parietal and high parietal near the vertex areas, the recently placed left and right side DBS electrode connector locations, respectively.    Attention was first turned to the left lateral parietal area.  The recently placed left sided DBS electrode connector was palpable.  Using a skin knife, a linear incision of approximately 3 cm was made over the palpable connector.  The incision was deepened.  Then, using a mosquito forceps, the protective cover for  the left sided electrode connector was found.  This was slowly teased out of the incision with enough length of the wire for manipulation.    Attention was then turned to the left high parietal area near the vertex.  The recently placed right sided DBS electrode connector was palpable.  Using a skin knife, a linear incision of approximately 2 cm was made over the palpable connector.  The incision was deepened.  Then, using a mosquito forceps, the protective cover for the right sided electrode connector was found.  This was slowly teased out of the incision with enough length of the wire for manipulation.  Then, using a blunt dissection technique, the right sided electrode connector was tunneled from this incision to the earlier incision, the location of the left sided DBS electrode connector location.  The right sided DBS electrode connector was slowly pulled until enough slack was available and remainder of the electrode was hidden under the scalp at the high parietal incision.    Attention was then turned to the left chest wall area.  Using a skin knife, a linear incision of approximately 6 cm was made over the left chest wall.  The incision was then further deepened using a combination of sharp and blunt dissection technique until I reached a tissue plane that was anterior/superficial to the pectoralis muscle.  This plane was not too deep from the skin.  Then, using a blunt dissection technique, a pocket was created in an inferior direction over the left chest wall.  The pocket was then generously irrigated with antibiotic solution and meticulous hemostasis was obtained.  No active bleeding was noted.    Attention was then turned to the incision at the left lateral parietal area.  Using a blunt dissection technique, a passage was created in the subcutaneous/subgaleal space in the direction towards the neck/chest.  Then, using a tunneler, a passage was created from the left parietal incision down to the left chest  incision.  Care was taken to stay superficial and the path of the tunneler was confirmed to be over the clavicle.  The tunneler was removed, leaving behind a plastic sheath.  Two extension wires were passed from the head incision to the chest incision.  Subsequently, the plastic sheath was pulled out from the chest incision, leaving behind the tunneled extension wires.    The connection between the extension wires and the implanted left sided deep brain stimulator electrode was made in the following fashion.  The protective covering was removed from the connector portion of the left sided DBS electrode.  The contacts were inspected to be clean and without damage.  Then, the intracranial electrode connector was inserted into the extension wire connection.  The connection was secured with screwdriver.  Then, the extension wire was gently pulled from the chest incision and the excess wire length towards the head was also buried superiorly until the connector was within the incision.  Then, the connector was buried further superiorly in the incision until it was barely visible.    The connection between the extension wire and the implanted right sided deep brain stimulator electrode was made in the following fashion.  The protective covering was removed from the connector portion of the right sided DBS electrode. The contacts were inspected to be clean and without damage.  Then, the intracranial electrode connector was inserted into the extension wire connection.  The connection was secured with screwdriver.  Then, the extension wire was gently pulled from the chest incision and the excess wire length towards the head was also buried superiorly until the connector was within the incision.  Then, the connector was buried further superiorly in the incision until it was barely visible.  Relatively, the right side connection was located posterior to the left side connection.      At this time, a new generator/battery was  brought onto the field.  The extension wires were then cleaned at its contacts and inserted into the generator/battery portals.  The extension wire for the left sided implant was placed into the front connector portal and the extension wire for the right sided implant was placed into the back connector portal.  These were secured with a screwdriver.  Then, the new generator/battery with the excess wires being placed posterior to the generator/battery was placed within the left chest wall pocket that was created previously.  The entire apparatus fit into the pocket comfortably.  The generator/battery was anchored to the pocket using two 2-0 Ethibond sutures.    The system was tested electrically.  All the impedances of the left and right side stimulating electrodes were within normal.  No problems were found with the system.    With the satisfactory placement of the new system, I began closing the wound.  The left chest incision was closed in the following manner.  The deep pocket was reapproximated using 3-0 Vicryl sutures in an inverted interrupted fashion.  The dermal layer was reapproximated using 3-0 Vicryl sutures in an inverted interrupted fashion.  The skin was reapproximated using 4-0 Monocryl suture in a subcuticular fashion.  The left lateral parietal incision and high parietal incision were irrigated with antibiotic solution and dried.  Meticulous hemostasis was obtained.  They were then closed in the following manner.  The galea was reapproximated over the implanted hardware using 3-0 Vicryl sutures in an inverted interrupted fashion.  This provided a protective layer.  The skin was then reapproximated using 4-0 Monocryl suture in a running fashion.  All the wounds were cleaned and dried.  ChoraPrep was used to clean the incisions again.  Then, Exofin skin glue was applied.    At the end of the case, all counts including needle, sponge and instrument counts were correct x 2.  There were no complications.   Patient was extubated and taken to the recovery room in a stable condition.    IDavid M.D., Ph.D., Neurosurgery Attending, was present and scrubbed for the entire case and performed the entire the case.      DAVID BRADEN MD, PHD

## 2019-05-31 ENCOUNTER — PATIENT OUTREACH (OUTPATIENT)
Dept: NEUROSURGERY | Facility: CLINIC | Age: 74
End: 2019-05-31

## 2019-05-31 NOTE — PROGRESS NOTES
Left VM for pt in response to his earlier VM asking when he can return to his normal workouts. I requested that he call me b/c I want a little more information about his workouts before I respond. Left my direct number.     Pt called back and he let me know the following about his usual weight lifting workout: 60 lb arm pull-down; 90 lb chest pulls; 300 lb squats.     With any workout after surgery, one must ease slowly back into the routine. Start with low weight and lower reps since he has not been working out since 4/26/19. See how you feel and then gradually increase weight/reps over time. You risk injury if you start your weight routine where you left off before surgery. I will f/u with Dr. Briones to see if there are any other restrictions in place. Pt expressed understanding and is in agreement with plan.     Left pt a VM. Spoke with Dr. Briones and he said there are no activity restrictions at this point. However, as discussed on 5/31, pt should ease back into any physical activity, including lifting weights, as he has not worked out since his 4/26 surgery. Start with low weight, low reps and gradually increase as tolerated to avoid strain and injury. Left my direct number should pt have any questions.

## 2019-06-18 ENCOUNTER — TELEPHONE (OUTPATIENT)
Dept: NEUROLOGY | Facility: CLINIC | Age: 74
End: 2019-06-18

## 2019-06-18 NOTE — TELEPHONE ENCOUNTER
[facility] Call Center    Phone Message    May a detailed message be left on voicemail: no    Reason for Call: Other: Pt's son Jose called to rescheduled the Pt's appt on 6/28/19 w/ Alycia for DBS f/u. He wants to know if 6/26 is available or another day except that Friday. Please call Jose back.      Action Taken: Message routed to:  [facility] ([facility]): [facility]

## 2019-06-19 NOTE — TELEPHONE ENCOUNTER
[facility] Call Center    Phone Message    May a detailed message be left on voicemail: yes    Reason for Call: Other: Jose calling to state he doesn't want to reschedule appt anymore.      Action Taken: Message routed to:  [facility] ([facility]): [facility] neuro

## 2019-06-28 ENCOUNTER — OFFICE VISIT (OUTPATIENT)
Dept: NEUROLOGY | Facility: CLINIC | Age: 74
End: 2019-06-28
Payer: MEDICARE

## 2019-06-28 VITALS
HEART RATE: 41 BPM | HEIGHT: 71 IN | OXYGEN SATURATION: 99 % | SYSTOLIC BLOOD PRESSURE: 140 MMHG | WEIGHT: 179.6 LBS | BODY MASS INDEX: 25.15 KG/M2 | DIASTOLIC BLOOD PRESSURE: 76 MMHG

## 2019-06-28 DIAGNOSIS — G20.A1 PARKINSON'S DISEASE (H): Primary | ICD-10-CM

## 2019-06-28 DIAGNOSIS — Z96.89 S/P DEEP BRAIN STIMULATOR PLACEMENT: ICD-10-CM

## 2019-06-28 DIAGNOSIS — K11.7 SIALORRHEA: ICD-10-CM

## 2019-06-28 ASSESSMENT — MOVEMENT DISORDERS SOCIETY - UNIFIED PARKINSONS DISEASE RATING SCALE (MDS-UPDRS)
TURNING_IN_BED: NORMAL: NOT AT ALL (NO PROBLEMS).
DRESSING: NORMAL: NOT AT ALL (NO PROBLEMS).
TREMOR: SLIGHT: SHAKING OR TREMOR OCCURS BUT DOES NOT CAUSE PROBLEMS WITH ANY ACTIVITIES.
FREEZING: NORMAL: NOT AT ALL (NO PROBLEMS).
SALIVA_AND_DROOLING: MODERATE: I HAVE SOME DROOLING WHEN I AM AWAKE, BUT I USUALLY DO NOT NEED TISSUES OR A HANDKERCHIEF.
GETTING_OUT_OF_BED_CAR_DEEP_CHAIR: NORMAL: NOT AT ALL (NO PROBLEMS).
HYGIENE: NORMAL: NOT AT ALL (NO PROBLEMS).
TOTAL_SCORE: 4
SPEECH: NORMAL: NOT AT ALL (NO PROBLEMS).
EATING_TASKS: NORMAL: NOT AT ALL (NO PROBLEMS).
HANDWRITING: NORMAL: NOT AT ALL (NO PROBLEMS).
WALKING_AND_BALANCE: NORMAL: NOT AT ALL (NO PROBLEMS).
CHEWING_AND_SWALLOWING: NORMAL: NO PROBLEMS.
HOBBIES_AND_OTHER_ACTIVITIES: NORMAL:  NOT AT ALL (NO PROBLEMS).

## 2019-06-28 ASSESSMENT — UNIFIED PARKINSONS DISEASE RATING SCALE (UPDRS)
HANDMOVEMENTS_LEFT: SLIGHT: ANY OF THE FOLLOWING: A) THE REGULAR RHYTHM IS BROKEN WITH ONE WITH ONE OR TWO INTERRUPTIONS OR HESITATIONS OF THE MOVEMENT B) SLIGHT SLOWING C) THE AMPLITUDE DECREMENTS NEAR THE END OF THE 10 MOVEMENTS.
FREEZING_GAIT: NORMAL
CONSTANCY_TREMOR_ATREST: SLIGHT: TREMOR AT REST IS PRESENT  25% OF THE ENTIRE EXAMINATION PERIOD.
AMPLITUDE_LUE: NORMAL: NO TREMOR.
PRONATION_SUPINATION_RIGHT: SLIGHT: ANY OF THE FOLLOWING: A) THE REGULAR RHYTHM IS BROKEN WITH ONE WITH ONE OR TWO INTERRUPTIONS OR HESITATIONS OF THE MOVEMENT B) SLIGHT SLOWING C) THE AMPLITUDE DECREMENTS NEAR THE END OF THE 10 MOVEMENTS.
AMPLITUDE_LLE: NORMAL: NO TREMOR.
TOTAL_SCORE: 6
PARKINSONS_MEDS: ON
ARISING_CHAIR: NORMAL: ABLE TO ARISE QUICKLY WITHOUT HESITATION.
RIGIDITY_RLE: NORMAL
SPONTANEITY_OF_MOVEMENT: 1: SLIGHT: SLIGHT GLOBAL SLOWNESS AND POVERTY OF SPONTANEOUS MOVEMENTS.
POSTURE: 1 SLIGHT.  NOT QUITE ERECT BUT COULD BE NORMAL FOR OLDER PERSONS.
HANDMOVEMENTS_RIGHT: SLIGHT: ANY OF THE FOLLOWING: A) THE REGULAR RHYTHM IS BROKEN WITH ONE WITH ONE OR TWO INTERRUPTIONS OR HESITATIONS OF THE MOVEMENT B) SLIGHT SLOWING C) THE AMPLITUDE DECREMENTS NEAR THE END OF THE 10 MOVEMENTS.
FACIAL_EXPRESSION: MODERATE: MASKED FACIES WITH LIPS PARTED SOME OF THE TIME WHEN THE MOUTH IS AT REST.
AXIAL_SCORE: 8
TOTAL_SCORE: 18
RIGIDITY_RUE: SLIGHT: RIGIDITY ONLY DETECTED WITH ACTIVATION MANEUVER.
POSTURAL_STABILITY: NORMAL:  RECOVERS WITH ONE OR TWO STEPS.
TOTAL_SCORE_LEFT: 3
FINGER_TAPPING_LEFT: NORMAL
RIGIDITY_LUE: NORMAL
AMPLITUDE_LIP_JAW: NORMAL: NO TREMOR.
AMPLITUDE_RUE: MILD > 1 CM BUT < 3 CM IN MAXIMAL AMPLITUDE.
AMPLITUDE_RLE: NORMAL: NO TREMOR.
RIGIDITY_NECK: SLIGHT: RIGIDITY ONLY DETECTED WITH ACTIVATION MANEUVER.
FINGER_TAPPING_RIGHT: NORMAL
SPEECH: MILD: LOSS OF MODULATION, DICTION OR VOLUME, WITH A FEW WORDS UNCLEAR, BUT THE OVERALL SENTENCES EASY TO FOLLOW.
LEG_AGILITY_RIGHT: SLIGHT: ANY OF THE FOLLOWING: A) THE REGULAR RHYTHM IS BROKEN WITH ONE WITH ONE OR TWO INTERRUPTIONS OR HESITATIONS OF THE MOVEMENT B) SLIGHT SLOWING C) THE AMPLITUDE DECREMENTS NEAR THE END OF THE 10 MOVEMENTS.
RIGIDITY_LLE: NORMAL
PRONATION_SUPINATION_LEFT: SLIGHT: ANY OF THE FOLLOWING: A) THE REGULAR RHYTHM IS BROKEN WITH ONE WITH ONE OR TWO INTERRUPTIONS OR HESITATIONS OF THE MOVEMENT B) SLIGHT SLOWING C) THE AMPLITUDE DECREMENTS NEAR THE END OF THE 10 MOVEMENTS.
GAIT: NORMAL
TOETAPPING_LEFT: NORMAL
LEG_AGILITY_LEFT: SLIGHT: ANY OF THE FOLLOWING: A) THE REGULAR RHYTHM IS BROKEN WITH ONE WITH ONE OR TWO INTERRUPTIONS OR HESITATIONS OF THE MOVEMENT B) SLIGHT SLOWING C) THE AMPLITUDE DECREMENTS NEAR THE END OF THE 10 MOVEMENTS.
TOETAPPING_RIGHT: NORMAL

## 2019-06-28 ASSESSMENT — PAIN SCALES - GENERAL: PAINLEVEL: NO PAIN (0)

## 2019-06-28 ASSESSMENT — MIFFLIN-ST. JEOR: SCORE: 1591.79

## 2019-06-28 NOTE — PATIENT INSTRUCTIONS
June 28, 2019    Dear Mr. Ace Navarro,    Thank you for coming today.  During your visit, we have discussed the following:     __ Your DBS electrical system function (impedance) is normal. The battery life is also good.      Left Brain (Right Body)    Amplitude:  2.80 mA   Right Brain (Left Body)    Amplitude:  2.40 mA       Patient :  Upper Limit: 3.00 mA  Lower Limit: 2.00 mA     Patient :  Upper Limit: 2.80 mA  Lower Limit: 1.50 mA     __  If tremor is still well controlled, reduce your Medication to 2.5 tablets 3 x a day.    PD Medications 7 am 1 pm 9 pm 11 pm   Sinemet 25/100 mg  2.5 2.5 2.5     Sinemet 50/200 mg        2   Pramipexole 0.25 mg      2          __  I'll have one of the nurses reach out to you in 2 weeks to check in.    __  If your tremor gets worse, increase your current/amplituder by 0.2 mA.    __ Please call if your symptoms worsen or with any questions.     __ For your subsequent DBS programming visits, come ON medication.     __  Referral to see Dr. Godinez for Botox injection to improve drooling.     To contact our clinic, you may call [phone] or use [employer] message.     It's good to see you!  I'll see you in 2 months.      NICK Ford, CNP  [facility]

## 2019-06-28 NOTE — LETTER
"2019       RE: Ace Navarro  [address] [facility] [address]     Dear Colleague,    Thank you for referring your patient, Ace Navarro, to the [facility] NEUROLOGY at [facility]. Please see a copy of my visit note below.    MOVEMENT DISORDERS CLINIC    PATIENT: Ace Navarro    : 1945    NORA: 2019    REASON FOR VISIT: Deep Brain Stimulation (DBS) Programming follow up for  Parkinson's disease.    HPI:  Mr. Ace Navarro is a 73 year old right - handed  male who came to the [facility] neurology clinic accompanied by his son for DBS programming optimization.  His last DBS programming was on 2019, which was for monopolar review and initial programming.      Since his DBS programming, he might have a couple of incidents of dyskinesias; otherwise, no problems with it.  He has reduced Sinemet 25/100 mg from 4 tabs TID to 3 tabs TID.  Overall, he reports that tremors have improved; he might have about 6 - 7 episodes in both hands; Lt > Rt.  His son has also noticed that his tremors have improved quite a bit.  Pt/son haven't made any changes on his DBS.     He reports some balance problems the first 2 weeks after programming, which have improved.   Denies mood issues.  Memory is about the same.  His son reports that he had noticed some memory changes soon after the surgery, but now patient is back to baseline.     He reports his constipation has \"gone away.\"  He wondered if DBS improves constipation.    He has continued playing softball.  He has a tournament today.     Pre-programming antiparkinsonian medications:     PD Medications 7 am 1 pm 9 pm 11 pm   Sinemet 25/100 mg  3 3 3     Sinemet 50/200 mg        2   Pramipexole 0.25 mg      2        Last antiparkinsonian dose taken:   This moring at 5 am.      He reports having moderate excessive saliva and drooling that is bothersome.    PHYSICAL EXAM:    Vital Signs:  " "Blood pressure 140/76, pulse (!) 41, height 1.803 m (5' 11\"), weight 81.5 kg (179 lb 9.6 oz), SpO2 99 %. Body mass index is 25.05 kg/m .    General:  Mr. Navarro is a pleasant  male who is well-groomed and well-developed sitting comfortably in the exam room without any distress.  Affect is appropriate.    MDS Part II  -- Over the last week -- 0: Normal -- 1: Slight -- 2: Mild -- 3: Moderate -- 4: Severe     2.1 Speech: 0   2.2 Saliva and drooling: 3   2.3 Chewing and swallowin   2.4 Eating tasks: 0   2.5 Dressin   2.6 Hygiene:  0   2.7 Handwritin   2.8 Doing hobbies and other activities:  0   2.9 Turning in bed:  0   2.10 Tremor:  1   2.11 Getting out of bed/car/deep chair:   0   2.12 Walking and balance: 0   2.13 Freezin     Total:    4         MOVEMENT DISORDERS ASSESSMENT:    UPDRS 3   UPDRS Values 2019   Time: 9:30 AM   Medication On   R Brain DBS: On   L Brain DBS: On   Speech 2   Facial Expression 3   Rigidity Neck 1   Rigidity RUE 1   Rigidity LUE 0   Rigidity RLE 0   Rigidity LLE 0   Finger Taps R 0   Finger Taps L 0   Hand Mvt R 1   Hand Mvt L 1   Pron-/Supinate R 1   Pron-/Supinate L 1   Toe Tap R 0   Toe Tap L 0   Leg Agility R 1   Leg Agility L 1   Arise From Chair 0   Gait 0   Gait Freezing 0   Postural Stability 0   Posture 1   Global Spont Mvt 1   Postural Tremor RUE 0   Postural Tremor LUE 0   Kinetic Tremor RUE 0   Kinetic Tremor LUE 0   Rest Tremor RUE 2   Rest Tremor LUE 0   Rest Tremor RLE 0   Rest Tremor LLE 0   Rest Tremor Lip/Jaw 0   Rest Tremor Constancy 1   Total Right 6   Total Left 3   Axial Total 8   Total 18       DBS Interrogation & Analysis:    Implanted generator:  Left chest Infinity 7  Lead placement:  Bilateral Subthalamic Nucleus (STN)  Bilateral lead placement date:  2019  Bilateral generator placement date:  5/10/2019     Battery Service Life:  OK.    2.98 volts.     Left Brain    C +, 3abc -  Amplitude:  2.50 Amplitude  PW:  60 msec  Rate:  " 130 Hz     Therapy impedance:   937 Ohms    Patient :  Upper Limit: 2.50 mA  Lower Limit: 0.00 mA    Electrode Impedance Check:    Left Lead: No Problems Found.       Right Brain     C +, 10abc -  Amplitude:  2.00 mA  PW:  60 msec  Rate:  130 Hz     Therapy impedance:   775 Ohms    Patient :  Upper Limit: 2.00 mA  Lower Limit: 0.00 mA    Electrode Impedance Check:  Right Lead: No Problems Found.      See scanned report for impedance details.      DBS PROGRAMMING:    Left STN  Current increased to 2.80 mA    Patient  adjusted as below: -  Upper Limit:  3.00 mA  Lower Limit:  2.00 mA      Right STN  Current increased to 2.4 mA    Patient  adjusted as below: -  Upper Limit:  2.80 mA  Lower Limit:  1.50 mA    Final Settings:     Left STN    C +, 3abc -  Amplitude:  2. 80 Amplitude  PW:  60 msec  Rate:  130 Hz     Therapy impedance:   937 Ohms   Right STN    C +, 10abc -  Amplitude:  2. 40 mA  PW:  60 msec  Rate:  130 Hz     Therapy impedance:   775 Ohms     Patient :  Upper Limit:  3.00 mA  Lower Limit:  2.00 mA     Patient :  Upper Limit:  2.80 mA  Lower Limit:  1.50 mA       ASSESSMENT/PLAN:    Parkinson's Disease:  Mr. Ace Navarro is a 73 year old right - handed  male with a 9 year Hx of tremor-dominant PD who came to the clinic for DBS programming optimization visit.  Overall, his tremors have improved with current DBS and antiparkinsonian medications.   The plan is to optimize DBS and slowly reduce Sinemet.    __  DBS programming completed as above.    __  Informed that DBS usually improves motor symptoms and not non-motor symptoms like constipation or sleep issues.     __  He was informed that if tremors continue to be well controlled, to reduce his Sinemet dose from 3 tabs to 2.5 tablets 3 x a day.    PD Medications 7 am 1 pm 9 pm 11 pm   Sinemet 25/100 mg  2.5 2.5 2.5     Sinemet 50/200 mg        2   Pramipexole 0.25 mg      2           __  He was informed that I'll have one of the nurses reach out to him in 2 weeks for an update.    __  If tremors get worse, he was instructed to increase his DBS current/amplitude by 0.2 mA.  Range was given.  Patient and son were shown how to turn DBS on/off and adjust amplitude.     __  He'll call if symptoms worsen or with any questions.     __  Referral to see Dr. Godinez for Botox injection to improve sialorrhea.     __  Will return in 2 months for a follow up and DBS optimization.     The total amount of time spent with the patient was 70 minutes; 45 min in DBS programming and 25 min in addressing PD, constipation, and & drooling.  Over 50% of the time was spent in counseling & coordination of care.      NICK Ford, CNP  [facility]     9:07 AM  10:17 AM

## 2019-06-28 NOTE — PROGRESS NOTES
"MOVEMENT DISORDERS CLINIC    PATIENT: Ace Navarro    : 1945    NORA: 2019    REASON FOR VISIT: Deep Brain Stimulation (DBS) Programming follow up for  Parkinson's disease.    HPI:  Mr. Ace Navarro is a 73 year old right - handed  male who came to the [facility] neurology clinic accompanied by his son for DBS programming optimization.  His last DBS programming was on 2019, which was for monopolar review and initial programming.      Since his DBS programming, he might have a couple of incidents of dyskinesias; otherwise, no problems with it.  He has reduced Sinemet 25/100 mg from 4 tabs TID to 3 tabs TID.  Overall, he reports that tremors have improved; he might have about 6 - 7 episodes in both hands; Lt > Rt.  His son has also noticed that his tremors have improved quite a bit.  Pt/son haven't made any changes on his DBS.     He reports some balance problems the first 2 weeks after programming, which have improved.   Denies mood issues.  Memory is about the same.  His son reports that he had noticed some memory changes soon after the surgery, but now patient is back to baseline.     He reports his constipation has \"gone away.\"  He wondered if DBS improves constipation.    He has continued playing softball.  He has a tournament today.     Pre-programming antiparkinsonian medications:     PD Medications 7 am 1 pm 9 pm 11 pm   Sinemet 25/100 mg  3 3 3     Sinemet 50/200 mg        2   Pramipexole 0.25 mg      2        Last antiparkinsonian dose taken:  This moring at 5 am.      He reports having moderate excessive saliva and drooling that is bothersome.    PHYSICAL EXAM:    Vital Signs:  Blood pressure 140/76, pulse (!) 41, height 1.803 m (5' 11\"), weight 81.5 kg (179 lb 9.6 oz), SpO2 99 %. Body mass index is 25.05 kg/m .    General:  Mr. Navarro is a pleasant  male who is well-groomed and well-developed sitting comfortably in the exam room without any distress.  " Affect is appropriate.    MDS Part II  -- Over the last week -- 0: Normal -- 1: Slight -- 2: Mild -- 3: Moderate -- 4: Severe     2.1 Speech: 0   2.2 Saliva and drooling: 3   2.3 Chewing and swallowin   2.4 Eating tasks: 0   2.5 Dressin   2.6 Hygiene:  0   2.7 Handwritin   2.8 Doing hobbies and other activities:  0   2.9 Turning in bed:  0   2.10 Tremor:  1   2.11 Getting out of bed/car/deep chair:   0   2.12 Walking and balance: 0   2.13 Freezin     Total:    4         MOVEMENT DISORDERS ASSESSMENT:    UPDRS 3   UPDRS Values 2019   Time: 9:30 AM   Medication On   R Brain DBS: On   L Brain DBS: On   Speech 2   Facial Expression 3   Rigidity Neck 1   Rigidity RUE 1   Rigidity LUE 0   Rigidity RLE 0   Rigidity LLE 0   Finger Taps R 0   Finger Taps L 0   Hand Mvt R 1   Hand Mvt L 1   Pron-/Supinate R 1   Pron-/Supinate L 1   Toe Tap R 0   Toe Tap L 0   Leg Agility R 1   Leg Agility L 1   Arise From Chair 0   Gait 0   Gait Freezing 0   Postural Stability 0   Posture 1   Global Spont Mvt 1   Postural Tremor RUE 0   Postural Tremor LUE 0   Kinetic Tremor RUE 0   Kinetic Tremor LUE 0   Rest Tremor RUE 2   Rest Tremor LUE 0   Rest Tremor RLE 0   Rest Tremor LLE 0   Rest Tremor Lip/Jaw 0   Rest Tremor Constancy 1   Total Right 6   Total Left 3   Axial Total 8   Total 18       DBS Interrogation & Analysis:    Implanted generator:  Left chest Infinity 7  Lead placement:  Bilateral Subthalamic Nucleus (STN)  Bilateral lead placement date:  2019  Bilateral generator placement date:  5/10/2019     Battery Service Life:  OK.  2.98 volts.     Left Brain    C +, 3abc -  Amplitude:  2.50 Amplitude  PW:  60 msec  Rate:  130 Hz     Therapy impedance:  937 Ohms    Patient :  Upper Limit: 2.50 mA  Lower Limit: 0.00 mA    Electrode Impedance Check:    Left Lead: No Problems Found.       Right Brain     C +, 10abc -  Amplitude:  2.00 mA  PW:  60 msec  Rate:  130 Hz     Therapy impedance:  775  Ohms    Patient :  Upper Limit: 2.00 mA  Lower Limit: 0.00 mA    Electrode Impedance Check:  Right Lead: No Problems Found.      See scanned report for impedance details.      DBS PROGRAMMING:    Left STN  Current increased to 2.80 mA    Patient  adjusted as below: -  Upper Limit: 3.00 mA  Lower Limit: 2.00 mA      Right STN  Current increased to 2.4 mA    Patient  adjusted as below: -  Upper Limit: 2.80 mA  Lower Limit: 1.50 mA    Final Settings:     Left STN    C +, 3abc -  Amplitude:  2.80 Amplitude  PW:  60 msec  Rate:  130 Hz     Therapy impedance:  937 Ohms   Right STN    C +, 10abc -  Amplitude:  2.40 mA  PW:  60 msec  Rate:  130 Hz     Therapy impedance:  775 Ohms     Patient :  Upper Limit: 3.00 mA  Lower Limit: 2.00 mA     Patient :  Upper Limit: 2.80 mA  Lower Limit: 1.50 mA       ASSESSMENT/PLAN:    Parkinson's Disease:  Mr. Ace Navarro is a 73 year old right - handed  male with a 9 year Hx of tremor-dominant PD who came to the clinic for DBS programming optimization visit.  Overall, his tremors have improved with current DBS and antiparkinsonian medications.   The plan is to optimize DBS and slowly reduce Sinemet.    __  DBS programming completed as above.    __  Informed that DBS usually improves motor symptoms and not non-motor symptoms like constipation or sleep issues.     __  He was informed that if tremors continue to be well controlled, to reduce his Sinemet dose from 3 tabs to 2.5 tablets 3 x a day.    PD Medications 7 am 1 pm 9 pm 11 pm   Sinemet 25/100 mg  2.5 2.5 2.5     Sinemet 50/200 mg        2   Pramipexole 0.25 mg      2          __  He was informed that I'll have one of the nurses reach out to him in 2 weeks for an update.    __  If tremors get worse, he was instructed to increase his DBS current/amplitude by 0.2 mA.  Range was given.  Patient and son were shown how to turn DBS on/off and adjust amplitude.     __   He'll call if symptoms worsen or with any questions.     __  Referral to see Dr. Godinez for Botox injection to improve sialorrhea.     __  Will return in 2 months for a follow up and DBS optimization.     The total amount of time spent with the patient was 70 minutes; 45 min in DBS programming and 25 min in addressing PD, constipation, and & drooling.  Over 50% of the time was spent in counseling & coordination of care.      NICK Ford, CNP  [facility] Neurology [facility]     9:07 AM  10:17 AM

## 2019-07-05 ENCOUNTER — TELEPHONE (OUTPATIENT)
Dept: NEUROLOGY | Facility: CLINIC | Age: 74
End: 2019-07-05

## 2019-07-05 DIAGNOSIS — G20.A1 PARKINSON DISEASE (H): ICD-10-CM

## 2019-07-05 RX ORDER — PRAMIPEXOLE DIHYDROCHLORIDE 0.25 MG/1
0.5 TABLET ORAL AT BEDTIME
Qty: 180 TABLET | Refills: 1 | Status: CANCELLED | OUTPATIENT
Start: 2019-07-05

## 2019-07-05 NOTE — TELEPHONE ENCOUNTER
Health Call Center    Phone Message    May a detailed message be left on voicemail: yes    Reason for Call: Medication Refill Request    Has the patient contacted the pharmacy for the refill? Yes   Name of medication being requested: pramipexole (MIRAPEX) 0.25 MG tablet  Provider who prescribed the medication: Dr Godinez  Pharmacy: [id number] - [place], [place] - [address]  Date medication is needed: Today - out of medication     Pt said that he reached out to the pharmacy on Monday and they have attempted to request. Please process as soon as you can. Thank you.      Action Taken: Message routed to:  [facility] ([facility]):  Neurology

## 2019-07-05 NOTE — TELEPHONE ENCOUNTER
Nurse received a message from Tammie Rider, RN stating Gordon called left her a message saying he is out of his pramipexole.    Nurse received refill request for:   pramipexole (MIRAPEX) 0.25 MG tablet 180 tablet 1 1/4/2019  No   Sig - Route: Take 2 tablets (0.5 mg) by mouth At Bedtime - Oral     Pharmacy: [facility]    Last re-ordered: 1/4/2019    Last appointment: 6/28/2019    Action taken: Sent to be signed by the provider.    Called Ace and asked which pharmacy he would like to use.   Ace would like t use [facility] in [place].    Are you experiencing any of the following:  Excessive Shopping, Gambling, hypersexuality, or excessive pornographic use? No  Sleep Attacks? No

## 2019-07-05 NOTE — TELEPHONE ENCOUNTER
Last Written Prescription Date: 1/4/19  Last Fill Quantity: 180 tabs  Last office:6/28/19  Future Office Visit:f/u 20 months

## 2019-07-09 ENCOUNTER — TELEPHONE (OUTPATIENT)
Dept: NEUROLOGY | Facility: CLINIC | Age: 74
End: 2019-07-09

## 2019-07-09 ENCOUNTER — OFFICE VISIT (OUTPATIENT)
Dept: NEUROLOGY | Facility: CLINIC | Age: 74
End: 2019-07-09
Payer: MEDICARE

## 2019-07-09 VITALS
BODY MASS INDEX: 25.05 KG/M2 | WEIGHT: 178.9 LBS | OXYGEN SATURATION: 98 % | DIASTOLIC BLOOD PRESSURE: 69 MMHG | HEART RATE: 41 BPM | HEIGHT: 71 IN | SYSTOLIC BLOOD PRESSURE: 98 MMHG

## 2019-07-09 DIAGNOSIS — G20.A1 PARKINSON'S DISEASE (H): ICD-10-CM

## 2019-07-09 DIAGNOSIS — K11.7 DISTURBANCE OF SALIVARY SECRETION: Primary | ICD-10-CM

## 2019-07-09 DIAGNOSIS — G20.A1 PARKINSON DISEASE (H): ICD-10-CM

## 2019-07-09 RX ORDER — CARBIDOPA AND LEVODOPA 25; 100 MG/1; MG/1
2 TABLET ORAL 3 TIMES DAILY
Qty: 360 TABLET | Refills: 11 | COMMUNITY
Start: 2019-07-09 | End: 2019-11-22

## 2019-07-09 ASSESSMENT — ENCOUNTER SYMPTOMS
DECREASED APPETITE: 1
HEMATURIA: 0
SPEECH CHANGE: 1
POLYDIPSIA: 0
FEVER: 0
NAIL CHANGES: 0
HALLUCINATIONS: 1
LOSS OF CONSCIOUSNESS: 0
TROUBLE SWALLOWING: 0
NIGHT SWEATS: 0
TINGLING: 0
CHILLS: 1
SINUS CONGESTION: 0
FLANK PAIN: 0
FATIGUE: 1
INCREASED ENERGY: 1
DYSURIA: 1
NECK MASS: 0
DOUBLE VISION: 0
INSOMNIA: 1
DECREASED CONCENTRATION: 0
SKIN CHANGES: 1
NERVOUS/ANXIOUS: 0
ALTERED TEMPERATURE REGULATION: 0
DISTURBANCES IN COORDINATION: 0
HOARSE VOICE: 1
WEIGHT LOSS: 1
EYE PAIN: 0
SORE THROAT: 0
DIZZINESS: 0
EYE WATERING: 0
SEIZURES: 0
TASTE DISTURBANCE: 0
DEPRESSION: 0
SMELL DISTURBANCE: 0
HEADACHES: 0
MEMORY LOSS: 1
SINUS PAIN: 0
WEIGHT GAIN: 0
PANIC: 0

## 2019-07-09 ASSESSMENT — PAIN SCALES - GENERAL: PAINLEVEL: NO PAIN (0)

## 2019-07-09 ASSESSMENT — MIFFLIN-ST. JEOR: SCORE: 1588.62

## 2019-07-09 NOTE — TELEPHONE ENCOUNTER
pramipexole (MIRAPEX) 0.25 MG tablet 180 tablet 1 1/4/2019  No   Sig - Route: Take 2 tablets (0.5 mg) by mouth At Bedtime - Oral     Last re-ordered: 1/4/2019    Last appointment: 7/9/2019    Action taken: Sent to be signed by the provider.

## 2019-07-09 NOTE — PROGRESS NOTES
"Movement Disorder Clinic follow up note    Patient: Ace Navarro  Medical Record Number: [id number]  Encounter Date: July 9, 2019  PCP:Maurilio Bonilla    CC: Parkinson's disease    Impression:  1.  Idiopathic Parkinson's disease  2.  Status post bilateral STN DBS under anesthesia-5/10/2019  3.  Excellent response to STN DBS  4.  Persistent sialorrhea    Recommendations:  1.  The patient is very pleased with his response to DBS and Ms. or cuts programming.  His only complaint is mild dysarthria.  Cognitive function has returned to baseline.  Gait is normal and he is playing softball again.  2.  He would like to try Myobloc for his persistent sialorrhea.  I have ordered this and once a preapproved we will go ahead and do the injections.  The procedure and the risk of thickening of his saliva were explained to the patient.    Return to clinic: The patient has an appointment in late August for the Myobloc injection.    Interval Hx:: Mr. Ace Navarro returns to clinic today for follow-up of his Parkinson's disease.  He is a longtime patient who is beginning to experience disabling motor fluctuation and persistent tremor.  He tried having DBS awake but had severe confusion and panic.  After long discussion it was decided to do the DBS bilaterally under general anesthesia.  This was performed by Dr. Humberto Briones on May 10, 2019.  The patient has had 2 programming sessions with Ms. Tong on 5/24/2019 and 6/18/2019.  At first he was having some gait problems but this cleared up.  He was a bit confused after surgery but this cleared up rapidly.  The patient had some dyskinesia early on but again this is much less common he says.  He has not had tremor since the surgery.  He says he has no fluctuation at this time.  He awakens feeling strong and not having the morning \"off time\" that was bothersome to him.  He does say that his speech is a bit slurred and at times when he talks he can understand what he " "saying.  He has where hallucination where he sees an animal around his mailbox.  He is living independently.  He is watched over by his ex-wife, Ortiz, who is moving to [place] and by his 4 sons.    He is back to playing softball.  He is doing well and said he went 3 for 3 the other night.  He is having no gait problems.    He is taking carbidopa/levodopa, 25/100, IR, 2-1/2 tablets 3 times a day.  This is down from 4 tablets preoperatively.  He feels no worse on the reduced dosage.  He does not want to reduce further however.  He is taking long-acting carbidopa levodopa, 50/200, 2 at bedtime.  For his restless legs he takes pramipexole 0.5 mg 1 tablet at bedtime.  He just has 1 hour of restlessness as he waits for the medicine to kick in.    Current medications    Movement Disorder-related Medications                   AM noon PM Bedtime    Carbidopa/levodopa, 25/100, IR                                                2.5 2.5 2.5     Carbidopa/levodopa, 50/200, ER                            1    Pramipexole 0.5 mg                             1              Current Outpatient Medications   Medication     acetaminophen (TYLENOL) 325 MG tablet     carbidopa-levodopa (SINEMET CR)  MG per CR tablet     carbidopa-levodopa (SINEMET)  MG tablet     folic acid 20 MG CAPS     multivitamin, therapeutic with minerals (MULTI-VITAMIN) TABS tablet     pramipexole (MIRAPEX) 0.25 MG tablet     senna-docusate (SENOKOT-S/PERICOLACE) 8.6-50 MG tablet     sulfaSALAzine (AZULFIDINE) 500 MG tablet     Current Facility-Administered Medications   Medication     botulinum toxin type B (MYOBLOC) Solution 2,500 Units       Examination:  BP 98/69 (BP Location: Left arm, Patient Position: Sitting, Cuff Size: Adult Regular)   Pulse (!) 41   Ht 1.803 m (5' 11\")   Wt 81.1 kg (178 lb 14.4 oz)   SpO2 98%   BMI 24.95 kg/m    General- no distress, no rash, good pulses, no edema  Respiratory-auscultation over the anterior lung fields is " clear  Cardiac- regular rate and rhythm    Neurologic  Mental status  Patient is alert, appropriate, speech is fluent and comprehension is intact    Cranial nerve testing  Pupils are equal and reactive to light, visual fields are full to confrontation bilaterally  Extraocular movements are full  Facial sensation is intact, face is symmetric with rest and activation  Palate rises symmetrically, tongue protrudes at midline with normal movements  Sterocleidomastoid and trapezius strength is normal and symmetric    Motor  Bulk and tone are normal  Strength is 5/5 shoulder abduction, finger abduction, arm flexion and extension, hip flexion, plantar and dorsiflexion    Sensation  Intact to pin, vibration   Proprioception intact in great toes and fingers    Tendon reflexes  Testing at brachioradialis, biceps, triceps, patella and achilles bilaterally showed normal reflexes, symmetrically, without Babinski or Zuluaga signs    Coordination  Finger nose finger testing: normalRapid alternating movements are normal.  Gait and Station: normal    25 minutes of total time was spent face-to-face with the patient over 50% of which was counseling..    Answers for HPI/ROS submitted by the patient on 7/9/2019   General Symptoms: Yes  Skin Symptoms: Yes  HENT Symptoms: Yes  EYE SYMPTOMS: Yes  HEART SYMPTOMS: No  LUNG SYMPTOMS: No  INTESTINAL SYMPTOMS: No  URINARY SYMPTOMS: Yes  REPRODUCTIVE SYMPTOMS: No  SKELETAL SYMPTOMS: No  BLOOD SYMPTOMS: No  NERVOUS SYSTEM SYMPTOMS: Yes  MENTAL HEALTH SYMPTOMS: Yes  Fever: No  Loss of appetite: Yes  Weight loss: Yes  Weight gain: No  Fatigue: Yes  Night sweats: No  Chills: Yes  Increased stress: No  Excessive thirst: No  Feeling hot or cold when others believe the temperature is normal: No  Loss of height: Yes  Post-operative complications: No  Surgical site pain: No  Hallucinations: Yes  Change in or Loss of Energy: Yes  Hyperactivity: No  Changes in hair: No  Changes in moles/birth marks:  Yes  Itching: No  Rashes: No  Changes in nails: No  Acne: No  Change in facial hair: No  Warts: No  Ear pain: No  Ear discharge: No  Hearing loss: No  Tinnitus: No  Nosebleeds: No  Congestion: No  Sinus pain: No  Trouble swallowing: No   Voice hoarseness: Yes  Mouth sores: No  Sore throat: No  Tooth pain: No  Gum tenderness: No  Bleeding gums: No  Change in taste: No  Change in sense of smell: No  Dry mouth: No  Neck lump: No  Eye pain: No  Vision loss: No  Dry eyes: Yes  Watery eyes: No  Eye bulging: No  Double vision: No  Flashing of lights: No  Spots: No  Trouble holding urine or incontinence: Yes  Pain or burning: Yes  Trouble starting or stopping: No  Increased frequency of urination: Yes  Blood in urine: No  Decreased frequency of urination: No  Frequent nighttime urination: No  Flank pain: No  Trouble with coordination: No  Dizziness or trouble with balance: No  Fainting or black-out spells: No  Memory loss: Yes  Headache: No  Seizures: No  Speech problems: Yes  Tingling: No  Nervous or Anxious: No  Depression: No  Trouble sleeping: Yes  Trouble thinking or concentrating: No  Mood changes: No  Panic attacks: No

## 2019-07-09 NOTE — LETTER
7/9/2019       RE: Ace Navarro  [address] [facility] [address]     Dear Colleague,    Thank you for referring your patient, Ace Navarro, to the [facility] NEUROLOGY at [facility]. Please see a copy of my visit note below.    Movement Disorder Clinic follow up note    Patient: Ace Navarro  Medical Record Number: [id number]  Encounter Date: July 9, 2019  PCP:Maurilio Bonilla    CC: Parkinson's disease    Impression:  1.  Idiopathic Parkinson's disease  2.  Status post bilateral STN DBS under anesthesia-5/10/2019  3.  Excellent response to STN DBS  4.  Persistent sialorrhea    Recommendations:  1.  The patient is very pleased with his response to DBS and Ms. or cuts programming.  His only complaint is mild dysarthria.  Cognitive function has returned to baseline.  Gait is normal and he is playing softball again.  2.  He would like to try Myobloc for his persistent sialorrhea.  I have ordered this and once a preapproved we will go ahead and do the injections.  The procedure and the risk of thickening of his saliva were explained to the patient.    Return to clinic: The patient has an appointment in late August for the Myobloc injection.    Interval Hx:: Mr. Ace Navarro returns to clinic today for follow-up of his Parkinson's disease.  He is a longtime patient who is beginning to experience disabling motor fluctuation and persistent tremor.  He tried having DBS awake but had severe confusion and panic.  After long discussion it was decided to do the DBS bilaterally under general anesthesia.  This was performed by Dr. Humberto Briones on May 10, 2019.  The patient has had 2 programming sessions with Ms. Mistryga on 5/24/2019 and 6/18/2019.  At first he was having some gait problems but this cleared up.  He was a bit confused after surgery but this cleared up rapidly.  The patient had some dyskinesia early on but again this is much less common he  "says.  He has not had tremor since the surgery.  He says he has no fluctuation at this time.  He awakens feeling strong and not having the morning \"off time\" that was bothersome to him.  He does say that his speech is a bit slurred and at times when he talks he can understand what he saying.  He has where hallucination where he sees an animal around his mailbox.  He is living independently.  He is watched over by his ex-wife, Ortiz, who is moving to [place] and by his 4 sons.    He is back to playing softball.  He is doing well and said he went 3 for 3 the other night.  He is having no gait problems.    He is taking carbidopa/levodopa, 25/100, IR, 2-1/2 tablets 3 times a day.  This is down from 4 tablets preoperatively.  He feels no worse on the reduced dosage.  He does not want to reduce further however.  He is taking long-acting carbidopa levodopa, 50/200, 2 at bedtime.  For his restless legs he takes pramipexole 0.5 mg 1 tablet at bedtime.  He just has 1 hour of restlessness as he waits for the medicine to kick in.    Current medications    Movement Disorder-related Medications                   AM noon PM Bedtime    Carbidopa/levodopa, 25/100, IR                                                2.5 2.5 2.5     Carbidopa/levodopa, 50/200, ER                            1    Pramipexole 0.5 mg                             1              Current Outpatient Medications   Medication     acetaminophen (TYLENOL) 325 MG tablet     carbidopa-levodopa (SINEMET CR)  MG per CR tablet     carbidopa-levodopa (SINEMET)  MG tablet     folic acid 20 MG CAPS     multivitamin, therapeutic with minerals (MULTI-VITAMIN) TABS tablet     pramipexole (MIRAPEX) 0.25 MG tablet     senna-docusate (SENOKOT-S/PERICOLACE) 8.6-50 MG tablet     sulfaSALAzine (AZULFIDINE) 500 MG tablet     Current Facility-Administered Medications   Medication     botulinum toxin type B (MYOBLOC) Solution 2,500 Units       Examination:  BP 98/69 (BP " "Location: Left arm, Patient Position: Sitting, Cuff Size: Adult Regular)   Pulse (!) 41   Ht 1.803 m (5' 11\")   Wt 81.1 kg (178 lb 14.4 oz)   SpO2 98%   BMI 24.95 kg/m     General- no distress, no rash, good pulses, no edema  Respiratory-auscultation over the anterior lung fields is clear  Cardiac- regular rate and rhythm    Neurologic  Mental status  Patient is alert, appropriate, speech is fluent and comprehension is intact    Cranial nerve testing  Pupils are equal and reactive to light, visual fields are full to confrontation bilaterally  Extraocular movements are full  Facial sensation is intact, face is symmetric with rest and activation  Palate rises symmetrically, tongue protrudes at midline with normal movements  Sterocleidomastoid and trapezius strength is normal and symmetric    Motor  Bulk and tone are normal  Strength is 5/5 shoulder abduction, finger abduction, arm flexion and extension, hip flexion, plantar and dorsiflexion    Sensation  Intact to pin, vibration   Proprioception intact in great toes and fingers    Tendon reflexes  Testing at brachioradialis, biceps, triceps, patella and achilles bilaterally showed normal reflexes, symmetrically, without Babinski or Zuluaga signs    Coordination  Finger nose finger testing: normalRapid alternating movements are normal.  Gait and Station: normal    25 minutes of total time was spent face-to-face with the patient over 50% of which was counseling..    Again, thank you for allowing me to participate in the care of your patient.      Sincerely,    Eric Godinez MD      "

## 2019-07-09 NOTE — NURSING NOTE
Chief Complaint   Patient presents with     RECHECK     [facility] RETURN - RMD, MEDICATION UPDATES       Prashanth Paul, EMT

## 2019-07-09 NOTE — TELEPHONE ENCOUNTER
botulinum toxin type B (MYOBLOC) Solution 2,500 Units 2,500 Units SEE ADMIN INSTRUCTIONS 7/9/2019  --   Admin Instructions: 20280 units injected as 2500 every 3 months     Prior Authorization Infusion/Clinic Administered Request    Location: [place] [address]  Diagnosis and ICD: Disturbance of salivary secretion K11.7  Drug/Therapy: See above    Previously Tried and Failed Therapies: N/A    Date of provider note with supporting information: 7/9/2019    Urgency (When is the patient scheduled?): 02.9 Other Brain    Would you like to include any research articles? NO

## 2019-07-10 RX ORDER — PRAMIPEXOLE DIHYDROCHLORIDE 0.25 MG/1
0.5 TABLET ORAL AT BEDTIME
Qty: 180 TABLET | Refills: 1 | Status: SHIPPED | OUTPATIENT
Start: 2019-07-10 | End: 2019-09-20

## 2019-07-12 ENCOUNTER — TELEPHONE (OUTPATIENT)
Dept: NEUROLOGY | Facility: CLINIC | Age: 74
End: 2019-07-12

## 2019-07-12 NOTE — TELEPHONE ENCOUNTER
I called Mr. Navarro and left him a voice message to call me with an update.  He was given the neurology phone number to call.    1)  How he is doing with Carbidopa/Levodopa dose reduction from 3 tabs TID to 2.5 tabs TID.  2)  If tremors are still controlled or not.

## 2019-07-15 ENCOUNTER — TELEPHONE (OUTPATIENT)
Dept: NEUROLOGY | Facility: CLINIC | Age: 74
End: 2019-07-15

## 2019-07-15 NOTE — TELEPHONE ENCOUNTER
Basia,     Please call pt and ask him.     1)  How he is doing with Carbidopa/Levodopa dose reduction from 3 tabs TID to 2.5 tabs TID.  2)  If tremors are still controlled or not.      BELL, he doesn't use his [employer] message.

## 2019-07-15 NOTE — TELEPHONE ENCOUNTER
[facility] Call Center    Phone Message    May a detailed message be left on voicemail: yes    Reason for Call: Other: [place] john Alycia's call on 7/12/2019.  He states he is available anytime today for a call back.  Please call him at your earliest convenience     Action Taken: Message routed to:  [facility] ([facility]  Neurology

## 2019-08-25 ASSESSMENT — MOVEMENT DISORDERS SOCIETY - UNIFIED PARKINSONS DISEASE RATING SCALE (MDS-UPDRS)
FREEZING: NORMAL: NOT AT ALL (NO PROBLEMS).
GETTING_OUT_OF_BED_CAR_DEEP_CHAIR: NORMAL: NOT AT ALL (NO PROBLEMS).
SPEECH: MODERATE: MY SPEECH IS UNCLEAR ENOUGH THAT OTHERS ASK ME TO REPEAT MYSELF EVERY DAY EVEN THOUGH MOST OF MY SPEECH IS UNDERSTOOD.
TURNING_IN_BED: NORMAL: NOT AT ALL (NO PROBLEMS).
TOTAL_SCORE: 10
CHEWING_AND_SWALLOWING: NORMAL: NO PROBLEMS.
TREMOR: NORMAL: NOT AT ALL (NO PROBLEMS).
HANDWRITING: MODERATE: MANY WORDS ARE UNCLEAR AND DIFFICULT TO READ.
EATING_TASKS: NORMAL: NOT AT ALL (NO PROBLEMS).
SALIVA_AND_DROOLING: SEVERE: I HAVE SO MUCH DROOLING THAT I REGULARLY NEED TO USE TISSUES OR A HANDKERCHIEF TO PROTECT MY CLOTHES.
WALKING_AND_BALANCE: NORMAL: NOT AT ALL (NO PROBLEMS).
HYGIENE: NORMAL: NOT AT ALL (NO PROBLEMS).
DRESSING: NORMAL: NOT AT ALL (NO PROBLEMS).
HOBBIES_AND_OTHER_ACTIVITIES: NORMAL:  NOT AT ALL (NO PROBLEMS).

## 2019-08-26 ENCOUNTER — TELEPHONE (OUTPATIENT)
Dept: NEUROLOGY | Facility: CLINIC | Age: 74
End: 2019-08-26

## 2019-08-26 NOTE — TELEPHONE ENCOUNTER
[facility] Call [facility]    Phone Message    May a detailed message be left on voicemail: yes    Reason for Call: Other: Pt needing call back to verify if he needs to bring anything to his appt on 8/28 with Dr. Godinez     Action Taken: Message routed to:  [facility] neuro

## 2019-08-26 NOTE — TELEPHONE ENCOUNTER
I informed Ace that he does not need to bring anything with him for his appointment with Dr. Godinez. This appointment is on Wednesday at 12 PM.    Ace asked what the purpose was for his friday visit with Alycia. I informed Ace that this visit is for follow-up and for DBS optimization. This appointment is on Friday at 7 AM.

## 2019-08-28 ENCOUNTER — OFFICE VISIT (OUTPATIENT)
Dept: NEUROLOGY | Facility: CLINIC | Age: 74
End: 2019-08-28
Attending: NURSE PRACTITIONER
Payer: MEDICARE

## 2019-08-28 VITALS
WEIGHT: 180.9 LBS | RESPIRATION RATE: 14 BRPM | OXYGEN SATURATION: 97 % | BODY MASS INDEX: 25.32 KG/M2 | DIASTOLIC BLOOD PRESSURE: 86 MMHG | SYSTOLIC BLOOD PRESSURE: 140 MMHG | HEIGHT: 71 IN | HEART RATE: 53 BPM

## 2019-08-28 DIAGNOSIS — K11.7 DISTURBANCE OF SALIVARY SECRETION: Primary | ICD-10-CM

## 2019-08-28 ASSESSMENT — MIFFLIN-ST. JEOR: SCORE: 1597.69

## 2019-08-28 ASSESSMENT — PAIN SCALES - GENERAL: PAINLEVEL: NO PAIN (0)

## 2019-08-28 NOTE — LETTER
8/28/2019       RE: Ace Navarro  [address] [facility] [address]     Dear Colleague,    Thank you for referring your patient, Ace Navarro, to the [facility] NEUROLOGY at [facility]. Please see a copy of my visit note below.    Movement Disorders Botulinum Toxin Procedure Note    Chief Complaint: sialorrhea in Parkinson Disease     History of Present Illness:  Ace Navarro is a 73 year old male who presents to clinic for botulinum toxin injections for treatment of persistent sialorrhea .This is his first treatment.    Prior to the start of the procedure and with procedural staff participation, I verbally confirmed the patient s identity using two indicators, relevant allergies, that the procedure was appropriate and matched the consent or emergent situation, and that the correct equipment/implants were available. Immediately prior to starting the procedure I conducted the Time Out with the procedural staff and re-confirmed the patient s name, procedure, and site/side. (The Joint Commission universal protocol was followed.)  Yes    TOTAL DOSE ADMINISTERED:  Dose Administered:  2500 units Myobloc    Diluent Used:  Preservative Free Normal Saline  Total Volume of Diluent Used:  1 ml  Lot # [id number] with Expiration Date:  2022-01      Medication guide was offered to patient and was accepted.    CONSENT:  The risks, benefits, and treatment options were discussed with Ace Navarro and she agreed to proceed.      Written consent was obtained byYes    EQUIPMENT USED:  Ultrasound    SKIN PREPARATION:  Skin preparation was performed using an alcohol wipe.    GUIDANCE DESCRIPTION:  EMG guidance:No  Ultrasound guidance:Yes    AREA/MUSCLE INJECTED:          Muscles Injected Units Injected Number of Injections   Left Parotid gland 1250 2   Right Parotid gland  1250 2             Total Units Injected: 2500    Unavoidable Waste:     Total Units Billed 2500       The patient tolerated the injections without difficulty.      Summary:    The patient was injected today with  2500 units of Myobloc under US  guidance as treatment of sialorrhea..  The patient tolerated the procedure well.    Plan  Follow-up in 3 months' time to consider repeat injections.    Eric Godinez MD

## 2019-08-28 NOTE — NURSING NOTE
Chief Complaint   Patient presents with     Parkinson     Botox     [facility] RETURN MOVEMENT DISORDER- BOTOX       Jax Engel, EMT

## 2019-08-28 NOTE — PROGRESS NOTES
Movement Disorders Botulinum Toxin Procedure Note    Chief Complaint: sialorrhea in Parkinson Disease     History of Present Illness:  Ace Navarro is a 73 year old male who presents to clinic for botulinum toxin injections for treatment of persistent sialorrhea .This is his first treatment.    Prior to the start of the procedure and with procedural staff participation, I verbally confirmed the patient s identity using two indicators, relevant allergies, that the procedure was appropriate and matched the consent or emergent situation, and that the correct equipment/implants were available. Immediately prior to starting the procedure I conducted the Time Out with the procedural staff and re-confirmed the patient s name, procedure, and site/side. (The Joint Commission universal protocol was followed.)  Yes    TOTAL DOSE ADMINISTERED:  Dose Administered:  2500 units Myobloc    Diluent Used:  Preservative Free Normal Saline  Total Volume of Diluent Used:  1 ml  Lot # [id number] with Expiration Date:  2022-01      Medication guide was offered to patient and was accepted.    CONSENT:  The risks, benefits, and treatment options were discussed with Ace Navarro and she agreed to proceed.      Written consent was obtained byYes    EQUIPMENT USED:  Ultrasound    SKIN PREPARATION:  Skin preparation was performed using an alcohol wipe.    GUIDANCE DESCRIPTION:  EMG guidance:No  Ultrasound guidance:Yes    AREA/MUSCLE INJECTED:          Muscles Injected Units Injected Number of Injections   Left Parotid gland 1250 2   Right Parotid gland  1250 2             Total Units Injected: 2500    Unavoidable Waste:     Total Units Billed 2500      The patient tolerated the injections without difficulty.      Summary:    The patient was injected today with  2500 units of Myobloc under US  guidance as treatment of sialorrhea..  The patient tolerated the procedure well.    Plan  Follow-up in 3 months' time to consider repeat  injections.    Eric Godinez MD

## 2019-08-30 ENCOUNTER — OFFICE VISIT (OUTPATIENT)
Dept: NEUROLOGY | Facility: CLINIC | Age: 74
End: 2019-08-30
Payer: MEDICARE

## 2019-08-30 VITALS
OXYGEN SATURATION: 97 % | TEMPERATURE: 97.7 F | BODY MASS INDEX: 25.48 KG/M2 | HEART RATE: 53 BPM | SYSTOLIC BLOOD PRESSURE: 124 MMHG | HEIGHT: 71 IN | DIASTOLIC BLOOD PRESSURE: 81 MMHG | WEIGHT: 182 LBS | RESPIRATION RATE: 14 BRPM

## 2019-08-30 DIAGNOSIS — R49.8 HYPOPHONIA: ICD-10-CM

## 2019-08-30 DIAGNOSIS — G20.A1 PARKINSON'S DISEASE (H): Primary | ICD-10-CM

## 2019-08-30 DIAGNOSIS — R49.0 DYSPHONIA: ICD-10-CM

## 2019-08-30 DIAGNOSIS — G25.81 RESTLESS LEGS SYNDROME (RLS): ICD-10-CM

## 2019-08-30 DIAGNOSIS — Z96.89 S/P DEEP BRAIN STIMULATOR PLACEMENT: ICD-10-CM

## 2019-08-30 RX ORDER — PRAMIPEXOLE DIHYDROCHLORIDE 0.5 MG/1
0.5 TABLET ORAL AT BEDTIME
Qty: 90 TABLET | Refills: 3 | Status: SHIPPED | OUTPATIENT
Start: 2019-08-30 | End: 2021-02-19

## 2019-08-30 ASSESSMENT — UNIFIED PARKINSONS DISEASE RATING SCALE (UPDRS)
TOTAL_SCORE: 6
TOETAPPING_RIGHT: NORMAL
AXIAL_SCORE: 8
LEG_AGILITY_LEFT: NORMAL
CONSTANCY_TREMOR_ATREST: NORMAL: NO TREMOR.
AMPLITUDE_RUE: NORMAL: NO TREMOR.
FINGER_TAPPING_LEFT: NORMAL
LEG_AGILITY_RIGHT: SLIGHT: ANY OF THE FOLLOWING: A) THE REGULAR RHYTHM IS BROKEN WITH ONE WITH ONE OR TWO INTERRUPTIONS OR HESITATIONS OF THE MOVEMENT B) SLIGHT SLOWING C) THE AMPLITUDE DECREMENTS NEAR THE END OF THE 10 MOVEMENTS.
RIGIDITY_NECK: SLIGHT: RIGIDITY ONLY DETECTED WITH ACTIVATION MANEUVER.
SPONTANEITY_OF_MOVEMENT: 1: SLIGHT: SLIGHT GLOBAL SLOWNESS AND POVERTY OF SPONTANEOUS MOVEMENTS.
TOETAPPING_LEFT: NORMAL
RIGIDITY_LLE: NORMAL
PRONATION_SUPINATION_LEFT: SLIGHT: ANY OF THE FOLLOWING: A) THE REGULAR RHYTHM IS BROKEN WITH ONE WITH ONE OR TWO INTERRUPTIONS OR HESITATIONS OF THE MOVEMENT B) SLIGHT SLOWING C) THE AMPLITUDE DECREMENTS NEAR THE END OF THE 10 MOVEMENTS.
TOTAL_SCORE: 16
FINGER_TAPPING_RIGHT: SLIGHT: ANY OF THE FOLLOWING: A) THE REGULAR RHYTHM IS BROKEN WITH ONE WITH ONE OR TWO INTERRUPTIONS OR HESITATIONS OF THE MOVEMENT B) SLIGHT SLOWING C) THE AMPLITUDE DECREMENTS NEAR THE END OF THE 10 MOVEMENTS.
POSTURE: 1 SLIGHT.  NOT QUITE ERECT BUT COULD BE NORMAL FOR OLDER PERSONS.
PRONATION_SUPINATION_RIGHT: SLIGHT: ANY OF THE FOLLOWING: A) THE REGULAR RHYTHM IS BROKEN WITH ONE WITH ONE OR TWO INTERRUPTIONS OR HESITATIONS OF THE MOVEMENT B) SLIGHT SLOWING C) THE AMPLITUDE DECREMENTS NEAR THE END OF THE 10 MOVEMENTS.
RIGIDITY_RUE: SLIGHT: RIGIDITY ONLY DETECTED WITH ACTIVATION MANEUVER.
SPEECH: MODERATE: SPEECH IS DIFFICULT TO UNDERSTAND TO THE POINT THAT SOME, BUT NOT MOST, SENTENCES ARE POORLY UNDERSTOOD.
GAIT: NORMAL
PARKINSONS_MEDS: ON
RIGIDITY_LUE: NORMAL
AMPLITUDE_LLE: NORMAL: NO TREMOR.
TOTAL_SCORE_LEFT: 2
ARISING_CHAIR: NORMAL: ABLE TO ARISE QUICKLY WITHOUT HESITATION.
RIGIDITY_RLE: SLIGHT: RIGIDITY ONLY DETECTED WITH ACTIVATION MANEUVER.
FACIAL_EXPRESSION: MILD: IN ADDITION TO DECREASED EYE-BLINK FREQUENCY, MASKED FACIES PRESENT IN THE LOWER FACE AS WELL, NAMELY FEWER MOVEMENTS AROUND THE MOUTH, SUCH AS LESS SPONTANEOUS SMILING, BUT LIPS NOT PARTED.
FREEZING_GAIT: NORMAL
POSTURAL_STABILITY: NORMAL:  RECOVERS WITH ONE OR TWO STEPS.
HANDMOVEMENTS_LEFT: SLIGHT: ANY OF THE FOLLOWING: A) THE REGULAR RHYTHM IS BROKEN WITH ONE WITH ONE OR TWO INTERRUPTIONS OR HESITATIONS OF THE MOVEMENT B) SLIGHT SLOWING C) THE AMPLITUDE DECREMENTS NEAR THE END OF THE 10 MOVEMENTS.
AMPLITUDE_LIP_JAW: NORMAL: NO TREMOR.
AMPLITUDE_RLE: NORMAL: NO TREMOR.
AMPLITUDE_LUE: NORMAL: NO TREMOR.
HANDMOVEMENTS_RIGHT: SLIGHT: ANY OF THE FOLLOWING: A) THE REGULAR RHYTHM IS BROKEN WITH ONE WITH ONE OR TWO INTERRUPTIONS OR HESITATIONS OF THE MOVEMENT B) SLIGHT SLOWING C) THE AMPLITUDE DECREMENTS NEAR THE END OF THE 10 MOVEMENTS.

## 2019-08-30 ASSESSMENT — PAIN SCALES - GENERAL: PAINLEVEL: NO PAIN (0)

## 2019-08-30 ASSESSMENT — MIFFLIN-ST. JEOR: SCORE: 1602.68

## 2019-08-30 NOTE — PATIENT INSTRUCTIONS
August 30, 2019    Dear Carolyn Ace Navarro,    Thank you for coming today.  During your visit, we have discussed the following:     __ Your DBS electrical system function (impedance) is normal. The battery life is also good.    __  Referral to Speech Therapy    Lissa Sam  Speech Language Pathologist    [facility] at [address] [address]  [phone]  [email or web address]  Type of Organization:  [facility]    __  Reduce your bedtime Carbidopa/Levodopa 50/200 mg from 2 tablets to 1 tab.    __  If yours symptoms are stable for 2 weeks, you can reduce daytime Sinemet as follows:     Week 1:  Take   PD Medications 7 am 1 pm 9 pm   Sinemet 25/100 mg  2 2.5 2.5     Week 2: Take  PD Medications 7 am 1 pm 9 pm   Sinemet 25/100 mg  2 2 2.5     Week 3: Take  PD Medications 7 am 1 pm 9 pm   Sinemet 25/100 mg  2 2 2     If your tremors get worse, go back to taking the previous dose.     __  I have sent a new prescription for Pramipexole 0.5 mg to take 1 tablet at bedtime.     __ Return in 6 months to check your DBS.  May return sooner.     To contact our clinic, you may call [phone] or use [employer] message.     It's good to see you!      NICK Ford, CNP  [facility] Neurology Clinic

## 2019-08-30 NOTE — NURSING NOTE
Chief Complaint   Patient presents with     RECHECK     DBS     Medications reviewed and vital signs taken.   Paul Mae CMA

## 2019-08-30 NOTE — NURSING NOTE
Chief Complaint   Patient presents with     DBS PROGRAMMING     [facility] RETURN     Saumya Ramirez, CMA

## 2019-09-02 NOTE — PROGRESS NOTES
"MOVEMENT DISORDERS CLINIC    PATIENT: Ace Navarro    : 1945    NORA:  2019    REASON FOR VISIT:  Deep Brain Stimulation (DBS) Programming follow up to treat Parkinson's disease (PD).    HPI:  Mr. Ace Navarro is a 73 year old right - handed  male who came to the [facility] neurology clinic accompanied by his son for DBS programming follow up.  His last DBS programming was on 2019, which was for DBS programming optimization.    Since his last visit, he is doing very well.  He denies dyskinesias.  No wearing off tremors.  Once in a while he might get tremors in Rt hand, which subsides right away.  His son stated occasionally noticing his father's tremors.  Patient is very happy with the DBS outcome.     Patient reports slurring his words.  Voice is very low.  He has excessive saliva.  He just got Botox injection a couple of days ago by Dr. Godinez.     He reports waking up several times during the night.  He usually goes to bed around 11:30 pm and he wakes up around 1 pm or 2:30 am.  If he is wide awake & has difficulty falling back to sleep, he gets up and walks around.  He denies tremors, or restless leg waking him up.  Generally, he gets about 6 hours of sleep.  He also takes a nap during the day if needed.  Denies excessive daytime sleepiness.     Pre-programming antiparkinsonian medications:     PD Medications 7 am 1 pm 9 pm 11 pm   Sinemet 25/100 mg  2.5 2.5 2.5     Sinemet 50/200 mg        2   Pramipexole 0.25 mg      2        Last antiparkinsonian dose taken:  This moring at 6 am.      PHYSICAL EXAM:    Vital Signs:  Blood pressure 124/81, pulse 53, temperature 97.7  F (36.5  C), temperature source Oral, resp. rate 14, height 1.803 m (5' 11\"), weight 82.6 kg (182 lb), SpO2 97 %.  Body mass index is 25.38 kg/mÂ .    General:  Mr. Navarro is a pleasant  male who is well-groomed and well-developed sitting comfortably in the exam room without any " distress.  Affect is appropriate.    MDS Part II  -- Over the last week -- 0: Normal -- 1: Slight -- 2: Mild -- 3: Moderate -- 4: Severe     2.1 Speech: 3   2.2 Saliva and droolin   2.3 Chewing and swallowin   2.4 Eating tasks: 0   2.5 Dressin   2.6 Hygiene:  0   2.7 Handwriting:  3   2.8 Doing hobbies and other activities:  0   2.9 Turning in bed:  0   2.10 Tremor:  0   2.11 Getting out of bed/car/deep chair:   0   2.12 Walking and balance: 0   2.13 Freezin     Total:    10         MOVEMENT DISORDERS ASSESSMENT:    UPDRS 3   UPDRS Values 2019   Time: 7:34 AM   Medication On   R Brain DBS: On   L Brain DBS: On   Speech 3   Facial Expression 2   Rigidity Neck 1   Rigidity RUE 1   Rigidity LUE 0   Rigidity RLE 1   Rigidity LLE 0   Finger Taps R 1   Finger Taps L 0   Hand Mvt R 1   Hand Mvt L 1   Pron-/Supinate R 1   Pron-/Supinate L 1   Toe Tap R 0   Toe Tap L 0   Leg Agility R 1   Leg Agility L 0   Arise From Chair 0   Gait 0   Gait Freezing 0   Postural Stability 0   Posture 1   Global Spont Mvt 1   Postural Tremor RUE 0   Postural Tremor LUE 0   Kinetic Tremor RUE 0   Kinetic Tremor LUE 0   Rest Tremor RUE 0   Rest Tremor LUE 0   Rest Tremor RLE 0   Rest Tremor LLE 0   Rest Tremor Lip/Jaw 0   Rest Tremor Constancy 0   Total Right 6   Total Left 2   Axial Total 8   Total 16     DBS Interrogation & Analysis:    Implanted generator:  Left chest Infinity 7  Lead placement:  Bilateral Subthalamic Nucleus (STN)  Bilateral lead placement date:  2019  Bilateral generator placement date:  5/10/2019     Battery Service Life:  OK.  2.96 volts.     Left Brain    C +, 3abc -  Amplitude:  2.80 Amplitude  PW:  60 msec  Rate:  130 Hz     Therapy impedance:  987 Ohms    Patient :  Upper Limit: 3.00 mA  Lower Limit: 2.00 mA    Electrode Impedance Check:    Left Lead: No Problems Found.       Right Brain     C +, 10abc -  Amplitude:  2.40 mA  PW:  60 msec  Rate:  130 Hz     Therapy impedance:   925 Ohms    Patient :  Upper Limit: 1.50 mA  Lower Limit: 2.80 mA    Electrode Impedance Check:  Right Lead: No Problems Found.      See scanned report for impedance details.        ASSESSMENT/PLAN:      ASSESSMENT/PLAN:    Parkinson's Disease:  Mr. Ace Navarro is a 73 year old right - handed  male with a 9 year Hx of tremor-dominant PD who came to the clinic for DBS programming follow up.  Motor symptoms are well controlled on current DBS therapy and antiparkinsonian medications.    Dysphonia:  Getting worse and needs improvement.     Sleep Disturbances:  Ongoing issue, but not bothersome to patient.  He takes naps during the day as needed.     __  No DBS programming changes made.    __  Will reduce bedtime Sinemet 50/200 mg CR dose from 2 tabs to 1 tab.    __  After 2 weeks, if motor symptoms are still stable, will reduce 7 am Sinemet 25/100 mg from 2.5 to 2 tabs x 1 week.  He will continue to reduce his 1 pm, then 9 pm doses every week by 0.5 mg if he has no wearing off & tremors are still controlled.     __  Once he reaches Sinemet 25/100 mg 2 tabs TID & Sinemet 50/200 mg 1 tab at HS, he ll stay on this dose.  If tremors recur, he ll go back to previous dose where tremors were controlled.     __  Will stay on the same dose of Pramipexole 0.25 mg 2 tabs at 9 pm.  To simplify his Rx, Pramipexole 0.5 mg 1 tab was sent to his pharmacy.     __  He'll call if symptoms worsen or with any questions.     __  Referral to speech therapy to work on voice.      __  No treatment for sleep disturbances as he is able to manage it and quality of life hasn't been affected.     __  Will return in 6 months for a follow up.  May return sooner as needed.      The total amount of time spent with the patient was 50 minutes; 20 min in DBS interrogation and analysis and 30 min in addressing PD, sleep issues, and dysphonia.  Over 50% of the time was spent in counseling & coordination of care.     Alycia DAVID  NICK Dickens, CNP  [facility]     7:10 AM  8:00 AM

## 2019-09-19 ENCOUNTER — TELEPHONE (OUTPATIENT)
Dept: NEUROLOGY | Facility: CLINIC | Age: 74
End: 2019-09-19

## 2019-09-19 DIAGNOSIS — G20.A1 PARKINSON'S DISEASE (H): ICD-10-CM

## 2019-09-19 NOTE — TELEPHONE ENCOUNTER
"carbidopa-levodopa (SINEMET)  MG tablet 360 tablet 11 7/9/2019  No   Sig - Route: Take 2.5 tablets by mouth 3 times daily 4 tabs 0500/ 4 tabs 1200 / 4 tabs 2100 - Oral   -7 AM dose is 2 tablets, 2 at 1 and 2 tablets at 9 PM  - Last medication change was on 9/13    carbidopa-levodopa (SINEMET CR)  MG per CR tablet 180 tablet 3 11/13/2018  No   Sig - Route: Take 2 tablets by mouth At Bedtime - Oral   -1 tablet at 11 PM    pramipexole (MIRAPEX) 0.5 MG tablet 90 tablet 3 8/30/2019  --   Sig - Route: Take 1 tablet (0.5 mg) by mouth At Bedtime - Oral   -Taking 1 tablet at bedtime    \"I feel pretty good.\" Ace stated he has had no worsening symptoms, everything has stayed the same since decreasing his medications. He has no concerns or questions at this time.    Do you need any refills? No, still has refills at the pharmacy for his carbidopa/levodopa prescriptions.  "

## 2019-09-20 RX ORDER — CARBIDOPA AND LEVODOPA 50; 200 MG/1; MG/1
1 TABLET, EXTENDED RELEASE ORAL AT BEDTIME
Qty: 180 TABLET | Refills: 3 | COMMUNITY
Start: 2019-09-20 | End: 2019-12-10

## 2019-10-05 ENCOUNTER — HEALTH MAINTENANCE LETTER (OUTPATIENT)
Age: 74
End: 2019-10-05

## 2019-10-14 ENCOUNTER — TELEPHONE (OUTPATIENT)
Dept: NEUROSURGERY | Facility: CLINIC | Age: 74
End: 2019-10-14

## 2019-10-14 NOTE — TELEPHONE ENCOUNTER
Pt left me a VM saying he needs to schedule an appt with either Alycia Dickens or Dr. Godinez.     Will forward message to neurology.

## 2019-10-14 NOTE — TELEPHONE ENCOUNTER
Ace stated he needs to be seen to make sure his medications are working. He would like to discuss his medication regimen with Alycia or Dr. Godinez in clinic.    I informed him he can call our regular number to have this scheduled. Ace stated he does not have a pen handy to be able to take this number. I will have our clinic coordinators call him to get this scheduled.

## 2019-10-29 ENCOUNTER — TELEPHONE (OUTPATIENT)
Dept: NEUROLOGY | Facility: CLINIC | Age: 74
End: 2019-10-29

## 2019-10-30 NOTE — TELEPHONE ENCOUNTER
Called patient asking if he would be willing to come in early (10:00 am) for his next neurology appointment for an OFF meds-ON DBS UPDRS III exam. This is not mandatory but only if patient would like to volunteer for--patient agrees to plan and glad to help. Patient will see Dr. Sherlyn Mccann prior to regularly scheduled follow-up with Dr. Godinez on 11/27/19 at 12:00.    Patient will not take any doses of Sinemet or pramipexole after 11pm the evening before his appointment and will skip morning doses to arrive to the appointment OFF medication (can take all other non-PD meds as usual). DBS should be left ON as usual. Patient agrees to bring medications as well as patient .     I encouraged patient to call or MyChart with any additional questions. Gave patient my direct phone number.    Kerline Chadwick RN, MPH  Research Nurse, Movement Disorders

## 2019-11-20 NOTE — TELEPHONE ENCOUNTER
Spoke to patient 11/20/19 at 3:00pm--patient no longer needs to come early for next appt on 11/27/19. Instead he may come to his regularly scheduled appointment with Dr. Godinez at noon on 11/27/19, OFF meds, for an UPDRS III exam with Dr. Mccann at the beginning. I reminded patient that this is voluntary, and that to be OFF meds, he would need to skip his morning dose of PD medsd (Sinemet), as well as bringing this medication to his appointment to take after the exam is finished. Patient expressed understanding. Message sent to schedulers to remove 10am appt. Dr. Sherlyn Mccann updated with plan.     Kerline Chadwick, RN, MPH  Research Nurse, Movement Disorders

## 2019-11-22 DIAGNOSIS — G20.A1 PARKINSON'S DISEASE (H): ICD-10-CM

## 2019-11-22 RX ORDER — CARBIDOPA AND LEVODOPA 25; 100 MG/1; MG/1
2 TABLET ORAL 3 TIMES DAILY
Qty: 180 TABLET | Refills: 11 | Status: SHIPPED | OUTPATIENT
Start: 2019-11-22 | End: 2020-03-24

## 2019-11-22 NOTE — TELEPHONE ENCOUNTER
Nurse received refill request for: carbidopa-levodopa (SINEMET)  MG tablet    Pharmacy: [facility]    Last re-ordered: 7/9/2019    Last appointment: 8/30/2019    Next appointment: 11/27/19    Action taken: Routed to neurology nurse    Patient states he requested refill from his pharmacy Tuesday and was told it would be filled Wednesday. I told patient I did not see anything in the chart at the moment but would get this request sent to his neurology team promptly. I explained that for the future, I am in the research department so future requests would need to be made directly pharmacy or questions would be directed to neurology clinic.     Kerline Chadwick, RN, MPH  Research Nurse, Movement Disorders

## 2019-11-26 ENCOUNTER — TELEPHONE (OUTPATIENT)
Dept: NEUROLOGY | Facility: CLINIC | Age: 74
End: 2019-11-26

## 2019-11-26 NOTE — TELEPHONE ENCOUNTER
Per Alycia, I called patient to give another reminder about appointment time and medication instructions. Attempted call 2x, left voicemail:  Patient can arrive 15 minutes early to appt with Dr. Godinez tomorrow for additional off med-On stim exam with Dr. Potter. Patient is to take last doses of PD meds at 9pm, and skip all other doses the next morning (of Sinemet, Sinemet CR, and pramipexole) to arrive to appt OFF meds.  Left my direct number for questions.    Kerline Chadwick, RN, MPH  Research Nurse, Movement Disorders

## 2019-11-27 NOTE — TELEPHONE ENCOUNTER
Patient left voicemail saying he had to cancel his appointment today with Dr. Godinez. I called pt back and told him no worries that he had to cancel due to weather. He mentioned there were two appointments made when rescheduling, 12/4 and 12/9 and he preferred the 12/9 appt. Later, I saw that the 12/9 appointment was unnecessary as it was with a Fellow for an extra research exam, I believe this was rescheduled in error. I called patient back to apologize for the confusion and ask if he would be able to come see Dr. Godinez on 12/4 for his follow up appt, and arrive OFF meds for the extra exam. Patient agreed to this plan. Will call patient the evening before to confirm medication holding instructions. Message sent to nir to cancel 12/9 Fellow appt.    Dr. Mccann and Dr. Godinez updated.     Kerline Chadwick, RN, MPH  Research Nurse, Movement Disorders

## 2019-12-03 ENCOUNTER — TELEPHONE (OUTPATIENT)
Dept: NEUROLOGY | Facility: CLINIC | Age: 74
End: 2019-12-03

## 2019-12-03 NOTE — TELEPHONE ENCOUNTER
Called patient to clarify medication instructions for tomorrow's appt with Dr. Godinez (and extra off med/on stim UPDRS III exam with Dr. Mccann to start).  Patient was under the impression that his appt was 8:30am, I clarified that in his chart, I was seeing the appt was at 1:30 pm with Dr. Godinez.    Due to patient's appt being in the afternoon, he can go ahead and take his PD meds as usual tonight (Sinemet IR, Sinemet CR, pramipexole). He should however skip his morning doses of carbidopa-levodopa so that arrives OFF PD medication for 12 hours.  I reminded him to bring his Sinemet to the appt as well. Patient agreed to plan and I thanked him for his patience and effort.    Kerline Chadwick, RN, MPH  Research Nurse, Movement Disorders

## 2019-12-04 ENCOUNTER — OFFICE VISIT (OUTPATIENT)
Dept: NEUROLOGY | Facility: CLINIC | Age: 74
End: 2019-12-04
Payer: MEDICARE

## 2019-12-04 VITALS
WEIGHT: 182 LBS | OXYGEN SATURATION: 96 % | DIASTOLIC BLOOD PRESSURE: 96 MMHG | HEART RATE: 57 BPM | BODY MASS INDEX: 25.38 KG/M2 | SYSTOLIC BLOOD PRESSURE: 152 MMHG

## 2019-12-04 DIAGNOSIS — G20.A1 PARKINSON DISEASE (H): Primary | ICD-10-CM

## 2019-12-04 DIAGNOSIS — G25.81 RLS (RESTLESS LEGS SYNDROME): ICD-10-CM

## 2019-12-04 DIAGNOSIS — K11.7 SIALORRHEA: ICD-10-CM

## 2019-12-04 RX ORDER — FOLIC ACID 0.8 MG
800 TABLET ORAL DAILY
COMMUNITY

## 2019-12-04 ASSESSMENT — UNIFIED PARKINSONS DISEASE RATING SCALE (UPDRS)
FINGER_TAPPING_RIGHT: SLIGHT: ANY OF THE FOLLOWING: A) THE REGULAR RHYTHM IS BROKEN WITH ONE WITH ONE OR TWO INTERRUPTIONS OR HESITATIONS OF THE MOVEMENT B) SLIGHT SLOWING C) THE AMPLITUDE DECREMENTS NEAR THE END OF THE 10 MOVEMENTS.
AMPLITUDE_LIP_JAW: NORMAL: NO TREMOR.
FACIAL_EXPRESSION: MODERATE: MASKED FACIES WITH LIPS PARTED SOME OF THE TIME WHEN THE MOUTH IS AT REST.
PRONATION_SUPINATION_RIGHT: NORMAL
RIGIDITY_RLE: NORMAL
PARKINSONS_MEDS: OFF
POSTURE: 1 SLIGHT.  NOT QUITE ERECT BUT COULD BE NORMAL FOR OLDER PERSONS.
AMPLITUDE_LLE: NORMAL: NO TREMOR.
HANDMOVEMENTS_RIGHT: SLIGHT: ANY OF THE FOLLOWING: A) THE REGULAR RHYTHM IS BROKEN WITH ONE WITH ONE OR TWO INTERRUPTIONS OR HESITATIONS OF THE MOVEMENT B) SLIGHT SLOWING C) THE AMPLITUDE DECREMENTS NEAR THE END OF THE 10 MOVEMENTS.
POSTURAL_STABILITY: NORMAL:  RECOVERS WITH ONE OR TWO STEPS.
LEG_AGILITY_RIGHT: NORMAL
RIGIDITY_NECK: NORMAL
TOETAPPING_LEFT: NORMAL
AMPLITUDE_RUE: SLIGHT: < 1 CM IN MAXIMAL AMPLITUDE.
RIGIDITY_RUE: SLIGHT: RIGIDITY ONLY DETECTED WITH ACTIVATION MANEUVER.
AMPLITUDE_LUE: NORMAL: NO TREMOR.
SPEECH: MODERATE: SPEECH IS DIFFICULT TO UNDERSTAND TO THE POINT THAT SOME, BUT NOT MOST, SENTENCES ARE POORLY UNDERSTOOD.
TOETAPPING_RIGHT: NORMAL
TOTAL_SCORE_LEFT: 2
RIGIDITY_LLE: NORMAL
TOTAL_SCORE: 14
AXIAL_SCORE: 8
FREEZING_GAIT: NORMAL
CONSTANCY_TREMOR_ATREST: NORMAL: NO TREMOR.
FINGER_TAPPING_LEFT: NORMAL
RIGIDITY_LUE: NORMAL
HANDMOVEMENTS_LEFT: SLIGHT: ANY OF THE FOLLOWING: A) THE REGULAR RHYTHM IS BROKEN WITH ONE WITH ONE OR TWO INTERRUPTIONS OR HESITATIONS OF THE MOVEMENT B) SLIGHT SLOWING C) THE AMPLITUDE DECREMENTS NEAR THE END OF THE 10 MOVEMENTS.
ARISING_CHAIR: NORMAL: ABLE TO ARISE QUICKLY WITHOUT HESITATION.
SPONTANEITY_OF_MOVEMENT: 1: SLIGHT: SLIGHT GLOBAL SLOWNESS AND POVERTY OF SPONTANEOUS MOVEMENTS.
GAIT: NORMAL
AMPLITUDE_RLE: NORMAL: NO TREMOR.
TOTAL_SCORE: 4
PRONATION_SUPINATION_LEFT: SLIGHT: ANY OF THE FOLLOWING: A) THE REGULAR RHYTHM IS BROKEN WITH ONE WITH ONE OR TWO INTERRUPTIONS OR HESITATIONS OF THE MOVEMENT B) SLIGHT SLOWING C) THE AMPLITUDE DECREMENTS NEAR THE END OF THE 10 MOVEMENTS.
LEG_AGILITY_LEFT: NORMAL

## 2019-12-04 ASSESSMENT — PAIN SCALES - GENERAL: PAINLEVEL: NO PAIN (0)

## 2019-12-04 NOTE — PATIENT INSTRUCTIONS
PLAN:  Follow-up 1/11/2019 for repeat injections. We will administer 5000 units (double the amount from last injection).  For RLS, Increase Pramipexole 0.25 mg 2 tabs at bedtime.   Continue CD/LD 50/200 mg 1 tab at bedtime.   Continue CD/LD 25/100 mg as scheduled.     We will see you back 1/11/2019 at 2 pm for your Botox injections.

## 2019-12-04 NOTE — LETTER
12/4/2019       RE: Ace Navarro  [address] [facility] [id number]     Dear Colleague,    Thank you for referring your patient, Ace Navarro, to the [facility] NEUROLOGY at [facility]. Please see a copy of my visit note below.    Movement Disorders Botulinum Toxin Clinic Note    Chief Complaint: sialorrhea     History of Present Illness:  Ace Navarro is a 74 year old male who presents to clinic for botulinum toxin injections for treatment of sialorrhea.       Response to Last Injection: 8/28/2019    Onset of effect:  Unable to determine.     Benefit from last injections:  None. Believed that he was drooling more after the injections.     Wearing off: Unable to determine.    Side effects: He reports none.     Additional interval history: Reports speech is worsening and is softer and people find it difficult to understand him. Tremors are mostly controlled and he notices it most at night. He has been having trouble sleeping that he suspects is due to his RLS.     Patient denies new medical diagnoses, illnesses, hospitalizations, emergency room visits, and injuries since the previous injection with botulinum neurotoxin.     We reviewed the recommended safety guidelines for  Botox from any vaccine injection, such as the seasonal flu vaccine, by a minimum of 10-14 days, and acknowledged understanding.    ROS:  Other than that mentioned above, the remainder of 12 systems reviewed were negative.    Current Outpatient Medications   Medication Sig Dispense Refill     acetaminophen (TYLENOL) 325 MG tablet Take 2 tablets (650 mg) by mouth every 4 hours as needed for mild pain 60 tablet 0     carbidopa-levodopa (SINEMET CR)  MG CR tablet Take 1 tablet by mouth At Bedtime 180 tablet 3     carbidopa-levodopa (SINEMET)  MG tablet Take 2 tablets by mouth 3 times daily At 7 am, 1 pm, & 9 pm. 180 tablet 11     folic acid 800 MCG tablet Take 800 mcg by  mouth daily       multivitamin, therapeutic with minerals (MULTI-VITAMIN) TABS tablet Take 1 tablet by mouth every morning        pramipexole (MIRAPEX) 0.5 MG tablet Take 1 tablet (0.5 mg) by mouth At Bedtime 90 tablet 3     senna-docusate (SENOKOT-S/PERICOLACE) 8.6-50 MG tablet Take 1-2 tablets by mouth 2 times daily 30 tablet 0     sulfaSALAzine (AZULFIDINE) 500 MG tablet TAKE 4 TABLETS BY MOUTH ONCE DAILY.  3     folic acid 20 MG CAPS Take 800 mg by mouth daily (with lunch)      Last took CD/LD 8:45 pm on 12/3/2019    Allergies: He is allergic to shellfish-derived products and aspirin.    Past Medical History:   Diagnosis Date     Parkinson's disease (H) 09/2010     RLS (restless legs syndrome)      Ulcerative colitis (H)      Vitiligo        Past Surgical History:   Procedure Laterality Date     ------------OTHER-------------      nasal     HERNIA REPAIR       IMPLANT DEEP BRAIN STIMULATION GENERATOR / BATTERY Left 5/10/2019    Procedure: Deep Brain Stimulator Placement, Phase II, Placement Of Deep Brain Stimulator Generator/Battery Over The Left Chest Wall;  Surgeon: Humberto Briones MD;  Location: [facility] OR     OPTICAL TRACKING SYSTEM INSERTION DEEP BRAIN STIMULATION Left 10/23/2018    Procedure: Left Deep Brain Stimulator Placement, Phase One, Placement of Left Side Deep Brain Stimulator Electrode Target Left Subthalamic Nucleus with Microelectrode Recording;  Surgeon: Humberto Briones MD;  Location: [facility] OR     OPTICAL TRACKING SYSTEM INSERTION DEEP BRAIN STIMULATION BILATERAL Bilateral 4/26/2019    Procedure: O-Arm/Stealth Assisted Bilateral Deep Brain Stimulator Placement, Phase I, Placement Of Bilateral Deep Brain Stimulator Electrodes, Target Bilateral Subthalamic Nucleus With Microelectrode Recording;  Surgeon: Humberto Briones MD;  Location: [facility] OR     TONSILLECTOMY & ADENOIDECTOMY         Social History     Socioeconomic History     Marital status:      Spouse name: Not on file      Number of children: 4     Years of education: Not on file     Highest education level: Not on file   Occupational History     Not on file   Social Needs     Financial resource strain: Not on file     Food insecurity:     Worry: Not on file     Inability: Not on file     Transportation needs:     Medical: Not on file     Non-medical: Not on file   Tobacco Use     Smoking status: Never Smoker     Smokeless tobacco: Never Used   Substance and Sexual Activity     Alcohol use: No     Drug use: No     Sexual activity: Never   Lifestyle     Physical activity:     Days per week: Not on file     Minutes per session: Not on file     Stress: Not on file   Relationships     Social connections:     Talks on phone: Not on file     Gets together: Not on file     Attends Jew service: Not on file     Active member of club or organization: Not on file     Attends meetings of clubs or organizations: Not on file     Relationship status: Not on file     Intimate partner violence:     Fear of current or ex partner: Not on file     Emotionally abused: Not on file     Physically abused: Not on file     Forced sexual activity: Not on file   Other Topics Concern     Parent/sibling w/ CABG, MI or angioplasty before 65F 55M? Not Asked   Social History Narrative    Since he was diagnosed with Parkinson's disease, Mr. Navarro has not had any alcohol. Before that, he would have alcohol one night a week while he was in a relationship for about seven years. He was never in any chemical dependency treatment programs and was never hospitalized for any alcohol related problems. He endorsed marijuana use when he was in college, and has tried it about once every three years. He has not noticed any benefit for his Parkinson's disease. He was a social smoker in his early 20s, and smoked about a pack of cigarettes a week. He last smoked 40 years ago. He denied gambling or any other compulsive behaviors.         Mr. Navarro completed a Bachelor's  degree at [employer]. 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       Family History   Problem Relation Age of Onset     Breast Cancer Mother      Myocardial Infarction Father      Coronary Artery Disease Father      No Known Problems Sister      No Known Problems Brother      No Known Problems Sister        Physical Examination:  Vital Signs:   weight is 82.6 kg (182 lb). His blood pressure is 173/103 (abnormal) and his pulse is 55. His oxygen saturation is 96%.   Physical Exam:  GENERAL: alert, active, attentive, appropriately groomed   HEENT: normocephalic, eyes open with no discharge, nares patent, oropharynx clear-no lesions  CHEST: normal configuration, chest rise equal b/l, non labored breathing   EXTREMITIES: no edema/cyanosis in BUE/BLE  PSYCH: mood stable     Neurologic Exam:  MENTAL STATUS: Alert and oriented to person, place, time, and situation. Follows commands. Recent and remote memory intact. Attention span and concentration intact.   SPEECH: Fluent, intact comprehension and articulation  CN: visual fields intact in all fields, EOMIB,  no nystagmus, normal saccades, PERRL, facial sensation intact, facial movement symmetric, hearing grossly intact to conversation, tongue protrudes midline   MOTOR: Moves all extremities equally against gravity without difficulty  Involuntary movements: refer to UPDRS III  SENSATION: intact to light touch throughout   COORDINATION: no dysmetria with FTN  GAIT: able to rise unassisted from a seated position, no arm swing b/l and normal length of gait, no en bloc turns, no ataxia    UPDRS III  UPDRS Values 12/4/2019   Time: 1:20 PM   Medication Off   R Brain DBS: On   L Brain DBS: On   Speech 3   Facial Expression 3   Rigidity Neck 0   Rigidity RUE 1   Rigidity LUE 0   Rigidity RLE 0   Rigidity LLE 0   Finger Taps R 1   Finger Taps L 0   Hand Mvt R 1   Hand Mvt L 1   Pron-/Supinate R 0    Pron-/Supinate L 1   Toe Tap R 0   Toe Tap L 0   Leg Agility R 0   Leg Agility L 0   Arise From Chair 0   Gait 0   Gait Freezing 0   Postural Stability 0   Posture 1   Global Spont Mvt 1   Postural Tremor RUE 0   Postural Tremor LUE 0   Kinetic Tremor RUE 0   Kinetic Tremor LUE 0   Rest Tremor RUE 1   Rest Tremor LUE 0   Rest Tremor RLE 0   Rest Tremor LLE 0   Rest Tremor Lip/Jaw 0   Rest Tremor Constancy 0   Total Right 4   Total Left 2   Axial Total 8   Total 14   Previous 16    Assessment:    Ace Navarro is a 74 year old male with sialorrhea in setting of Parkinson disease.  Today we did repeat botulinum toxin injections. He did not report any benefit from his previous botulinum toxin injections, thus we felt that it would be best to have him come back in a week for repeat injections with 5000 units of Myobloc. Patient agreed to this. He reported worsening sleep secondary to his RLS and we recommended increasing his dose of pramipexole.     1. Sialorrhea  2. Parkinson disease  3. Restless leg syndrome     Plan  Follow-up next week for repeat injections at higher dose  For RLS, increase pramipexole 0.25 mg 2 tabs at bedtime.   Continue CD/LD 50/200 mg 1 tab at bedtime.   Continue CD/LD 25/100 mg as scheduled.   Will not bill this patient for this visit as we plan to reschedule his botulinum toxin injections.     RTC: 12/11/2019 at 2 pm.       Sherlyn Mccann DO  Movement Disorders Fellow     Patient seen and examined with Dr. Godinez.     I, Eric Godinez MD, personally interviewed, examined and evaluated this patient on 12/4/2019.  I discussed the patient with Dr. Sherlyn Mccann and agree with the assessment, examination and plan of care documented by Dr. Mccann.  I personally reviewed the vital signs, medications and labs/imaging.    Eric Godinez MD

## 2019-12-04 NOTE — PROGRESS NOTES
Movement Disorders Botulinum Toxin Clinic Note    Chief Complaint: sialorrhea     History of Present Illness:  Ace Navarro is a 74 year old male who presents to clinic for botulinum toxin injections for treatment of sialorrhea.       Response to Last Injection: 8/28/2019    Onset of effect:  Unable to determine.     Benefit from last injections:  None. Believed that he was drooling more after the injections.     Wearing off: Unable to determine.    Side effects: He reports none.     Additional interval history: Reports speech is worsening and is softer and people find it difficult to understand him. Tremors are mostly controlled and he notices it most at night. He has been having trouble sleeping that he suspects is due to his RLS.     Patient denies new medical diagnoses, illnesses, hospitalizations, emergency room visits, and injuries since the previous injection with botulinum neurotoxin.     We reviewed the recommended safety guidelines for  Botox from any vaccine injection, such as the seasonal flu vaccine, by a minimum of 10-14 days, and acknowledged understanding.    ROS:  Other than that mentioned above, the remainder of 12 systems reviewed were negative.    Current Outpatient Medications   Medication Sig Dispense Refill     acetaminophen (TYLENOL) 325 MG tablet Take 2 tablets (650 mg) by mouth every 4 hours as needed for mild pain 60 tablet 0     carbidopa-levodopa (SINEMET CR)  MG CR tablet Take 1 tablet by mouth At Bedtime 180 tablet 3     carbidopa-levodopa (SINEMET)  MG tablet Take 2 tablets by mouth 3 times daily At 7 am, 1 pm, & 9 pm. 180 tablet 11     folic acid 800 MCG tablet Take 800 mcg by mouth daily       multivitamin, therapeutic with minerals (MULTI-VITAMIN) TABS tablet Take 1 tablet by mouth every morning        pramipexole (MIRAPEX) 0.5 MG tablet Take 1 tablet (0.5 mg) by mouth At Bedtime 90 tablet 3     senna-docusate (SENOKOT-S/PERICOLACE) 8.6-50 MG tablet Take 1-2  tablets by mouth 2 times daily 30 tablet 0     sulfaSALAzine (AZULFIDINE) 500 MG tablet TAKE 4 TABLETS BY MOUTH ONCE DAILY.  3     folic acid 20 MG CAPS Take 800 mg by mouth daily (with lunch)      Last took CD/LD 8:45 pm on 12/3/2019    Allergies: He is allergic to shellfish-derived products and aspirin.    Past Medical History:   Diagnosis Date     Parkinson's disease (H) 09/2010     RLS (restless legs syndrome)      Ulcerative colitis (H)      Vitiligo        Past Surgical History:   Procedure Laterality Date     ------------OTHER-------------      nasal     HERNIA REPAIR       IMPLANT DEEP BRAIN STIMULATION GENERATOR / BATTERY Left 5/10/2019    Procedure: Deep Brain Stimulator Placement, Phase II, Placement Of Deep Brain Stimulator Generator/Battery Over The Left Chest Wall;  Surgeon: Humberto Briones MD;  Location: [facility] OR     OPTICAL TRACKING SYSTEM INSERTION DEEP BRAIN STIMULATION Left 10/23/2018    Procedure: Left Deep Brain Stimulator Placement, Phase One, Placement of Left Side Deep Brain Stimulator Electrode Target Left Subthalamic Nucleus with Microelectrode Recording;  Surgeon: Humberto Briones MD;  Location: [facility] OR     OPTICAL TRACKING SYSTEM INSERTION DEEP BRAIN STIMULATION BILATERAL Bilateral 4/26/2019    Procedure: O-Arm/Stealth Assisted Bilateral Deep Brain Stimulator Placement, Phase I, Placement Of Bilateral Deep Brain Stimulator Electrodes, Target Bilateral Subthalamic Nucleus With Microelectrode Recording;  Surgeon: Humberto Briones MD;  Location: [facility] OR     TONSILLECTOMY & ADENOIDECTOMY         Social History     Socioeconomic History     Marital status:      Spouse name: Not on file     Number of children: 4     Years of education: Not on file     Highest education level: Not on file   Occupational History     Not on file   Social Needs     Financial resource strain: Not on file     Food insecurity:     Worry: Not on file     Inability: Not on file     Transportation  needs:     Medical: Not on file     Non-medical: Not on file   Tobacco Use     Smoking status: Never Smoker     Smokeless tobacco: Never Used   Substance and Sexual Activity     Alcohol use: No     Drug use: No     Sexual activity: Never   Lifestyle     Physical activity:     Days per week: Not on file     Minutes per session: Not on file     Stress: Not on file   Relationships     Social connections:     Talks on phone: Not on file     Gets together: Not on file     Attends Christianity service: Not on file     Active member of club or organization: Not on file     Attends meetings of clubs or organizations: Not on file     Relationship status: Not on file     Intimate partner violence:     Fear of current or ex partner: Not on file     Emotionally abused: Not on file     Physically abused: Not on file     Forced sexual activity: Not on file   Other Topics Concern     Parent/sibling w/ CABG, MI or angioplasty before 65F 55M? Not Asked   Social History Narrative    Since he was diagnosed with Parkinson's disease, Mr. Navarro has not had any alcohol. Before that, he would have alcohol one night a week while he was in a relationship for about seven years. He was never in any chemical dependency treatment programs and was never hospitalized for any alcohol related problems. He endorsed marijuana use when he was in college, and has tried it about once every three years. He has not noticed any benefit for his Parkinson's disease. He was a social smoker in his early 20s, and smoked about a pack of cigarettes a week. He last smoked 40 years ago. He denied gambling or any other compulsive behaviors.         Mr. Navarro completed a Bachelor's degree at [employer]. 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       Family History   Problem Relation Age of Onset     Breast Cancer Mother      Myocardial Infarction Father      Coronary Artery Disease Father       No Known Problems Sister      No Known Problems Brother      No Known Problems Sister        Physical Examination:  Vital Signs:   weight is 82.6 kg (182 lb). His blood pressure is 173/103 (abnormal) and his pulse is 55. His oxygen saturation is 96%.   Physical Exam:  GENERAL: alert, active, attentive, appropriately groomed   HEENT: normocephalic, eyes open with no discharge, nares patent, oropharynx clear-no lesions  CHEST: normal configuration, chest rise equal b/l, non labored breathing   EXTREMITIES: no edema/cyanosis in BUE/BLE  PSYCH: mood stable     Neurologic Exam:  MENTAL STATUS: Alert and oriented to person, place, time, and situation. Follows commands. Recent and remote memory intact. Attention span and concentration intact.   SPEECH: Fluent, intact comprehension and articulation  CN: visual fields intact in all fields, EOMIB,  no nystagmus, normal saccades, PERRL, facial sensation intact, facial movement symmetric, hearing grossly intact to conversation, tongue protrudes midline   MOTOR: Moves all extremities equally against gravity without difficulty  Involuntary movements: refer to UPDRS III  SENSATION: intact to light touch throughout   COORDINATION: no dysmetria with FTN  GAIT: able to rise unassisted from a seated position, no arm swing b/l and normal length of gait, no en bloc turns, no ataxia    UPDRS III  UPDRS Values 12/4/2019   Time: 1:20 PM   Medication Off   R Brain DBS: On   L Brain DBS: On   Speech 3   Facial Expression 3   Rigidity Neck 0   Rigidity RUE 1   Rigidity LUE 0   Rigidity RLE 0   Rigidity LLE 0   Finger Taps R 1   Finger Taps L 0   Hand Mvt R 1   Hand Mvt L 1   Pron-/Supinate R 0   Pron-/Supinate L 1   Toe Tap R 0   Toe Tap L 0   Leg Agility R 0   Leg Agility L 0   Arise From Chair 0   Gait 0   Gait Freezing 0   Postural Stability 0   Posture 1   Global Spont Mvt 1   Postural Tremor RUE 0   Postural Tremor LUE 0   Kinetic Tremor RUE 0   Kinetic Tremor LUE 0   Rest Tremor RUE  1   Rest Tremor LUE 0   Rest Tremor RLE 0   Rest Tremor LLE 0   Rest Tremor Lip/Jaw 0   Rest Tremor Constancy 0   Total Right 4   Total Left 2   Axial Total 8   Total 14   Previous 16    Assessment:    Ace Navarro is a 74 year old male with sialorrhea in setting of Parkinson disease.  Today we did repeat botulinum toxin injections. He did not report any benefit from his previous botulinum toxin injections, thus we felt that it would be best to have him come back in a week for repeat injections with 5000 units of Myobloc. Patient agreed to this. He reported worsening sleep secondary to his RLS and we recommended increasing his dose of pramipexole.     1. Sialorrhea  2. Parkinson disease  3. Restless leg syndrome     Plan  Follow-up next week for repeat injections at higher dose  For RLS, increase pramipexole 0.25 mg 2 tabs at bedtime.   Continue CD/LD 50/200 mg 1 tab at bedtime.   Continue CD/LD 25/100 mg as scheduled.   Will not bill this patient for this visit as we plan to reschedule his botulinum toxin injections.     RTC: 12/11/2019 at 2 pm.       Sherlyn Mccann DO  Movement Disorders Fellow     Patient seen and examined with Dr. Godinez.     I, Eric Godinez MD, personally interviewed, examined and evaluated this patient on 12/4/2019.  I discussed the patient with Dr. Sherlyn Mccann and agree with the assessment, examination and plan of care documented by Dr. Mccann.  I personally reviewed the vital signs, medications and labs/imaging.

## 2019-12-05 ENCOUNTER — TELEPHONE (OUTPATIENT)
Dept: NEUROLOGY | Facility: CLINIC | Age: 74
End: 2019-12-05

## 2019-12-05 NOTE — TELEPHONE ENCOUNTER
botulinum toxin type B (MYOBLOC) injection 5,000 Units 5,000 Units SEE ADMIN INSTRUCTIONS 12/4/2019  --   Admin Instructions: 20,000U over the next year given as 5000U injected      Prior Authorization Infusion/Clinic Administered Request    Location: [address] [place] [id number]  Diagnosis and ICD:Sialorrhea K11.7  Drug/Therapy: See above    Previously Tried and Failed Therapies: N/A    Date of provider note with supporting information: 12/4/2019    Urgency (When is the patient scheduled?): 02.9 Other Brain    Would you like to include any research articles? No

## 2019-12-10 DIAGNOSIS — G20.A1 PARKINSON'S DISEASE (H): ICD-10-CM

## 2019-12-10 RX ORDER — CARBIDOPA AND LEVODOPA 50; 200 MG/1; MG/1
1 TABLET, EXTENDED RELEASE ORAL AT BEDTIME
Qty: 90 TABLET | Refills: 3 | Status: SHIPPED | OUTPATIENT
Start: 2019-12-10 | End: 2019-12-11

## 2019-12-10 NOTE — TELEPHONE ENCOUNTER
Nurse received refill request for: Ace left a voicemail for Kerline Chadwick RN regarding needing refills for carbidopa/levodopa CR     Pharmacy: [facility]    Last re-ordered: 11/13/2018    Last appointment: 8/30/2019    Next appointment: 2/24/2020    Action taken: Sent to be signed by the provider

## 2019-12-11 ENCOUNTER — OFFICE VISIT (OUTPATIENT)
Dept: NEUROLOGY | Facility: CLINIC | Age: 74
End: 2019-12-11
Payer: MEDICARE

## 2019-12-11 VITALS
HEART RATE: 47 BPM | SYSTOLIC BLOOD PRESSURE: 140 MMHG | DIASTOLIC BLOOD PRESSURE: 87 MMHG | OXYGEN SATURATION: 97 % | TEMPERATURE: 97 F

## 2019-12-11 DIAGNOSIS — K11.7 EXCESSIVE SALIVATION: Primary | ICD-10-CM

## 2019-12-11 DIAGNOSIS — G20.A1 PARKINSON'S DISEASE (H): ICD-10-CM

## 2019-12-11 RX ORDER — CARBIDOPA AND LEVODOPA 50; 200 MG/1; MG/1
1 TABLET, EXTENDED RELEASE ORAL AT BEDTIME
Qty: 90 TABLET | Refills: 3 | Status: SHIPPED | OUTPATIENT
Start: 2019-12-11 | End: 2020-12-02

## 2019-12-11 ASSESSMENT — PAIN SCALES - GENERAL: PAINLEVEL: NO PAIN (0)

## 2019-12-11 NOTE — NURSING NOTE
Chief Complaint   Patient presents with     Botox     [facility] RETURN BOTOX MOVEMENT DISORDER       Quynh Cabral, EMT

## 2019-12-11 NOTE — PROGRESS NOTES
Movement Disorders Botulinum Toxin Clinic Note    Chief Complaint: sialorrhea     History of Present Illness:  Ace Navarro is a 74 year old male who presents to clinic for botulinum toxin injections for treatment of sialorrhea.       Response to Last Injection: 8/28/2019     Onset of effect:  Unable to determine.      Benefit from last injections:  None. Believed that he was drooling more after the injections.      Wearing off: Unable to determine.     Side effects: He reports none.      Additional interval history: none    Patient denies new medical diagnoses, illnesses, hospitalizations, emergency room visits, and injuries since the previous injection with botulinum neurotoxin.     We reviewed the recommended safety guidelines for  Botox from any vaccine injection, such as the seasonal flu vaccine, by a minimum of 10-14 days, and acknowledged understanding.    ROS:  Other than that mentioned above, the remainder of 12 systems reviewed were negative.    Current Outpatient Medications   Medication Sig Dispense Refill     acetaminophen (TYLENOL) 325 MG tablet Take 2 tablets (650 mg) by mouth every 4 hours as needed for mild pain 60 tablet 0     carbidopa-levodopa (SINEMET CR)  MG CR tablet Take 1 tablet by mouth At Bedtime 90 tablet 3     carbidopa-levodopa (SINEMET)  MG tablet Take 2 tablets by mouth 3 times daily At 7 am, 1 pm, & 9 pm. 180 tablet 11     folic acid 800 MCG tablet Take 800 mcg by mouth daily       multivitamin, therapeutic with minerals (MULTI-VITAMIN) TABS tablet Take 1 tablet by mouth every morning        pramipexole (MIRAPEX) 0.5 MG tablet Take 1 tablet (0.5 mg) by mouth At Bedtime 90 tablet 3     senna-docusate (SENOKOT-S/PERICOLACE) 8.6-50 MG tablet Take 1-2 tablets by mouth 2 times daily 30 tablet 0     sulfaSALAzine (AZULFIDINE) 500 MG tablet TAKE 4 TABLETS BY MOUTH ONCE DAILY.  3     folic acid 20 MG CAPS Take 800 mg by mouth daily (with lunch)          Allergies: He  is allergic to shellfish-derived products and aspirin.    Past Medical History:   Diagnosis Date     Parkinson's disease (H) 09/2010     RLS (restless legs syndrome)      Ulcerative colitis (H)      Vitiligo        Past Surgical History:   Procedure Laterality Date     ------------OTHER-------------      nasal     HERNIA REPAIR       IMPLANT DEEP BRAIN STIMULATION GENERATOR / BATTERY Left 5/10/2019    Procedure: Deep Brain Stimulator Placement, Phase II, Placement Of Deep Brain Stimulator Generator/Battery Over The Left Chest Wall;  Surgeon: Humberto Briones MD;  Location: [facility] OR     OPTICAL TRACKING SYSTEM INSERTION DEEP BRAIN STIMULATION Left 10/23/2018    Procedure: Left Deep Brain Stimulator Placement, Phase One, Placement of Left Side Deep Brain Stimulator Electrode Target Left Subthalamic Nucleus with Microelectrode Recording;  Surgeon: Humberto Briones MD;  Location: [facility] OR     OPTICAL TRACKING SYSTEM INSERTION DEEP BRAIN STIMULATION BILATERAL Bilateral 4/26/2019    Procedure: O-Arm/Stealth Assisted Bilateral Deep Brain Stimulator Placement, Phase I, Placement Of Bilateral Deep Brain Stimulator Electrodes, Target Bilateral Subthalamic Nucleus With Microelectrode Recording;  Surgeon: Humberto Briones MD;  Location: [facility] OR     TONSILLECTOMY & ADENOIDECTOMY         Social History     Socioeconomic History     Marital status:      Spouse name: Not on file     Number of children: 4     Years of education: Not on file     Highest education level: Not on file   Occupational History     Not on file   Social Needs     Financial resource strain: Not on file     Food insecurity:     Worry: Not on file     Inability: Not on file     Transportation needs:     Medical: Not on file     Non-medical: Not on file   Tobacco Use     Smoking status: Never Smoker     Smokeless tobacco: Never Used   Substance and Sexual Activity     Alcohol use: No     Drug use: No     Sexual activity: Never   Lifestyle      Physical activity:     Days per week: Not on file     Minutes per session: Not on file     Stress: Not on file   Relationships     Social connections:     Talks on phone: Not on file     Gets together: Not on file     Attends Yarsani service: Not on file     Active member of club or organization: Not on file     Attends meetings of clubs or organizations: Not on file     Relationship status: Not on file     Intimate partner violence:     Fear of current or ex partner: Not on file     Emotionally abused: Not on file     Physically abused: Not on file     Forced sexual activity: Not on file   Other Topics Concern     Parent/sibling w/ CABG, MI or angioplasty before 65F 55M? Not Asked   Social History Narrative    Since he was diagnosed with Parkinson's disease, Mr. Navarro has not had any alcohol. Before that, he would have alcohol one night a week while he was in a relationship for about seven years. He was never in any chemical dependency treatment programs and was never hospitalized for any alcohol related problems. He endorsed marijuana use when he was in college, and has tried it about once every three years. He has not noticed any benefit for his Parkinson's disease. He was a social smoker in his early 20s, and smoked about a pack of cigarettes a week. He last smoked 40 years ago. He denied gambling or any other compulsive behaviors.         Mr. Navarro completed a Bachelor's degree at [employer]. 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       Family History   Problem Relation Age of Onset     Breast Cancer Mother      Myocardial Infarction Father      Coronary Artery Disease Father      No Known Problems Sister      No Known Problems Brother      No Known Problems Sister        Physical Examination:  Vital Signs:   oral temperature is 97  F (36.1  C). His blood pressure is 140/87 (abnormal) and his pulse is 47 (abnormal). His  oxygen saturation is 97%.   Physical Exam:  GENERAL: alert, active, attentive, appropriately groomed   HEENT: normocephalic, eyes open with no discharge, nares patent, oropharynx clear-no lesions  CHEST: normal configuration, chest rise equal b/l, non labored breathing   EXTREMITIES: no edema/cyanosis in BUE/BLE, distal pulses intact  PSYCH: mood stable     Neurologic Exam:  MENTAL STATUS: Alert and oriented to person, place, time, and situation. Follows commands. Recent and remote memory intact. Attention span and concentration intact.   SPEECH: Fluent, intact comprehension and articulation  CN: visual fields intact in all fields, EOMIB,  no nystagmus, normal saccades, PERRL, facial sensation intact, facial movement symmetric, hearing grossly intact to conversation, tongue protrudes midline   MOTOR: Moves all extremities equally against gravity without difficulty  Involuntary movements: refer to UPDRS III  SENSATION: intact to light touch throughout   COORDINATION: no dysmetria with FTN  GAIT: able to rise unassisted from a seated position, no arm swing b/l and normal length of gait, no en bloc turns, no ataxia      BOTULINUM NEUROTOXIN INJECTION PROCEDURES:    VERIFICATION OF PATIENT IDENTIFICATION AND PROCEDURE     Initials   Patient Name KMD   Patient  KMD   Procedure Verified by: MARIA R     Prior to the start of the procedure and with procedural staff participation, I verbally confirmed the patient s identity using two indicators, relevant allergies, that the procedure was appropriate and matched the consent or emergent situation, and that the correct equipment/implants were available. Immediately prior to starting the procedure I conducted the Time Out with the procedural staff and re-confirmed the patient s name, procedure, and site/side. (The Joint Commission universal protocol was followed.)  Yes    Sedation (Moderate or Deep): None      Above assessments performed by:  Resident/Fellow         Dr. Mccann           The attending provider was present for the entire procedure documented below.      Dr. Godinez       INDICATION/S FOR PROCEDURE/S:  Ace Navarro is a 74 year old year old patient with sialorrhea secondary to Parkinson disease  with associated symptoms of increase saliva production. .     His baseline symptoms have been recalcitrant to oral medications and conservative therapy.  He is here today for an injection of Myobloc.      GOAL OF PROCEDURE:  The goal of this procedure is to decrease excessive drooling  associated with sialorrhea.    TOTAL DOSE ADMINISTERED:  Dose Administered:  4000 units Myobloc    Diluent Used:  Preservative Free Normal Saline  Total Volume of Diluent Used:  2 ml  Lot # [id number] with Expiration Date:  1/2022  NDC #: Myobloc 5000u ([address]-10)    Medication guide was offered to patient and was accepted.    CONSENT:  The risks, benefits, and treatment options were discussed with Ace Navarro and she agreed to proceed.      Written consent was obtained by MARIA R.     EQUIPMENT USED:  Needle-37mm stimulating/recording    SKIN PREPARATION:  Skin preparation was performed using an alcohol wipe.    GUIDANCE DESCRIPTION:  Ultrasound guidance was necessary throughout the procedure to accurately identify all areas of glandular tissue muscles while avoiding injection of underlying muscles , neighboring nerves and nearby vascular structures.     AREA/MUSCLE INJECTED:    250 unit(s) = 0.1 mL    Muscles Injected Units Injected Number of Injections   Right parotid 2000 (0.8 mL) 2   Left parotid 2000 2        Total Units Injected: 4000    Unavoidable Waste: 1000    Total Units Billed 5000      The patient tolerated the injections without difficulty.      Assessment:    Ace Navarro is a 74 year old male with sialorrhea secondary to Parkinson disease.  Today we did repeat botulinum toxin injections. We increased the amount of Myobloc administered to bilateral parotid glands as he  reported that the amount he was given prior did not provide noticeable benefit. Thus, it was medically necessary to increase the dose due to the patient's previous response. We attempted to evaluate the submandibular glands, however, the gland appeared too dorsal on the neck (as opposed to near the jaw) and we were not comfortable injecting in that area.    Plan  Follow-up in 3 months' time to consider repeat injections  Refilled CD/LD TIMMY Mccann DO  Movement Disorders Fellow     Patient seen and examined with Dr. Godinez. Injections completed with Dr. Godinez in attendance during the entirety of the procedure.

## 2019-12-23 DIAGNOSIS — G20.A1 PARKINSON DISEASE (H): Primary | ICD-10-CM

## 2020-02-10 ENCOUNTER — HEALTH MAINTENANCE LETTER (OUTPATIENT)
Age: 75
End: 2020-02-10

## 2020-02-18 ENCOUNTER — PATIENT OUTREACH (OUTPATIENT)
Dept: NEUROLOGY | Facility: CLINIC | Age: 75
End: 2020-02-18

## 2020-02-18 NOTE — PROGRESS NOTES
Cy wanted to know what time his appointment was on Monday. I informed him he had an appointment with Alycia Dickens at 11:40 AM.   I gave him the Neurology clinic number and informed him to call this number with questions or concerns regarding his Neurological care. This ensures we get the message especially if Tammie is out of the office.

## 2020-02-18 NOTE — PROGRESS NOTES
Patient called and left a message with Tammie Correa RN regarding wanting to discuss an appointment with Dr. Godinez. Currently no appointment is scheduled with Dr. Godinez. Will call Ace back and inquire about his questions.

## 2020-02-24 ENCOUNTER — OFFICE VISIT (OUTPATIENT)
Dept: NEUROLOGY | Facility: CLINIC | Age: 75
End: 2020-02-24
Payer: MEDICARE

## 2020-02-24 VITALS
WEIGHT: 179.3 LBS | BODY MASS INDEX: 25.01 KG/M2 | RESPIRATION RATE: 16 BRPM | SYSTOLIC BLOOD PRESSURE: 132 MMHG | DIASTOLIC BLOOD PRESSURE: 75 MMHG | OXYGEN SATURATION: 97 % | HEART RATE: 59 BPM

## 2020-02-24 DIAGNOSIS — K11.7 SIALORRHEA: ICD-10-CM

## 2020-02-24 DIAGNOSIS — Z96.89 S/P DEEP BRAIN STIMULATOR PLACEMENT: Primary | ICD-10-CM

## 2020-02-24 DIAGNOSIS — G20.A1 PARKINSON'S DISEASE (H): ICD-10-CM

## 2020-02-24 DIAGNOSIS — K59.01 SLOW TRANSIT CONSTIPATION: ICD-10-CM

## 2020-02-24 DIAGNOSIS — G47.9 SLEEP DISTURBANCES: ICD-10-CM

## 2020-02-24 ASSESSMENT — UNIFIED PARKINSONS DISEASE RATING SCALE (UPDRS)
TOTAL_SCORE: 18
HANDMOVEMENTS_LEFT: NORMAL
PARKINSONS_MEDS: ON
PRONATION_SUPINATION_RIGHT: SLIGHT: ANY OF THE FOLLOWING: A) THE REGULAR RHYTHM IS BROKEN WITH ONE WITH ONE OR TWO INTERRUPTIONS OR HESITATIONS OF THE MOVEMENT B) SLIGHT SLOWING C) THE AMPLITUDE DECREMENTS NEAR THE END OF THE 10 MOVEMENTS.
TOTAL_SCORE: 7
FINGER_TAPPING_LEFT: NORMAL
AMPLITUDE_RLE: SLIGHT: < 1 CM IN MAXIMAL AMPLITUDE.
TOTAL_SCORE_LEFT: 1
SPEECH: MODERATE: SPEECH IS DIFFICULT TO UNDERSTAND TO THE POINT THAT SOME, BUT NOT MOST, SENTENCES ARE POORLY UNDERSTOOD.
AMPLITUDE_LLE: NORMAL: NO TREMOR.
POSTURAL_STABILITY: NORMAL:  RECOVERS WITH ONE OR TWO STEPS.
LEG_AGILITY_RIGHT: NORMAL
LEG_AGILITY_LEFT: NORMAL
SPONTANEITY_OF_MOVEMENT: 1: SLIGHT: SLIGHT GLOBAL SLOWNESS AND POVERTY OF SPONTANEOUS MOVEMENTS.
PRONATION_SUPINATION_LEFT: SLIGHT: ANY OF THE FOLLOWING: A) THE REGULAR RHYTHM IS BROKEN WITH ONE WITH ONE OR TWO INTERRUPTIONS OR HESITATIONS OF THE MOVEMENT B) SLIGHT SLOWING C) THE AMPLITUDE DECREMENTS NEAR THE END OF THE 10 MOVEMENTS.
HANDMOVEMENTS_RIGHT: SLIGHT: ANY OF THE FOLLOWING: A) THE REGULAR RHYTHM IS BROKEN WITH ONE WITH ONE OR TWO INTERRUPTIONS OR HESITATIONS OF THE MOVEMENT B) SLIGHT SLOWING C) THE AMPLITUDE DECREMENTS NEAR THE END OF THE 10 MOVEMENTS.
AXIAL_SCORE: 9
AMPLITUDE_RUE: MILD > 1 CM BUT < 3 CM IN MAXIMAL AMPLITUDE.
POSTURE: 1 SLIGHT.  NOT QUITE ERECT BUT COULD BE NORMAL FOR OLDER PERSONS.
FINGER_TAPPING_RIGHT: SLIGHT: ANY OF THE FOLLOWING: A) THE REGULAR RHYTHM IS BROKEN WITH ONE WITH ONE OR TWO INTERRUPTIONS OR HESITATIONS OF THE MOVEMENT B) SLIGHT SLOWING C) THE AMPLITUDE DECREMENTS NEAR THE END OF THE 10 MOVEMENTS.
GAIT: NORMAL
RIGIDITY_RLE: NORMAL
RIGIDITY_RUE: SLIGHT: RIGIDITY ONLY DETECTED WITH ACTIVATION MANEUVER.
AMPLITUDE_LIP_JAW: NORMAL: NO TREMOR.
FREEZING_GAIT: NORMAL
RIGIDITY_LUE: NORMAL
TOETAPPING_RIGHT: NORMAL
RIGIDITY_LLE: NORMAL
TOETAPPING_LEFT: NORMAL
AMPLITUDE_LUE: NORMAL: NO TREMOR.
ARISING_CHAIR: NORMAL: ABLE TO ARISE QUICKLY WITHOUT HESITATION.
FACIAL_EXPRESSION: MODERATE: MASKED FACIES WITH LIPS PARTED SOME OF THE TIME WHEN THE MOUTH IS AT REST.
RIGIDITY_NECK: SLIGHT: RIGIDITY ONLY DETECTED WITH ACTIVATION MANEUVER.
CONSTANCY_TREMOR_ATREST: SLIGHT: TREMOR AT REST IS PRESENT  25% OF THE ENTIRE EXAMINATION PERIOD.

## 2020-02-24 ASSESSMENT — PAIN SCALES - GENERAL: PAINLEVEL: WORST PAIN (10)

## 2020-02-24 NOTE — NURSING NOTE
Chief Complaint   Patient presents with     Follow Up     [facility] RETURN DEEP BRAIN STIMULATION       Gilmer [place]

## 2020-02-24 NOTE — PROGRESS NOTES
"MOVEMENT DISORDERS CLINIC    PATIENT: Ace Navarro    : 1945    NORA: 2020    REASON FOR VISIT: Parkinson's disease (PD) follow up and de    HPI: Mr. Ace Navarro is a 74 year old who came to the [facility] neurology clinic accompanied by his son for a follow up visit.  He was last seen in the neurology clinic on 2019 by Dr. Mccann (Fellow) and Dr. Godinez for Botox injection to improve sialorrhea.      Today in clinic he is having significant abdominal pain due to constipation.  He is rating his pain 10/10.  It has been a week since he had good bowel movement.  For the last 2 years he has used \"non-orange\" Metamucil to manage constipation, which has worked for him.  He reports \"that orange Metamucil\" makes him constipated.  Patient was in [place] for 2 weeks and got back yesterday.  While he was in [place], he used the orange Metamucil.  He was also eating prunes daily.  While he was in [place] he had one good bowel movement.  In the past, he has used MiraLax.  Since the non-orange color Metamucil worked better, he has not been using MiraLax.  He has been straining, and reports discomfort in his rectal area.  He usually moves his bowels every 3 days.  The longest he had gone was 10 days.  He has been drinking a lot of fluids.  He  also eats some vegetables in his diet.     From PD stand point, he is doing pretty good.  Tremor is well controlled.  The two pronounced issues are that his writing is small and his speech is soft and at times not clear.    Pre-programming antiparkinsonian medications:      PD Medications 7 am 1 pm 9 pm 10:30 - 11 pm   Sinemet 25/100 mg  2 2 2.5     Sinemet 50/200 mg        1   Pramipexole 0.25 mg        2      Patient reports that sleep is an ongoing issue.  He has no difficulty falling asleep; however, he wakes up 1.5 hrs after he falls asleep.  He stays wide-awake for 30 min - 60 min, and he is able to fall back to sleep.  He has used " Melatonin.  He does not recall the brand or dose he is taking.     MEDICATIONS:   Outpatient Medications Marked as Taking for the 2/24/20 encounter (Office Visit) with Isaias Dickens APRN CNP   Medication Sig     carbidopa-levodopa (SINEMET CR)  MG CR tablet Take 1 tablet by mouth At Bedtime     carbidopa-levodopa (SINEMET)  MG tablet Take 2 tablets by mouth 3 times daily At 7 am, 1 pm, & 9 pm.     folic acid 800 MCG tablet Take 800 mcg by mouth daily     multivitamin, therapeutic with minerals (MULTI-VITAMIN) TABS tablet Take 1 tablet by mouth every morning      pramipexole (MIRAPEX) 0.5 MG tablet Take 1 tablet (0.5 mg) by mouth At Bedtime     sulfaSALAzine (AZULFIDINE) 500 MG tablet TAKE 4 TABLETS BY MOUTH ONCE DAILY.     Current Facility-Administered Medications for the 2/24/20 encounter (Office Visit) with Isaias Dickens APRN CNP   Medication     botulinum toxin type B (MYOBLOC) injection 5,000 Units     botulinum toxin type B (MYOBLOC) Solution 2,500 Units     ALLERGIES: Shellfish-derived products and Aspirin    PHYSICAL EXAM:    VITAL SIGNS:  Blood pressure 132/75, pulse 59, resp. rate 16, weight 81.3 kg (179 lb 4.8 oz), SpO2 97 %., Body mass index is 25.01 kg/m .    GENERAL:  Mr. Navarro is a pleasant  male who is well-groomed, well-developed and thin sitting comfortably in the exam room without any distress.  Affect is appropriate.    Bowel Sounds:  Distant and quiet.      MOVEMENT DISORDERS ASSESSMENT:  (Last Sinemet was about 4 hrs ago)  Speech: Monotone, slurred but understandable; moderately impaired.  He has mild drooling.  Had to wipe his mouth with tissue a few times during today's visit.   Facial Expression: Slight, abnormal diminution of facial expression.  Rest Tremor: Mild only rarely present in RUE; absent in other extremities.   Dyskinesias:  Absent.    DBS Interrogation & Analysis:     Implanted generator:  Left chest Infinity 7  Lead placement:  Bilateral  Subthalamic Nucleus (STN)  Bilateral lead placement date:  4/26/2019  Bilateral generator placement date:  5/10/2019     Battery Service Life:  OK.  2.95 volts.     Left Brain     C +, 3ABC -  Amplitude:  2.80 mA  PW:  60 msec  Rate:  130 Hz     Therapy impedance:  1037 Ohms     Patient :  Upper Limit: 3.00 mA  Lower Limit: 2.00 mA     Electrode Impedance Check:    Left Lead: No Problems Found.       Right Brain      C +, 10ABC -  Amplitude:  2.40 mA  PW:  60 msec  Rate:  130 Hz     Therapy impedance:  887 Ohms     Patient :  Upper Limit: 1.50 mA  Lower Limit: 2.80 mA     Electrode Impedance Check:  Right Lead: No Problems Found.       See scanned report for impedance details.      ASSESSMENT:    1)  Parkinson's Disease:  Stable.      2)  S/p Bilateral STN DBS lead implantation:  Normal impedance and battery life.  No programming changes made.     3)  Constipation:  Ongoing issue.  Currently having acute problems.       4)  Sialorrhea:  Mild drooling.  So far, Botox injections hasn't helped.    5)  Sleep disturbances:  Although he wakes up in the middle of the night for 30 to 60 minutes, he still gets good rest.       PLAN:    The following instruction provided: -     Most of today's visit was spent discussing constipation in pts with PD, treatments, and the risk of small bowel obstruction.    __ Your DBS electrical system function (impedance) is normal. The battery life is also good.    __ Talk to your pharmacist and get a suppository for today as well as as needed if no bowel movements for 3 days.       Dulcolax or Glycerine suppositories    __ You might need to add Colace 100 mg daily or twice a day to help soften your bowels so that you don't strain.  This is in addition to Metamucil.     __  Continue with hydration and high-fiber diet.    __  Stay on the same dose of Sinemet and Mirapex.    __ You can try a different brand of Melatonin or a higher dose up to 15 mg 1 hours before bedtime.      __  See Dr. Godinez in March.    The total amount of time spent with the patient was 50 minutes; 20 min in DBS interrogation & analysis and 30 min in addressing constipation and drooling. Over 50% of the time was spent in counseling & coordination of care.     NICK Ford,  CNP  [facility]    11:48 AM  12:39 PM

## 2020-02-24 NOTE — LETTER
"2020       RE: Ace Navarro  [address] [facility] [address]     Dear Colleague,    Thank you for referring your patient, Ace Navarro, to the [facility] NEUROLOGY at [facility]. Please see a copy of my visit note below.    MOVEMENT DISORDERS CLINIC    PATIENT: Ace Navarro    : 1945    NORA: 2020    REASON FOR VISIT: Parkinson's disease (PD) follow up and de    HPI: Mr. Ace Navarro is a 74 year old who came to the [facility] neurology clinic accompanied by his son for a follow up visit.  He was last seen in the neurology clinic on 2019 by Dr. Mccann (Fellow) and Dr. Godinez for Botox injection to improve sialorrhea.      Today in clinic he is having significant abdominal pain due to constipation.  He is rating his pain 10/10.  It has been a week since he had good bowel movement.  For the last 2 years he has used \"non-orange\" Metamucil to manage constipation, which has worked for him.  He reports \"that orange Metamucil\" makes him constipated.  Patient was in [place] for 2 weeks and got back yesterday.  While he was in [place], he used the orange Metamucil.  He was also eating prunes daily.  While he was in [place] he had one good bowel movement.  In the past, he has used MiraLax.  Since the non-orange color Metamucil worked better, he has not been using MiraLax.  He has been straining, and reports discomfort in his rectal area.  He usually moves his bowels every 3 days.  The longest he had gone was 10 days.  He has been drinking a lot of fluids.  He  also eats some vegetables in his diet.     From PD stand point, he is doing pretty good.  Tremor is well controlled.  The two pronounced issues are that his writing is small and his speech is soft and at times not clear.    Pre-programming antiparkinsonian medications:      PD Medications 7 am 1 pm 9 pm 10:30 - 11 pm   Sinemet 25/100 mg  2 2 2.5   "   Sinemet 50/200 mg        1   Pramipexole 0.25 mg        2      Patient reports that sleep is an ongoing issue.  He has no difficulty falling asleep; however, he wakes up 1.5 hrs after he falls asleep.  He stays wide-awake for 30 min - 60 min, and he is able to fall back to sleep.  He has used Melatonin.  He does not recall the brand or dose he is taking.     MEDICATIONS:   Outpatient Medications Marked as Taking for the 2/24/20 encounter (Office Visit) with Isaias Dickens APRN CNP   Medication Sig     carbidopa-levodopa (SINEMET CR)  MG CR tablet Take 1 tablet by mouth At Bedtime     carbidopa-levodopa (SINEMET)  MG tablet Take 2 tablets by mouth 3 times daily At 7 am, 1 pm, & 9 pm.     folic acid 800 MCG tablet Take 800 mcg by mouth daily     multivitamin, therapeutic with minerals (MULTI-VITAMIN) TABS tablet Take 1 tablet by mouth every morning      pramipexole (MIRAPEX) 0.5 MG tablet Take 1 tablet (0.5 mg) by mouth At Bedtime     sulfaSALAzine (AZULFIDINE) 500 MG tablet TAKE 4 TABLETS BY MOUTH ONCE DAILY.     Current Facility-Administered Medications for the 2/24/20 encounter (Office Visit) with Isaias Dickens APRN CNP   Medication     botulinum toxin type B (MYOBLOC) injection 5,000 Units     botulinum toxin type B (MYOBLOC) Solution 2,500 Units     ALLERGIES: Shellfish-derived products and Aspirin    PHYSICAL EXAM:    VITAL SIGNS:  Blood pressure 132/75, pulse 59, resp. rate 16, weight 81.3 kg (179 lb 4.8 oz), SpO2 97 %., Body mass index is 25.01 kg/m .    GENERAL:  Mr. Navarro is a pleasant  male who is well-groomed, well-developed and thin sitting comfortably in the exam room without any distress.  Affect is appropriate.    Bowel Sounds:  Distant and quiet.      MOVEMENT DISORDERS ASSESSMENT:  (Last Sinemet was about 4 hrs ago)  Speech: Monotone, slurred but understandable; moderately impaired.  He has mild drooling.  Had to wipe his mouth with tissue a few times during  today's visit.   Facial Expression: Slight, abnormal diminution of facial expression.  Rest Tremor: Mild only rarely present in RUE; absent in other extremities.   Dyskinesias:  Absent.    DBS Interrogation & Analysis:     Implanted generator:  Left chest Infinity 7  Lead placement:  Bilateral Subthalamic Nucleus (STN)  Bilateral lead placement date:  4/26/2019  Bilateral generator placement date:  5/10/2019     Battery Service Life:  OK.  2.95 volts.     Left Brain     C +, 3ABC -  Amplitude:  2.80 mA  PW:  60 msec  Rate:  130 Hz     Therapy impedance:  1037 Ohms     Patient :  Upper Limit: 3.00 mA  Lower Limit: 2.00 mA     Electrode Impedance Check:    Left Lead: No Problems Found.       Right Brain      C +, 10ABC -  Amplitude:  2.40 mA  PW:  60 msec  Rate:  130 Hz     Therapy impedance:  887 Ohms     Patient :  Upper Limit: 1.50 mA  Lower Limit: 2.80 mA     Electrode Impedance Check:  Right Lead: No Problems Found.       See scanned report for impedance details.      ASSESSMENT:    1)  Parkinson's Disease:  Stable.      2)  S/p Bilateral STN DBS lead implantation:  Normal impedance and battery life.  No programming changes made.     3)  Constipation:  Ongoing issue.  Currently having acute problems.       4)  Sialorrhea:  Mild drooling.  So far, Botox injections hasn't helped.    5)  Sleep disturbances:  Although he wakes up in the middle of the night for 30 to 60 minutes, he still gets good rest.       PLAN:    The following instruction provided: -     Most of today's visit was spent discussing constipation in pts with PD, treatments, and the risk of small bowel obstruction.    __ Your DBS electrical system function (impedance) is normal. The battery life is also good.    __ Talk to your pharmacist and get a suppository for today as well as as needed if no bowel movements for 3 days.       Dulcolax or Glycerine suppositories    __ You might need to add Colace 100 mg daily or twice a day to  help soften your bowels so that you don't strain.  This is in addition to Metamucil.     __  Continue with hydration and high-fiber diet.    __  Stay on the same dose of Sinemet and Mirapex.    __ You can try a different brand of Melatonin or a higher dose up to 15 mg 1 hours before bedtime.     __  See Dr. Godinez in March.    The total amount of time spent with the patient was 50 minutes; 20 min in DBS interrogation & analysis and 30 min in addressing constipation and drooling. Over 50% of the time was spent in counseling & coordination of care.     NICK Ford,  CNP  [facility] Neurology [facility]  11:48 AM  12:39 PM

## 2020-02-24 NOTE — PATIENT INSTRUCTIONS
February 24, 2020    Dear  Ace Navarro,    Thank you for coming today.  During your visit, we have discussed the following:     __ Your DBS electrical system function (impedance) is normal. The battery life is also good.    __ Talk to your pharmacist and get a suppository for today as well as as needed if no bowel movements for 3 days.       Dulcolax or Glycerine suppositories    __ You might need to add Colace 100 mg daily or twice a day to help soften your bowels so that you don't strain.  This is in addition to Metamucil.     __  Continue with hydration and high-fiber diet.    __  Stay on the same dose of Sinemet and Mirapex.    __ You can try a different brand of Melatonin or a higher dose up to 15 mg 1 hours before bedtie.     __  See Dr. Godinez in March.      To contact our clinic, you may call [phone] or use [employer] message.     It's good to see you!    NICK Ford, CNP  [facility]

## 2020-03-09 ENCOUNTER — TELEPHONE (OUTPATIENT)
Dept: NEUROSURGERY | Facility: CLINIC | Age: 75
End: 2020-03-09

## 2020-03-09 NOTE — TELEPHONE ENCOUNTER
Pt left VM saying he had some questions about an upcoming appointment with Dr. Godinez. I don't see anything on his schedule, so will forward to neurology.

## 2020-03-18 ENCOUNTER — TELEPHONE (OUTPATIENT)
Dept: NEUROLOGY | Facility: CLINIC | Age: 75
End: 2020-03-18

## 2020-03-18 NOTE — TELEPHONE ENCOUNTER
Ace stated he does not know what the appointment is for next week with Dr. Godinez. He gave me permission to call his son Jose to discuss his appointment and PHI.

## 2020-03-20 NOTE — TELEPHONE ENCOUNTER
Due to the recent developments of COVID-19 we are changing all in-person office visits to telephone visits for your safety. You are at an increased risk for complications if you were to get the virus due to your age and Parkinson's disease. Wash your hands frequently and only leave your home if necessary.    If possible, having a second set of ears is a good idea. If someone is there with you, you could have your phone on the speaker setting. Please have the following close by during the call:    1. A list of any questions/concerns written down beforehand.    2. List of medications and the EXACT times they are taken - better if you have your medications in front of you.    3. Please follow up with any questions or concerns via [employer] as the call center wait times have increased dramatically.    4. Due to your diagnosis, you are in the higher risk category. Wash your hands often during the day, avoid large crowds, better yet, stay home if possible.    5. Should you develop a new fever or cough, seek care through [email or web address] (no drive up testing available anymore) and be sure to put in  COVID19  as the diagnosis for your visit, not your chronic disease diagnosis.    Denies fever, cough, sore throat, shortness of breath    One of our CMA's will call you about 30 minutes before your scheduled appointment time to go through your medications.    It looks like you are set up for a phone visit with Jose your son. Ace is okay with Dr. Godinez discussing with Jose. Ace will share with Jose his medication list.

## 2020-03-24 ENCOUNTER — DOCUMENTATION ONLY (OUTPATIENT)
Dept: NEUROLOGY | Facility: CLINIC | Age: 75
End: 2020-03-24

## 2020-03-24 ENCOUNTER — VIRTUAL VISIT (OUTPATIENT)
Dept: NEUROLOGY | Facility: CLINIC | Age: 75
End: 2020-03-24
Payer: MEDICARE

## 2020-03-24 DIAGNOSIS — G20.A1 PARKINSON'S DISEASE (H): ICD-10-CM

## 2020-03-24 RX ORDER — CARBIDOPA AND LEVODOPA 25; 100 MG/1; MG/1
2 TABLET ORAL 4 TIMES DAILY
Qty: 320 TABLET | Refills: 11 | Status: SHIPPED | OUTPATIENT
Start: 2020-03-24 | End: 2021-02-19

## 2020-03-24 ASSESSMENT — PAIN SCALES - GENERAL: PAINLEVEL: NO PAIN (0)

## 2020-03-24 NOTE — PROGRESS NOTES
"Ace Navarro is a 74 year old male who is being evaluated via a billable telephone visit.      The patient has been notified of following:     \"This telephone visit will be conducted via a call between you and your physician/provider. We have found that certain health care needs can be provided without the need for a physical exam.  This service lets us provide the care you need with a short phone conversation.  If a prescription is necessary we can send it directly to your pharmacy.  If lab work is needed we can place an order for that and you can then stop by our lab to have the test done at a later time.    If during the course of the call the physician/provider feels a telephone visit is not appropriate, you will not be charged for this service.\"     Ace Navarro complains of    Chief Complaint   Patient presents with     Follow Up     [facility] TELEPHONE VISIT        I have reviewed and updated the patient's Past Medical History, Social History, Family History and Medication List.    ALLERGIES  Shellfish-derived products and Aspirin    Additional provider notes:    This is a 74-year-old gentleman who I have followed for many years with his Parkinson's disease.  He developed motor fluctuations and had bilateral STN stimulation with successful results.  He was most recently programmed by Ms. Alycia Dickens, NICK CNP, on February 24, 2020 with optimal results.  We had been giving him Myobloc for sialorrhea but both he and his son Jose who is on the telephone call today indicate that it is not working.  He still drools extensively.    Ace continues to live alone.  His other son is about to move in with him.  He feels he is doing well however.  He is taking carbidopa levodopa 25 100, 2 pills in the morning, 2 pills at noon 2-1/2 pills at 9 PM.  He is taking carbidopa levodopa ER, 50/200, 1 pill at bedtime.  He still awakens about 5 in the morning and cannot get back to sleep.  His son and he feel that " "he is sleep deprived.  He is only getting about 5 hours of good sleep at night.  He seems continuously tired.    Otherwise his motor fluctuations are well controlled.  He has no dyskinesia or dystonia.  He has only minimal right hand tremor at the period where his medicine wears off about 15 minutes before the next dose.  His gait is good.  He has had no falls or freezing.  Cognition remains stable.  His speech is soft and he would like a prescription sent to him for Shemar Teran Loud voice therapy.    Assessment/Plan:  1.  Idiopathic Parkinson's disease stable  2.  Motor fluctuations well controlled  3.  Status post bilateral STN DBS well-controlled  4.  Sialorhhea no response to Myobloc injections  5.  Poor sleep most likely secondary to \"wearing off phenomena\" in the middle of the night    Recommendations:  1.  Continue carbidopa levodopa 25/100, 2 tablets at 7 AM, 1 PM and 2-1/2 tablets at 9 PM.  2.  Patient can keep 2 tablets of carbidopa/levodopa, 25/100, at his bedside and take it at 5 AM when he awakens in a wearing off state.  New prescription was sent to his pharmacy.  3.  Continue carbidopa/levodopa ER 50/200 at bedtime  4.  For the sialorrhea we could consider increasing his dosage and adding the submandibular glands.  He will decide if he wants to do this.  5.  I will send a prescription for LSVT loud therapy.    Phone call duration:40 minutes    Eric Godinez MD  "

## 2020-03-24 NOTE — PROGRESS NOTES
"Ace Navarro is a 74 year old male who is being evaluated via a billable telephone visit.      The patient has been notified of following:     \"This telephone visit will be conducted via a call between you and your physician/provider. We have found that certain health care needs can be provided without the need for a physical exam.  This service lets us provide the care you need with a short phone conversation.  If a prescription is necessary we can send it directly to your pharmacy.  If lab work is needed we can place an order for that and you can then stop by our lab to have the test done at a later time.    If during the course of the call the physician/provider feels a telephone visit is not appropriate, you will not be charged for this service.\"     Ace Navarro complains of    Chief Complaint   Patient presents with     Follow Up     [facility] TELEPHONE VISIT        I have reviewed and updated the patient's allergies and Medication List.    ALLERGIES  Shellfish-derived products and Aspirin        Phone call duration: [id number] minutes    Gilmer Washington, EMT     "

## 2020-04-22 ENCOUNTER — TELEPHONE (OUTPATIENT)
Dept: NEUROPSYCHOLOGY | Facility: CLINIC | Age: 75
End: 2020-04-22

## 2020-04-28 ENCOUNTER — VIRTUAL VISIT (OUTPATIENT)
Dept: NEUROPSYCHOLOGY | Facility: CLINIC | Age: 75
End: 2020-04-28
Attending: PSYCHIATRY & NEUROLOGY
Payer: MEDICARE

## 2020-04-28 DIAGNOSIS — F09 MENTAL DISORDER DUE TO GENERAL MEDICAL CONDITION: ICD-10-CM

## 2020-04-28 DIAGNOSIS — G20.A1 PARKINSON'S DISEASE (H): Primary | ICD-10-CM
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
## 2020-05-13 ENCOUNTER — TELEPHONE (OUTPATIENT)
Dept: NEUROLOGY | Facility: CLINIC | Age: 75
End: 2020-05-13

## 2020-05-13 NOTE — TELEPHONE ENCOUNTER
"Received message  medication questions   Received: Today   Message Contents   Tammie Rider, RN sent to  [facility]-    Phone Number: [phone]               Received VM form pt's son Jose. They have questions about the medication changes Dr. Godinez   Recommended at the most recent telehealth visit. Can you call Jose at [phone]?     Thanks,   Tammie      Reviewed patient EHR and visit notes, placed call to alpa Garcia - got VM \"Mail box is full\".  Will attempt at later time and pass on to Basia on her return.  "

## 2020-05-14 NOTE — TELEPHONE ENCOUNTER
"Reached son today and review medication adjustment, doses and times with him, confirmed that his dad is taking as ordered; jen has concern about his dad taking a dose at 5a and then again at 7a as he feels it might be to much in that time frame and make him lethargic.  He is also talking about having his dad use an alarm to get up at 4a and take a dose then.  Called question to Basia.  Basia indicates this is less than optimal dose - usually 3 tabs, so patient should be fine with this dose.  Returned call to Jen again, and got \"full mail box' again will try again at later time.    Called again 5/15/20 2: 23 \"Mail box full\".  "

## 2020-05-18 ENCOUNTER — TELEPHONE (OUTPATIENT)
Dept: NEUROLOGY | Facility: CLINIC | Age: 75
End: 2020-05-18

## 2020-05-18 NOTE — TELEPHONE ENCOUNTER
[facility] Call Center    Phone Message    May a detailed message be left on voicemail: yes     Reason for Call: Other: Jose/ Pt's son, calling about medication balance for the 4 pills.     Action Taken: Message routed to:  [facility] ([facility] Neurology    Travel Screening: Not Applicable

## 2020-05-18 NOTE — TELEPHONE ENCOUNTER
carbidopa-levodopa (SINEMET)  MG tablet  320 tablet  11  3/24/2020   No    Sig - Route: Take 2 tablets by mouth 4 times daily At 7 am, 1 pm, & 9 pm. - Oral      Jose stated that Ace has expressed trouble balancing meals and his medications. They are also concerned about dyskinesia's if taking 2 tabs at 5 AM and 2 tabs at 7 AM. Jose and Ace have talked and would like to try taking 2 tabs at 4 AM, 9 AM, 3 PM, and 9 PM. Jose asks if the 7 AM, 1 PM, and 9 PM dose times are something Dr. Godinez wants them to follow strictly.    Plan/recommendation:  1. The medication times are not set in stone. If Dilcia meal times are affecting his medications he can change his meals or med times to accommodate his routine. I will check with Dr. Godinez to verify the proposed dose times.    2. Do not change Aces regimen mid-day.

## 2020-10-13 ENCOUNTER — VIRTUAL VISIT (OUTPATIENT)
Dept: NEUROLOGY | Facility: CLINIC | Age: 75
End: 2020-10-13
Payer: MEDICARE

## 2020-10-13 DIAGNOSIS — G20.A1 PARKINSON'S DISEASE (H): Primary | ICD-10-CM

## 2020-10-13 PROCEDURE — 99442 PR PHYSICIAN TELEPHONE EVALUATION 11-20 MIN: CPT | Mod: 95 | Performed by: PSYCHIATRY & NEUROLOGY

## 2020-10-13 NOTE — LETTER
10/13/2020       RE: Ace Navarro  [address] [facility] [address]     Dear Colleague,    Thank you for referring your patient, Ace Navarro, to the [facility] [place] at [facility]. Please see a copy of my visit note below.    CC:  Parkinson's Disease    HPI:  Mr. Ace Navarro is a 75-year-old gentleman who I followed for a long time with Parkinson's disease.  He developed motor fluctuations and then had bilateral STN stimulators placed on 4/26/2019.  These were done under general anesthesia as the patient did not tolerate awake surgery.    He has been programmed by Ms. Montoya or cut on February 24 of 2020.  He was doing well at that time of the programming had virtually removed his motor fluctuations.    At this time he says he continues to do well.  He is taking carbidopa/levodopa, 25/100, 2 tablets 4 times a day.  He has very little sense of on and very rarely has a severe off time.  This was his major disability previously.  He says he only rarely gets a feeling of off.  He has no dyskinesia.  He is able to walk and exercise at the [place] 3 days a week.  He was not able to play softball again this year.  He is living independently with his son.  His cognition remains sharp.  He occasionally has a sense of a person behind him but never has visual hallucination.  He has no freezing or falls.  He has a difficult time swallowing but does not choke.  He feels he is having swallowing problems because he has excessive salivary production.  We had tried giving him Botox for this without any great benefit.  He takes long-acting carbidopa/levodopa 50/200, CR at bedtime and sleeps relatively well.  He takes pramipexole 0.5 mg at bedtime for restless leg which is currently quiesced sent.  He has Parkinson's review of symptoms as listed below.    C/l 25/100 2 four times a day  NO downtime  Rare      Parkinson's Disease Motor Symptom  Review:    Motor fluctuations:     Dyskinesia:  Duration - no.  Disability - no.  Wearing off:  rare.  Freezing of gait: no  Dystonia: no  Tremor: twice a a\day  Rigidity: no  Bradykinesia: no     Parkinson's Disease Non-motor Symptom Review:    Psychiatric disturbances - no.  Cognitive impairment - good.  Sleep disturbances - mild.   GI symptoms - no.  Urinary symptoms -frequency.   Balance -no falls.  Pain - no.  Autonomic dysfunction - NO.  Hallucinations -rare feels man behind.  Speech - ok.  Swallowing - trouble. No choking.  Salivation - increased.  Dopamine agonist side effects - no.  Driving: na.  Living situation: ok  Medication compliance googd  ADL's: ojk    Phone call duration: 15 minutes      Again, thank you for allowing me to participate in the care of your patient.  Sincerely,    Eric Godinez MD

## 2020-10-13 NOTE — LETTER
"10/13/2020       RE: Ace Navarro  [address] [facility] [address]     Dear Colleague,    Thank you for referring your patient, Ace Navarro, to the [facility] [place] [facility] Please see a copy of my visit note below.    Ace Navarro is a 75 year old male who is being evaluated via a billable telephone visit.      The patient has been notified of following:     \"This telephone visit will be conducted via a call between you and your physician/provider. We have found that certain health care needs can be provided without the need for a physical exam.  This service lets us provide the care you need with a short phone conversation.  If a prescription is necessary we can send it directly to your pharmacy.  If lab work is needed we can place an order for that and you can then stop by our lab to have the test done at a later time.    Telephone visits are billed at different rates depending on your insurance coverage. During this emergency period, for some insurers they may be billed the same as an in-person visit.  Please reach out to your insurance provider with any questions.    If during the course of the call the physician/provider feels a telephone visit is not appropriate, you will not be charged for this service.\"    Patient has given verbal consent for Telephone visit?  Yes    What phone number would you like to be contacted at? [phone]    How would you like to obtain your AVS? Mail a copy     I changed the patient's scheduled in-person visit to a virtual telephone visit  because of the COVID19 crisis.  The rationale was to protect the patient from unnecessary interpersonal contact.    CC:  Parkinson's Disease    HPI:  Mr. Ace Navarro is a 75-year-old gentleman who I followed for a long time with Parkinson's disease.  He developed motor fluctuations and then had bilateral STN stimulators placed on " 4/26/2019.  These were done under general anesthesia as the patient did not tolerate awake surgery.    He has been programmed by Ms. Montoya or cut on February 24 of 2020.  He was doing well at that time of the programming had virtually removed his motor fluctuations.    At this time he says he continues to do well.  He is taking carbidopa/levodopa, 25/100, 2 tablets 4 times a day.  He has very little sense of on and very rarely has a severe off time.  This was his major disability previously.  He says he only rarely gets a feeling of off.  He has no dyskinesia.  He is able to walk and exercise at the [facility] 3 days a week.  He was not able to play softball again this year.  He is living independently with his son.  His cognition remains sharp.  He occasionally has a sense of a person behind him but never has visual hallucination.  He has no freezing or falls.  He has a difficult time swallowing but does not choke.  He feels he is having swallowing problems because he has excessive salivary production.  We had tried giving him Botox for this without any great benefit.  He takes long-acting carbidopa/levodopa 50/200, CR at bedtime and sleeps relatively well.  He takes pramipexole 0.5 mg at bedtime for restless leg which is currently quiesced sent.  He has Parkinson's review of symptoms as listed below.    C/l 25/100 2 four times a day  NO downtime  Rare    Parkinson's Disease Motor Symptom Review:    Motor fluctuations:     Dyskinesia:  Duration - no.  Disability - no.  Wearing off:  rare.  Freezing of gait: no  Dystonia: no  Tremor: twice a a\day  Rigidity: no  Bradykinesia: no     Parkinson's Disease Non-motor Symptom Review:    Psychiatric disturbances - no.  Cognitive impairment - good.  Sleep disturbances - mild.   GI symptoms - no.  Urinary symptoms -frequency.   Balance -no falls.  Pain - no.  Autonomic dysfunction - NO.  Hallucinations -rare feels man behind.  Speech - ok.  Swallowing - trouble. No  choking.  Salivation - increased.  Dopamine agonist side effects - no.  Driving: na.  Living situation: ok  Medication compliance googd  ADL's: ojk    Phone call duration: 15 minutes          Again, thank you for allowing me to participate in the care of your patient.      Sincerely,    Eric Godinez MD

## 2020-10-13 NOTE — PROGRESS NOTES
"Ace Navarro is a 75 year old male who is being evaluated via a billable telephone visit.      The patient has been notified of following:     \"This telephone visit will be conducted via a call between you and your physician/provider. We have found that certain health care needs can be provided without the need for a physical exam.  This service lets us provide the care you need with a short phone conversation.  If a prescription is necessary we can send it directly to your pharmacy.  If lab work is needed we can place an order for that and you can then stop by our lab to have the test done at a later time.    Telephone visits are billed at different rates depending on your insurance coverage. During this emergency period, for some insurers they may be billed the same as an in-person visit.  Please reach out to your insurance provider with any questions.    If during the course of the call the physician/provider feels a telephone visit is not appropriate, you will not be charged for this service.\"    Patient has given verbal consent for Telephone visit?  Yes    What phone number would you like to be contacted at? [phone]    How would you like to obtain your AVS? Mail a copy     I changed the patient's scheduled in-person visit to a virtual telephone visit  because of the COVID19 crisis.  The rationale was to protect the patient from unnecessary interpersonal contact.    CC:  Parkinson's Disease    HPI:  Mr. Ace Navarro is a 75-year-old gentleman who I followed for a long time with Parkinson's disease.  He developed motor fluctuations and then had bilateral STN stimulators placed on 4/26/2019.  These were done under general anesthesia as the patient did not tolerate awake surgery.    He has been programmed by Ms. Montoya or denice on February 24 of 2020.  He was doing well at that time of the programming had virtually removed his motor fluctuations.    At this time he says he continues to do well.  He is " taking carbidopa/levodopa, 25/100, 2 tablets 4 times a day.  He has very little sense of on and very rarely has a severe off time.  This was his major disability previously.  He says he only rarely gets a feeling of off.  He has no dyskinesia.  He is able to walk and exercise at the [facility] 3 days a week.  He was not able to play softball again this year.  He is living independently with his son.  His cognition remains sharp.  He occasionally has a sense of a person behind him but never has visual hallucination.  He has no freezing or falls.  He has a difficult time swallowing but does not choke.  He feels he is having swallowing problems because he has excessive salivary production.  We had tried giving him Botox for this without any great benefit.  He takes long-acting carbidopa/levodopa 50/200, CR at bedtime and sleeps relatively well.  He takes pramipexole 0.5 mg at bedtime for restless leg which is currently quiesced sent.  He has Parkinson's review of symptoms as listed below.    C/l 25/100 2 four times a day  NO downtime  Rare    Parkinson's Disease Motor Symptom Review:    Motor fluctuations:     Dyskinesia:  Duration - no.  Disability - no.  Wearing off:  rare.  Freezing of gait: no  Dystonia: no  Tremor: twice a a\day  Rigidity: no  Bradykinesia: no     Parkinson's Disease Non-motor Symptom Review:    Psychiatric disturbances - no.  Cognitive impairment - good.  Sleep disturbances - mild.   GI symptoms - no.  Urinary symptoms -frequency.   Balance -no falls.  Pain - no.  Autonomic dysfunction - NO.  Hallucinations -rare feels man behind.  Speech - ok.  Swallowing - trouble. No choking.  Salivation - increased.  Dopamine agonist side effects - no.  Driving: na.  Living situation: ok  Medication compliance googd  ADL's: ojk    Phone call duration: 15 minutes

## 2020-11-14 ENCOUNTER — HEALTH MAINTENANCE LETTER (OUTPATIENT)
Age: 75
End: 2020-11-14

## 2020-11-30 ENCOUNTER — VIRTUAL VISIT (OUTPATIENT)
Dept: NEUROLOGY | Facility: CLINIC | Age: 75
End: 2020-11-30
Payer: MEDICARE

## 2020-11-30 DIAGNOSIS — G20.A1 PARKINSON'S DISEASE (H): Primary | ICD-10-CM

## 2020-11-30 DIAGNOSIS — R19.7 DIARRHEA, UNSPECIFIED TYPE: ICD-10-CM

## 2020-11-30 DIAGNOSIS — G47.9 SLEEP DISTURBANCES: ICD-10-CM

## 2020-11-30 DIAGNOSIS — Z96.89 S/P DEEP BRAIN STIMULATOR PLACEMENT: ICD-10-CM

## 2020-11-30 PROCEDURE — 99443 PR PHYSICIAN TELEPHONE EVALUATION 21-30 MIN: CPT | Mod: 95 | Performed by: NURSE PRACTITIONER

## 2020-11-30 NOTE — PATIENT INSTRUCTIONS
November 30, 2020    Dear Mr. Ace Navarro,    Telephone Visit Summary: -     __  Stay on the same antiparkinsonian medications.    __  If diarrhea continues this week, contact your PCP to be seen sooner than your GI appointment on Dec 23rd.    __  I'll have the schedulers call you to setup your next visit in clinic to check your DBS sometime in February.    __  For sleep disturbances, try the following:     Nonpharmacological Management      Practice good sleep hygiene.    Maintain a regular sleep schedule seven days a week. Keep the same bedtime each night.    Make the sleep environment as comfortable as possible. Consider a bed with capability to elevate head. Use satin sheets to move more easily. Install a grab bar or mobility transfer handle to assist with turning and getting in and out of bed. Have a comfortable recliner chair in the room. Keep the bedroom clear of clutter.    Avoid ingesting stimulants, including caffeine, nicotine, and alcohol, too close to bedtime.    Participate in an active exercise program during the day.    Spend part of the day outdoors, increasing natural exposure to light.    Avoid heavy meals within 4 hours of bedtime.    Limit fluid intake 4 hours before bedtime.    Review Parkinson and concomitant medications with health care provider to determine if they interfere with sleep. Review optimal schedule with health care provider.    Information extracted from a book by Percy Salter MD: Viky Prater RN, MS: Karen Briones, HowardD, [facility]: Erik Jeffery MD. 2009.  Parkinson s Disease: A Guide to Patient Care.    ____________________    __  You may contact us anytime as needed.     For questions, you may send us a "[employer]," message or call [phone]    Fax number: [phone]    NICK Ford, CNP  [facility]

## 2020-11-30 NOTE — PROGRESS NOTES
"Ace Navarro is a 75 year old male who is being evaluated via a billable telephone visit.      The patient has been notified of following:     \"This telephone visit will be conducted via a call between you and your physician/provider. We have found that certain health care needs can be provided without the need for a physical exam.  This service lets us provide the care you need with a short phone conversation.  If a prescription is necessary we can send it directly to your pharmacy.  If lab work is needed we can place an order for that and you can then stop by our lab to have the test done at a later time.    Telephone visits are billed at different rates depending on your insurance coverage. During this emergency period, for some insurers they may be billed the same as an in-person visit.  Please reach out to your insurance provider with any questions.    If during the course of the call the physician/provider feels a telephone visit is not appropriate, you will not be charged for this service.\"     Patient has given verbal consent for Telephone visit?  Yes    What phone number would you like to be contacted at? [phone]    How would you like to obtain your AVS? Mail a copy    Curt Landon      ____________________________________________________________________________________________________________________________________    MOVEMENT DISORDERS TELEMEDICINE VISIT:     PATIENT: Ace Navarro    : 1945    DATE: 2020    REASON FOR VISIT: Discuss a few questions.     After a review of the patient s situation, this visit was changed from an in-person visit to a telephone visit to reduce the risk of COVID-19 exposure.   The patient is being evaluated via a billable telephone visit.    Telephone visit was completed with patient, and his two sons: Tonja (has moved with patient to take care of him) and Jose (added to the call.)    Viviana did not know that patient had a " telephone visit with Dr. Godinez on Oct 13th.  They wanted to discuss 3 things:     __  Patient's quality of sleep is poor.  __  Constipation used to be a problem, but now he is having diarrhea.  __  When to check his DBS device.    Patient reports that he sometimes goes to bed at 11 pm and wakes up 2 am.  When he wakes up in the middle of the night, he watches TV and falls back to sleep.  He reports getting 2 - 4 hours of sleep.  Tonja has noticed that patient takes frequent naps during the day.  Patient doesn't have a routine sleep hygiene. Due to the COVID-19 lockdown, he has been inactive and watching a lot of TV.    Jose reports that patient has had constipation.  However, Tonja reported that last week, he had diarrhea.  About a month ago, constipation was bothering him and he took stool softener, which resolved the problem.  Since the, he hasn't taken stool softener.  Patient has made an appointment with GI, which is scheduled for Dec 23rd.     MEDICATIONS:   Outpatient Medications Marked as Taking for the 11/30/20 encounter (Virtual Visit) with Isaias Dickens APRN CNP   Medication Sig     carbidopa-levodopa (SINEMET CR)  MG CR tablet Take 1 tablet by mouth At Bedtime     carbidopa-levodopa (SINEMET)  MG tablet Take 2 tablets by mouth 4 times daily At 7 am, 1 pm, & 9 pm.     folic acid 800 MCG tablet Take 800 mcg by mouth daily     multivitamin, therapeutic with minerals (MULTI-VITAMIN) TABS tablet Take 1 tablet by mouth every morning      pramipexole (MIRAPEX) 0.5 MG tablet Take 1 tablet (0.5 mg) by mouth At Bedtime     sulfaSALAzine (AZULFIDINE) 500 MG tablet TAKE 4 TABLETS BY MOUTH ONCE DAILY.       ALLERGIES: Shellfish-derived products and Aspirin      ASSESSMENT:    1)  Parkinson's Disease:  Stable.    2)  S/p Bilateral Subthalamic Nucleus DBS lead implantation:  Last DBS interrogation and analysis was on Feb 24th 2020.  Needs to be checked.    3)  Diarrhea:  Acute issue for a week.  Scheduled to see GI on Dec 23rd.    4)  Sleep Disturbances:  Recurring issue.  Has gotten worse due to routine disruption as he is staying at home due to COVID-19 restrictions.      PLAN:    __  Will stay on the same antiparkinsonian medications.    __  Patient and his sons were instructed that if the diarrhea continues this week, to contact his PCP to be seen sooner than the GI appointment in Dec.    __  A message was sent to the schedulers to call pt and setup his next visit in clinic to check his DBS sometime in February.    __  Discussed about sleep disturbances in PD.  Encouraged to follow nonpharmacological management by establishing a sleep hygiene.    __  Patient may contact us anytime as needed.    I have reviewed the note as documented above.  This accurately captures the substance of my conversation with the patient.    Total telephone time was 25 minutes.  Greater than 50% of this time was spent in therapeutic plan, counseling, and coordination of care.     NICK Ford,  CNP  [facility]      Phone call contact time  Call Started at 10:01 AM  Call Ended at 10:26 AM

## 2020-12-01 DIAGNOSIS — G20.A1 PARKINSON'S DISEASE (H): ICD-10-CM

## 2020-12-01 RX ORDER — CARBIDOPA AND LEVODOPA 25; 100 MG/1; MG/1
2 TABLET ORAL 4 TIMES DAILY
Qty: 320 TABLET | Refills: 11 | Status: CANCELLED | OUTPATIENT
Start: 2020-12-01

## 2020-12-01 NOTE — TELEPHONE ENCOUNTER
Rx Authorization:    Requested Medication/ Dose: Carbidopa-levodopa     Date last refill ordered: 3/24/20    Quantity ordered: 320    # refills: 11    Date of last clinic visit with ordering provider: 11/30/20    Date of next clinic visit with ordering provider: F/U 6 months    All pertinent protocol data (lab date/result):     Include pertinent information from patients message:

## 2020-12-01 NOTE — TELEPHONE ENCOUNTER
[facility] Call Center    Phone Message    May a detailed message be left on voicemail: yes     Reason for Call: Medication Refill Request    Has the patient contacted the pharmacy for the refill? Yes   Name of medication being requested: carbidopa-levodopa (SINEMET)  MG tablet [55232] (Order [id number])  Provider who prescribed the medication: Dr. Godinez   Pharmacy: [id number], [place] - [address] [place] [address]  Date medication is needed: asap    Patient calling to get refill - states that this is over due and his pharmacy has been trying to get a hold of clinic to get refill.     Please advise       Action Taken: Other: [facility]    Travel Screening: Not Applicable

## 2020-12-02 RX ORDER — CARBIDOPA AND LEVODOPA 50; 200 MG/1; MG/1
1 TABLET, EXTENDED RELEASE ORAL AT BEDTIME
Qty: 90 TABLET | Refills: 3 | Status: SHIPPED | OUTPATIENT
Start: 2020-12-02 | End: 2021-05-18

## 2021-01-28 DIAGNOSIS — G20.A1 PARKINSON DISEASE (H): Primary | ICD-10-CM

## 2021-02-19 ENCOUNTER — OFFICE VISIT (OUTPATIENT)
Dept: NEUROLOGY | Facility: CLINIC | Age: 76
End: 2021-02-19
Payer: MEDICARE

## 2021-02-19 VITALS
HEART RATE: 45 BPM | BODY MASS INDEX: 24.83 KG/M2 | RESPIRATION RATE: 16 BRPM | OXYGEN SATURATION: 99 % | SYSTOLIC BLOOD PRESSURE: 179 MMHG | WEIGHT: 178 LBS | DIASTOLIC BLOOD PRESSURE: 86 MMHG

## 2021-02-19 DIAGNOSIS — Z96.89 S/P DEEP BRAIN STIMULATOR PLACEMENT: Primary | ICD-10-CM

## 2021-02-19 DIAGNOSIS — G20.A1 PARKINSON'S DISEASE (H): ICD-10-CM

## 2021-02-19 DIAGNOSIS — G25.81 RESTLESS LEGS SYNDROME (RLS): ICD-10-CM

## 2021-02-19 PROCEDURE — 95970 ALYS NPGT W/O PRGRMG: CPT | Performed by: NURSE PRACTITIONER

## 2021-02-19 PROCEDURE — 99215 OFFICE O/P EST HI 40 MIN: CPT | Mod: 25 | Performed by: NURSE PRACTITIONER

## 2021-02-19 RX ORDER — PRAMIPEXOLE DIHYDROCHLORIDE 0.5 MG/1
0.5 TABLET ORAL AT BEDTIME
Qty: 90 TABLET | Refills: 3 | Status: SHIPPED | OUTPATIENT
Start: 2021-02-19 | End: 2021-05-18

## 2021-02-19 RX ORDER — CARBIDOPA AND LEVODOPA 25; 100 MG/1; MG/1
2 TABLET ORAL 4 TIMES DAILY
Qty: 320 TABLET | Refills: 11 | Status: SHIPPED | OUTPATIENT
Start: 2021-02-19 | End: 2021-05-18

## 2021-02-19 ASSESSMENT — UNIFIED PARKINSONS DISEASE RATING SCALE (UPDRS)
RIGIDITY_LUE: SLIGHT: RIGIDITY ONLY DETECTED WITH ACTIVATION MANEUVER.
AXIAL_SCORE: 11
AMPLITUDE_RUE: MILD > 1 CM BUT < 3 CM IN MAXIMAL AMPLITUDE.
PRONATION_SUPINATION_LEFT: SLIGHT: ANY OF THE FOLLOWING: A) THE REGULAR RHYTHM IS BROKEN WITH ONE WITH ONE OR TWO INTERRUPTIONS OR HESITATIONS OF THE MOVEMENT B) SLIGHT SLOWING C) THE AMPLITUDE DECREMENTS NEAR THE END OF THE 10 MOVEMENTS.
TOETAPPING_LEFT: NORMAL
AMPLITUDE_RLE: NORMAL: NO TREMOR.
MOVEMENTS_INTERFERE_WITH_RATINGS: NO
AMPLITUDE_LLE: SLIGHT: < 1 CM IN MAXIMAL AMPLITUDE.
GAIT: NORMAL
TOETAPPING_RIGHT: NORMAL
HANDMOVEMENTS_LEFT: NORMAL
FREEZING_GAIT: NORMAL
POSTURE: 1 SLIGHT.  NOT QUITE ERECT BUT COULD BE NORMAL FOR OLDER PERSONS.
HANDMOVEMENTS_RIGHT: SLIGHT: ANY OF THE FOLLOWING: A) THE REGULAR RHYTHM IS BROKEN WITH ONE WITH ONE OR TWO INTERRUPTIONS OR HESITATIONS OF THE MOVEMENT B) SLIGHT SLOWING C) THE AMPLITUDE DECREMENTS NEAR THE END OF THE 10 MOVEMENTS.
RIGIDITY_NECK: SLIGHT: RIGIDITY ONLY DETECTED WITH ACTIVATION MANEUVER.
AMPLITUDE_LIP_JAW: NORMAL: NO TREMOR.
TOTAL_SCORE: 21
DYSKINESIAS_PRESENT: NO
FINGER_TAPPING_LEFT: NORMAL
TOTAL_SCORE_LEFT: 3
RIGIDITY_RUE: MILD: RIGIDITY DETECTED WITHOUT THE ACTIVATION MANEUVER.  FULL RANGE OF MOTION IS EASILY ACHIEVED.
SPEECH: MILD: LOSS OF MODULATION, DICTION OR VOLUME, WITH A FEW WORDS UNCLEAR, BUT THE OVERALL SENTENCES EASY TO FOLLOW.
ARISING_CHAIR: SLIGHT: ARISING IS SLOWER THAN NORMAL, OR MAY NEED MORE THAN ONE ATTEMPT, OR MAY NEED TO MOVE FORWARD IN THE CHAIR TO ARISE.  NO NEED TO USE THE ARMS OF THE CHAIR.
POSTURAL_STABILITY: MODERATE: STANDS SAFELY, BUT WITH ABSENCE OF POSTURAL RESPONSE,  FALLS IF NOT CAUGHT BY EXAMINER.
PRONATION_SUPINATION_RIGHT: SLIGHT: ANY OF THE FOLLOWING: A) THE REGULAR RHYTHM IS BROKEN WITH ONE WITH ONE OR TWO INTERRUPTIONS OR HESITATIONS OF THE MOVEMENT B) SLIGHT SLOWING C) THE AMPLITUDE DECREMENTS NEAR THE END OF THE 10 MOVEMENTS.
LEG_AGILITY_RIGHT: NORMAL
SPONTANEITY_OF_MOVEMENT: 1: SLIGHT: SLIGHT GLOBAL SLOWNESS AND POVERTY OF SPONTANEOUS MOVEMENTS.
AMPLITUDE_LUE: NORMAL: NO TREMOR.
RIGIDITY_LLE: NORMAL
TOTAL_SCORE: 6
FACIAL_EXPRESSION: MILD: IN ADDITION TO DECREASED EYE-BLINK FREQUENCY, MASKED FACIES PRESENT IN THE LOWER FACE AS WELL, NAMELY FEWER MOVEMENTS AROUND THE MOUTH, SUCH AS LESS SPONTANEOUS SMILING, BUT LIPS NOT PARTED.
PARKINSONS_MEDS: ON
CONSTANCY_TREMOR_ATREST: SLIGHT: TREMOR AT REST IS PRESENT  25% OF THE ENTIRE EXAMINATION PERIOD.
RIGIDITY_RLE: NORMAL
LEG_AGILITY_LEFT: NORMAL
FINGER_TAPPING_RIGHT: NORMAL

## 2021-02-19 NOTE — PROGRESS NOTES
MOVEMENT DISORDERS CLINIC    PATIENT: Ace Navarro    : 1945    NORA: 2021    REASON FOR VISIT: Parkinson's disease (PD) follow up and deep brain stimulation (DBS) interrogation and analysis.     HPI: Mr. Ace Navarro is a 75 year old  male who came to the [facility] neurology clinic accompanied by his son who lives with him for a follow up visit.  He was last seen in the clinic on 2020.  During that visit, the following were discussed: -    Record Reviewed:   Last neurology virtual visit: 2020    He hasn't been working out like he did before.  He sometimes does yoga, Pilates, and uses his stationary bike. Denies falls.     His son reports that when pt gets tired, he gets spacey and forgetful.  While he is at home, he forgets that he is at home and asks his son to take him home. Son has stopped the 3 - 4 am Sinemet dose as pt was more confused when he wakes up in the middle of the night. Since his last neuropsychological evaluation 2020, his son has been managing patient's medication.  Family has adjusted his diet and cut out sugar. No safety concerns due to memory issues.     He has no problem falling asleep.  If/when he wakes up in the middle of the night, he is able to fall back to sleep.     Tremors are pretty controlled. Today, he has tremors in the clinic.     PD Medications 9 am 3 pm 9 pm 9:30 pm   Sinemet 25/100 mg  2 2 2    Sinemet 50/200 mg     1   Pramipexole 0.5 mg    1   Melatonin 8 mg (5 mg + 3 mg)     1         MEDICATIONS:   Outpatient Medications Marked as Taking for the 21 encounter (Office Visit) with Isaias Dickens APRN CNP   Medication Sig     carbidopa-levodopa (SINEMET CR)  MG CR tablet Take 1 tablet by mouth At Bedtime     carbidopa-levodopa (SINEMET)  MG tablet Take 2 tablets by mouth 4 times daily At 7 am, 1 pm, & 9 pm.     folic acid 800 MCG tablet Take 800 mcg by mouth daily     multivitamin, therapeutic  with minerals (MULTI-VITAMIN) TABS tablet Take 1 tablet by mouth every morning      pramipexole (MIRAPEX) 0.5 MG tablet Take 1 tablet (0.5 mg) by mouth At Bedtime     sulfaSALAzine (AZULFIDINE) 500 MG tablet TAKE 4 TABLETS BY MOUTH ONCE DAILY.       ALLERGIES: Shellfish-derived products and Aspirin    PHYSICAL EXAM:    VITAL SIGNS:  Blood pressure (!) 179/86, pulse (!) 45, resp. rate 16, weight 80.7 kg (178 lb), SpO2 99 %., Body mass index is 24.83 kg/m .    GENERAL:  Mr. Navarro is a pleasant  male who is well-groomed and well-developed sitting comfortably in the exam room without any distress.  Affect is appropriate.    MOVEMENT DISORDERS ASSESSMENT:  MDS UPDRS III (Last Sinemet was about 3.5 hrs ago)    UPDRS Values 2/19/2021   Time: 12:39 PM   Medication On   R Brain DBS: On   L Brain DBS: On   Dyskinesia (LID) No   Did LID interfere No   Speech 2   Facial Expression 2   Rigidity Neck 1   Rigidity RUE 2   Rigidity LUE 1   Rigidity RLE 0   Rigidity LLE 0   Finger Taps R 0   Finger Taps L 0   Hand Mvt R 1   Hand Mvt L 0   Pron-/Supinate R 1   Pron-/Supinate L 1   Toe Tap R 0   Toe Tap L 0   Leg Agility R 0   Leg Agility L 0   Arise From Chair 1   Gait 0   Gait Freezing 0   Postural Stability 3   Posture 1   Global Spont Mvt 1   Postural Tremor RUE 0   Postural Tremor LUE 0   Kinetic Tremor RUE 0   Kinetic Tremor LUE 0   Rest Tremor RUE 2   Rest Tremor LUE 0   Rest Tremor RLE 0   Rest Tremor LLE 1   Rest Tremor Lip/Jaw 0   Rest Tremor Constancy 1   Total Right 6   Total Left 3   Axial Total 11   Total 21     DBS Interrogation & Analysis:     Implanted generator:  Left chest Infinity 7  Lead placement:  Bilateral Subthalamic Nucleus (STN)  Bilateral lead placement date:  4/26/2019  Bilateral generator placement date:  5/10/2019     Battery Service Life:  OK.  2.94 volts.     Left Brain     C +, 3ABC -  Amplitude:  2.80 mA  PW:  60 msec  Rate:  130 Hz     Therapy impedance:  1062 Ohms     Patient  :  Upper Limit: 3.00 mA  Lower Limit: 2.00 mA     Electrode Impedance Check:    Left Lead: No Problems Found.       Right Brain      C +, 10ABC -  Amplitude:  2.40 mA  PW:  60 msec  Rate:  130 Hz     Therapy impedance:  1000 Ohms     Patient :  Upper Limit: 1.50 mA  Lower Limit: 2.80 mA     Electrode Impedance Check:  Right Lead: No Problems Found.       See scanned report for impedance details.    ASSESSMENT:    Parkinson's Disease:  Overall, parkinson's symptoms are stable.  Today's exam shows that balance is worse than last exam in 2020.     S/p Bilateral DBS lead implantation:  Normal impedance and battery life.    Clinic High Blood Pressure Reading:  Today's Blood pressure (!) 179/86, pulse (!) 45.    Memory Problems:  He is scheduled to finish the neuropsychological evaluating in March. No safety concerns due to memory issues.       PLAN:    __  Due to high BP in the clinic, buy a BP cuff and check it at home.  If BP is consistently over 140/80,  contact your PCP.     __  Stay on the same antiparkinsonian medications.     __  Try to increase your physical activity level and work on balance exercises.     __  See Dr. Franklin on 3/11/2021 to finish the neuropsychological evaluation.     Will return to our clinic in 6 months to see Dr. Godinez or sooner as needed.    Today I spent 60 minutes caring for the patient. 20 minutes in DBS interrogation and analysis.  40 minutes was spent with reviewing records, meeting with the patient, answer questions, examining, medication refill, and documentation.    Alycia Dickens, APRN,  CNP  [facility]

## 2021-02-19 NOTE — PATIENT INSTRUCTIONS
Dear  Ace Navarro,    Thank you for coming today.  During your visit, we have discussed the following:     ASSESSMENT:    Parkinson's Disease:  Overall, parkinson's symptoms are stable.  Your balance is worse than last exam in 2020.     S/p Bilateral DBS lead implantation:  Normal impedance and battery life.    Clinic High Blood Pressure Reading:  Today's Blood pressure (!) 179/86, pulse (!) 45, resp. rate 16, weight 80.7 kg (178 lb), SpO2 99 %..    Memory Problems:  He is scheduled to finish the neuropsychological evaluating in March.       PLAN:    __  Due to high BP in the clinic, buy a BP cuff and check it at home.  If BP is consistently over 140/80,  contact your PCP.     __  Stay on the same antiparkinsonian medications.     __  Try to increase your physical activity level and work on balance exercises.     __  See Dr. Franklin on 3/11/2021 to finish the neuropsychological evaluation.       Will return to our clinic in 6 months or sooner as needed.      For questions, you may send us a [employer] message or call [phone]    Fax number: [phone]    NICK Ford, CNP  [facility]

## 2021-03-11 ENCOUNTER — OFFICE VISIT (OUTPATIENT)
Dept: NEUROPSYCHOLOGY | Facility: CLINIC | Age: 76
End: 2021-03-11
Payer: MEDICARE

## 2021-03-11 DIAGNOSIS — G20.A1 DEMENTIA DUE TO PARKINSON'S DISEASE WITHOUT BEHAVIORAL DISTURBANCE (H): ICD-10-CM

## 2021-03-11 DIAGNOSIS — F02.80 DEMENTIA DUE TO PARKINSON'S DISEASE WITHOUT BEHAVIORAL DISTURBANCE (H): ICD-10-CM

## 2021-03-11 DIAGNOSIS — G20.A1 PARKINSON DISEASE (H): Primary | ICD-10-CM

## 2021-03-11 PROCEDURE — 96139 PSYCL/NRPSYC TST TECH EA: CPT

## 2021-03-11 PROCEDURE — 90791 PSYCH DIAGNOSTIC EVALUATION: CPT

## 2021-03-11 PROCEDURE — 96133 NRPSYC TST EVAL PHYS/QHP EA: CPT

## 2021-03-11 PROCEDURE — 96132 NRPSYC TST EVAL PHYS/QHP 1ST: CPT

## 2021-03-11 PROCEDURE — 96138 PSYCL/NRPSYC TECH 1ST: CPT

## 2021-03-11 NOTE — LETTER
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 Information was obtained via interview with the patient and his son, and review of his medical records, including a prior neuropsychological evaluation.  Records indicate a history of tremor-predominant idiopathic Parkinson's disease, diagnosed in September 2010, with symptom onset 6 months prior involving right hand tremor. He is s/p bilateral subthalamic DBS lead placement in April 2019, after an aborted attempt in October 2018, when he could not tolerate the procedure. His tremor has been well controlled.  He was referred for neuropsychological evaluation by Eric Godinez MD, as part of a 1 year follow-up after surgery, to assist with treatment planning and to determine whether there have been any changes in cognitive functioning or mood over time. The clinic was closed at the time of his interview in April 2020, due to the pandemic. A telephone interview was completed at that time. The clinic has re-opened and he was seen today to undergo the evaluation. Since it has been more than 10 months since the interview, he was interviewed again today, together with his son, for updated information.      Mr. Navarro first underwent a neuropsychological evaluation under my direction on 5/24/2018 to establish a baseline prior to surgery.  Please refer to the report from that date for full details regarding his history and the findings of the evaluation.  Briefly, results indicated slowed psychomotor processing speed with inefficiencies in word retrieval, complex attentional processing, and retrieval of visual information.  Language, executive functioning, and visual processing abilities fell within normal limits.  He endorsed minimal depressive symptomatology.   He did report some claustrophobia.     At the time of his 4/8/2020 interview, he and his son were noticing at most subtle changes in cognitive functioning, with increased forgetfulness and word finding problems.  He was bothered by changes in speech which he thought were causing him to be somewhat more withdrawn.  He also reported recent difficulty balancing his checkbook and was late on the payment of one of his bills.    CLINICAL INTERVIEW FINDINGS    Upon interview, Mr. Navarro and his son indicated that his memory is not as good.  He forgets what others have said.  His son noted that there are times, usually at night when he is sleepy, where he asks if they are going to be staying at the house or going home, when they are at his home where he has lived for 8 years.  His son has hung pictures to try to help orient him, but then he wonders how the pictures got to that house.  There are also periods of time when he seems more confused, and when he is getting dressed, he will try to put on a long sleeve shirt as pants, or a diaper as socks.  When it is pointed out to him, he realizes his mistake, and he remembers that these episodes have happened afterwards.  He notices some difficulty with concentration.  He denied difficulty with decision-making or word finding.  His speech has changed, and that he is speaking with a low volume and his speech is somewhat slurred.    Mr. Navarro's son moved in with him a year ago.  His son has been managing his medications since then.  At that point, he had started to forget whether he had taken the medications or not.  He was developing visual hallucinations.  Then his son found him with 6 pills laid out, seemingly about to take them, because he thought he had not already taken his medications.  His son has managed him since then.  He has another son who manages his finances for him.  He stopped driving about a year ago.  He makes simple meals such as a sandwich, or heating  something up in the microwave, which he does without difficulty.  He handles his personal cares independently.    When asked to describe his mood, Mr. Navarro stated that he has felt anxious for the last few months.  When asked specifically, however, he stated that he is not worrying much, and that he has not had physical symptoms of anxiety.  He is not feeling sad or hopeless, and stated that he is perhaps feeling a little depressed.  He is sleeping fairly well, 7 hours nightly.  He has not been acting out his dreams.  He may nap during the day, and tends to doze off if he sits down, but he has been fairly active.  His appetite is good and he has gained some weight.  He used to work out every day at the .  During much of the pandemic he spent much of his time on the couch.  They recently obtained an indoor bike, however, and he is going back to the  now at times when they are less crowded, so he is increasing his exercise.  His interest level is good.  He had not been experiencing visual hallucinations since his medications were sorted out, but two weeks ago he went out and had 2-1/2 beers, and then seemed to have some hallucinations after that.  He denied auditory hallucinations.  He denied suicidal ideation.    Mr. Navarro rarely drinks alcohol.  He went out with his friends 2 weeks ago, as noted, but this is very rare since the pandemic started, and they do not keep alcohol at their home.  He denied illicit drug use or tobacco use.    Since his last interview, Mr. Navarro has not had any major illnesses or injuries.  His balance has been good and he has not been falling.  He denied unilateral weakness or numbness.  He is not bothered by headaches or other aches or pains.    PAST MEDICAL HISTORY: Medical records indicate a history of Parkinson's disease, ulcerative colitis, restless leg syndrome, inguinal hernia.    CURRENT MEDICATIONS:  Include carbidopa-levodopa (Sinemet CR, Sinemet), folic acid, melatonin,  multivitamins, pramipexole, sulfasalazine.    FAMILY MEDICAL HISTORY:  Significant for a father with myocardial infarction and coronary artery disease, and a mother with breast cancer.    BEHAVIORAL OBSERVATIONS    During the evaluation, Mr. Navarro was pleasant, cooperative, and seemed to understand the instructions.  He was alert and oriented to person, place, and time.  Tremors were observed clinically bilaterally.  He was mildly distractible and required some reminders for test instructions.  Mood was neutral. Speech was slow, hypophonic, and dysarthric.  The interview took place over a video platform while he was in the clinic room with his son, and at times it was difficult to understand what he was saying.  Spontaneous conversation was present and appropriate.  Internal performance validity measures fell within normal limits.  The results are believed to accurately reflect his current level of functioning.    MEASURES ADMINISTERED    The following measures were administered by a trained psychometrist, under the direct supervision of a licensed psychologist.    Subtests of the Wechsler Adult Intelligence Scale-4; subtests of the Wechsler Memory Scale-Revised; Leon Verbal Learning Test-Revised, Form 5; Brief Visual Memory Test - Revised, Form 3; [place] Naming Test; D-KEFS Verbal Fluency, Alternate Form; Trail Making Test; Stroop; Wisconsin Card Sorting Test - 64; Garcia Judgement of Line Orientation Form V; Clock Drawing; Dementia Rating Scale - 2 (DRS-2) Standard Form;  MoCA v. 7.1; Joe Depression Inventory-2 (BDI-2), HAM-D, HAM-A, Apathy Scale, RBDSQ; QUIP, PDQ-39, ESS.     RESULTS AND INTERPRETATION    Overall intellectual functioning was estimated to fall in the borderline range, significantly below premorbid estimates of high average based on single word reading abilities, administered at a prior evaluation.  Performance on a screening measure of dementia was mildly impaired (DRS-2 Total Score =  124/144).  On this measure, he had particular difficulty on tasks of memory, and mild difficulty on tasks of initiation/perseveration.  Performance on the MoCA fell below expected (21/30), with errors in visuospatial/executive functioning, clock drawing, naming, repetition, and memory.    Confrontation naming was mildly impaired for his age and level of education, and improved somewhat with phonemic cues.  Verbal abstract reasoning was average.  Ability to comprehend and articulate responses to complex social situations was average.  Letter fluency was mildly impaired.  Generative naming to category and switching fluency were moderately impaired.  Fluency tasks were notable for loss of cognitive set.    Attention span was moderately impaired.  Divided attention was severely impaired on a timed, visuomotor sequencing task.  He had significant difficulty shifting cognitive set on this task.  On an untimed, auditory sequencing task, divided attention was moderately impaired.  He was mildly distractible.  Psychomotor processing speed was moderately slowed.    Basic visual perception, including matching lines and angles, was average for his age and level of education.  Construction of a clock was impaired, and was notable for difficulty with conceptualization.  When asked to set the hands to 11:10, he steven 2 hands of equal length, pointing to the 11 and the 12.  Nonverbal deductive reasoning was low average.    Novel problem-solving, including the ability to generate strategies and solutions, fell within normal limits for his age and level of education.    Immediate recall of verbal narrative material was mildly impaired, with low average recall following a 30-minute delay.  On a multiple trial list learning task, immediate recall was low average, with moderately impaired recall following a 25-minute delay.  Recognition memory on this task was mildly impaired, and was notable for several false positive identifications of  words.  Immediate recall of visual material was moderately impaired, with moderately impaired recall following a 25-minute delay.  Recognition memory on this task was borderline impaired.    On the BDI-2, a self-report questionnaire, he endorsed a minimal level of depressive symptomatology.  He endorsed minimal to mild apathy on a scale, and he endorsed compulsive behaviors on a questionnaire, across a wide variety of areas.    IMPRESSIONS AND RECOMMENDATIONS    Ace Navarro is a 75 year old man with a history of Parkinson's disease, who is s/p bilateral STN DBS lead placement in April 2019. He has had good tremor control since the surgery. He has had some progression of his PD, including changes in handwriting, and softer speech, and more recently, there are concerns regarding cognitive decline. This neuropsychological evaluation was undertaken as part of the one-year follow up after surgery, to assist in treatment planning and to determine whether there have been changes in cognitive or emotional functioning over time. He initially completed a telephone interview on 4/28/2020 when the clinic was closed due to COVID-19. He was seen in the clinic today for the full evaluation, including an updated interview and testing.     Results indicate prominent impairments in attention, including complex attentional processing.  Impairments are noted with learning and retrieval of learned information, information and psychomotor processing speed, and word retrieval.  There is executive dysfunction manifesting in the difficulty shifting cognitive sets, and difficulty with conceptualization.  Basic language and visual processing fall within normal limits.  He endorses minimal depressive symptomatology minimal to mild apathy, along with compulsive behaviors and anxiety.    Compared to his most recent evaluation on 5/24/2018, he has had significant declines in attention, memory, and information processing speed.     This pattern  of performance is suggestive of frontal and subcortical system involvement. Taken together with his history, which includes increasing difficulty managing his instrumental activities of daily living independently, the findings appear to reflect mild dementia. The pattern is consistent with Parkinson's disease dementia. Notably, in addition to requiring increasing assistance with the management of finances and medications, he has made errors such as mistaking a long sleeve shirt for pants, and he has sometimes seemed asked his son if they are going home, when they are already at their home. Anxiety may exacerbate his cognitive difficulties, but it does not appear to be the sole contributor. Additionally, he is not drinking alcohol frequently now, but when he recently had a couple of beers, his symptoms, including visual hallucinations, seemed worse. It seems reasonable for him to refrain from alcohol use if possible. He is working with neurology to address his medications.    In terms of daily functioning, Mr. Navarro is likely to require ongoing assistance with the management of his instrumental activities of daily living. His son lives with him and provides assistance with tasks including management of medications and meal preparation, and another son manages his finances for him. He stopped driving last year. He will likely benefit from structure and routine. If he is having difficulty managing large, complex tasks, others may assist by breaking down such tasks into smaller, more manageable parts. Given some confusion while dressing, it may be helpful to choose outfits for him, or together with him, and lay them out in the order he will use while dressing. He may benefit from the use of written reminder or checklists, and he may find it helpful to use a smart phone or similar device for alarms and reminders. When working on a project, he may work best in environments that are relatively free from distractions,  such as noises or other interruptions. It may take him longer to complete tasks, so he may find it helpful to provide himself with ample time when working on a project so that he does not become overwhelmed and work less efficiently. His exercise was limited during the pandemic, but they have recently obtained an exercise bike, and he has been able to return to the  as well.     Given changes in cognitive functioning, it may be helpful to continue to follow Mr. Navarro over time. Repeated neuropsychological evaluation in one year may help to determine whether his cognitive difficulties are progressive.    Carole Franklin, Ph.D., ABPP  Licensed Psychologist, [id number]  Board Certified in Clinical Neuropsychology    Time spent: One unit professional time, including interview (CPT 15130). 60 minutes (1 unit) neuropsychological testing evaluation by licensed and board-certified neuropsychologist, including integration of patient data, interpretation of standardized test results and clinical data, clinical decision-making, treatment planning, report, and interactive feedback to the patient, first hour (CPT 38964). 100 minutes (2 units) of neuropsychological testing evaluation by licensed and board-certified neuropsychologist, including integration of patient data, interpretation of standardized test results and clinical data, clinical decision-making, treatment planning, report, and interactive feedback to the patient, subsequent hours (CPT 09930). 30 minutes of neuropsychological test administration and scoring by technician, first 30 minutes (CPT 31244). 176 additional minutes (6 units) neuropsychological test administration and scoring by technician, subsequent 30 minutes (CPT 54424). ICD-10 Diagnoses: G20; F02.80.        Carole Franklin, PhD

## 2021-03-11 NOTE — PROGRESS NOTES
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 Information was obtained via interview with the patient and his son, and review of his medical records, including a prior neuropsychological evaluation.  Records indicate a history of tremor-predominant idiopathic Parkinson's disease, diagnosed in September 2010, with symptom onset 6 months prior involving right hand tremor. He is s/p bilateral subthalamic DBS lead placement in April 2019, after an aborted attempt in October 2018, when he could not tolerate the procedure. His tremor has been well controlled.  He was referred for neuropsychological evaluation by Eric Godinez MD, as part of a 1 year follow-up after surgery, to assist with treatment planning and to determine whether there have been any changes in cognitive functioning or mood over time. The clinic was closed at the time of his interview in April 2020, due to the pandemic. A telephone interview was completed at that time. The clinic has re-opened and he was seen today to undergo the evaluation. Since it has been more than 10 months since the interview, he was interviewed again today, together with his son, for updated information.      Mr. Navarro first underwent a neuropsychological evaluation under my direction on 5/24/2018 to establish a baseline prior to surgery.  Please refer to the report from that date for full details regarding his history and the findings of the evaluation.  Briefly, results indicated slowed psychomotor processing speed with inefficiencies in word retrieval, complex attentional processing, and retrieval of visual information.  Language, executive functioning, and visual processing abilities fell within normal limits.  He endorsed minimal depressive symptomatology.  He did report some claustrophobia.     At the time of his 4/8/2020 interview, he and  his son were noticing at most subtle changes in cognitive functioning, with increased forgetfulness and word finding problems.  He was bothered by changes in speech which he thought were causing him to be somewhat more withdrawn.  He also reported recent difficulty balancing his checkbook and was late on the payment of one of his bills.    CLINICAL INTERVIEW FINDINGS    Upon interview, Mr. Navarro and his son indicated that his memory is not as good.  He forgets what others have said.  His son noted that there are times, usually at night when he is sleepy, where he asks if they are going to be staying at the house or going home, when they are at his home where he has lived for 8 years.  His son has hung pictures to try to help orient him, but then he wonders how the pictures got to that house.  There are also periods of time when he seems more confused, and when he is getting dressed, he will try to put on a long sleeve shirt as pants, or a diaper as socks.  When it is pointed out to him, he realizes his mistake, and he remembers that these episodes have happened afterwards.  He notices some difficulty with concentration.  He denied difficulty with decision-making or word finding.  His speech has changed, and that he is speaking with a low volume and his speech is somewhat slurred.    Mr. Navarro's son moved in with him a year ago.  His son has been managing his medications since then.  At that point, he had started to forget whether he had taken the medications or not.  He was developing visual hallucinations.  Then his son found him with 6 pills laid out, seemingly about to take them, because he thought he had not already taken his medications.  His son has managed him since then.  He has another son who manages his finances for him.  He stopped driving about a year ago.  He makes simple meals such as a sandwich, or heating something up in the microwave, which he does without difficulty.  He handles his personal  cares independently.    When asked to describe his mood, Mr. Navarro stated that he has felt anxious for the last few months.  When asked specifically, however, he stated that he is not worrying much, and that he has not had physical symptoms of anxiety.  He is not feeling sad or hopeless, and stated that he is perhaps feeling a little depressed.  He is sleeping fairly well, 7 hours nightly.  He has not been acting out his dreams.  He may nap during the day, and tends to doze off if he sits down, but he has been fairly active.  His appetite is good and he has gained some weight.  He used to work out every day at the .  During much of the pandemic he spent much of his time on the couch.  They recently obtained an indoor bike, however, and he is going back to the  now at times when they are less crowded, so he is increasing his exercise.  His interest level is good.  He had not been experiencing visual hallucinations since his medications were sorted out, but two weeks ago he went out and had 2-1/2 beers, and then seemed to have some hallucinations after that.  He denied auditory hallucinations.  He denied suicidal ideation.    Mr. Navarro rarely drinks alcohol.  He went out with his friends 2 weeks ago, as noted, but this is very rare since the pandemic started, and they do not keep alcohol at their home.  He denied illicit drug use or tobacco use.    Since his last interview, Mr. Navarro has not had any major illnesses or injuries.  His balance has been good and he has not been falling.  He denied unilateral weakness or numbness.  He is not bothered by headaches or other aches or pains.    PAST MEDICAL HISTORY: Medical records indicate a history of Parkinson's disease, ulcerative colitis, restless leg syndrome, inguinal hernia.    CURRENT MEDICATIONS:  Include carbidopa-levodopa (Sinemet CR, Sinemet), folic acid, melatonin, multivitamins, pramipexole, sulfasalazine.    FAMILY MEDICAL HISTORY:  Significant for a  father with myocardial infarction and coronary artery disease, and a mother with breast cancer.    BEHAVIORAL OBSERVATIONS    During the evaluation, Mr. Navarro was pleasant, cooperative, and seemed to understand the instructions.  He was alert and oriented to person, place, and time.  Tremors were observed clinically bilaterally.  He was mildly distractible and required some reminders for test instructions.  Mood was neutral. Speech was slow, hypophonic, and dysarthric.  The interview took place over a video platform while he was in the clinic room with his son, and at times it was difficult to understand what he was saying.  Spontaneous conversation was present and appropriate.  Internal performance validity measures fell within normal limits.  The results are believed to accurately reflect his current level of functioning.    MEASURES ADMINISTERED    The following measures were administered by a trained psychometrist, under the direct supervision of a licensed psychologist.    Subtests of the Wechsler Adult Intelligence Scale-4; subtests of the Wechsler Memory Scale-Revised; Leon Verbal Learning Test-Revised, Form 5; Brief Visual Memory Test - Revised, Form 3; Denton Naming Test; D-KEFS Verbal Fluency, Alternate Form; Trail Making Test; Stroop; Wisconsin Card Sorting Test - 64; Garcia Judgement of Line Orientation Form V; Clock Drawing; Dementia Rating Scale - 2 (DRS-2) Standard Form;  MoCA v. 7.1; Joe Depression Inventory-2 (BDI-2), HAM-D, HAM-A, Apathy Scale, RBDSQ; QUIP, PDQ-39, ESS.     RESULTS AND INTERPRETATION    Overall intellectual functioning was estimated to fall in the borderline range, significantly below premorbid estimates of high average based on single word reading abilities, administered at a prior evaluation.  Performance on a screening measure of dementia was mildly impaired (DRS-2 Total Score = 124/144).  On this measure, he had particular difficulty on tasks of memory, and mild  difficulty on tasks of initiation/perseveration.  Performance on the MoCA fell below expected (21/30), with errors in visuospatial/executive functioning, clock drawing, naming, repetition, and memory.    Confrontation naming was mildly impaired for his age and level of education, and improved somewhat with phonemic cues.  Verbal abstract reasoning was average.  Ability to comprehend and articulate responses to complex social situations was average.  Letter fluency was mildly impaired.  Generative naming to category and switching fluency were moderately impaired.  Fluency tasks were notable for loss of cognitive set.    Attention span was moderately impaired.  Divided attention was severely impaired on a timed, visuomotor sequencing task.  He had significant difficulty shifting cognitive set on this task.  On an untimed, auditory sequencing task, divided attention was moderately impaired.  He was mildly distractible.  Psychomotor processing speed was moderately slowed.    Basic visual perception, including matching lines and angles, was average for his age and level of education.  Construction of a clock was impaired, and was notable for difficulty with conceptualization.  When asked to set the hands to 11:10, he steven 2 hands of equal length, pointing to the 11 and the 12.  Nonverbal deductive reasoning was low average.    Novel problem-solving, including the ability to generate strategies and solutions, fell within normal limits for his age and level of education.    Immediate recall of verbal narrative material was mildly impaired, with low average recall following a 30-minute delay.  On a multiple trial list learning task, immediate recall was low average, with moderately impaired recall following a 25-minute delay.  Recognition memory on this task was mildly impaired, and was notable for several false positive identifications of words.  Immediate recall of visual material was moderately impaired, with moderately  impaired recall following a 25-minute delay.  Recognition memory on this task was borderline impaired.    On the BDI-2, a self-report questionnaire, he endorsed a minimal level of depressive symptomatology.  He endorsed minimal to mild apathy on a scale, and he endorsed compulsive behaviors on a questionnaire, across a wide variety of areas.    IMPRESSIONS AND RECOMMENDATIONS    Ace Navarro is a 75 year old man with a history of Parkinson's disease, who is s/p bilateral STN DBS lead placement in April 2019. He has had good tremor control since the surgery. He has had some progression of his PD, including changes in handwriting, and softer speech, and more recently, there are concerns regarding cognitive decline. This neuropsychological evaluation was undertaken as part of the one-year follow up after surgery, to assist in treatment planning and to determine whether there have been changes in cognitive or emotional functioning over time. He initially completed a telephone interview on 4/28/2020 when the clinic was closed due to COVID-19. He was seen in the clinic today for the full evaluation, including an updated interview and testing.     Results indicate prominent impairments in attention, including complex attentional processing.  Impairments are noted with learning and retrieval of learned information, information and psychomotor processing speed, and word retrieval.  There is executive dysfunction manifesting in the difficulty shifting cognitive sets, and difficulty with conceptualization.  Basic language and visual processing fall within normal limits.  He endorses minimal depressive symptomatology minimal to mild apathy, along with compulsive behaviors and anxiety.    Compared to his most recent evaluation on 5/24/2018, he has had significant declines in attention, memory, and information processing speed.     This pattern of performance is suggestive of frontal and subcortical system involvement. Taken  together with his history, which includes increasing difficulty managing his instrumental activities of daily living independently, the findings appear to reflect mild dementia. The pattern is consistent with Parkinson's disease dementia. Notably, in addition to requiring increasing assistance with the management of finances and medications, he has made errors such as mistaking a long sleeve shirt for pants, and he has sometimes seemed asked his son if they are going home, when they are already at their home. Anxiety may exacerbate his cognitive difficulties, but it does not appear to be the sole contributor. Additionally, he is not drinking alcohol frequently now, but when he recently had a couple of beers, his symptoms, including visual hallucinations, seemed worse. It seems reasonable for him to refrain from alcohol use if possible. He is working with neurology to address his medications.    In terms of daily functioning, Mr. Navarro is likely to require ongoing assistance with the management of his instrumental activities of daily living. His son lives with him and provides assistance with tasks including management of medications and meal preparation, and another son manages his finances for him. He stopped driving last year. He will likely benefit from structure and routine. If he is having difficulty managing large, complex tasks, others may assist by breaking down such tasks into smaller, more manageable parts. Given some confusion while dressing, it may be helpful to choose outfits for him, or together with him, and lay them out in the order he will use while dressing. He may benefit from the use of written reminder or checklists, and he may find it helpful to use a smart phone or similar device for alarms and reminders. When working on a project, he may work best in environments that are relatively free from distractions, such as noises or other interruptions. It may take him longer to complete tasks, so  he may find it helpful to provide himself with ample time when working on a project so that he does not become overwhelmed and work less efficiently. His exercise was limited during the pandemic, but they have recently obtained an exercise bike, and he has been able to return to the  as well.     Given changes in cognitive functioning, it may be helpful to continue to follow Mr. Navarro over time. Repeated neuropsychological evaluation in one year may help to determine whether his cognitive difficulties are progressive.    Carole Franklin, Ph.D., ABPP  Licensed Psychologist, LP [id number]  Board Certified in Clinical Neuropsychology    Time spent: One unit professional time, including interview (CPT 29008). 60 minutes (1 unit) neuropsychological testing evaluation by licensed and board-certified neuropsychologist, including integration of patient data, interpretation of standardized test results and clinical data, clinical decision-making, treatment planning, report, and interactive feedback to the patient, first hour (CPT 77835). 100 minutes (2 units) of neuropsychological testing evaluation by licensed and board-certified neuropsychologist, including integration of patient data, interpretation of standardized test results and clinical data, clinical decision-making, treatment planning, report, and interactive feedback to the patient, subsequent hours (CPT 10332). 30 minutes of neuropsychological test administration and scoring by technician, first 30 minutes (CPT 41294). 176 additional minutes (6 units) neuropsychological test administration and scoring by technician, subsequent 30 minutes (CPT 59337). ICD-10 Diagnoses: G20; F02.80.

## 2021-03-12 NOTE — NURSING NOTE
The patient was seen for neuropsychological evaluation at the request of NICK Wright, for the purposes of diagnostic clarification and treatment planning. 206 minutes of test administration and scoring were provided by this writer, Reginald Antonio. Please see Dr. Carole Franklin's report for a full interpretation of the findings.

## 2021-03-29 ENCOUNTER — TELEPHONE (OUTPATIENT)
Dept: NEUROSURGERY | Facility: CLINIC | Age: 76
End: 2021-03-29

## 2021-03-29 NOTE — TELEPHONE ENCOUNTER
Returned call to pt's son, Jose. Left VM with number to reach neurology per his request. Also left my number should he have any additional questions.

## 2021-03-30 NOTE — PROGRESS NOTES
Patient Ace Navarro  ROBLES 3[birth year]    MRN [id number]   Provider      [birth year]   Tech KB    Sex Male   Occupation Retired Contractor   Education 16   Language English    Preferred Hand Right   Station [facility]    Age 75   Source [facility]             DEMENTIA RATING SCALE - 2    TEST FORM Standard     Raw MAS       Attention 35 10       Init/Psv 30 5       Construct 6 10       Concept 37 10       Memory 16 2       Total / 144 124 5                WAIS-IV          Raw SS       Similarities 23 10       Comprehension 20 9       Letter Num Seq 9 3       Digit Span 14 4       Matrix Reasoning 7 7                WECHSLER MEMORY SCALE - REVISED         Raw MAS/SS       Info/Orientation 14        LM Immediate 12 5       LM 30 Minute 10 7                BOSTON NAMING TEST         Score (Correct+Stim Cue)  49 MAS 6     # Correct Stimulus Cue  0 T 35     # Correct Phonemic Cue  3                D-KEFS VERBAL FLUENCY    TEST FORM Alternate     Raw SS       Letter Fluency 25 6       Category Fluency 7 1       Switching Fluency             Total Correct 6 3       Switching Accuracy 5 7                CLOCK DRAWING         Command 1 /3 Rating   Impaired    Copy 2 /3 Rating  Borderline    TRAIL MAKING TEST          Seconds Errors MAS z     Trails A 107 0 3 -4.38     Trails B DC @300 4                STROOP             Raw    +    Sanam    = Sanam Tot.         T  Raw MAS   Word 90 14 104 48 90 9   Color  53 11 64 39 53 7   Color/Word 19 15 34 39 19 5            [place] CARD SORTING TEST - 1 deck        # Categories 4 %ile >16      # Persev Error 8 T 60      Concept. Lev Resp. 44 T 57      FMS 0                 [place] JUDGMENT OF LINE ORIENTATION   TEST FORM V    Raw Score 23 Raw Correction      MAS 10 [place] %ile                HVLT-R    TEST FORM 5     Raw T       Trial 1 4        Trial 2 7        Trial 3 7        Total 1-3 18 40       Learning 3        Delay 2 27       Percent Retained 29 23       True Positives 12        Discrimination  Index 8 34       False Positives 4                 BRIEF VISUAL MEMORY TEST - REVISED   TEST FORM 3     Raw T       Trial 1 2        Trial 2 2        Trial 3 3        Total 1-3 7 26       Learning 1 35       Delay 2 25       Percent Retained 67 6-10 %ile      Recognition Hits 5        Discrimination Index 4 11-16 %ile      False Alarms 1                 SARI COGNITVE ASSESSMENT (MOCA)  TEST FORM 7.1    Score 21 /30                BDI         Score 8        Interpretation Minimal                 APATHY SCALE         Score 13                 PD QUESTIONNAIRES         ESS         RDBSQ         PDQ-39         QUIP

## 2021-04-03 ENCOUNTER — HEALTH MAINTENANCE LETTER (OUTPATIENT)
Age: 76
End: 2021-04-03

## 2021-04-23 ENCOUNTER — TELEPHONE (OUTPATIENT)
Dept: NEUROLOGY | Facility: CLINIC | Age: 76
End: 2021-04-23

## 2021-04-23 NOTE — TELEPHONE ENCOUNTER
Jose described issues with Ace either missing doses or doubling doses of his medications when self managing it. He will then have hallucinations and delusions such as believing his sone is coming to pick him up or hallucinating conversations with his sons or the neighbors. He looked into some resources and they were to expensive for a nurse to come out to help with his medications. He will take his stool softeners wrong and have accidents as well. He asks if there is state funded assistance available to Ace and how home care works.    When Alessandra manges Ace's medications he does not hallucinate and functions pretty well. They have tried pill boxes with notes and a white board for Ace but if he takes a 9 AM dose of medication and does not remember. He does not have the capacity to determine that he took the dose by looking at the empty 9 AM slot in the pill box and he will take another dose. Alessandra works spring to fall 10 hour days and will not be able to help during the day.    Plan/recommendation:  1. Jose will look into [facility] RN care managers (free consultation) and [facility] to talk about care and costs. He confirmed Ace is not home bound and would not qualify for home care.    2. Jose will look into the [facility] pill dispenser option    3. I will ask Hedy to call you regarding [facility] resources and whether Ace may qualify    4. Jose will schedule a follow-up with Dr. Franklin to go over his neuropsych testing and schedule a 6 month follow up with Alycia

## 2021-04-23 NOTE — TELEPHONE ENCOUNTER
Health Call Center    Phone Message    May a detailed message be left on voicemail: yes     Reason for Call: Other: Pt's son Jose called with questions about getting in home help for Pt to dispense his meds.      Jose reports Pt does really well when he is on a very controlled schedule with his medications. 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    The family is concerned about Pt having bouts of dementia due to Pt relying on himself to take medications on time.    Jose spoke with Tammie Rider about getting some support to get in home help for this. Tammie referred him to call Venecia Smith. He is wondering how to get approved for and set up with a program just to have some one to stop in to dispense Pt's medications.    Please call Jose back to advise.    Action Taken: Message routed to:  [facility] Neurology    Travel Screening: Not Applicable

## 2021-04-26 ENCOUNTER — PATIENT OUTREACH (OUTPATIENT)
Dept: CARE COORDINATION | Facility: CLINIC | Age: 76
End: 2021-04-26

## 2021-05-05 NOTE — PROGRESS NOTES
Social Work Telephone Message Note  [facility]    Patient Name:  Ace Navarro  /Age:  1945 (75 year old)    Referral Source: Basia Quispe RN  Reason for Referral:  Resources for care at home    Contacted Patient's son Jose on 2021 and 2021 and left voice messages. Will await alpa Garcia's return phone call and will provide assistance at that time.    MIKEL Patel, [facility]    [facility]  [phone]

## 2021-05-18 ENCOUNTER — VIRTUAL VISIT (OUTPATIENT)
Dept: NEUROLOGY | Facility: CLINIC | Age: 76
End: 2021-05-18
Payer: MEDICARE

## 2021-05-18 DIAGNOSIS — G20.A1 PARKINSON'S DISEASE (H): ICD-10-CM

## 2021-05-18 DIAGNOSIS — G25.81 RESTLESS LEGS SYNDROME (RLS): ICD-10-CM

## 2021-05-18 DIAGNOSIS — R41.82 ALTERED MENTAL STATUS, UNSPECIFIED ALTERED MENTAL STATUS TYPE: Primary | ICD-10-CM

## 2021-05-18 PROCEDURE — 99215 OFFICE O/P EST HI 40 MIN: CPT | Mod: 95 | Performed by: PSYCHIATRY & NEUROLOGY

## 2021-05-18 RX ORDER — CARBIDOPA AND LEVODOPA 25; 100 MG/1; MG/1
2 TABLET ORAL 3 TIMES DAILY
Qty: 320 TABLET | Refills: 11 | Status: SHIPPED | OUTPATIENT
Start: 2021-05-18 | End: 2022-05-16

## 2021-05-18 RX ORDER — PRAMIPEXOLE DIHYDROCHLORIDE 0.5 MG/1
0.5 TABLET ORAL AT BEDTIME
Qty: 90 TABLET | Refills: 3 | Status: SHIPPED | OUTPATIENT
Start: 2021-05-18 | End: 2022-06-03

## 2021-05-18 RX ORDER — CARBIDOPA AND LEVODOPA 50; 200 MG/1; MG/1
1 TABLET, EXTENDED RELEASE ORAL AT BEDTIME
Qty: 90 TABLET | Refills: 3 | Status: SHIPPED | OUTPATIENT
Start: 2021-05-18 | End: 2021-12-28

## 2021-05-18 NOTE — LETTER
5/18/2021       RE: Ace Navarro  [address] [facility] [address]     Dear Colleague,    Thank you for referring your patient, Ace Navarro, to the [facility] [place] at [facility]. Please see a copy of my visit note below.    Chief complaint: Parkinson's disease    History of present illness:  Mr. Ace Navarro is a 75-year-old gentleman who I have followed for some time.  He has longtime Parkinson's disease with motor fluctuations.  He was treated with bilateral STN stimulation here at the [facility] for fluctuations in intractable tremor.  He has had good response with less severe fluctuation and much better tremor control.    He was seen on February 19 of 2021 by Ms. Alycia Dickens, NICK, CNP.  The patient stimulator was working well at that time.  It was found that the patient was having some confusion.  The patient was seen by Dr. Aaron Holter March 11, 2021.  The following were the the conclusions:    Results indicate prominent impairments in attention, including complex attentional processing.  Impairments are noted with learning and retrieval of learned information, information and psychomotor processing speed, and word retrieval.  There is executive dysfunction manifesting in the difficulty shifting cognitive sets, and difficulty with conceptualization.  Basic language and visual processing fall within normal limits.  He endorses minimal depressive symptomatology minimal to mild apathy, along with compulsive behaviors and anxiety.     Compared to his most recent evaluation on 5/24/2018, he has had significant declines in attention, memory, and information processing speed.    The patient was having some hallucination and confusion in his home.  The family has found that if they keep his medicine absolutely on time and if they eliminate the middle of the night dose of carbidopa levodopa the patient  does much better.  He currently takes carbidopa/levodopa 25 100, 2 tablets at 7 AM, 1 PM and 9 PM.  He does note a feeling of on response but does not really notice wearing off at this time.  He has only minimal dyskinesia.  His hallucinations have resolved for the most part.  He is not having great anxiety.  Cognitive status however is definitely worsened with memory decline.    His motor status remains about the same.  He is walking well without falls.  He is not having significant tremor.  He lives in a private home with his son.  They have taken control of his finances.    He also takes pramipexole 0.5 mg at bedtime for restless leg symptoms which are currently under control.    Past medical history:  Inflammatory bowel disease.  No new medical problems.    Examination:  Masked face -2 hypokinetic dysarthria -2,  Gait stiff and slow -1    Impression:  1.  Longstanding idiopathic Parkinson's disease  2.  Motor fluctuations greatly improved after deep brain stimulation  3.  Status post bilateral STN stimulation April 2019  4.  Gradual memory decline consistent with dementia with Lewy bodies    Recommendations:  1.  CT head scan without contrast  2.  B12 level  3.  If these are normal we will consider a trial of donepezil or rivastigmine.    Eric Godinez MD    45 minutes total spent with face-to-face time counseling reviewing records and preparing report.    Ace is a 75 year old who is being evaluated via a billable video visit.      How would you like to obtain your AVS? MyChart  If the video visit is dropped, the invitation should be resent by: Send to e-mail at: [email or web address]  Will anyone else be joining your video visit? No

## 2021-05-18 NOTE — PROGRESS NOTES
Ace is a 75 year old who is being evaluated via a billable video visit.      How would you like to obtain your AVS? [email or web address]  If the video visit is dropped, the invitation should be resent by: Send to e-mail at: [email or web address]  Will anyone else be joining your video visit? No      Video Start Time  Video-Visit Details    Type of service:  Video Visit    Video End Time:    Originating Location (pt. Location): Home    Distant Location (provider location):  [facility] [place]     Platform used for Video Visit: Generic Media

## 2021-05-18 NOTE — PROGRESS NOTES
Chief complaint: Parkinson's disease    History of present illness:  Mr. Ace Navarro is a 75-year-old gentleman who I have followed for some time.  He has longtime Parkinson's disease with motor fluctuations.  He was treated with bilateral STN stimulation here at the [facility] for fluctuations in intractable tremor.  He has had good response with less severe fluctuation and much better tremor control.    He was seen on February 19 of 2021 by NICK Garland, CNP.  The patient stimulator was working well at that time.  It was found that the patient was having some confusion.  The patient was seen by Dr. Aaron Holter March 11, 2021.  The following were the the conclusions:    Results indicate prominent impairments in attention, including complex attentional processing.  Impairments are noted with learning and retrieval of learned information, information and psychomotor processing speed, and word retrieval.  There is executive dysfunction manifesting in the difficulty shifting cognitive sets, and difficulty with conceptualization.  Basic language and visual processing fall within normal limits.  He endorses minimal depressive symptomatology minimal to mild apathy, along with compulsive behaviors and anxiety.     Compared to his most recent evaluation on 5/24/2018, he has had significant declines in attention, memory, and information processing speed.    The patient was having some hallucination and confusion in his home.  The family has found that if they keep his medicine absolutely on time and if they eliminate the middle of the night dose of carbidopa levodopa the patient does much better.  He currently takes carbidopa/levodopa 25 100, 2 tablets at 7 AM, 1 PM and 9 PM.  He does note a feeling of on response but does not really notice wearing off at this time.  He has only minimal dyskinesia.  His hallucinations have resolved for the most part.  He is not having great anxiety.  Cognitive  status however is definitely worsened with memory decline.    His motor status remains about the same.  He is walking well without falls.  He is not having significant tremor.  He lives in a private home with his son.  They have taken control of his finances.    He also takes pramipexole 0.5 mg at bedtime for restless leg symptoms which are currently under control.    Past medical history:  Inflammatory bowel disease.  No new medical problems.    Examination:  Masked face -2 hypokinetic dysarthria -2,  Gait stiff and slow -1    Impression:  1.  Longstanding idiopathic Parkinson's disease  2.  Motor fluctuations greatly improved after deep brain stimulation  3.  Status post bilateral STN stimulation April 2019  4.  Gradual memory decline consistent with dementia with Lewy bodies    Recommendations:  1.  CT head scan without contrast  2.  B12 level  3.  If these are normal we will consider a trial of donepezil or rivastigmine.    Eric Godinez MD    45 minutes total spent with face-to-face time counseling reviewing records and preparing report.

## 2021-05-18 NOTE — PATIENT INSTRUCTIONS
1.  Continue carbidopa/levodopa, 25/100, 2 tablets at 7 AM, 1 PM and 9 PM.  Eliminate the 4:56 AM dosage.  2.  Continue carbidopa/levodopa, 50/200, extended release at bedtime  3.  Continue pramipexole 0.5 mg 1 tablet at bedtime for restless leg  4.  For changes in memory and hallucination we will check a CT head scan and B12 level.  If these are satisfactory we will consider the introduction of donepezil or rivastigmine.

## 2021-05-19 ENCOUNTER — PATIENT OUTREACH (OUTPATIENT)
Dept: CARE COORDINATION | Facility: CLINIC | Age: 76
End: 2021-05-19

## 2021-05-20 NOTE — PROGRESS NOTES
Social Work Intervention  [facility]    Data/Intervention:    Patient Name:  Ace Navarro  /Age:  1945 (75 year old)    Visit Type: telephone  Referral Source: Basia Quispe RN   Reason for Referral:  Community resources    Collaborated With:    -Jose, Pt's son    Patient Concerns/Issues:   Jose indicates that Ace has had more issues with periods of confusion and hallucinations. They have found that if he is taking his meds on time, eating and sleeping well, abstains from any ETOH use and has a lower sugar diet that these periods are much less frequent. Jose reports that Ace has good days when he is good cognitively, but also bad days when he is not.   Pt's son Alessandra moved in with him last year to assist and supervise. However, Alessandra is a seasonal worker and is now working April thru Oct, 10 hour days. There is some concern about a need for some supervision during the daytime to make sure he is taking his medications on time and eating. He has had some periods of confusion when he packs his briefcase with things to get ready to leave the house so there is some concern that he will actually leave and get lost. [place] [facility] [place] [facility] and that [place]'t been a problem. He rides his bike there. He is no longer driving.  Their goal is to keep him in his home for as long as possible and Jose would like information on services that could help them make that work.     Intervention/Education/Resources Provided:  Reviewed their living situation, family support as well his his financial situation. Jose will look into Pt's financial situation a bit further re assets which prevent him from eligibility now for [facility] programs.   We discussed hiring a home health aide to come mid day to remind him to take his meds, help with ADLs, ensure that he eats a meal. Discussed a medication machine, Lifeline/applewatch, adult day programs and using technology like cameras and [employer]re[employer].  Discussed hiring a nurse care manager.  Also discussed general financial guidelines for [facility] funded services and discussed that for now he could pay privately for services and then look for [facility] assistance in the future when he has spent more of his assets.   Also addressed needing a verbal MIHAI signed by pt. And a health care directive for the future.     Assessment/Plan:  Pt's sons working together to meet Pt's needs. He is currently taking his meds on time most of the time but if that changes they want to be prepared to put in additional supports. They are monitoring him closely. They will consider the above resource information.   Jose is aware that he can contact me again as needed.    Provided patient/family with contact information and availability.    MIKEL Patel, [facility]    [facility] [place]  [phone]

## 2021-06-11 NOTE — MR AVS SNAPSHOT
After Visit Summary   10/3/2018    Ace Navarro    MRN: [id number]           Patient Information     Date Of Birth          1945        Visit Information        Provider Department      10/3/2018 11:00 AM Sudha Prater, PhD [facility]        Today's Diagnoses     Anxiety disorder due to general medical condition with panic attack    -  1       Follow-ups after your visit        Your next 10 appointments already scheduled     Oct 23, 2018   Procedure with Humberto Briones MD   [facility], [place], Same Day Surgery (--)    [address] [facility] [id number] [phone]            Oct 23, 2018  8:40 AM CDT   CT HEAD W/O CONTRAST with UUCT1   [facility], [place], CT ([facility])    [address] [facility] [facility] [id number] [phone]           How do I prepare for my exam? (Food and drink instructions) No Food and Drink Restrictions.  How do I prepare for my exam? (Other instructions) You do not need to do anything special to prepare for this exam. For a sinus scan: Use your nose spray (nasal decongestant spray) as directed.  What should I wear: Please wear loose clothing, such as a sweat suit or jogging clothes. Avoid snaps, zippers and other metal. We may ask you to undress and put on a hospital gown.  How long does the exam take: Most scans take less than 20 minutes.  What should I bring: Please bring any scans or X-rays taken at other hospitals, if similar tests were done. Also bring a list of your medicines, including vitamins, minerals and over-the-counter drugs. It is safest to leave personal items at home.  Do I need a : No  is needed.  What do I need to tell my doctor? Be sure to tell your doctor: * If you have any allergies. * If there s any chance you are pregnant. * If you are breastfeeding.  What should I do after the exam: No restrictions, You may resume normal activities.  What is this  Writer reviewed with patient:  Per DEEP Chavez it was noted there was a UTI in April, symptoms noted, but not followed through for evaluation  - it is recommended that she is seen in an urgent care facility wherever she currently is. Patient agreed and denied any additional questions/concerns.    test: A CT (computed tomography) scan is a series of pictures that allows us to look inside your body. The scanner creates images of the body in cross sections, much like slices of bread. This helps us see any problems more clearly.  Who should I call with questions: If you have any questions, please call the Imaging Department where you will have your exam. Directions, parking instructions, and other information is available on our website, [email or web address].            Oct 30, 2018   Procedure with Humberto Briones MD   [facility], [facility] (--)    [address] [address]   [phone]            Nov 14, 2018  9:00 AM CST   (Arrive by 8:45 AM)   Return Visit with NICK Britton CNP [facility] ([facility])    [address] [place] [address]   [phone]            Nov 19, 2018  7:20 AM CST   (Arrive by 7:05 AM)   Deep Brain Stimulation with NICK Wright CNP   [facility] ([facility])    [address] [place] [address]   [phone]            Nov 19, 2018 12:20 PM CST   CT HEAD W/O CONTRAST with UCCT1   [facility] CT ([facility])    [address] [place] [address] [phone]           How do I prepare for my exam? (Food and drink instructions) No Food and Drink Restrictions.  How do I prepare for my exam? (Other instructions) You do not need to do anything special to prepare for this exam. For a sinus scan: Use your nose spray (nasal decongestant spray) as directed.  What should I wear: Please wear loose clothing, such as a sweat suit or jogging clothes. Avoid snaps, zippers and other metal. We may ask you to undress and put on a hospital gown.  How long does the exam take: Most scans take less than 20 minutes.  What should I bring: Please bring any scans or X-rays  taken at other hospitals, if similar tests were done. Also bring a list of your medicines, including vitamins, minerals and over-the-counter drugs. It is safest to leave personal items at home.  Do I need a : No  is needed.  What do I need to tell my doctor? Be sure to tell your doctor: * If you have any allergies. * If there s any chance you are pregnant. * If you are breastfeeding.  What should I do after the exam: No restrictions, You may resume normal activities.  What is this test: A CT (computed tomography) scan is a series of pictures that allows us to look inside your body. The scanner creates images of the body in cross sections, much like slices of bread. This helps us see any problems more clearly.  Who should I call with questions: If you have any questions, please call the Imaging Department where you will have your exam. Directions, parking instructions, and other information is available on our website, [email or web address].              Who to contact     Please call your clinic at [phone] to:    Ask questions about your health    Make or cancel appointments    Discuss your medicines    Learn about your test results    Speak to your doctor            Additional Information About Your Visit        [employer] Information     [employer] gives you secure access to your electronic health record. If you see a primary care provider, you can also send messages to your care team and make appointments. If you have questions, please call your primary care clinic.  If you do not have a primary care provider, please call [phone] and they will assist you.      [employer] is an electronic gateway that provides easy, online access to your medical records. With [employer], you can request a clinic appointment, read your test results, renew a prescription or communicate with your care team.     To access your existing account, please contact your [facility] or call [phone]  for assistance.        Care EveryWhere ID     This is your Care EveryWhere ID. This could be used by other organizations to access your [place] medical records  [id number]         Blood Pressure from Last 3 Encounters:   10/09/18 136/76   05/24/18 135/84   05/09/18 154/80    Weight from Last 3 Encounters:   10/09/18 80.7 kg (178 lb)   05/24/18 82.6 kg (182 lb 3.2 oz)   05/09/18 83.5 kg (184 lb)              Today, you had the following     No orders found for display       Primary Care Provider Office Phone # Fax #    Maurilio Bonilla [phone]       [facility] [address] [id number]        Equal Access to Services     REBECA HORTON : Hadii aad ku hadasho Sogalileoali, waaxda luqadaha, qaybta kaalmada adeegyada, waxay nelain albert garsia. So [facility] [phone].    ATENCIÓN: Si habla español, tiene a rodriguez disposición servicios gratuitos de asistencia lingüística. [facility] [phone].    We comply with applicable federal civil rights laws and [place] laws. We do not discriminate on the basis of race, color, national origin, age, disability, sex, sexual orientation, or gender identity.            Thank you!     Thank you for choosing [facility]  for your care. Our goal is always to provide you with excellent care. Hearing back from our patients is one way we can continue to improve our services. Please take a few minutes to complete the written survey that you may receive in the mail after your visit with us. Thank you!             Your Updated Medication List - Protect others around you: Learn how to safely use, store and throw away your medicines at www.disposemymeds.org.          This list is accurate as of 10/3/18 11:59 PM.  Always use your most recent med list.                   Brand Name Dispense Instructions for use Diagnosis    * carbidopa-levodopa  MG per CR tablet    SINEMET CR     Take 2 tablets by mouth At Bedtime        *  carbidopa-levodopa  MG per tablet    SINEMET     Take 4 tablets by mouth 3 times daily 4 tabs 0500/ 4 tabs 1200 / 4 tabs 2100        diazepam 5 MG tablet    VALIUM    2 tablet    Take 1 tablet 30 minutes prior to MRI. May take additional tablet immediately before MRI as needed    Anxiety       folic acid 20 MG Caps      Take 800 mg by mouth        Multi-vitamin Tabs tablet      Take 1 tablet by mouth daily        PEG-3350/Electrolytes 236 g Solr      As directed. Do not fill until patient contacts the pharmacy.        pramipexole 0.25 MG tablet    MIRAPEX     Take 2 tablets (0.5 mg) by mouth At Bedtime        sulfaSALAzine 500 MG tablet    AZULFIDINE     Take 2,000 mg by mouth every morning        * Notice:  This list has 2 medication(s) that are the same as other medications prescribed for you. Read the directions carefully, and ask your doctor or other care provider to review them with you.

## 2021-06-28 ENCOUNTER — TELEPHONE (OUTPATIENT)
Dept: NEUROLOGY | Facility: CLINIC | Age: 76
End: 2021-06-28

## 2021-06-28 NOTE — TELEPHONE ENCOUNTER
Health Call Center    Phone Message    May a detailed message be left on voicemail: yes     Reason for Call: Order(s): Other:   Reason for requested: CT Head w/o contrast  Date needed: asap  Provider name: Dr. Godinez    Please send this order ASAP to [facility] in [place] ([facility]) and as soon as the order is sent, please call pt's son, Jose, to let him know so that it can be scheduled. Ph# [phone] (Jose's ph#).      Writer sees order in chart dated 5/18/21, however, pt is suffering with dementia and restless leg syndrome and it is so difficult to have a long car ride for this pt. Pt is worsening with bouts of dementia (hallucinations, a lot of visuals, he is seeing people who are not there; having a hard time swallowing because pt thinks he has bugs in this throat (big change NOW as of TODAY) and pt had 1/2 hour conversation with someone who is not there.  Much worsening now and coming more often---pt put together his dirty laundry and his shoes and mail and put it in a picnic basket, 7 blocks, at 5am in the morning. Getting very scary. Pt's condition is really accelerating now/decline.    ALSO, Jose needs to speak with Dr. Godinez's team an update of the past 4-5 weeks because of the decline and what should family do?  Ph# [phone]     Action Taken: Message routed to:  [facility] ([facility]    Travel Screening: Not Applicable

## 2021-06-29 NOTE — TELEPHONE ENCOUNTER
CT order was fax to [facility], per patient son Jose, patient son was called and is aware that orders was fax [phone], message was placed on sheet for clinic to call patient son to schedule appointment

## 2021-06-29 NOTE — TELEPHONE ENCOUNTER
Situation:    From message below: Pt is suffering with dementia and restless leg syndrome. Pt is worsening with bouts of dementia (hallucinations, a lot of visuals, he is seeing people who are not there; having a hard time swallowing because pt thinks he has bugs in this throat (big change NOW as of TODAY) and pt had 1/2 hour conversation with someone who is not there.  Much worsening now and coming more often---pt put together his dirty laundry and his shoes and mail and put it in a picnic basket, walked 7 blocks, at 5am in the morning and placed on a friend's doorstep. Getting very scary. Pt's condition is really accelerating now/decline.    Background:    Parkinson's disease patient with BSTN. Patient was noted as having some confusion 2/19/21 during [facility] visit.  Last office visit note with Dr. Godinez 5/18/21  Impression:  1.  Longstanding idiopathic Parkinson's disease  2.  Motor fluctuations greatly improved after deep brain stimulation  3.  Status post bilateral STN stimulation April 2019  4.  Gradual memory decline consistent with dementia with Lewy bodies  Recommendations:  1.  CT head scan without contrast  2.  B12 level  3.  If these are normal we will consider a trial of donepezil or rivastigmine.    Assessment:     9 am 3 pm 9 pm 11 pm    Carbidopa/levodopa  mg 2 2 2     Carbidopa/levodopa  CR    1    pramipexole 0.5 mg    1    Verified the above medication schedule    Spoke with Jose. Jose reports:  At his worst, he's really out of it, waxed look to eyes and this always happens when he screws his meds up. Brother Alessandra, lives with him. No sugar, no alcohol, eat well, take pills perfectly - he can seem normal. If he misses his pills by an hour - trickle effect of chaos. Jose does not think he is getting full night's sleep. Ace is by himself now because Alessandra is back at work with long days (10 hours at a time).    No new medications.  No falls - has not hit head.  No infection they are  aware of.    Jose noted that all of the confusion and hallucinations started after Deep Brain Stimulation. He does not know if that is a coincidence or progression of the Parkinson's disease.     They want to keep him home.   Jose will check if patient has long term care insurance.     Recommendations/Education:    1. Writer emphasized that patient cannot be left alone.  2. Protected health information form needs to be filled out and sent back to us as soon as possible along with HEALTH CARE DIRECTIVE.  3. As soon as possible, see primary care provider to rule out infection   4. Balance motor symptom management with dopamine replacement therapy.  5. Hedy Swann will call Jose the week of 7/5 - 7/9, morning.  6. Jose will have primary care provider fax lab results to [phone] Attn: Venecia Smith RN    37 minute call

## 2021-06-30 NOTE — TELEPHONE ENCOUNTER
Left voice mail on Jose's phone that I was calling to find out how the patient did with the primary care provider and if any labs came back abnormal. I stated that Dr. Godinez would like to start the patient on donepezil, a memory medication.    Alanna sent to explain.

## 2021-06-30 NOTE — TELEPHONE ENCOUNTER
We should start donepezil 5 mg at bedtime.  I can order it if you can clear with family and give me a pharmacy    Thanks,    Román

## 2021-07-01 ENCOUNTER — TELEPHONE (OUTPATIENT)
Dept: NEUROLOGY | Facility: CLINIC | Age: 76
End: 2021-07-01

## 2021-07-01 ENCOUNTER — TRANSFERRED RECORDS (OUTPATIENT)
Dept: HEALTH INFORMATION MANAGEMENT | Facility: CLINIC | Age: 76
End: 2021-07-01

## 2021-07-01 DIAGNOSIS — F02.818 LEWY BODY DEMENTIA WITH BEHAVIORAL DISTURBANCE (H): Primary | ICD-10-CM

## 2021-07-01 DIAGNOSIS — G31.83 LEWY BODY DEMENTIA WITH BEHAVIORAL DISTURBANCE (H): Primary | ICD-10-CM

## 2021-07-01 RX ORDER — DONEPEZIL HYDROCHLORIDE 5 MG/1
5 TABLET, FILM COATED ORAL AT BEDTIME
Qty: 30 TABLET | Refills: 1 | Status: SHIPPED | OUTPATIENT
Start: 2021-07-01 | End: 2021-07-28

## 2021-07-01 NOTE — TELEPHONE ENCOUNTER
I tried to call Mr. Navarro's son but could not reach him.  I called the patient and he was at the [facility] working out.  He says he is having trouble at nighttime sleeping because of hallucination.  He is willing to try donepezil.    I will call him prescription for donepezil 5 mg at bedtime.  He would like this called to the [facility] in [place].  We will call him back in 1 to 2 weeks and see how he is doing.    Eric Godinez MD

## 2021-07-02 ENCOUNTER — TRANSFERRED RECORDS (OUTPATIENT)
Dept: HEALTH INFORMATION MANAGEMENT | Facility: CLINIC | Age: 76
End: 2021-07-02

## 2021-07-05 ENCOUNTER — TELEPHONE (OUTPATIENT)
Dept: NEUROLOGY | Facility: CLINIC | Age: 76
End: 2021-07-05

## 2021-07-05 ENCOUNTER — PATIENT OUTREACH (OUTPATIENT)
Dept: CARE COORDINATION | Facility: CLINIC | Age: 76
End: 2021-07-05

## 2021-07-05 NOTE — TELEPHONE ENCOUNTER
Received CT of the head from [facility]   Records Date of service: 7/2/21  Copy has been sent to scanning and emailed to provider email

## 2021-07-09 ENCOUNTER — DOCUMENTATION ONLY (OUTPATIENT)
Dept: OTHER | Facility: CLINIC | Age: 76
End: 2021-07-09

## 2021-07-13 NOTE — PROGRESS NOTES
Social Work Telephone Message Note  [facility]    Patient Name:  Ace Navarro  /Age:  1945 (75 year old)    Referral Source: Venecia Smith RN  Reason for Referral:  F/u with Pt's son re needing resources    LM for Pt's son Jose. Will await his return call.    MIKEL Patel, [facility]    [facility]  [phone]

## 2021-07-22 ENCOUNTER — TELEPHONE (OUTPATIENT)
Dept: NEUROLOGY | Facility: CLINIC | Age: 76
End: 2021-07-22

## 2021-07-22 NOTE — TELEPHONE ENCOUNTER
I called the patient back about his CT scan of the head which I reviewed.  This shows some low density around his right DBS lead.  I think the significance of this is still uncertain.  There is some concern that it can be associated with cognitive problems.    The patient says he is doing well.  He is sleeping better on the donepezil.  He was out at the [facility] when I called him.    Eric Godinez MD

## 2021-07-28 DIAGNOSIS — G31.83 LEWY BODY DEMENTIA WITH BEHAVIORAL DISTURBANCE (H): ICD-10-CM

## 2021-07-28 DIAGNOSIS — F02.818 LEWY BODY DEMENTIA WITH BEHAVIORAL DISTURBANCE (H): ICD-10-CM

## 2021-07-28 NOTE — TELEPHONE ENCOUNTER
Rx Authorization:    Requested Medication/ Dose: Donepezil HCL 5MG tabs    Date last refill ordered: 7/1/21    Quantity ordered: 30 tabs    # refills: 1    Date of last clinic visit with ordering provider: 5/18/21    Date of next clinic visit with ordering provider: F/U 1 months    All pertinent protocol data (lab date/result):     Include pertinent information from patients message:

## 2021-07-29 RX ORDER — DONEPEZIL HYDROCHLORIDE 5 MG/1
5 TABLET, FILM COATED ORAL AT BEDTIME
Qty: 30 TABLET | Refills: 5 | Status: SHIPPED | OUTPATIENT
Start: 2021-07-29 | End: 2021-08-10

## 2021-08-10 ENCOUNTER — OFFICE VISIT (OUTPATIENT)
Dept: NEUROLOGY | Facility: CLINIC | Age: 76
End: 2021-08-10
Payer: MEDICARE

## 2021-08-10 VITALS
SYSTOLIC BLOOD PRESSURE: 143 MMHG | RESPIRATION RATE: 16 BRPM | BODY MASS INDEX: 25.26 KG/M2 | WEIGHT: 181.1 LBS | OXYGEN SATURATION: 97 % | DIASTOLIC BLOOD PRESSURE: 88 MMHG | HEART RATE: 66 BPM

## 2021-08-10 DIAGNOSIS — Z96.89 S/P DEEP BRAIN STIMULATOR PLACEMENT: ICD-10-CM

## 2021-08-10 DIAGNOSIS — G31.83 LEWY BODY DEMENTIA WITH BEHAVIORAL DISTURBANCE (H): ICD-10-CM

## 2021-08-10 DIAGNOSIS — G20.A1 PARKINSON'S DISEASE (H): Primary | ICD-10-CM

## 2021-08-10 DIAGNOSIS — F02.818 LEWY BODY DEMENTIA WITH BEHAVIORAL DISTURBANCE (H): ICD-10-CM

## 2021-08-10 PROCEDURE — 99215 OFFICE O/P EST HI 40 MIN: CPT | Mod: GC | Performed by: PSYCHIATRY & NEUROLOGY

## 2021-08-10 PROCEDURE — 95970 ALYS NPGT W/O PRGRMG: CPT | Performed by: PSYCHIATRY & NEUROLOGY

## 2021-08-10 RX ORDER — DONEPEZIL HYDROCHLORIDE 10 MG/1
10 TABLET, FILM COATED ORAL AT BEDTIME
Qty: 90 TABLET | Refills: 3 | Status: SHIPPED | OUTPATIENT
Start: 2021-08-10 | End: 2021-12-28

## 2021-08-10 ASSESSMENT — ENCOUNTER SYMPTOMS
NIGHT SWEATS: 0
POLYDIPSIA: 0
HALLUCINATIONS: 1
FATIGUE: 1
CHILLS: 0
NAUSEA: 0
MUSCLE WEAKNESS: 0
INCREASED ENERGY: 1
DIARRHEA: 0
RECTAL PAIN: 0
ALTERED TEMPERATURE REGULATION: 0
ABDOMINAL PAIN: 0
WEIGHT LOSS: 0
POLYPHAGIA: 0
BLOOD IN STOOL: 0
WEIGHT GAIN: 0
STIFFNESS: 0
MUSCLE CRAMPS: 0
ARTHRALGIAS: 0
BLOATING: 0
HEARTBURN: 0
FEVER: 0
BACK PAIN: 0
NECK PAIN: 0
DECREASED APPETITE: 0
VOMITING: 0
MYALGIAS: 0
JOINT SWELLING: 0
JAUNDICE: 0
CONSTIPATION: 1
BOWEL INCONTINENCE: 0

## 2021-08-10 ASSESSMENT — UNIFIED PARKINSONS DISEASE RATING SCALE (UPDRS)
GAIT: NORMAL
FINGER_TAPPING_LEFT: MILD: ANY OF THE FOLLOWING: A) 3 TO 5 INTERRUPTIONS DURING TAPPING B) MILD SLOWING C) THE AMPLITUDE DECREMENTS MIDWAY IN THE 10-MOVEMENT SEQUENCE
RIGIDITY_LUE: SLIGHT: RIGIDITY ONLY DETECTED WITH ACTIVATION MANEUVER.
RIGIDITY_RLE: NORMAL
LEG_AGILITY_RIGHT: SLIGHT: ANY OF THE FOLLOWING: A) THE REGULAR RHYTHM IS BROKEN WITH ONE WITH ONE OR TWO INTERRUPTIONS OR HESITATIONS OF THE MOVEMENT B) SLIGHT SLOWING C) THE AMPLITUDE DECREMENTS NEAR THE END OF THE 10 MOVEMENTS.
TOTAL_SCORE_LEFT: 6
AMPLITUDE_RLE: NORMAL: NO TREMOR.
TOETAPPING_RIGHT: NORMAL
POSTURE: 3 MODERATE.  STOOPED POSTURE, SCOLIOSIS, OR LEANING TO ONE SIDE THAT CANNOT BE CORRECTED VOLITIONALLY TO A NORMAL POSTURE BY THE PATIENT.
AMPLITUDE_LLE: NORMAL: NO TREMOR.
FREEZING_GAIT: NORMAL
AXIAL_SCORE: 9
FINGER_TAPPING_RIGHT: MILD: ANY OF THE FOLLOWING: A) 3 TO 5 INTERRUPTIONS DURING TAPPING B) MILD SLOWING C) THE AMPLITUDE DECREMENTS MIDWAY IN THE 10-MOVEMENT SEQUENCE
PARKINSONS_MEDS: ON
AMPLITUDE_RUE: NORMAL: NO TREMOR.
RIGIDITY_NECK: NORMAL
TOTAL_SCORE: 21
AMPLITUDE_LUE: NORMAL: NO TREMOR.
AMPLITUDE_LIP_JAW: NORMAL: NO TREMOR.
HANDMOVEMENTS_LEFT: SLIGHT: ANY OF THE FOLLOWING: A) THE REGULAR RHYTHM IS BROKEN WITH ONE WITH ONE OR TWO INTERRUPTIONS OR HESITATIONS OF THE MOVEMENT B) SLIGHT SLOWING C) THE AMPLITUDE DECREMENTS NEAR THE END OF THE 10 MOVEMENTS.
PRONATION_SUPINATION_RIGHT: NORMAL
SPEECH: MILD: LOSS OF MODULATION, DICTION OR VOLUME, WITH A FEW WORDS UNCLEAR, BUT THE OVERALL SENTENCES EASY TO FOLLOW.
TOTAL_SCORE: 6
HANDMOVEMENTS_RIGHT: SLIGHT: ANY OF THE FOLLOWING: A) THE REGULAR RHYTHM IS BROKEN WITH ONE WITH ONE OR TWO INTERRUPTIONS OR HESITATIONS OF THE MOVEMENT B) SLIGHT SLOWING C) THE AMPLITUDE DECREMENTS NEAR THE END OF THE 10 MOVEMENTS.
TOETAPPING_LEFT: NORMAL
SPONTANEITY_OF_MOVEMENT: 1: SLIGHT: SLIGHT GLOBAL SLOWNESS AND POVERTY OF SPONTANEOUS MOVEMENTS.
DYSKINESIAS_PRESENT: NO
RIGIDITY_RUE: SLIGHT: RIGIDITY ONLY DETECTED WITH ACTIVATION MANEUVER.
PRONATION_SUPINATION_LEFT: NORMAL
LEG_AGILITY_LEFT: SLIGHT: ANY OF THE FOLLOWING: A) THE REGULAR RHYTHM IS BROKEN WITH ONE WITH ONE OR TWO INTERRUPTIONS OR HESITATIONS OF THE MOVEMENT B) SLIGHT SLOWING C) THE AMPLITUDE DECREMENTS NEAR THE END OF THE 10 MOVEMENTS.
FACIAL_EXPRESSION: MILD: IN ADDITION TO DECREASED EYE-BLINK FREQUENCY, MASKED FACIES PRESENT IN THE LOWER FACE AS WELL, NAMELY FEWER MOVEMENTS AROUND THE MOUTH, SUCH AS LESS SPONTANEOUS SMILING, BUT LIPS NOT PARTED.
CONSTANCY_TREMOR_ATREST: NORMAL: NO TREMOR.
POSTURAL_STABILITY: SLIGHT: 3-5 STEPS, BUT RECOVERS UNAIDED.
ARISING_CHAIR: NORMAL: ABLE TO ARISE QUICKLY WITHOUT HESITATION.
RIGIDITY_LLE: NORMAL

## 2021-08-10 ASSESSMENT — MOVEMENT DISORDERS SOCIETY - UNIFIED PARKINSONS DISEASE RATING SCALE (MDS-UPDRS)
EATING_TASKS: SLIGHT: I AM SLOW, BUT I DO NOT NEED ANY HELP HANDLING MY FOOD AND HAVE NOT HAD FOOD SPILLS WHILE EATING.
HANDWRITING: SLIGHT: MY WRITING IS SLOW, CLUMSY OR UNEVEN, BUT ALL WORDS ARE CLEAR.
FREEZING: NORMAL: NOT AT ALL (NO PROBLEMS).
WALKING_AND_BALANCE: SLIGHT: I AM SLIGHTLY SLOW OR MAY DRAG A LEG.  I NEVER USE A WALKING AID.
HYGIENE: SLIGHT:  I AM SLOW, BUT I DO NOT NEED ANY HELP.
SALIVA_AND_DROOLING: SEVERE: I HAVE SO MUCH DROOLING THAT I REGULARLY NEED TO USE TISSUES OR A HANDKERCHIEF TO PROTECT MY CLOTHES.
CHEWING_AND_SWALLOWING: NORMAL: NO PROBLEMS.
SPEECH: MODERATE: MY SPEECH IS UNCLEAR ENOUGH THAT OTHERS ASK ME TO REPEAT MYSELF EVERY DAY EVEN THOUGH MOST OF MY SPEECH IS UNDERSTOOD.
DRESSING: MILD: I AM SLOW AND NEED HELP FOR A FEW DRESSING TASKS (BUTTONS, BRACELETS).
TURNING_IN_BED: SLIGHT: I HAVE A BIT OF TROUBLE TURNING, BUT I DO NOT NEED ANY HELP.
GETTING_OUT_OF_BED_CAR_DEEP_CHAIR: SLIGHT: I AM SLOW OR AWKWARD, BUT I USUALLY CAN DO IT ON MY FIRST TRY.
TREMOR: SLIGHT: SHAKING OR TREMOR OCCURS BUT DOES NOT CAUSE PROBLEMS WITH ANY ACTIVITIES.
HOBBIES_AND_OTHER_ACTIVITIES: SLIGHT: I AM A BIT SLOW BUT DO THESE ACTIVITIES EASILY.
TOTAL_SCORE: 17

## 2021-08-10 ASSESSMENT — PAIN SCALES - GENERAL: PAINLEVEL: NO PAIN (0)

## 2021-08-10 NOTE — PROGRESS NOTES
"Department of Neurology  Movement Disorders Division   Follow-up Note    Patient: Ace Navarro   MRN: [id number]   : 1945   Date of Visit: 8/10/2021    Diagnosis: Parkinson's disease  DBS Target(s): Bilateral STN  Date(s) of DBS Lead Placement: 2019    Date(s) of IPG Placement: L chest 5/10/2019  Device: Abbott    INTERVAL EVENTS:  Mr. Navarro is a 76 yo man with PD s/p bilateral STN DBS with good symptom control.  He has developed cognitive decline and CT head showed right kitty-lead edema.  Today he returns for follow-up.  He is accompanied by his son, Jose.  Since the last visit, his memory has continued to worsen.  Due to the change, he was assessed for infection which was negative.  Since starting donepezil, he hasn't had any more hallucinations.  He also feels his memory is a little better but notes that he hasn't felt the same since the 2nd side surgery.  In particular he struggles to think of words.    His balance has also been worsening.  When he bends over to pet the dog, he loses his balance.  Denies falls.    He is also reporting numbness of his left leg when he touches it.   He also feels that \"something is living in his leg\".    If he misses a dose, he sleeps poorly and then hallucinations off and on for a few days.    Endorses cramps of his right hand.  Denies dyskinesias.  He feels his medications kick in within 10 minutes and wear off within 20-25 minutes.  He reports stiffness if he misses a dose.       Part II  2.1 Speech (P) 3 (P) Moderate: My speech is unclear enough that others ask me to repeat myself every day even though most of my speech is understood.   2.2 Saliva and drooling (P) 4 (P) Severe: I have so much drooling that I regularly need to use tissues or a handkerchief to protect my clothes.   2.3 Chewing and swallowing (P) 0 (P) Normal: No problems.   2.4 Eating tasks (P) 1 (P) Slight: I am slow, but I do not need any help handling my food and have not had food " spills while eating.   2.5 Dressing (P) 2 (P) Mild: I am slow and need help for a few dressing tasks (buttons, bracelets).   2.6 Hygiene (P) 1 (P) Slight:  I am slow, but I do not need any help.   2.7 Handwriting (P) 1 (P) Slight: My writing is slow, clumsy or uneven, but all words are clear.   2.8 Doing hobbies and other activities (P) 1 (P) Slight: I am a bit slow but do these activities easily.   2.9 Turning in bed (P) 1 (P) Slight: I have a bit of trouble turning, but I do not need any help.   2.10 Tremor (P) 1 (P) Slight: Shaking or tremor occurs but does not cause problems with any activities.   2.11 Getting out of bed  (P) 1 (P) Slight: I am slow or awkward, but I usually can do it on my first try.   2.12 Walking and balance  (P) 1 (P) Slight: I am slightly slow or may drag a leg.  I never use a walking aid.   2.13 Freezing (P) 0 (P) Normal: Not at all (no problems).   Total (P) 17        PD Medications 9am 3:00pm 9pm 9:30pm   CD/LD IR 25-100mg 2 2 2    CD/LD CR 50-200mg    1   Donepezil 5mg    1   Ropinirole 0.5mg    1   Melatonin    1   Last taken at 2:30pm    ROS:  All others negative except as listed above.    Past Medical History:   Diagnosis Date     Parkinson's disease (H) 09/2010     RLS (restless legs syndrome)      Ulcerative colitis (H)      Vitiligo         Past Surgical History:   Procedure Laterality Date     ------------OTHER-------------      nasal     HERNIA REPAIR       IMPLANT DEEP BRAIN STIMULATION GENERATOR / BATTERY Left 5/10/2019    Procedure: Deep Brain Stimulator Placement, Phase II, Placement Of Deep Brain Stimulator Generator/Battery Over The Left Chest Wall;  Surgeon: Humberto Briones MD;  Location: [facility] OR     OPTICAL TRACKING SYSTEM INSERTION DEEP BRAIN STIMULATION Left 10/23/2018    Procedure: Left Deep Brain Stimulator Placement, Phase One, Placement of Left Side Deep Brain Stimulator Electrode Target Left Subthalamic Nucleus with Microelectrode Recording;  Surgeon: Anali  Humberto Castro MD;  Location: [facility] OR     OPTICAL TRACKING SYSTEM INSERTION DEEP BRAIN STIMULATION BILATERAL Bilateral 4/26/2019    Procedure: O-Arm/Stealth Assisted Bilateral Deep Brain Stimulator Placement, Phase I, Placement Of Bilateral Deep Brain Stimulator Electrodes, Target Bilateral Subthalamic Nucleus With Microelectrode Recording;  Surgeon: Humberto Briones MD;  Location: [facility] OR     TONSILLECTOMY & ADENOIDECTOMY          Current Outpatient Medications   Medication Sig Dispense Refill     carbidopa-levodopa (SINEMET CR)  MG CR tablet Take 1 tablet by mouth At Bedtime 90 tablet 3     carbidopa-levodopa (SINEMET)  MG tablet Take 2 tablets by mouth 3 times daily At 7 am, 1 pm, & 9 pm. 320 tablet 11     donepezil (ARICEPT) 5 MG tablet Take 1 tablet (5 mg) by mouth At Bedtime 30 tablet 5     folic acid 800 MCG tablet Take 800 mcg by mouth daily       multivitamin, therapeutic with minerals (MULTI-VITAMIN) TABS tablet Take 1 tablet by mouth every morning        pramipexole (MIRAPEX) 0.5 MG tablet Take 1 tablet (0.5 mg) by mouth At Bedtime 90 tablet 3     sulfaSALAzine (AZULFIDINE) 500 MG tablet TAKE 4 TABLETS BY MOUTH ONCE DAILY.  3     melatonin 5 MG tablet Take 8 mg by mouth 3 mg plus 5 mg          Allergies   Allergen Reactions     Shellfish-Derived Products Swelling     LOBSTER causes throat swelling/closing  (shrimp/shellfish ok)     Aspirin Nausea        Family History   Problem Relation Age of Onset     Breast Cancer Mother      Myocardial Infarction Father      Coronary Artery Disease Father      No Known Problems Sister      No Known Problems Brother      No Known Problems Sister         Social History     Socioeconomic History     Marital status:      Spouse name: Not on file     Number of children: 4     Years of education: Not on file     Highest education level: Not on file   Occupational History     Not on file   Tobacco Use     Smoking status: Never Smoker     Smokeless  tobacco: Never Used   Substance and Sexual Activity     Alcohol use: No     Drug use: No     Sexual activity: Never   Other Topics Concern     Parent/sibling w/ CABG, MI or angioplasty before 65F 55M? Not Asked   Social History Narrative    Since he was diagnosed with Parkinson's disease, Mr. Navarro has not had any alcohol. Before that, he would have alcohol one night a week while he was in a relationship for about seven years. He was never in any chemical dependency treatment programs and was never hospitalized for any alcohol related problems. He endorsed marijuana use when he was in college, and has tried it about once every three years. He has not noticed any benefit for his Parkinson's disease. He was a social smoker in his early 20s, and smoked about a pack of cigarettes a week. He last smoked 40 years ago. He denied gambling or any other compulsive behaviors.         Mr. Navarro completed a Bachelor's degree at [employer]. 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     Social Determinants of Health     Financial Resource Strain:      Difficulty of Paying Living Expenses:    Food Insecurity:      Worried About Running Out of Food in the Last Year:      Ran Out of Food in the Last Year:    Transportation Needs:      Lack of Transportation (Medical):      Lack of Transportation (Non-Medical):    Physical Activity:      Days of Exercise per Week:      Minutes of Exercise per Session:    Stress:      Feeling of Stress :    Social Connections:      Frequency of Communication with Friends and Family:      Frequency of Social Gatherings with Friends and Family:      Attends Christian Services:      Active Member of Clubs or Organizations:      Attends Club or Organization Meetings:      Marital Status:    Intimate Partner Violence:      Fear of Current or Ex-Partner:      Emotionally Abused:      Physically Abused:      Sexually Abused:          PHYSICAL EXAM:  BP (!) 143/88   Pulse 66   Resp 16   Wt 82.1 kg (181 lb 1.6 oz)   SpO2 97%   BMI 25.26 kg/m    Sensation: reduced to light touch and pinprick over lateral left lower leg  UPDRS Values 8/10/2021   Time: 3:59 PM   Medication On   R Brain DBS: On   L Brain DBS: On   Dyskinesia (LID) No   Did LID interfere    Speech 2   Facial Expression 2   Rigidity Neck 0   Rigidity RUE 1   Rigidity LUE 1   Rigidity RLE 0   Rigidity LLE 0   Finger Taps R 2   Finger Taps L 2   Hand Mvt R 1   Hand Mvt L 1   Pron-/Supinate R 0   Pron-/Supinate L 0   Toe Tap R 0   Toe Tap L 0   Leg Agility R 1   Leg Agility L 1   Arise From Chair 0   Gait 0   Gait Freezing 0   Postural Stability 1   Posture 3   Global Spont Mvt 1   Postural Tremor RUE 1   Postural Tremor LUE 1   Kinetic Tremor RUE 0   Kinetic Tremor LUE 0   Rest Tremor RUE 0   Rest Tremor LUE 0   Rest Tremor RLE 0   Rest Tremor LLE 0   Rest Tremor Lip/Jaw 0   Rest Tremor Constancy 0   Total Right 6   Total Left 6   Axial Total 9   Total 21   Prior 21 on 2/19/2021      IMAGING:  CT head - right kitty-lead hypodensity      Procedure: DBS Interrogation & Programming  Lead(s):    Left Right   DBS Target STN STN   DBS Lead Type Infinity 1.5 Infinity 1.5   Lead Implant Date 4/26/2019 4/26/2019     IPG(s):   1   IPG Infinity 7   IPG Implant Date 5/10/2019   Location Left chest   Battery (V) 2.93V     Impedance Check: No problems found  See scanned report for impedance details.    R 1025    Program 1 Left Brain         Right Brain       Initial Final Initial Final    Active Active Active Active   Amplitude (mA) 2.8 [2-3] 2.8 [2.0-3.0] 2.4 [1.5-2.8] 2.4 [1.5-2.8]   Pulse width (usec) 60 60 60 60   Freq (Hz) 130 130 130 130   Contacts:  C+3abc-  C+3abc-  C+10abc- C+10abc-          ASSESSMENT/PLAN:  Mr. Navarro is a 74 yo man with PD s/p bilateral STN DBS with good symptom control who has developed dementia.  His hallucinations have resolved on donepezil.  We will  monitor his kitty-lead edema with periodic CT scans.    - Increase donepezil to 10mg at bedtime  - Repeat CT head in 3 months  - No programming changes today  - RTC 4 months, 1 hr DBS programming      Josee Merino MD  Movement Disorders Fellow    Patient seen in person, examined and evaluated with Dr. Adeola Merino.  I agree with her documentation above.     Eric Godinez MD    Answers for HPI/ROS submitted by the patient on 8/10/2021  General Symptoms: Yes  Skin Symptoms: No  HENT Symptoms: No  EYE SYMPTOMS: No  HEART SYMPTOMS: No  LUNG SYMPTOMS: No  INTESTINAL SYMPTOMS: Yes  URINARY SYMPTOMS: No  REPRODUCTIVE SYMPTOMS: No  SKELETAL SYMPTOMS: Yes  BLOOD SYMPTOMS: No  NERVOUS SYSTEM SYMPTOMS: No  MENTAL HEALTH SYMPTOMS: No  Fever: No  Loss of appetite: No  Weight loss: No  Weight gain: No  Fatigue: Yes  Night sweats: No  Chills: No  Increased stress: No  Excessive hunger: No  Excessive thirst: No  Feeling hot or cold when others believe the temperature is normal: No  Loss of height: No  Post-operative complications: No  Surgical site pain: No  Hallucinations: Yes  Change in or Loss of Energy: Yes  Hyperactivity: No  Confusion: Yes  Heart burn or indigestion: No  Nausea: No  Vomiting: No  Abdominal pain: No  Bloating: No  Constipation: Yes  Diarrhea: No  Blood in stool: No  Black stools: No  Rectal or Anal pain: No  Fecal incontinence: No  Yellowing of skin or eyes: No  Vomit with blood: No  Change in stools: No  Back pain: No  Muscle aches: No  Neck pain: No  Swollen joints: No  Joint pain: No  Bone pain: No  Muscle cramps: No  Muscle weakness: No  Joint stiffness: No  Bone fracture: No

## 2021-08-10 NOTE — LETTER
"8/10/2021       RE: Ace Navarro  [address] [facility] [address]     Dear Colleague,    Thank you for referring your patient, Ace Navarro, to the [facility] [place] at [facility]. Please see a copy of my visit note below.    Department of Neurology  Movement Disorders Division   Follow-up Note    Patient: Ace Navarro   MRN: [id number]   : 1945   Date of Visit: 8/10/2021    Diagnosis: Parkinson's disease  DBS Target(s): Bilateral STN  Date(s) of DBS Lead Placement: 2019    Date(s) of IPG Placement: L chest 5/10/2019  Device: Abbott    INTERVAL EVENTS:  Mr. Navarro is a 76 yo man with PD s/p bilateral STN DBS with good symptom control.  He has developed cognitive decline and CT head showed right kitty-lead edema.  Today he returns for follow-up.  He is accompanied by his son, Jose.  Since the last visit, his memory has continued to worsen.  Due to the change, he was assessed for infection which was negative.  Since starting donepezil, he hasn't had any more hallucinations.  He also feels his memory is a little better but notes that he hasn't felt the same since the 2nd side surgery.  In particular he struggles to think of words.    His balance has also been worsening.  When he bends over to pet the dog, he loses his balance.  Denies falls.    He is also reporting numbness of his left leg when he touches it.   He also feels that \"something is living in his leg\".    If he misses a dose, he sleeps poorly and then hallucinations off and on for a few days.    Endorses cramps of his right hand.  Denies dyskinesias.  He feels his medications kick in within 10 minutes and wear off within 20-25 minutes.  He reports stiffness if he misses a dose.       Part II  2.1 Speech (P) 3 (P) Moderate: My speech is unclear enough that others ask me to repeat myself every day even though most of my speech is " understood.   2.2 Saliva and drooling (P) 4 (P) Severe: I have so much drooling that I regularly need to use tissues or a handkerchief to protect my clothes.   2.3 Chewing and swallowing (P) 0 (P) Normal: No problems.   2.4 Eating tasks (P) 1 (P) Slight: I am slow, but I do not need any help handling my food and have not had food spills while eating.   2.5 Dressing (P) 2 (P) Mild: I am slow and need help for a few dressing tasks (buttons, bracelets).   2.6 Hygiene (P) 1 (P) Slight:  I am slow, but I do not need any help.   2.7 Handwriting (P) 1 (P) Slight: My writing is slow, clumsy or uneven, but all words are clear.   2.8 Doing hobbies and other activities (P) 1 (P) Slight: I am a bit slow but do these activities easily.   2.9 Turning in bed (P) 1 (P) Slight: I have a bit of trouble turning, but I do not need any help.   2.10 Tremor (P) 1 (P) Slight: Shaking or tremor occurs but does not cause problems with any activities.   2.11 Getting out of bed  (P) 1 (P) Slight: I am slow or awkward, but I usually can do it on my first try.   2.12 Walking and balance  (P) 1 (P) Slight: I am slightly slow or may drag a leg.  I never use a walking aid.   2.13 Freezing (P) 0 (P) Normal: Not at all (no problems).   Total (P) 17        PD Medications 9am 3:00pm 9pm 9:30pm   CD/LD IR 25-100mg 2 2 2    CD/LD CR 50-200mg    1   Donepezil 5mg    1   Ropinirole 0.5mg    1   Melatonin    1   Last taken at 2:30pm    ROS:  All others negative except as listed above.    Past Medical History:   Diagnosis Date     Parkinson's disease (H) 09/2010     RLS (restless legs syndrome)      Ulcerative colitis (H)      Vitiligo         Past Surgical History:   Procedure Laterality Date     ------------OTHER-------------      nasal     HERNIA REPAIR       IMPLANT DEEP BRAIN STIMULATION GENERATOR / BATTERY Left 5/10/2019    Procedure: Deep Brain Stimulator Placement, Phase II, Placement Of Deep Brain Stimulator Generator/Battery Over The Left Chest  Wall;  Surgeon: Humberto Briones MD;  Location: [facility] OR     OPTICAL TRACKING SYSTEM INSERTION DEEP BRAIN STIMULATION Left 10/23/2018    Procedure: Left Deep Brain Stimulator Placement, Phase One, Placement of Left Side Deep Brain Stimulator Electrode Target Left Subthalamic Nucleus with Microelectrode Recording;  Surgeon: Humberto Briones MD;  Location: [facility] OR     OPTICAL TRACKING SYSTEM INSERTION DEEP BRAIN STIMULATION BILATERAL Bilateral 4/26/2019    Procedure: O-Arm/Stealth Assisted Bilateral Deep Brain Stimulator Placement, Phase I, Placement Of Bilateral Deep Brain Stimulator Electrodes, Target Bilateral Subthalamic Nucleus With Microelectrode Recording;  Surgeon: Humberto Briones MD;  Location: [facility] OR     TONSILLECTOMY & ADENOIDECTOMY          Current Outpatient Medications   Medication Sig Dispense Refill     carbidopa-levodopa (SINEMET CR)  MG CR tablet Take 1 tablet by mouth At Bedtime 90 tablet 3     carbidopa-levodopa (SINEMET)  MG tablet Take 2 tablets by mouth 3 times daily At 7 am, 1 pm, & 9 pm. 320 tablet 11     donepezil (ARICEPT) 5 MG tablet Take 1 tablet (5 mg) by mouth At Bedtime 30 tablet 5     folic acid 800 MCG tablet Take 800 mcg by mouth daily       multivitamin, therapeutic with minerals (MULTI-VITAMIN) TABS tablet Take 1 tablet by mouth every morning        pramipexole (MIRAPEX) 0.5 MG tablet Take 1 tablet (0.5 mg) by mouth At Bedtime 90 tablet 3     sulfaSALAzine (AZULFIDINE) 500 MG tablet TAKE 4 TABLETS BY MOUTH ONCE DAILY.  3     melatonin 5 MG tablet Take 8 mg by mouth 3 mg plus 5 mg          Allergies   Allergen Reactions     Shellfish-Derived Products Swelling     LOBSTER causes throat swelling/closing  (shrimp/shellfish ok)     Aspirin Nausea        Family History   Problem Relation Age of Onset     Breast Cancer Mother      Myocardial Infarction Father      Coronary Artery Disease Father      No Known Problems Sister      No Known Problems Brother       No Known Problems Sister         Social History     Socioeconomic History     Marital status:      Spouse name: Not on file     Number of children: 4     Years of education: Not on file     Highest education level: Not on file   Occupational History     Not on file   Tobacco Use     Smoking status: Never Smoker     Smokeless tobacco: Never Used   Substance and Sexual Activity     Alcohol use: No     Drug use: No     Sexual activity: Never   Other Topics Concern     Parent/sibling w/ CABG, MI or angioplasty before 65F 55M? Not Asked   Social History Narrative    Since he was diagnosed with Parkinson's disease, Mr. Navarro has not had any alcohol. Before that, he would have alcohol one night a week while he was in a relationship for about seven years. He was never in any chemical dependency treatment programs and was never hospitalized for any alcohol related problems. He endorsed marijuana use when he was in college, and has tried it about once every three years. He has not noticed any benefit for his Parkinson's disease. He was a social smoker in his early 20s, and smoked about a pack of cigarettes a week. He last smoked 40 years ago. He denied gambling or any other compulsive behaviors.         Mr. Navarro completed a Bachelor's degree at [employer]. 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     Social Determinants of Health     Financial Resource Strain:      Difficulty of Paying Living Expenses:    Food Insecurity:      Worried About Running Out of Food in the Last Year:      Ran Out of Food in the Last Year:    Transportation Needs:      Lack of Transportation (Medical):      Lack of Transportation (Non-Medical):    Physical Activity:      Days of Exercise per Week:      Minutes of Exercise per Session:    Stress:      Feeling of Stress :    Social Connections:      Frequency of Communication with Friends and Family:       Frequency of Social Gatherings with Friends and Family:      Attends Judaism Services:      Active Member of Clubs or Organizations:      Attends Club or Organization Meetings:      Marital Status:    Intimate Partner Violence:      Fear of Current or Ex-Partner:      Emotionally Abused:      Physically Abused:      Sexually Abused:         PHYSICAL EXAM:  BP (!) 143/88   Pulse 66   Resp 16   Wt 82.1 kg (181 lb 1.6 oz)   SpO2 97%   BMI 25.26 kg/m    Sensation: reduced to light touch and pinprick over lateral left lower leg  UPDRS Values 8/10/2021   Time: 3:59 PM   Medication On   R Brain DBS: On   L Brain DBS: On   Dyskinesia (LID) No   Did LID interfere    Speech 2   Facial Expression 2   Rigidity Neck 0   Rigidity RUE 1   Rigidity LUE 1   Rigidity RLE 0   Rigidity LLE 0   Finger Taps R 2   Finger Taps L 2   Hand Mvt R 1   Hand Mvt L 1   Pron-/Supinate R 0   Pron-/Supinate L 0   Toe Tap R 0   Toe Tap L 0   Leg Agility R 1   Leg Agility L 1   Arise From Chair 0   Gait 0   Gait Freezing 0   Postural Stability 1   Posture 3   Global Spont Mvt 1   Postural Tremor RUE 1   Postural Tremor LUE 1   Kinetic Tremor RUE 0   Kinetic Tremor LUE 0   Rest Tremor RUE 0   Rest Tremor LUE 0   Rest Tremor RLE 0   Rest Tremor LLE 0   Rest Tremor Lip/Jaw 0   Rest Tremor Constancy 0   Total Right 6   Total Left 6   Axial Total 9   Total 21   Prior 21 on 2/19/2021      IMAGING:  CT head - right kitty-lead hypodensity      Procedure: DBS Interrogation & Programming  Lead(s):    Left Right   DBS Target STN STN   DBS Lead Type Infinity 1.5 Infinity 1.5   Lead Implant Date 4/26/2019 4/26/2019     IPG(s):   1   IPG Infinity 7   IPG Implant Date 5/10/2019   Location Left chest   Battery (V) 2.93V     Impedance Check: No problems found  See scanned report for impedance details.    R 1025    Program 1 Left Brain         Right Brain       Initial Final Initial Final    Active Active Active Active   Amplitude (mA) 2.8 [2-3] 2.8  [2.0-3.0] 2.4 [1.5-2.8] 2.4 [1.5-2.8]   Pulse width (usec) 60 60 60 60   Freq (Hz) 130 130 130 130   Contacts:  C+3abc-  C+3abc-  C+10abc- C+10abc-          ASSESSMENT/PLAN:  Mr. Navarro is a 76 yo man with PD s/p bilateral STN DBS with good symptom control who has developed dementia.  His hallucinations have resolved on donepezil.  We will monitor his kitty-lead edema with periodic CT scans.    - Increase donepezil to 10mg at bedtime  - Repeat CT head in 3 months  - No programming changes today  - RTC 4 months, 1 hr DBS programming      Josee Merino MD  Movement Disorders Fellow    Patient seen in person, examined and evaluated with Dr. Adeola Merino.  I agree with her documentation above.     Eric Godinez MD    Answers for HPI/ROS submitted by the patient on 8/10/2021  General Symptoms: Yes  Skin Symptoms: No  HENT Symptoms: No  EYE SYMPTOMS: No  HEART SYMPTOMS: No  LUNG SYMPTOMS: No  INTESTINAL SYMPTOMS: Yes  URINARY SYMPTOMS: No  REPRODUCTIVE SYMPTOMS: No  SKELETAL SYMPTOMS: Yes  BLOOD SYMPTOMS: No  NERVOUS SYSTEM SYMPTOMS: No  MENTAL HEALTH SYMPTOMS: No  Fever: No  Loss of appetite: No  Weight loss: No  Weight gain: No  Fatigue: Yes  Night sweats: No  Chills: No  Increased stress: No  Excessive hunger: No  Excessive thirst: No  Feeling hot or cold when others believe the temperature is normal: No  Loss of height: No  Post-operative complications: No  Surgical site pain: No  Hallucinations: Yes  Change in or Loss of Energy: Yes  Hyperactivity: No  Confusion: Yes  Heart burn or indigestion: No  Nausea: No  Vomiting: No  Abdominal pain: No  Bloating: No  Constipation: Yes  Diarrhea: No  Blood in stool: No  Black stools: No  Rectal or Anal pain: No  Fecal incontinence: No  Yellowing of skin or eyes: No  Vomit with blood: No  Change in stools: No  Back pain: No  Muscle aches: No  Neck pain: No  Swollen joints: No  Joint pain: No  Bone pain: No  Muscle cramps: No  Muscle weakness: No  Joint stiffness:  No  Bone fracture: No        Again, thank you for allowing me to participate in the care of your patient.      Sincerely,    Eric Godinez MD

## 2021-08-10 NOTE — PATIENT INSTRUCTIONS
Increase donepzil to 10mg at bedtime.  I have sent a new prescription with the larger pill strength to the pharmacy.    We'll repeat the CT scan of your brain to check on that swelling around the lead.

## 2021-09-12 ENCOUNTER — HEALTH MAINTENANCE LETTER (OUTPATIENT)
Age: 76
End: 2021-09-12

## 2021-11-15 NOTE — OR NURSING
ABE was connected to patient with all locks engaged on bed and ABE, bed control unhooked; supervised by Dr. Briones and assisted by anesthesia staff and nursing staff.   See "[employer]" message.  Duplicate encounter.      Jamey Santos CMA (AAMA) at 9:06 AM on 11/15/2021

## 2021-12-28 ENCOUNTER — LAB (OUTPATIENT)
Dept: LAB | Facility: CLINIC | Age: 76
End: 2021-12-28
Payer: MEDICARE

## 2021-12-28 ENCOUNTER — OFFICE VISIT (OUTPATIENT)
Dept: NEUROLOGY | Facility: CLINIC | Age: 76
End: 2021-12-28
Payer: MEDICARE

## 2021-12-28 VITALS
BODY MASS INDEX: 24.44 KG/M2 | HEART RATE: 58 BPM | SYSTOLIC BLOOD PRESSURE: 139 MMHG | OXYGEN SATURATION: 97 % | DIASTOLIC BLOOD PRESSURE: 86 MMHG | WEIGHT: 174.6 LBS | HEIGHT: 71 IN

## 2021-12-28 DIAGNOSIS — R79.9 ABNORMAL FINDING OF BLOOD CHEMISTRY, UNSPECIFIED: ICD-10-CM

## 2021-12-28 DIAGNOSIS — Z96.89 S/P DEEP BRAIN STIMULATOR PLACEMENT: ICD-10-CM

## 2021-12-28 DIAGNOSIS — L98.9 SKIN LESION: ICD-10-CM

## 2021-12-28 DIAGNOSIS — K11.7 SIALORRHEA: ICD-10-CM

## 2021-12-28 DIAGNOSIS — R20.0 NUMBNESS: ICD-10-CM

## 2021-12-28 DIAGNOSIS — G31.83 LEWY BODY DEMENTIA WITH BEHAVIORAL DISTURBANCE (H): ICD-10-CM

## 2021-12-28 DIAGNOSIS — F02.818 LEWY BODY DEMENTIA WITH BEHAVIORAL DISTURBANCE (H): ICD-10-CM

## 2021-12-28 DIAGNOSIS — G20.A1 PARKINSON'S DISEASE (H): Primary | ICD-10-CM

## 2021-12-28 LAB
FOLATE SERPL-MCNC: 25.6 NG/ML
HBA1C MFR BLD: 4.7 % (ref 0–5.6)
TOTAL PROTEIN SERUM FOR ELP: 6.9 G/DL (ref 6.8–8.8)
VIT B12 SERPL-MCNC: 298 PG/ML (ref 193–986)

## 2021-12-28 PROCEDURE — 82746 ASSAY OF FOLIC ACID SERUM: CPT | Performed by: STUDENT IN AN ORGANIZED HEALTH CARE EDUCATION/TRAINING PROGRAM

## 2021-12-28 PROCEDURE — 82607 VITAMIN B-12: CPT | Performed by: PATHOLOGY

## 2021-12-28 PROCEDURE — 84165 PROTEIN E-PHORESIS SERUM: CPT | Mod: TC | Performed by: PATHOLOGY

## 2021-12-28 PROCEDURE — 84155 ASSAY OF PROTEIN SERUM: CPT | Performed by: STUDENT IN AN ORGANIZED HEALTH CARE EDUCATION/TRAINING PROGRAM

## 2021-12-28 PROCEDURE — 99214 OFFICE O/P EST MOD 30 MIN: CPT | Mod: 25 | Performed by: STUDENT IN AN ORGANIZED HEALTH CARE EDUCATION/TRAINING PROGRAM

## 2021-12-28 PROCEDURE — 95970 ALYS NPGT W/O PRGRMG: CPT | Performed by: STUDENT IN AN ORGANIZED HEALTH CARE EDUCATION/TRAINING PROGRAM

## 2021-12-28 PROCEDURE — 84165 PROTEIN E-PHORESIS SERUM: CPT | Mod: 26 | Performed by: PATHOLOGY

## 2021-12-28 PROCEDURE — 36415 COLL VENOUS BLD VENIPUNCTURE: CPT | Performed by: PATHOLOGY

## 2021-12-28 PROCEDURE — 83036 HEMOGLOBIN GLYCOSYLATED A1C: CPT | Performed by: PATHOLOGY

## 2021-12-28 RX ORDER — DONEPEZIL HYDROCHLORIDE 10 MG/1
5 TABLET, FILM COATED ORAL AT BEDTIME
Qty: 90 TABLET | Refills: 3 | Status: SHIPPED | OUTPATIENT
Start: 2021-12-28 | End: 2022-01-21

## 2021-12-28 RX ORDER — CARBIDOPA AND LEVODOPA 50; 200 MG/1; MG/1
TABLET, EXTENDED RELEASE ORAL
Qty: 180 TABLET | Refills: 3 | Status: SHIPPED | OUTPATIENT
Start: 2021-12-28 | End: 2022-10-26

## 2021-12-28 ASSESSMENT — UNIFIED PARKINSONS DISEASE RATING SCALE (UPDRS)
TOTAL_SCORE_LEFT: 5
GAIT: SLIGHT: INDEPENDENT WALKING WITH MINOR GAIT IMPAIRMENT.
AMPLITUDE_LLE: NORMAL: NO TREMOR.
SPONTANEITY_OF_MOVEMENT: 2: MILD: MILD GLOBAL SLOWNESS AND POVERTY OF SPONTANEOUS MOVEMENTS.
RIGIDITY_RLE: NORMAL
RIGIDITY_LLE: NORMAL
PRONATION_SUPINATION_LEFT: NORMAL
FREEZING_GAIT: NORMAL
AMPLITUDE_RLE: NORMAL: NO TREMOR.
TOTAL_SCORE: 9
TOETAPPING_RIGHT: NORMAL
CONSTANCY_TREMOR_ATREST: SLIGHT: TREMOR AT REST IS PRESENT  25% OF THE ENTIRE EXAMINATION PERIOD.
AMPLITUDE_LIP_JAW: NORMAL: NO TREMOR.
RIGIDITY_NECK: NORMAL
DYSKINESIAS_PRESENT: NO
AMPLITUDE_RUE: MILD > 1 CM BUT < 3 CM IN MAXIMAL AMPLITUDE.
FINGER_TAPPING_LEFT: MILD: ANY OF THE FOLLOWING: A) 3 TO 5 INTERRUPTIONS DURING TAPPING B) MILD SLOWING C) THE AMPLITUDE DECREMENTS MIDWAY IN THE 10-MOVEMENT SEQUENCE
POSTURE: 2 MILD.  DEFINITE FLEXION, SCOLIOSIS OR LEANING OT ONE SIDE, BUT CAN CORRECT POSTURE TO NORMAL WHEN ASKED.
RIGIDITY_LUE: SLIGHT: RIGIDITY ONLY DETECTED WITH ACTIVATION MANEUVER.
LEG_AGILITY_RIGHT: NORMAL
LEG_AGILITY_LEFT: NORMAL
FINGER_TAPPING_RIGHT: MILD: ANY OF THE FOLLOWING: A) 3 TO 5 INTERRUPTIONS DURING TAPPING B) MILD SLOWING C) THE AMPLITUDE DECREMENTS MIDWAY IN THE 10-MOVEMENT SEQUENCE
PARKINSONS_MEDS: OFF
AMPLITUDE_LUE: NORMAL: NO TREMOR.
POSTURAL_STABILITY: NORMAL:  RECOVERS WITH ONE OR TWO STEPS.
TOTAL_SCORE: 26
PRONATION_SUPINATION_RIGHT: NORMAL
ARISING_CHAIR: SLIGHT: ARISING IS SLOWER THAN NORMAL, OR MAY NEED MORE THAN ONE ATTEMPT, OR MAY NEED TO MOVE FORWARD IN THE CHAIR TO ARISE.  NO NEED TO USE THE ARMS OF THE CHAIR.
AXIAL_SCORE: 11
RIGIDITY_RUE: MILD: RIGIDITY DETECTED WITHOUT THE ACTIVATION MANEUVER.  FULL RANGE OF MOTION IS EASILY ACHIEVED.
HANDMOVEMENTS_RIGHT: MILD: ANY OF THE FOLLOWING: A) 3 TO 5 INTERRUPTIONS DURING TAPPING B) MILD SLOWING C) THE AMPLITUDE DECREMENTS MIDWAY IN THE 10-MOVEMENT SEQUENCE
FACIAL_EXPRESSION: MODERATE: MASKED FACIES WITH LIPS PARTED SOME OF THE TIME WHEN THE MOUTH IS AT REST.
HANDMOVEMENTS_LEFT: SLIGHT: ANY OF THE FOLLOWING: A) THE REGULAR RHYTHM IS BROKEN WITH ONE WITH ONE OR TWO INTERRUPTIONS OR HESITATIONS OF THE MOVEMENT B) SLIGHT SLOWING C) THE AMPLITUDE DECREMENTS NEAR THE END OF THE 10 MOVEMENTS.
TOETAPPING_LEFT: NORMAL
SPEECH: MILD: LOSS OF MODULATION, DICTION OR VOLUME, WITH A FEW WORDS UNCLEAR, BUT THE OVERALL SENTENCES EASY TO FOLLOW.

## 2021-12-28 ASSESSMENT — MIFFLIN-ST. JEOR: SCORE: 1554.11

## 2021-12-28 ASSESSMENT — PAIN SCALES - GENERAL: PAINLEVEL: NO PAIN (0)

## 2021-12-28 NOTE — PROGRESS NOTES
"Department of Neurology  Movement Disorders Division   Follow-up Note    Patient: Ace Navarro   MRN: [id number]   : 1945   Date of Visit: 2021    Diagnosis: Parkinson's disease  DBS Target(s): Bilateral STN  Date(s) of DBS Lead Placement: 2019    Date(s) of IPG Placement: L chest 5/10/2019  Device: Abbott    INTERVAL EVENTS:  Mr. Navarro is a 77 yo man with PD s/p bilateral STN DBS with good symptom control who returns for follow-up.  When they increased his donepezil to 10mg he began to have \"issues\" so they moved him back down to 5mg.  They can't remember specifically what the symptoms were but this has improved again with decreasing the dose.    His strange behaviors or confusion seem to coincide with sleepiness and sugar.  There have been a few episodes of thinking someone was in the room with him.  This doesn't seem distressing to him.    No falls.  His balance is doing well except when he bends over.  Feels off balance when he first stands but denies lightheadedness.    Denies dyskinesias and dystonia.  He feels something in his stomach which indicates that his pills are kicking.  He gets a few hours of \"a rush of clarity\".  Tremor gets worse with wearing off as well as sleepiness and fogginess.    His lives with his other son Alessandra.    PD Medications 9am 3:00pm 9pm 9:30pm   CD/LD IR 25-100mg 2 2 2     CD/LD CR 50-200mg       1   Donepezil 5mg       1   Pramipexole 0.5mg       1   Melatonin       1   Last taken at 11:00pm    ROS:  All others negative except as listed above.    Past Medical History:   Diagnosis Date     Parkinson's disease (H) 2010     RLS (restless legs syndrome)      Ulcerative colitis (H)      Vitiligo         Past Surgical History:   Procedure Laterality Date     ------------OTHER-------------      nasal     HERNIA REPAIR       IMPLANT DEEP BRAIN STIMULATION GENERATOR / BATTERY Left 5/10/2019    Procedure: Deep Brain Stimulator Placement, Phase II, Placement " Of Deep Brain Stimulator Generator/Battery Over The Left Chest Wall;  Surgeon: Humberto Briones MD;  Location: [facility] OR     OPTICAL TRACKING SYSTEM INSERTION DEEP BRAIN STIMULATION Left 10/23/2018    Procedure: Left Deep Brain Stimulator Placement, Phase One, Placement of Left Side Deep Brain Stimulator Electrode Target Left Subthalamic Nucleus with Microelectrode Recording;  Surgeon: Humberto Briones MD;  Location: [facility] OR     OPTICAL TRACKING SYSTEM INSERTION DEEP BRAIN STIMULATION BILATERAL Bilateral 4/26/2019    Procedure: O-Arm/Stealth Assisted Bilateral Deep Brain Stimulator Placement, Phase I, Placement Of Bilateral Deep Brain Stimulator Electrodes, Target Bilateral Subthalamic Nucleus With Microelectrode Recording;  Surgeon: Humberto Briones MD;  Location: [facility] OR     TONSILLECTOMY & ADENOIDECTOMY          Current Outpatient Medications   Medication Sig Dispense Refill     carbidopa-levodopa (SINEMET CR)  MG CR tablet Take 1.5 or 2 pills at bedtime. 180 tablet 3     carbidopa-levodopa (SINEMET)  MG tablet Take 2 tablets by mouth 3 times daily At 7 am, 1 pm, & 9 pm. 320 tablet 11     donepezil (ARICEPT) 10 MG tablet Take 0.5 tablets (5 mg) by mouth At Bedtime 90 tablet 3     folic acid 800 MCG tablet Take 800 mcg by mouth daily       multivitamin, therapeutic with minerals (MULTI-VITAMIN) TABS tablet Take 1 tablet by mouth every morning        pramipexole (MIRAPEX) 0.5 MG tablet Take 1 tablet (0.5 mg) by mouth At Bedtime 90 tablet 3     sulfaSALAzine (AZULFIDINE) 500 MG tablet TAKE 4 TABLETS BY MOUTH ONCE DAILY.  3       Allergies   Allergen Reactions     Shellfish-Derived Products Swelling     LOBSTER causes throat swelling/closing  (shrimp/shellfish ok)     Aspirin Nausea        Family History   Problem Relation Age of Onset     Breast Cancer Mother      Myocardial Infarction Father      Coronary Artery Disease Father      No Known Problems Sister      No Known Problems Brother   "    No Known Problems Sister         Social History     Socioeconomic History     Marital status:      Spouse name: Not on file     Number of children: 4     Years of education: Not on file     Highest education level: Not on file   Occupational History     Not on file   Tobacco Use     Smoking status: Never Smoker     Smokeless tobacco: Never Used   Substance and Sexual Activity     Alcohol use: No     Drug use: No     Sexual activity: Never   Other Topics Concern     Parent/sibling w/ CABG, MI or angioplasty before 65F 55M? Not Asked   Social History Narrative    Since he was diagnosed with Parkinson's disease, Mr. Navarro has not had any alcohol. Before that, he would have alcohol one night a week while he was in a relationship for about seven years. He was never in any chemical dependency treatment programs and was never hospitalized for any alcohol related problems. He endorsed marijuana use when he was in college, and has tried it about once every three years. He has not noticed any benefit for his Parkinson's disease. He was a social smoker in his early 20s, and smoked about a pack of cigarettes a week. He last smoked 40 years ago. He denied gambling or any other compulsive behaviors.         Mr. Navarro completed a Bachelor's degree at [employer]. 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     Social Determinants of Health     Financial Resource Strain: Not on file   Food Insecurity: Not on file   Transportation Needs: Not on file   Physical Activity: Not on file   Stress: Not on file   Social Connections: Not on file   Intimate Partner Violence: Not on file   Housing Stability: Not on file        PHYSICAL EXAM:  /86 (BP Location: Left arm, Patient Position: Chair, Cuff Size: Adult Regular)   Pulse 58   Ht 1.803 m (5' 11\")   Wt 79.2 kg (174 lb 9.6 oz)   SpO2 97%   BMI 24.35 kg/m    UPDRS Values 12/28/2021   Time: " 8:54 AM   Medication Off   R Brain DBS: On   L Brain DBS: On   Dyskinesia (LID) No   Did LID interfere    Speech 2   Facial Expression 3   Rigidity Neck 0   Rigidity RUE 2   Rigidity LUE 1   Rigidity RLE 0   Rigidity LLE 0   Finger Taps R 2   Finger Taps L 2   Hand Mvt R 2   Hand Mvt L 1   Pron-/Supinate R 0   Pron-/Supinate L 0   Toe Tap R 0   Toe Tap L 0   Leg Agility R 0   Leg Agility L 0   Arise From Chair 1   Gait 1   Gait Freezing 0   Postural Stability 0   Posture 2   Global Spont Mvt 2   Postural Tremor RUE 1   Postural Tremor LUE 1   Kinetic Tremor RUE 0   Kinetic Tremor LUE 0   Rest Tremor RUE 2   Rest Tremor LUE 0   Rest Tremor RLE 0   Rest Tremor LLE 0   Rest Tremor Lip/Jaw 0   Rest Tremor Constancy 1   Total Right 9   Total Left 5   Axial Total 11   Total 26   Prior: 21 on 8/10/2021      Procedure: DBS Interrogation & Programming  Lead(s):     Left Right   DBS Target STN STN   DBS Lead Type Infinity 1.5 Infinity 1.5   Lead Implant Date 4/26/2019 4/26/2019      IPG(s):    1   IPG Infinity 7   IPG Implant Date 5/10/2019   Location Left chest   Battery (V) 2.92V      Impedance Check: No problems found  See scanned report for impedance details.        Program 1 Left Brain         Right Brain       Initial Final Initial Final     Active Active Active Active   Amplitude (mA) 2.8 [2-3] 2.8 [2.0-3.0] 2.4 [1.5-2.8] 2.4 [1.5-2.8]   Pulse width (usec) 60 60 60 60   Freq (Hz) 30 130 30 130   Contacts: C+3abc- C+3abc- C+10abc- C+10abc-         ASSESSMENT/PLAN:  Mr. Navarro is a 77 yo man with PD s/p bilateral STN DBS with good motor symptom control who returns for follow-up. He did not tolerate increased donepezil but his hallucinosis is under good enough control at the current dose.  He is concerned about some skin lesions on his cheek so a derm referral was ordered.  He is also bothered by numbness of his lower legs so will perform a laboratory analysis for causes of peripheral neuropathy.  He is also  bothered by sialorrhea.  Prior Myobloc injections were ineffective but will add submandibular glad to try for better symptom control. Finally, he is not sleeping well overnight so will increase CD/LD CR at bedtime.  Will watch for exacerbated hallucinosis.  He is having wearing off during the day but they elected not to make changes to his current daytime regimen.  DBS was functioning effectively and no programming changes were made.    - Increase CD/LD CR 50-200mg to 1.5-2 pills  - Dermatology referral  - Physical therapy for Big therapy  - Try Myobloc for sialorrhea  - Folate, vitamin B12, A1C, and SPEP for suspected peripheral neuropathy  - RTC 3 months, 1 hr DBS programming  - RTC 2 weeks, 30 mins Botox      Josee Merino MD  Movement Disorders Fellow       Additional time spent for separate DBS programming: 10 min DBS analyzed without reprogramming.

## 2021-12-28 NOTE — PATIENT INSTRUCTIONS
Let's try increasing the carbidopa/levodopa CR 50-200mg to 1.5 pills for 2 weeks to see if this improves his sleep.  Watch for increase in hallucinations.  Can increase to 2 pills in the future if needed.    I ordered physical therapy for your posture and walking.    I also gave you a referral for a skin check with dermatology.

## 2021-12-29 LAB
ALBUMIN SERPL ELPH-MCNC: 4.5 G/DL (ref 3.7–5.1)
ALPHA1 GLOB SERPL ELPH-MCNC: 0.3 G/DL (ref 0.2–0.4)
ALPHA2 GLOB SERPL ELPH-MCNC: 0.6 G/DL (ref 0.5–0.9)
B-GLOBULIN SERPL ELPH-MCNC: 0.6 G/DL (ref 0.6–1)
GAMMA GLOB SERPL ELPH-MCNC: 0.8 G/DL (ref 0.7–1.6)
M PROTEIN SERPL ELPH-MCNC: 0 G/DL
PROT PATTERN SERPL ELPH-IMP: NORMAL

## 2021-12-29 NOTE — MR AVS SNAPSHOT
After Visit Summary   6/28/2018    Ace Navarro    MRN: [id number]           Patient Information     Date Of Birth          1945        Visit Information        Provider Department      6/28/2018 12:00 PM Sudha Prater, PhD [facility]        Today's Diagnoses     Parkinson disease (H)    -  1    Specific phobia           Follow-ups after your visit        Who to contact     Please call your clinic at [phone] to:    Ask questions about your health    Make or cancel appointments    Discuss your medicines    Learn about your test results    Speak to your doctor            Additional Information About Your Visit        MyChart Information     [employer] gives you secure access to your electronic health record. If you see a primary care provider, you can also send messages to your care team and make appointments. If you have questions, please call your primary care clinic.  If you do not have a primary care provider, please call [phone] and they will assist you.      [employer] is an electronic gateway that provides easy, online access to your medical records. With [employer] you can request a clinic appointment, read your test results, renew a prescription or communicate with your care team.     To access your existing account, please contact your [facility] or call [phone] for assistance.        Care EveryWhere ID     This is your Care EveryWhere ID. This could be used by other organizations to access your [place] medical records  [id number]         Blood Pressure from Last 3 Encounters:   05/24/18 135/84   05/09/18 154/80   05/01/18 149/83    Weight from Last 3 Encounters:   05/24/18 82.6 kg (182 lb 3.2 oz)   05/09/18 83.5 kg (184 lb)   05/01/18 84.3 kg (185 lb 14.4 oz)              Today, you had the following     No orders found for display       Primary Care Provider Office Phone # Fax #    Maurilio Bonilla [phone]  [phone]       [facility] [address] [address]        Equal Access to Services     REBECA HORTON : Hadii chidi joy felicity Flores, wajackieda luqbucky, vamshita karooseveltda roxann, janet nelain hayaadonald guardadoartemio saini tg garsia. So [facility] [phone].    ATENCIÓN: Si habla español, tiene a rodriguez disposición servicios gratuitos de asistencia lingüística. Llame al [phone].    We comply with applicable federal civil rights laws and [place] laws. We do not discriminate on the basis of race, color, national origin, age, disability, sex, sexual orientation, or gender identity.            Thank you!     Thank you for choosing [facility]  for your care. Our goal is always to provide you with excellent care. Hearing back from our patients is one way we can continue to improve our services. Please take a few minutes to complete the written survey that you may receive in the mail after your visit with us. Thank you!             Your Updated Medication List - Protect others around you: Learn how to safely use, store and throw away your medicines at www.disposemymeds.org.          This list is accurate as of 6/28/18 11:59 PM.  Always use your most recent med list.                   Brand Name Dispense Instructions for use Diagnosis    * carbidopa-levodopa  MG per CR tablet    SINEMET CR     2 tablets        * carbidopa-levodopa  MG per tablet    SINEMET     3.5 tabs / 4 tabs / 4 tabs        diazepam 5 MG tablet    VALIUM    2 tablet    Take 1 tablet 30 minutes prior to MRI. May take additional tablet immediately before MRI as needed    Anxiety       folic acid 20 MG Caps      Take 800 mg by mouth        Multi-vitamin Tabs tablet      Take 1 tablet by mouth daily        PEG-3350/Electrolytes 236 g Solr      As directed. Do not fill until patient contacts the pharmacy.        pramipexole 0.25 MG tablet    MIRAPEX     Take 2 tablets (0.5 mg) by mouth At Bedtime        sulfaSALAzine  500 MG tablet    AZULFIDINE     Take 2,000 mg by mouth        * Notice:  This list has 2 medication(s) that are the same as other medications prescribed for you. Read the directions carefully, and ask your doctor or other care provider to review them with you.       Double Island Pedicle Flap Text: The defect edges were debeveled with a #15 scalpel blade.  Given the location of the defect, shape of the defect and the proximity to free margins a double island pedicle advancement flap was deemed most appropriate.  Using a sterile surgical marker, an appropriate advancement flap was drawn incorporating the defect, outlining the appropriate donor tissue and placing the expected incisions within the relaxed skin tension lines where possible.    The area thus outlined was incised deep to adipose tissue with a #15 scalpel blade.  The skin margins were undermined to an appropriate distance in all directions around the primary defect and laterally outward around the island pedicle utilizing iris scissors.  There was minimal undermining beneath the pedicle flap.

## 2021-12-31 ENCOUNTER — PATIENT OUTREACH (OUTPATIENT)
Dept: CARE COORDINATION | Facility: CLINIC | Age: 76
End: 2021-12-31
Payer: MEDICARE

## 2021-12-31 NOTE — PROGRESS NOTES
Social Work Follow-Up  [facility]    Data/Intervention:  Patient Name:  Ace Navarro  /Age:  1945 (76 year old)    Reason for Follow-Up:  Contacted by son Jose about care planning    Collaborated With:    -Jose Pt's son    Intervention/Education/Resources Provided:  Previous discussion with son Jose about care planning. He indicated that in the past week or so, his Dad's confusion is some worse. For the first time, he went outside when his son Alessandra who lives with him was out for an hour at hockey practice. Alessandra found him standing in the driveway when he came home. Ace said that he started to walk to the hockey game because he thought he was supposed to meet Alessandra there. He had a coat on but no shirt underneath.   Jose indicated that 2% of his days he's very good and clear headed. 8% are very bad days when he's hallucinating a lot and confused. He said 90% of the days he's happy but pretty non verbal, difficult to hear when he does speak, drooling and sitting a lot.   His sons want to continue to care for him at home. They are looking at purchasing a GPS tracking system that has a casi tracker and ability to communicate with him verbally. They are also considering options to keep him from leaving the house at night when Alessandra is sleeping should that be an issue. Additionally, Jose wanted to discuss how to get outside services to help him velvet in the spring when Alessandra starts to work long hours again.   Encouraged them to look at the Alzheimer's [employer] online for ideas about safety devices.   Reviewed the EW and AC programs thru the [facility] and his financial situation. Sent links to the AC program via email to Jose and suggested [place] start with [place] with [facility]. Provided the phone # to get that started. If approved he should be eligible for some PCA hours, adult day programs and other services. Also reviewed the EW program and coverage for residential  care if he becomes too difficult to care for at home.     Assessment/Plan:  Jose found the info helpful today and he plans to initiate contact with [facility].  Encouraged him to contact me again as needed.     Previously provided patient/family with writer's contact information and availability.       MIKEL Patel, [facility]    [facility]  [phone]

## 2022-01-18 DIAGNOSIS — F02.818 LEWY BODY DEMENTIA WITH BEHAVIORAL DISTURBANCE (H): ICD-10-CM

## 2022-01-18 DIAGNOSIS — R19.7 DIARRHEA, UNSPECIFIED TYPE: ICD-10-CM

## 2022-01-18 DIAGNOSIS — K11.7 DISTURBANCE OF SALIVARY SECRETION: Primary | ICD-10-CM

## 2022-01-18 DIAGNOSIS — G31.83 LEWY BODY DEMENTIA WITH BEHAVIORAL DISTURBANCE (H): ICD-10-CM

## 2022-01-18 DIAGNOSIS — G20.A1 PARKINSON'S DISEASE (H): ICD-10-CM

## 2022-01-18 DIAGNOSIS — K11.7 SIALORRHEA: ICD-10-CM

## 2022-01-18 RX ORDER — DONEPEZIL HYDROCHLORIDE 10 MG/1
5 TABLET, FILM COATED ORAL AT BEDTIME
Qty: 90 TABLET | Refills: 3 | OUTPATIENT
Start: 2022-01-18

## 2022-01-18 NOTE — TELEPHONE ENCOUNTER
Rx Authorization:    Requested Medication/ Dose: Donepezil HCL 5MG tabs    Date last refill ordered: 12/28/21    Quantity ordered: 90 tabs    # refills: 3    Date of last clinic visit with ordering provider: 12/28/21    Date of next clinic visit with ordering provider: F/U 1 year    All pertinent protocol data (lab date/result):     Include pertinent information from patients message:

## 2022-01-21 DIAGNOSIS — G20.A1 PARKINSON'S DISEASE (H): ICD-10-CM

## 2022-01-21 DIAGNOSIS — F02.818 LEWY BODY DEMENTIA WITH BEHAVIORAL DISTURBANCE (H): Primary | ICD-10-CM

## 2022-01-21 DIAGNOSIS — G31.83 LEWY BODY DEMENTIA WITH BEHAVIORAL DISTURBANCE (H): Primary | ICD-10-CM

## 2022-01-21 RX ORDER — DONEPEZIL HYDROCHLORIDE 10 MG/1
5 TABLET, FILM COATED ORAL AT BEDTIME
Qty: 90 TABLET | Refills: 3 | Status: SHIPPED | OUTPATIENT
Start: 2022-01-21 | End: 2022-10-19

## 2022-01-24 DIAGNOSIS — K11.7 SIALORRHEA: Primary | ICD-10-CM

## 2022-01-24 DIAGNOSIS — K11.7 DISTURBANCE OF SALIVARY SECRETION: ICD-10-CM

## 2022-02-23 ENCOUNTER — OFFICE VISIT (OUTPATIENT)
Dept: NEUROLOGY | Facility: CLINIC | Age: 77
End: 2022-02-23
Payer: MEDICARE

## 2022-02-23 VITALS — HEART RATE: 50 BPM | DIASTOLIC BLOOD PRESSURE: 98 MMHG | SYSTOLIC BLOOD PRESSURE: 162 MMHG

## 2022-02-23 DIAGNOSIS — K11.7 SIALORRHEA: ICD-10-CM

## 2022-02-23 DIAGNOSIS — G20.A1 PARKINSON'S DISEASE (H): ICD-10-CM

## 2022-02-23 DIAGNOSIS — K11.7 DISTURBANCE OF SALIVARY SECRETION: Primary | ICD-10-CM

## 2022-02-23 PROCEDURE — 99214 OFFICE O/P EST MOD 30 MIN: CPT | Mod: 25 | Performed by: PSYCHIATRY & NEUROLOGY

## 2022-02-23 PROCEDURE — 64611 CHEMODENERV SALIV GLANDS: CPT | Performed by: PSYCHIATRY & NEUROLOGY

## 2022-02-23 NOTE — LETTER
2/23/2022         RE: Ace Navarro  [address] [facility] [id number]        Dear Colleague,    Thank you for referring your patient, Ace Navarro, to the [facility]. Please see a copy of my visit note below.    [facility] Botulinum Toxin Clinic Note    History of Present Illness:  Ace Navarro is a 76 year old male who presents to clinic for botulinum toxin injections for treatment of sialorrhea. This is the first [facility] injection for this patient with sialorrhea.      Additional interval history: none    Patient denies new medical diagnoses, illnesses, hospitalizations, emergency room visits, and injuries since the previous injection with botulinum neurotoxin.    ROS:  Other than that mentioned above, the remainder of 12 systems reviewed were negative.    Current Outpatient Medications   Medication Sig Dispense Refill     carbidopa-levodopa (SINEMET CR)  MG CR tablet Take 1.5 or 2 pills at bedtime. 180 tablet 3     carbidopa-levodopa (SINEMET)  MG tablet Take 2 tablets by mouth 3 times daily At 7 am, 1 pm, & 9 pm. 320 tablet 11     donepezil (ARICEPT) 10 MG tablet Take 0.5 tablets (5 mg) by mouth At Bedtime 90 tablet 3     folic acid 800 MCG tablet Take 800 mcg by mouth daily       multivitamin, therapeutic with minerals (MULTI-VITAMIN) TABS tablet Take 1 tablet by mouth every morning        pramipexole (MIRAPEX) 0.5 MG tablet Take 1 tablet (0.5 mg) by mouth At Bedtime 90 tablet 3     sulfaSALAzine (AZULFIDINE) 500 MG tablet TAKE 4 TABLETS BY MOUTH ONCE DAILY.  3       Allergies: He is allergic to shellfish-derived products and aspirin.    Past Medical History:   Diagnosis Date     Parkinson's disease (H) 09/2010     RLS (restless legs syndrome)      Ulcerative colitis (H)      Vitiligo        Past Surgical History:   Procedure Laterality Date     ------------OTHER-------------      nasal     HERNIA REPAIR        IMPLANT DEEP BRAIN STIMULATION GENERATOR / BATTERY Left 5/10/2019    Procedure: Deep Brain Stimulator Placement, Phase II, Placement Of Deep Brain Stimulator Generator/Battery Over The Left Chest Wall;  Surgeon: Humberto Briones MD;  Location: [facility] OR     OPTICAL TRACKING SYSTEM INSERTION DEEP BRAIN STIMULATION Left 10/23/2018    Procedure: Left Deep Brain Stimulator Placement, Phase One, Placement of Left Side Deep Brain Stimulator Electrode Target Left Subthalamic Nucleus with Microelectrode Recording;  Surgeon: Humberto Briones MD;  Location: [facility] OR     OPTICAL TRACKING SYSTEM INSERTION DEEP BRAIN STIMULATION BILATERAL Bilateral 4/26/2019    Procedure: O-Arm/Stealth Assisted Bilateral Deep Brain Stimulator Placement, Phase I, Placement Of Bilateral Deep Brain Stimulator Electrodes, Target Bilateral Subthalamic Nucleus With Microelectrode Recording;  Surgeon: Humberto Briones MD;  Location: [facility] OR     TONSILLECTOMY & ADENOIDECTOMY         Social History     Socioeconomic History     Marital status:      Spouse name: Not on file     Number of children: 4     Years of education: Not on file     Highest education level: Not on file   Occupational History     Not on file   Tobacco Use     Smoking status: Never Smoker     Smokeless tobacco: Never Used   Substance and Sexual Activity     Alcohol use: No     Drug use: No     Sexual activity: Never   Other Topics Concern     Parent/sibling w/ CABG, MI or angioplasty before 65F 55M? Not Asked   Social History Narrative    Since he was diagnosed with Parkinson's disease, Mr. Navarro has not had any alcohol. Before that, he would have alcohol one night a week while he was in a relationship for about seven years. He was never in any chemical dependency treatment programs and was never hospitalized for any alcohol related problems. He endorsed marijuana use when he was in college, and has tried it about once every three years. He has not noticed any  benefit for his Parkinson's disease. He was a social smoker in his early 20s, and smoked about a pack of cigarettes a week. He last smoked 40 years ago. He denied gambling or any other compulsive behaviors.         Mr. Navarro completed a Bachelor's degree at [employer]. 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     Social Determinants of Health     Financial Resource Strain: Not on file   Food Insecurity: Not on file   Transportation Needs: Not on file   Physical Activity: Not on file   Stress: Not on file   Social Connections: Not on file   Intimate Partner Violence: Not on file   Housing Stability: Not on file       Family History   Problem Relation Age of Onset     Breast Cancer Mother      Myocardial Infarction Father      Coronary Artery Disease Father      No Known Problems Sister      No Known Problems Brother      No Known Problems Sister        Physical Examination:  Vital Signs:   vitals were not taken for this visit.   Drooling     BOTULINUM NEUROTOXIN INJECTION PROCEDURES:    VERIFICATION OF PATIENT IDENTIFICATION AND PROCEDURE     Initials   Patient Name kd   Patient  kd   Procedure Verified by: shelley     Prior to the start of the procedure and with procedural staff participation, I verbally confirmed the patient s identity using two indicators, relevant allergies, that the procedure was appropriate and matched the consent or emergent situation, and that the correct equipment/implants were available. Immediately prior to starting the procedure I conducted the Time Out with the procedural staff and re-confirmed the patient s name, procedure, and site/side. (The Joint Commission universal protocol was followed.)  Yes    Sedation (Moderate or Deep): None      Above assessments performed by:      Sherlyn Mccann DO      INDICATION/S FOR PROCEDURE/S:  Ace Navarro is a 76 year old year old patient with sialorrhea  secondary to Parkinson disease with associated symptoms of trouble swallowing, coughing and decreased speech intelligibility.     His baseline symptoms have been recalcitrant to oral medications and conservative therapy.  He is here today for an injection of Myobloc.      GOAL OF PROCEDURE:  The goal of this procedure is to decrease excessive drooling  associated with sialorrhea.    TOTAL DOSE ADMINISTERED:  Dose Administered:  2500 units Myobloc    Diluent Used:  Preservative Free Normal Saline  Total Volume of Diluent Used:  1 ml  Lot # [id number] with Expiration Date:  2/2024  NDC #: Myobloc 5000u ([address]-10)    Medication guide was offered to patient and was declined.    CONSENT:  The risks, benefits, and treatment options were discussed with Ace Navarro and she agreed to proceed.      Written consent was obtained by kd.     EQUIPMENT USED:  TB needle    SKIN PREPARATION:  Skin preparation was performed using an alcohol wipe.    GUIDANCE DESCRIPTION:  Ultrasound guidance was necessary throughout the procedure to accurately identify all areas of glandular tissue muscles while avoiding injection of underlying muscles , neighboring nerves and nearby vascular structures.     AREA/MUSCLE INJECTED:    Muscles Injected Units Injected Number of Injections   Right parotid  1250 1   Left parotid 1250 1                   Total Units Injected: 2000     Unavoidable Waste: 3000     Total Units Billed 5000           The patient tolerated the injections without difficulty.      Assessment:    Ace Navarro is a 76 year old male with sialorrhea.  This is the first [facility] injection for this patient with sialorrhea. The patient understands that the goal is to improve the drooling but that we may not get the best injection pattern until we have several injections done.    Plan  Message via [employer] or take a video of movements in 3 weeks for an update  Avoid heat and cold pack and massaging the areas  injected for 24 hours post injections  Follow-up in 3 months' time to consider repeat injections    Sherlyn Mccann DO   of Neurology   [facility]              Again, thank you for allowing me to participate in the care of your patient.        Sincerely,        Sherlyn Mccann DO

## 2022-02-23 NOTE — PATIENT INSTRUCTIONS
Plan  Message via [employer] or take a video of movements in 3 weeks for an update  Avoid heat and cold pack and massaging the areas injected for 24 hours post injections  Follow-up in 3 months' time to consider repeat injections

## 2022-02-23 NOTE — PROGRESS NOTES
[facility] Botulinum Toxin Clinic Note    History of Present Illness:  Ace Navarro is a 76 year old male who presents to clinic for botulinum toxin injections for treatment of sialorrhea. This is the first [facility] injection for this patient with sialorrhea.      Additional interval history: none    Patient denies new medical diagnoses, illnesses, hospitalizations, emergency room visits, and injuries since the previous injection with botulinum neurotoxin.    ROS:  Other than that mentioned above, the remainder of 12 systems reviewed were negative.    Current Outpatient Medications   Medication Sig Dispense Refill     carbidopa-levodopa (SINEMET CR)  MG CR tablet Take 1.5 or 2 pills at bedtime. 180 tablet 3     carbidopa-levodopa (SINEMET)  MG tablet Take 2 tablets by mouth 3 times daily At 7 am, 1 pm, & 9 pm. 320 tablet 11     donepezil (ARICEPT) 10 MG tablet Take 0.5 tablets (5 mg) by mouth At Bedtime 90 tablet 3     folic acid 800 MCG tablet Take 800 mcg by mouth daily       multivitamin, therapeutic with minerals (MULTI-VITAMIN) TABS tablet Take 1 tablet by mouth every morning        pramipexole (MIRAPEX) 0.5 MG tablet Take 1 tablet (0.5 mg) by mouth At Bedtime 90 tablet 3     sulfaSALAzine (AZULFIDINE) 500 MG tablet TAKE 4 TABLETS BY MOUTH ONCE DAILY.  3       Allergies: He is allergic to shellfish-derived products and aspirin.    Past Medical History:   Diagnosis Date     Parkinson's disease (H) 09/2010     RLS (restless legs syndrome)      Ulcerative colitis (H)      Vitiligo        Past Surgical History:   Procedure Laterality Date     ------------OTHER-------------      nasal     HERNIA REPAIR       IMPLANT DEEP BRAIN STIMULATION GENERATOR / BATTERY Left 5/10/2019    Procedure: Deep Brain Stimulator Placement, Phase II, Placement Of Deep Brain Stimulator Generator/Battery Over The Left Chest Wall;  Surgeon: Humberto Briones MD;  Location:  OR     [facility]  TRACKING SYSTEM INSERTION DEEP BRAIN STIMULATION Left 10/23/2018    Procedure: Left Deep Brain Stimulator Placement, Phase One, Placement of Left Side Deep Brain Stimulator Electrode Target Left Subthalamic Nucleus with Microelectrode Recording;  Surgeon: Humberto Briones MD;  Location: [facility] OR     OPTICAL TRACKING SYSTEM INSERTION DEEP BRAIN STIMULATION BILATERAL Bilateral 4/26/2019    Procedure: O-Arm/Stealth Assisted Bilateral Deep Brain Stimulator Placement, Phase I, Placement Of Bilateral Deep Brain Stimulator Electrodes, Target Bilateral Subthalamic Nucleus With Microelectrode Recording;  Surgeon: Humberto Briones MD;  Location: [facility] OR     TONSILLECTOMY & ADENOIDECTOMY         Social History     Socioeconomic History     Marital status:      Spouse name: Not on file     Number of children: 4     Years of education: Not on file     Highest education level: Not on file   Occupational History     Not on file   Tobacco Use     Smoking status: Never Smoker     Smokeless tobacco: Never Used   Substance and Sexual Activity     Alcohol use: No     Drug use: No     Sexual activity: Never   Other Topics Concern     Parent/sibling w/ CABG, MI or angioplasty before 65F 55M? Not Asked   Social History Narrative    Since he was diagnosed with Parkinson's disease, Mr. Navarro has not had any alcohol. Before that, he would have alcohol one night a week while he was in a relationship for about seven years. He was never in any chemical dependency treatment programs and was never hospitalized for any alcohol related problems. He endorsed marijuana use when he was in college, and has tried it about once every three years. He has not noticed any benefit for his Parkinson's disease. He was a social smoker in his early 20s, and smoked about a pack of cigarettes a week. He last smoked 40 years ago. He denied gambling or any other compulsive behaviors.         Mr. Navarro completed a Bachelor's degree at [employer]. 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     Social Determinants of Health     Financial Resource Strain: Not on file   Food Insecurity: Not on file   Transportation Needs: Not on file   Physical Activity: Not on file   Stress: Not on file   Social Connections: Not on file   Intimate Partner Violence: Not on file   Housing Stability: Not on file       Family History   Problem Relation Age of Onset     Breast Cancer Mother      Myocardial Infarction Father      Coronary Artery Disease Father      No Known Problems Sister      No Known Problems Brother      No Known Problems Sister        Physical Examination:  Vital Signs:   vitals were not taken for this visit.   Drooling     BOTULINUM NEUROTOXIN INJECTION PROCEDURES:    VERIFICATION OF PATIENT IDENTIFICATION AND PROCEDURE     Initials   Patient Name kd   Patient  kd   Procedure Verified by: shelley     Prior to the start of the procedure and with procedural staff participation, I verbally confirmed the patient s identity using two indicators, relevant allergies, that the procedure was appropriate and matched the consent or emergent situation, and that the correct equipment/implants were available. Immediately prior to starting the procedure I conducted the Time Out with the procedural staff and re-confirmed the patient s name, procedure, and site/side. (The Joint Commission universal protocol was followed.)  Yes    Sedation (Moderate or Deep): None      Above assessments performed by:      Sherlyn Mccann DO      INDICATION/S FOR PROCEDURE/S:  Ace Navarro is a 76 year old year old patient with sialorrhea secondary to Parkinson disease with associated symptoms of trouble swallowing, coughing and decreased speech intelligibility.     His baseline symptoms have been recalcitrant to oral medications and conservative therapy.  He is here today for an injection of Myobloc.       GOAL OF PROCEDURE:  The goal of this procedure is to decrease excessive drooling  associated with sialorrhea.    TOTAL DOSE ADMINISTERED:  Dose Administered:  2500 units Myobloc    Diluent Used:  Preservative Free Normal Saline  Total Volume of Diluent Used:  1 ml  Lot # [id number] with Expiration Date:  2/2024  NDC #: Myobloc 5000u ([address]-10)    Medication guide was offered to patient and was declined.    CONSENT:  The risks, benefits, and treatment options were discussed with Ace Navarro and she agreed to proceed.      Written consent was obtained by kd.     EQUIPMENT USED:  TB needle    SKIN PREPARATION:  Skin preparation was performed using an alcohol wipe.    GUIDANCE DESCRIPTION:  Ultrasound guidance was necessary throughout the procedure to accurately identify all areas of glandular tissue muscles while avoiding injection of underlying muscles , neighboring nerves and nearby vascular structures.     AREA/MUSCLE INJECTED:    Muscles Injected Units Injected Number of Injections   Right parotid  1250 1   Left parotid 1250 1                   Total Units Injected: 2500     Unavoidable Waste: 2500     Total Units Billed 5000           The patient tolerated the injections without difficulty.      Assessment:    Ace Navarro is a 76 year old male with sialorrhea.  This is the first [facility] injection for this patient with sialorrhea. The patient understands that the goal is to improve the drooling but that we may not get the best injection pattern until we have several injections done.    Plan  Message via [employer] or take a video of movements in 3 weeks for an update  Avoid heat and cold pack and massaging the areas injected for 24 hours post injections  Follow-up in 3 months' time to consider repeat injections    Sherlyn Mccann DO   of Neurology   [facility]

## 2022-02-23 NOTE — NURSING NOTE
Chief Complaint   Patient presents with     Botox     BOOGIE Peters on 2/23/2022 at 11:02 AM

## 2022-04-12 ENCOUNTER — TELEPHONE (OUTPATIENT)
Dept: NEUROLOGY | Facility: CLINIC | Age: 77
End: 2022-04-12
Payer: MEDICARE

## 2022-04-12 NOTE — TELEPHONE ENCOUNTER
[facility] Call Center    Phone Message    May a detailed message be left on voicemail: yes     Reason for Call:   Sameer patients son called to speak to nurse for direction or help  Per Sameer patients states that his double vision is worsening. Double vision has been on going for a few months now and they do not know what else to do to help patient, wondering if they can speak to a nurse. Please call Sameer back or patient COREY Lopez.    COREY Lopez - [phone]

## 2022-04-13 NOTE — TELEPHONE ENCOUNTER
Spoke to his son Sameer.  Mr. Navarro has been reporting intermittent blurry vision.  Recently evaluated in the ED ([facility] in [place]) and work-up was reportedly negative.  Double vision has been present for 2-3 months.  Has not seen an eye doctor about this issue.  Recommended that he begin by seeing an eye doctor as there can be many causes of blurry eyes including dry eye, weak eye muscles, or need for glasses.

## 2022-04-24 ENCOUNTER — HEALTH MAINTENANCE LETTER (OUTPATIENT)
Age: 77
End: 2022-04-24

## 2022-05-16 DIAGNOSIS — G20.A1 PARKINSON'S DISEASE (H): ICD-10-CM

## 2022-05-16 NOTE — TELEPHONE ENCOUNTER
Rx Authorization:    Requested Medication/ Dose: Carbidopa/Levodopa 25-100MG    Date last refill ordered: 5/18/21    Quantity ordered: 320tabs    # refills: 11    Date of last clinic visit with ordering provider: 2/23/22    Date of next clinic visit with ordering provider: F/U 3 months    All pertinent protocol data (lab date/result):     Include pertinent information from patients message:

## 2022-05-16 NOTE — TELEPHONE ENCOUNTER
[facility] Call Center    Phone Message    May a detailed message be left on voicemail: yes     Reason for Call: Medication Refill Request    Has the patient contacted the pharmacy for the refill? Yes   Name of medication being requested: carbidopa-levodopa (SINEMET)      carbidopa-levodopa (SINEMET CR)  MG CR tablet  Provider who prescribed the medication: Josee Merino  [facility]  Date medication is needed: ASAP     Patient out of meds.  Need ASAP      Action Taken: Message routed to:  [facility]    Travel Screening: Not Applicable

## 2022-05-17 RX ORDER — CARBIDOPA AND LEVODOPA 25; 100 MG/1; MG/1
2 TABLET ORAL 3 TIMES DAILY
Qty: 180 TABLET | Refills: 11 | Status: SHIPPED | OUTPATIENT
Start: 2022-05-17 | End: 2023-01-01

## 2022-05-20 NOTE — TELEPHONE ENCOUNTER
[facility] Call Center    Phone Message    May a detailed message be left on voicemail: yes     Reason for Call: Medication Refill Request    Has the patient contacted the pharmacy for the refill? Yes   Name of medication being requested: carbidopa-levodopa (SINEMET CR)  MG CR tablet  Provider who prescribed the medication: Dr. Merino  Pharmacy: [facility]Y #[id number] - [place], [place] - [address]  Date medication is needed: ASAP   Pt's son Jose called to report the wrong Rx was sent to the pharmacy. Pt needs the extended release as indicated above.    Please sent Rx to pharmacy ASAP as Pt is nearly out of medication.      Action Taken: Message routed to:  [facility] Neurology    Travel Screening: Not Applicable

## 2022-05-23 NOTE — TELEPHONE ENCOUNTER
[facility] Call Center    Phone Message    May a detailed message be left on voicemail: yes     Reason for Call: Medication Refill Request    Has the patient contacted the pharmacy for the refill? Yes     SON CALLED TO BE SURE IT IS THE TIME RELEASED MEDICATION.  PATIENT IS OUT OF MEDS.  LAST prescription was not the time released  Name of medication being requested: carbidopa-levodopa (SINEMET CR)  MG CR tablet  Provider who prescribed the medication: Josee Merino  [facility]  Date medication is needed: ASAP!!    Action Taken: [facility]     Travel Screening: Not Applicable

## 2022-06-02 DIAGNOSIS — G25.81 RESTLESS LEGS SYNDROME (RLS): ICD-10-CM

## 2022-06-02 NOTE — TELEPHONE ENCOUNTER
[facility] Call Center    Phone Message    May a detailed message be left on voicemail: yes     Reason for Call: Medication Refill Request    Has the patient contacted the pharmacy for the refill? Yes   Name of medication being requested: pramipexole (MIRAPEX) 0.5 MG tablet,carbidopa-levodopa (SINEMET)  MG tablet,donepezil (ARICEPT) 10 MG tablet,carbidopa-levodopa (SINEMET CR)  MG CR tablet.   Provider who prescribed the medication: Josee Merino MD  Pharmacy: [facility]ACY #[id number] - [place], [place] - [address]      Date medication is needed: ASAP, they are also requesting mutiple refills sent with the 90 day quantity to ensure pt is cleared for 1 year of refills for all medications. Pt needing the pramipexole asap as hes completely out.        Family is requesting all medications to be refilled asap       Action Taken: Message routed to:  [facility] NEUROLOGY    Travel Screening: Not Applicable

## 2022-06-03 RX ORDER — PRAMIPEXOLE DIHYDROCHLORIDE 0.5 MG/1
0.5 TABLET ORAL AT BEDTIME
Qty: 90 TABLET | Refills: 3 | Status: SHIPPED | OUTPATIENT
Start: 2022-06-03 | End: 2023-01-01

## 2022-06-03 NOTE — TELEPHONE ENCOUNTER
Left a message stating pramipexole is refilled, the rest of his medications all have refills left. If there are issues filling these I asked he call me back or send a [employer] message.

## 2022-07-15 ENCOUNTER — TELEPHONE (OUTPATIENT)
Dept: NEUROLOGY | Facility: CLINIC | Age: 77
End: 2022-07-15

## 2022-07-15 NOTE — TELEPHONE ENCOUNTER
[facility] Call Center    Phone Message    May a detailed message be left on voicemail: yes     Reason for Call: Other: pt son calling to see if he should schedule a follow up appt with Dr. Merino now or should he wait until December to schedule since that was the patients last appt with her. Please call back to advise.    Action Taken: Other: neurology    Travel Screening: Not Applicable

## 2022-07-15 NOTE — TELEPHONE ENCOUNTER
Jose does not remember when Ace should follow up next with Dr. Merino next. He does not feel he needs to be seen at this time but wanted to check in. Per chart review Ace was to come back in March for a 1 hour Deep Brain Stimulation programming. They have noticed worsening sleep (wakes up at 3-4 AM and cant get back to sleep due to tremors). He has wandered at night when getting up and has went out the door a couple times. They have an alarm that alerts them when this happens. He has the guardian abigail system so they know where he is. He has good and bad days with his cognition. He is taking carbidopa/levodopa CR  as 1.5 tablets at bedtime. Dr. Merino did give him a range to increase to 2 tablets at bedtime if needed. He does not know if his brother John has tried this dose yet.    Plan/recommendation:  1. He will check to see if John has tried 2 tabs of carbidopa/levodopa CR  at bedtime and talk about trying this    2. Dr. Merino is out until September, we will get him on a list to be scheduled with her. Please call if you would like him to be seen sooner and we can try to schedule him with another provider    3. I will have the [facility] staff call John to schedule a follow up Botox appointment

## 2022-08-03 ENCOUNTER — TELEPHONE (OUTPATIENT)
Dept: NEUROLOGY | Facility: CLINIC | Age: 77
End: 2022-08-03

## 2022-08-03 NOTE — TELEPHONE ENCOUNTER
[facility] Call Center    Phone Message    May a detailed message be left on voicemail: yes     Reason for Call: Other: Patient's son Jose is requesting a call back to clarify some questions regarding care. Jose and the patient are confused and were told a Dr. Butler is taking over care from his previous physician Dr. Godinez. Writer doesnt see a Dr. Butler within out directory. Please call Jose back to clarify at # [phone]     Action Taken: Message routed to:  [facility] Neurology     Travel Screening: Not Applicable

## 2022-08-23 ENCOUNTER — TELEPHONE (OUTPATIENT)
Dept: NEUROLOGY | Facility: CLINIC | Age: 77
End: 2022-08-23

## 2022-08-23 NOTE — TELEPHONE ENCOUNTER
Called the patient to get him scheduled for Deep Brain Stimulation programming for 1 hour with Dr. Merino. Writer left a message for the patient to call back.

## 2022-08-31 NOTE — TELEPHONE ENCOUNTER
Writer left a message for the patient to call back.    Patient's son, Jose, called back and the patient was scheduled with Dr. Merino on 10/5/22 at 12 PM.

## 2022-10-05 ENCOUNTER — OFFICE VISIT (OUTPATIENT)
Dept: NEUROLOGY | Facility: CLINIC | Age: 77
End: 2022-10-05
Payer: MEDICARE

## 2022-10-05 DIAGNOSIS — G20.A1 PARKINSON'S DISEASE (H): Primary | ICD-10-CM

## 2022-10-05 PROCEDURE — 99207 PR NO CHARGE LOS: CPT | Performed by: STUDENT IN AN ORGANIZED HEALTH CARE EDUCATION/TRAINING PROGRAM

## 2022-10-05 NOTE — LETTER
10/5/2022       RE: Ace Navarro  [address] [facility] [address]     Dear Colleague,    Thank you for referring your patient, Ace Navarro, to the [facility] NE[facility]. Please see a copy of my visit note below.    Patient arrived 30 minutes late for his appointment.  He will be rescheduled.      Again, thank you for allowing me to participate in the care of your patient.      Sincerely,    Josee Merino MD

## 2022-10-07 ENCOUNTER — TELEPHONE (OUTPATIENT)
Dept: NEUROLOGY | Facility: CLINIC | Age: 77
End: 2022-10-07

## 2022-10-07 NOTE — TELEPHONE ENCOUNTER
[facility] Call Center    Phone Message    May a detailed message be left on voicemail: yes     Reason for Call: Other: Patients son Jose calling to reschedule DBS programming appt that patient was late for. Please contact patients son to reschedule appt [phone]     Action Taken: Message routed to:  [facility] Neurology    Travel Screening: Not Applicable

## 2022-10-10 NOTE — TELEPHONE ENCOUNTER
Spoke to Jose but he had to take another call and was not able to reschedule the patient's appointment. Will try Jose again later.

## 2022-10-18 ENCOUNTER — TELEPHONE (OUTPATIENT)
Dept: NEUROLOGY | Facility: CLINIC | Age: 77
End: 2022-10-18

## 2022-10-18 DIAGNOSIS — G20.A1 PARKINSON'S DISEASE (H): ICD-10-CM

## 2022-10-18 DIAGNOSIS — G31.83 LEWY BODY DEMENTIA WITH BEHAVIORAL DISTURBANCE (H): ICD-10-CM

## 2022-10-18 DIAGNOSIS — F02.818 LEWY BODY DEMENTIA WITH BEHAVIORAL DISTURBANCE (H): ICD-10-CM

## 2022-10-18 NOTE — TELEPHONE ENCOUNTER
[facility] Call Center    Phone Message    May a detailed message be left on voicemail: yes     Reason for Call: Medication Refill Request    Has the patient contacted the pharmacy for the refill? Yes   Name of medication being requested Donepezil 10 mg.   Provider who prescribed the medication: Sherlyn Mccann  Pharmacy: [facility] [address]  Date medication is needed: ASAP    Patient out of medication    Patient taking 10 mg once per day  He was taking 5 mg per day.  Prescription isn't due until November but patient is taking more    Please call  Jose when prescription is at [facility].       Patient is out of medication!      Action Taken: Message routed to:  [facility]    Travel Screening: Not Applicable

## 2022-10-19 RX ORDER — DONEPEZIL HYDROCHLORIDE 10 MG/1
10 TABLET, FILM COATED ORAL AT BEDTIME
Qty: 30 TABLET | Refills: 0 | Status: SHIPPED | OUTPATIENT
Start: 2022-10-19 | End: 2022-10-26

## 2022-10-19 NOTE — TELEPHONE ENCOUNTER
Pt taking aricept 10mg daily at bedtime, RN provided refill for 30 days. Pt has appt with Dr. Chu next week, son who is bringing pt to appt will ask her to continue refills. 30 day refill sent today to bridge pt until f/u.    Pt will continue seeing Dr. Mccann for botox.     Donta Bustamante RN, BSN  [facility] Neurology

## 2022-10-23 NOTE — PROGRESS NOTES
Department of Neurology  Movement Disorders Division   DBS Follow-up Note    Patient: Ace Navarro  MRN: [id number]   : 1945   Date of Visit: 10/26/2022    Diagnosis: Parkinson's disease  DBS Target(s): Bilateral STN  Date(s) of DBS Lead Placement: 2019    Date(s) of IPG Placement: L chest 5/10/2019  Device: Abbott      Chief Complaint:  Ace Navarro is a 77 year old male who returns to clinic for follow up of PD status post bilateral STN DBS.   He was last seen 2021 at which time bedtime CD/LD CR was increased, Myobloc injections were ordered, PT was ordered, and labs were ordered for PN work-up.      Interval History:  He had Myobloc injections 2022 but missed his follow-up.  He already has the next set schedule.  They briefly tried increasing his overnight CD/LD but discontinued it due to hallucinations.  He continues to have hallucinations of tall people during the day, particularly when very tired or has taken a nap.  He thinks this occurs daily.  When he is confused he'll pack a bag to go home.  This usually happens at night.  They have gotten him a guardian bracelet and installed security system with cameras for his safety.    Denies falls and orthostasis.  Is a little off balance.  Still waking at night with tremor.  His tremor during the day occurs mostly in the morning.  It causes him difficulty eating and drinking.        UPDRS Part II  2.1 Speech (P) 3 (P) Moderate: My speech is unclear enough that others ask me to repeat myself every day even though most of my speech is understood.   2.2 Saliva and drooling (P) 4 (P) Severe: I have so much drooling that I regularly need to use tissues or a handkerchief to protect my clothes.   2.3 Chewing and swallowing (P) 1 (P) Slight: I am aware of slowness in my chewing or increased effort at swallowing, but I do not choke or need to have my food specially prepared.   2.4 Eating tasks (P) 0 (P) Normal: Not at all (no problems).    2.5 Dressing (P) 1 (P) Slight: I am slow, but I do not need help.   2.6 Hygiene (P) 1 (P) Slight:  I am slow, but I do not need any help.   2.7 Handwriting (P) 2 (P) Mild: Some words are unclear and difficult to read.   2.8 Doing hobbies and other activities (P) 1 (P) Slight: I am a bit slow but do these activities easily.   2.9 Turning in bed (P) 1 (P) Slight: I have a bit of trouble turning, but I do not need any help.   2.10 Tremor (P) 3 (P) Moderate: Shaking or tremor causes problems with many of my daily activities.   2.11 Getting out of bed  (P) 2 (P) Mild: I need more than one try to get up or need occasional help.   2.12 Walking and balance  (P) 1 (P) Slight: I am slightly slow or may drag a leg.  I never use a walking aid.   2.13 Freezing (P) 0 (P) Normal: Not at all (no problems).   Total (P) 20          PD Medications 9am 3:00pm 9pm 9:30pm   CD/LD IR 25-100mg 2 2 2     CD/LD CR 50-200mg       1   Donepezil 10mg       1   Pramipexole 0.5mg       1   Melatonin       1   Last taken: 9am      Medications:  Current Outpatient Medications   Medication Sig Dispense Refill     carbidopa-levodopa (SINEMET CR)  MG CR tablet Take 1.5 or 2 pills at bedtime. 180 tablet 3     carbidopa-levodopa (SINEMET)  MG tablet Take 2 tablets by mouth 3 times daily 180 tablet 11     donepezil (ARICEPT) 10 MG tablet Take 1 tablet (10 mg) by mouth At Bedtime 30 tablet 0     folic acid 800 MCG tablet Take 800 mcg by mouth daily       multivitamin w/minerals (THERA-VIT-M) tablet Take 1 tablet by mouth every morning        pramipexole (MIRAPEX) 0.5 MG tablet Take 1 tablet (0.5 mg) by mouth At Bedtime 90 tablet 3     sulfaSALAzine (AZULFIDINE) 500 MG tablet TAKE 4 TABLETS BY MOUTH ONCE DAILY.  3        Review of Systems:  Other than that noted at the end of this note, the remainder of 12 systems reviewed were negative.    Allergies: is allergic to shellfish-derived products and aspirin.    Past Medical History:  Past  Medical History:   Diagnosis Date     Parkinson's disease (H) 09/2010     RLS (restless legs syndrome)      Ulcerative colitis (H)      Vitiligo        Past Surgical History:  Past Surgical History:   Procedure Laterality Date     ------------OTHER-------------      nasal     HERNIA REPAIR       IMPLANT DEEP BRAIN STIMULATION GENERATOR / BATTERY Left 5/10/2019    Procedure: Deep Brain Stimulator Placement, Phase II, Placement Of Deep Brain Stimulator Generator/Battery Over The Left Chest Wall;  Surgeon: Humberto Briones MD;  Location: [facility] OR     OPTICAL TRACKING SYSTEM INSERTION DEEP BRAIN STIMULATION Left 10/23/2018    Procedure: Left Deep Brain Stimulator Placement, Phase One, Placement of Left Side Deep Brain Stimulator Electrode Target Left Subthalamic Nucleus with Microelectrode Recording;  Surgeon: Humberto Briones MD;  Location: [facility] OR     OPTICAL TRACKING SYSTEM INSERTION DEEP BRAIN STIMULATION BILATERAL Bilateral 4/26/2019    Procedure: O-Arm/Stealth Assisted Bilateral Deep Brain Stimulator Placement, Phase I, Placement Of Bilateral Deep Brain Stimulator Electrodes, Target Bilateral Subthalamic Nucleus With Microelectrode Recording;  Surgeon: Humberto Briones MD;  Location: [facility] OR     TONSILLECTOMY & ADENOIDECTOMY         Social History:  Social History     Socioeconomic History     Marital status:      Number of children: 4   Tobacco Use     Smoking status: Never     Smokeless tobacco: Never   Substance and Sexual Activity     Alcohol use: No     Drug use: No     Sexual activity: Never   Social History Narrative    Since he was diagnosed with Parkinson's disease, Mr. Navarro has not had any alcohol. Before that, he would have alcohol one night a week while he was in a relationship for about seven years. He was never in any chemical dependency treatment programs and was never hospitalized for any alcohol related problems. He endorsed marijuana use when he was in college, and has  tried it about once every three years. He has not noticed any benefit for his Parkinson's disease. He was a social smoker in his early 20s, and smoked about a pack of cigarettes a week. He last smoked 40 years ago. He denied gambling or any other compulsive behaviors.         Mr. Navarro completed a Bachelor's degree at [employer]. 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       Family History:  Family History   Problem Relation Age of Onset     Breast Cancer Mother      Myocardial Infarction Father      Coronary Artery Disease Father      No Known Problems Sister      No Known Problems Brother      No Known Problems Sister          Physical Exam:  The patient's  blood pressure is 179/93 (abnormal) and his pulse is 46 (abnormal). His respiration is 16 and oxygen saturation is 99%.       Neurological Examination:   Last medication dose: 9am  UPDRS Values 10/26/2022   Time: 12:34 PM   Medication On   R Brain DBS: On   L Brain DBS: On   Dyskinesia (LID) No   Did LID interfere    Speech 3   Facial Expression 2   Rigidity Neck 0   Rigidity RUE 1   Rigidity LUE 1   Rigidity RLE 0   Rigidity LLE 0   Finger Taps R 2   Finger Taps L 2   Hand Mvt R 1   Hand Mvt L 2   Pron-/Supinate R 0   Pron-/Supinate L 0   Toe Tap R 0   Toe Tap L 0   Leg Agility R 0   Leg Agility L 0   Arise From Chair 0   Gait 1   Gait Freezing 0   Postural Stability 1   Posture 1   Global Spont Mvt 2   Postural Tremor RUE 1   Postural Tremor LUE 1   Kinetic Tremor RUE 0   Kinetic Tremor LUE 0   Rest Tremor RUE 2   Rest Tremor LUE 0   Rest Tremor RLE 0   Rest Tremor LLE 0   Rest Tremor Lip/Jaw 0   Rest Tremor Constancy 1   Total Right 7   Total Left 6   Axial Total 10   Total 24   Prior: 26 on 12/28/2021        Procedure: DBS Interrogation & Programming  Lead(s):    Left Right   DBS Target STN STN   DBS Lead Type Infinity 1.5 Infinity 1.5   Lead Implant Date 4/26/2019 4/26/2019       IPG(s):    1   IPG Infinity 7   IPG Implant Date 5/10/2019   Location Left chest   Battery (V) 2.88V (2.92V)     Impedance Check: No problems found  See scanned report for impedance details.    Program 1 Left Brain         Right Brain       Initial Final Initial Final     Active Active Active Active   Amplitude (mA) 2.8 [2-3 by 0.05] 2.8 [2.0-3.0] 2.4 [1.5-2.8 by 0.05] 2.4 [1.5-2.8]   Pulse width (usec) 60 60 60 60   Freq (Hz) 30 130 30 130   Contacts: C+3abc- C+3abc- C+10abc- C+10abc-   Impedance 1100 / 1062 /         Labs:   Latest Reference Range & Units 21 09:34   Folate >=5.4 ng/mL 25.6   Hemoglobin A1C 0.0 - 5.6 % 4.7   Vitamin B12 193 - 986 pg/mL 298      Latest Reference Range & Units 21 09:34   Albumin Fraction 3.7 - 5.1 g/dL 4.5   Alpha 1 Fraction 0.2 - 0.4 g/dL 0.3   Alpha 2 Fraction 0.5 - 0.9 g/dL 0.6   Beta Fraction 0.6 - 1.0 g/dL 0.6   ELP Interpretation:  Essentially normal electrophoretic pattern. No obvious monoclonal proteins seen. Pathologic significance requires clinical correlation.  Yon Palacios M.D., Ph.D., Pathologist.   Gamma Fraction 0.7 - 1.6 g/dL 0.8   Monoclonal Peak <=0.0 g/dL 0.0   Total Protein Serum for ELP 6.8 - 8.8 g/dL 6.9         Assessment/Plan:  Ace Navarro is a 77 year old male who returns to clinic for follow up of PD status post bilateral STN DBS.  He continues to have hallucinations so will discontinue DA.  This is being used to treat RLS so will replace with gabapentin.  If hallucinations improve, will try to increase bedtime long acting LD to improve nighttime symptoms.  No programming changes today.    - Pramipexole wean per AVS  - Replace with gabapentin 100mg qHS  - Then increase nighttime CD/LD CR per AVS  - RTC 3 months, 1 hr DBS remote programming video      Josee Merino MD    Movement Disorders Division  Department of Neurology       Additional time spent for separate DBS programmin min DBS analyzed without  reprogramming.

## 2022-10-26 ENCOUNTER — OFFICE VISIT (OUTPATIENT)
Dept: NEUROLOGY | Facility: CLINIC | Age: 77
End: 2022-10-26
Payer: MEDICARE

## 2022-10-26 VITALS
RESPIRATION RATE: 16 BRPM | DIASTOLIC BLOOD PRESSURE: 93 MMHG | HEART RATE: 46 BPM | OXYGEN SATURATION: 99 % | SYSTOLIC BLOOD PRESSURE: 179 MMHG

## 2022-10-26 DIAGNOSIS — G20.A1 PARKINSON'S DISEASE (H): Primary | ICD-10-CM

## 2022-10-26 DIAGNOSIS — G31.83 LEWY BODY DEMENTIA WITH BEHAVIORAL DISTURBANCE (H): ICD-10-CM

## 2022-10-26 DIAGNOSIS — F02.818 LEWY BODY DEMENTIA WITH BEHAVIORAL DISTURBANCE (H): ICD-10-CM

## 2022-10-26 DIAGNOSIS — Z96.89 S/P DEEP BRAIN STIMULATOR PLACEMENT: ICD-10-CM

## 2022-10-26 DIAGNOSIS — G25.81 RESTLESS LEGS SYNDROME: ICD-10-CM

## 2022-10-26 PROCEDURE — 95970 ALYS NPGT W/O PRGRMG: CPT | Performed by: STUDENT IN AN ORGANIZED HEALTH CARE EDUCATION/TRAINING PROGRAM

## 2022-10-26 PROCEDURE — 99214 OFFICE O/P EST MOD 30 MIN: CPT | Mod: 25 | Performed by: STUDENT IN AN ORGANIZED HEALTH CARE EDUCATION/TRAINING PROGRAM

## 2022-10-26 RX ORDER — PRAMIPEXOLE DIHYDROCHLORIDE 0.12 MG/1
0.12 TABLET ORAL AT BEDTIME
Qty: 7 TABLET | Refills: 0 | Status: SHIPPED | OUTPATIENT
Start: 2022-10-26 | End: 2022-11-02

## 2022-10-26 RX ORDER — DONEPEZIL HYDROCHLORIDE 10 MG/1
10 TABLET, FILM COATED ORAL AT BEDTIME
Qty: 90 TABLET | Refills: 3 | Status: SHIPPED | OUTPATIENT
Start: 2022-10-26 | End: 2023-01-01

## 2022-10-26 RX ORDER — CARBIDOPA AND LEVODOPA 50; 200 MG/1; MG/1
1 TABLET, EXTENDED RELEASE ORAL AT BEDTIME
Qty: 90 TABLET | Refills: 3 | Status: SHIPPED | OUTPATIENT
Start: 2022-10-26 | End: 2023-01-01

## 2022-10-26 ASSESSMENT — UNIFIED PARKINSONS DISEASE RATING SCALE (UPDRS)
FREEZING_GAIT: NORMAL
SPONTANEITY_OF_MOVEMENT: 2: MILD: MILD GLOBAL SLOWNESS AND POVERTY OF SPONTANEOUS MOVEMENTS.
RIGIDITY_RUE: SLIGHT: RIGIDITY ONLY DETECTED WITH ACTIVATION MANEUVER.
AMPLITUDE_LUE: NORMAL: NO TREMOR.
AMPLITUDE_LLE: NORMAL: NO TREMOR.
LEG_AGILITY_RIGHT: NORMAL
TOTAL_SCORE: 7
FINGER_TAPPING_RIGHT: MILD: ANY OF THE FOLLOWING: A) 3 TO 5 INTERRUPTIONS DURING TAPPING B) MILD SLOWING C) THE AMPLITUDE DECREMENTS MIDWAY IN THE 10-MOVEMENT SEQUENCE
PRONATION_SUPINATION_RIGHT: NORMAL
RIGIDITY_LUE: SLIGHT: RIGIDITY ONLY DETECTED WITH ACTIVATION MANEUVER.
POSTURE: 1 SLIGHT.  NOT QUITE ERECT BUT COULD BE NORMAL FOR OLDER PERSONS.
HANDMOVEMENTS_RIGHT: SLIGHT: ANY OF THE FOLLOWING: A) THE REGULAR RHYTHM IS BROKEN WITH ONE WITH ONE OR TWO INTERRUPTIONS OR HESITATIONS OF THE MOVEMENT B) SLIGHT SLOWING C) THE AMPLITUDE DECREMENTS NEAR THE END OF THE 10 MOVEMENTS.
TOETAPPING_RIGHT: NORMAL
PRONATION_SUPINATION_LEFT: NORMAL
RIGIDITY_NECK: NORMAL
TOTAL_SCORE: 24
LEG_AGILITY_LEFT: NORMAL
GAIT: SLIGHT: INDEPENDENT WALKING WITH MINOR GAIT IMPAIRMENT.
TOTAL_SCORE_LEFT: 6
RIGIDITY_LLE: NORMAL
TOETAPPING_LEFT: NORMAL
FINGER_TAPPING_LEFT: MILD: ANY OF THE FOLLOWING: A) 3 TO 5 INTERRUPTIONS DURING TAPPING B) MILD SLOWING C) THE AMPLITUDE DECREMENTS MIDWAY IN THE 10-MOVEMENT SEQUENCE
AMPLITUDE_RLE: NORMAL: NO TREMOR.
DYSKINESIAS_PRESENT: NO
PARKINSONS_MEDS: ON
CONSTANCY_TREMOR_ATREST: SLIGHT: TREMOR AT REST IS PRESENT  25% OF THE ENTIRE EXAMINATION PERIOD.
POSTURAL_STABILITY: SLIGHT: 3-5 STEPS, BUT RECOVERS UNAIDED.
FACIAL_EXPRESSION: MILD: IN ADDITION TO DECREASED EYE-BLINK FREQUENCY, MASKED FACIES PRESENT IN THE LOWER FACE AS WELL, NAMELY FEWER MOVEMENTS AROUND THE MOUTH, SUCH AS LESS SPONTANEOUS SMILING, BUT LIPS NOT PARTED.
HANDMOVEMENTS_LEFT: MILD: ANY OF THE FOLLOWING: A) 3 TO 5 INTERRUPTIONS DURING TAPPING B) MILD SLOWING C) THE AMPLITUDE DECREMENTS MIDWAY IN THE 10-MOVEMENT SEQUENCE
RIGIDITY_RLE: NORMAL
AMPLITUDE_LIP_JAW: NORMAL: NO TREMOR.
SPEECH: MODERATE: SPEECH IS DIFFICULT TO UNDERSTAND TO THE POINT THAT SOME, BUT NOT MOST, SENTENCES ARE POORLY UNDERSTOOD.
AMPLITUDE_RUE: MILD > 1 CM BUT < 3 CM IN MAXIMAL AMPLITUDE.
ARISING_CHAIR: NORMAL: ABLE TO ARISE QUICKLY WITHOUT HESITATION.
AXIAL_SCORE: 10

## 2022-10-26 ASSESSMENT — MOVEMENT DISORDERS SOCIETY - UNIFIED PARKINSONS DISEASE RATING SCALE (MDS-UPDRS)
SPEECH: MODERATE: MY SPEECH IS UNCLEAR ENOUGH THAT OTHERS ASK ME TO REPEAT MYSELF EVERY DAY EVEN THOUGH MOST OF MY SPEECH IS UNDERSTOOD.
DRESSING: SLIGHT: I AM SLOW, BUT I DO NOT NEED HELP.
TOTAL_SCORE: 20
CHEWING_AND_SWALLOWING: SLIGHT: I AM AWARE OF SLOWNESS IN MY CHEWING OR INCREASED EFFORT AT SWALLOWING, BUT I DO NOT CHOKE OR NEED TO HAVE MY FOOD SPECIALLY PREPARED.
SALIVA_AND_DROOLING: SEVERE: I HAVE SO MUCH DROOLING THAT I REGULARLY NEED TO USE TISSUES OR A HANDKERCHIEF TO PROTECT MY CLOTHES.
TREMOR: MODERATE: SHAKING OR TREMOR CAUSES PROBLEMS WITH MANY OF MY DAILY ACTIVITIES.
HOBBIES_AND_OTHER_ACTIVITIES: SLIGHT: I AM A BIT SLOW BUT DO THESE ACTIVITIES EASILY.
HYGIENE: SLIGHT:  I AM SLOW, BUT I DO NOT NEED ANY HELP.
HANDWRITING: MILD: SOME WORDS ARE UNCLEAR AND DIFFICULT TO READ.
FREEZING: NORMAL: NOT AT ALL (NO PROBLEMS).
EATING_TASKS: NORMAL: NOT AT ALL (NO PROBLEMS).
TURNING_IN_BED: SLIGHT: I HAVE A BIT OF TROUBLE TURNING, BUT I DO NOT NEED ANY HELP.
GETTING_OUT_OF_BED_CAR_DEEP_CHAIR: MILD: I NEED MORE THAN ONE TRY TO GET UP OR NEED OCCASIONAL HELP.
WALKING_AND_BALANCE: SLIGHT: I AM SLIGHTLY SLOW OR MAY DRAG A LEG.  I NEVER USE A WALKING AID.

## 2022-10-26 ASSESSMENT — PAIN SCALES - GENERAL: PAINLEVEL: NO PAIN (0)

## 2022-10-26 NOTE — PATIENT INSTRUCTIONS
Let's replace pramipexole with gabapentin for your restless legs because pramipexole can worsen hallucinations.  Week 1 - Add gabapentin 100mg at bedtime + pramipexole 0.5mg  Week 2 - Continue gabapentin 100mg at bedtime + decrease pramipexole to 0.25mg  Week 3 - Continue gabapentin 100mg at bedtime + decrease pramipexole to 0.125mg  Week 4 - Continue gabapentin 100mg at bedtime + stop pramipexole    If your restless legs gets worse, let me know and we can increase the gabapentin.    Then we'll try again to increase the bedtime carbidopa/levodopa long acting.  Week 5 - Continue gabapentin 100mg + carbidopa/levodopa CR 50-200mg 1.5 pills  Week 6 - If still having worsened tremor overnight and hallucinations are stable, increase carbidopa/levodopa CR 50-200mg to 2 pills

## 2022-10-26 NOTE — LETTER
10/26/2022       RE: Ace Navarro  [address] [facility] [address]     Dear Colleague,    Thank you for referring your patient, Ace Navarro, to the [facility] [place] [facility]. Please see a copy of my visit note below.    Department of Neurology  Movement Disorders Division   DBS Follow-up Note    Patient: Ace Navarro  MRN: [id number]   : 1945   Date of Visit: 10/26/2022    Diagnosis: Parkinson's disease  DBS Target(s): Bilateral STN  Date(s) of DBS Lead Placement: 2019    Date(s) of IPG Placement: L chest 5/10/2019  Device: Abbott      Chief Complaint:  Ace Navarro is a 77 year old male who returns to clinic for follow up of PD status post bilateral STN DBS.   He was last seen 2021 at which time bedtime CD/LD CR was increased, Myobloc injections were ordered, PT was ordered, and labs were ordered for PN work-up.      Interval History:  He had Myobloc injections 2022 but missed his follow-up.  He already has the next set schedule.  They briefly tried increasing his overnight CD/LD but discontinued it due to hallucinations.  He continues to have hallucinations of tall people during the day, particularly when very tired or has taken a nap.  He thinks this occurs daily.  When he is confused he'll pack a bag to go home.  This usually happens at night.  They have gotten him a guardian bracelet and installed security system with cameras for his safety.    Denies falls and orthostasis.  Is a little off balance.  Still waking at night with tremor.  His tremor during the day occurs mostly in the morning.  It causes him difficulty eating and drinking.        UPDRS Part II  2.1 Speech (P) 3 (P) Moderate: My speech is unclear enough that others ask me to repeat myself every day even though most of my speech is understood.   2.2 Saliva and drooling (P) 4 (P) Severe: I have so much  drooling that I regularly need to use tissues or a handkerchief to protect my clothes.   2.3 Chewing and swallowing (P) 1 (P) Slight: I am aware of slowness in my chewing or increased effort at swallowing, but I do not choke or need to have my food specially prepared.   2.4 Eating tasks (P) 0 (P) Normal: Not at all (no problems).   2.5 Dressing (P) 1 (P) Slight: I am slow, but I do not need help.   2.6 Hygiene (P) 1 (P) Slight:  I am slow, but I do not need any help.   2.7 Handwriting (P) 2 (P) Mild: Some words are unclear and difficult to read.   2.8 Doing hobbies and other activities (P) 1 (P) Slight: I am a bit slow but do these activities easily.   2.9 Turning in bed (P) 1 (P) Slight: I have a bit of trouble turning, but I do not need any help.   2.10 Tremor (P) 3 (P) Moderate: Shaking or tremor causes problems with many of my daily activities.   2.11 Getting out of bed  (P) 2 (P) Mild: I need more than one try to get up or need occasional help.   2.12 Walking and balance  (P) 1 (P) Slight: I am slightly slow or may drag a leg.  I never use a walking aid.   2.13 Freezing (P) 0 (P) Normal: Not at all (no problems).   Total (P) 20          PD Medications 9am 3:00pm 9pm 9:30pm   CD/LD IR 25-100mg 2 2 2     CD/LD CR 50-200mg       1   Donepezil 10mg       1   Pramipexole 0.5mg       1   Melatonin       1   Last taken: 9am      Medications:  Current Outpatient Medications   Medication Sig Dispense Refill     carbidopa-levodopa (SINEMET CR)  MG CR tablet Take 1.5 or 2 pills at bedtime. 180 tablet 3     carbidopa-levodopa (SINEMET)  MG tablet Take 2 tablets by mouth 3 times daily 180 tablet 11     donepezil (ARICEPT) 10 MG tablet Take 1 tablet (10 mg) by mouth At Bedtime 30 tablet 0     folic acid 800 MCG tablet Take 800 mcg by mouth daily       multivitamin w/minerals (THERA-VIT-M) tablet Take 1 tablet by mouth every morning        pramipexole (MIRAPEX) 0.5 MG tablet Take 1 tablet (0.5 mg) by mouth At  Bedtime 90 tablet 3     sulfaSALAzine (AZULFIDINE) 500 MG tablet TAKE 4 TABLETS BY MOUTH ONCE DAILY.  3        Review of Systems:  Other than that noted at the end of this note, the remainder of 12 systems reviewed were negative.    Allergies: is allergic to shellfish-derived products and aspirin.    Past Medical History:  Past Medical History:   Diagnosis Date     Parkinson's disease (H) 09/2010     RLS (restless legs syndrome)      Ulcerative colitis (H)      Vitiligo        Past Surgical History:  Past Surgical History:   Procedure Laterality Date     ------------OTHER-------------      nasal     HERNIA REPAIR       IMPLANT DEEP BRAIN STIMULATION GENERATOR / BATTERY Left 5/10/2019    Procedure: Deep Brain Stimulator Placement, Phase II, Placement Of Deep Brain Stimulator Generator/Battery Over The Left Chest Wall;  Surgeon: Humberto Briones MD;  Location: [facility] OR     OPTICAL TRACKING SYSTEM INSERTION DEEP BRAIN STIMULATION Left 10/23/2018    Procedure: Left Deep Brain Stimulator Placement, Phase One, Placement of Left Side Deep Brain Stimulator Electrode Target Left Subthalamic Nucleus with Microelectrode Recording;  Surgeon: Humberto Briones MD;  Location: [facility] OR     OPTICAL TRACKING SYSTEM INSERTION DEEP BRAIN STIMULATION BILATERAL Bilateral 4/26/2019    Procedure: O-Arm/Stealth Assisted Bilateral Deep Brain Stimulator Placement, Phase I, Placement Of Bilateral Deep Brain Stimulator Electrodes, Target Bilateral Subthalamic Nucleus With Microelectrode Recording;  Surgeon: Humberto Briones MD;  Location: [facility] OR     TONSILLECTOMY & ADENOIDECTOMY         Social History:  Social History     Socioeconomic History     Marital status:      Number of children: 4   Tobacco Use     Smoking status: Never     Smokeless tobacco: Never   Substance and Sexual Activity     Alcohol use: No     Drug use: No     Sexual activity: Never   Social History Narrative    Since he was diagnosed with Parkinson's  disease, Mr. Navarro has not had any alcohol. Before that, he would have alcohol one night a week while he was in a relationship for about seven years. He was never in any chemical dependency treatment programs and was never hospitalized for any alcohol related problems. He endorsed marijuana use when he was in college, and has tried it about once every three years. He has not noticed any benefit for his Parkinson's disease. He was a social smoker in his early 20s, and smoked about a pack of cigarettes a week. He last smoked 40 years ago. He denied gambling or any other compulsive behaviors.         Mr. Navarro completed a Bachelor's degree at [employer]. 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       Family History:  Family History   Problem Relation Age of Onset     Breast Cancer Mother      Myocardial Infarction Father      Coronary Artery Disease Father      No Known Problems Sister      No Known Problems Brother      No Known Problems Sister          Physical Exam:  The patient's  blood pressure is 179/93 (abnormal) and his pulse is 46 (abnormal). His respiration is 16 and oxygen saturation is 99%.       Neurological Examination:   Last medication dose: 9am  UPDRS Values 10/26/2022   Time: 12:34 PM   Medication On   R Brain DBS: On   L Brain DBS: On   Dyskinesia (LID) No   Did LID interfere    Speech 3   Facial Expression 2   Rigidity Neck 0   Rigidity RUE 1   Rigidity LUE 1   Rigidity RLE 0   Rigidity LLE 0   Finger Taps R 2   Finger Taps L 2   Hand Mvt R 1   Hand Mvt L 2   Pron-/Supinate R 0   Pron-/Supinate L 0   Toe Tap R 0   Toe Tap L 0   Leg Agility R 0   Leg Agility L 0   Arise From Chair 0   Gait 1   Gait Freezing 0   Postural Stability 1   Posture 1   Global Spont Mvt 2   Postural Tremor RUE 1   Postural Tremor LUE 1   Kinetic Tremor RUE 0   Kinetic Tremor LUE 0   Rest Tremor RUE 2   Rest Tremor LUE 0   Rest Tremor RLE 0   Rest  Tremor LLE 0   Rest Tremor Lip/Jaw 0   Rest Tremor Constancy 1   Total Right 7   Total Left 6   Axial Total 10   Total 24   Prior: 26 on 12/28/2021        Procedure: DBS Interrogation & Programming  Lead(s):    Left Right   DBS Target STN STN   DBS Lead Type Infinity 1.5 Infinity 1.5   Lead Implant Date 4/26/2019 4/26/2019      IPG(s):    1   IPG Infinity 7   IPG Implant Date 5/10/2019   Location Left chest   Battery (V) 2.88V (2.92V)     Impedance Check: No problems found  See scanned report for impedance details.    Program 1 Left Brain         Right Brain       Initial Final Initial Final     Active Active Active Active   Amplitude (mA) 2.8 [2-3 by 0.05] 2.8 [2.0-3.0] 2.4 [1.5-2.8 by 0.05] 2.4 [1.5-2.8]   Pulse width (usec) 60 60 60 60   Freq (Hz) 30 130 30 130   Contacts: C+3abc- C+3abc- C+10abc- C+10abc-   Impedance 1100 / 1062 /         Labs:   Latest Reference Range & Units 12/28/21 09:34   Folate >=5.4 ng/mL 25.6   Hemoglobin A1C 0.0 - 5.6 % 4.7   Vitamin B12 193 - 986 pg/mL 298      Latest Reference Range & Units 12/28/21 09:34   Albumin Fraction 3.7 - 5.1 g/dL 4.5   Alpha 1 Fraction 0.2 - 0.4 g/dL 0.3   Alpha 2 Fraction 0.5 - 0.9 g/dL 0.6   Beta Fraction 0.6 - 1.0 g/dL 0.6   ELP Interpretation:  Essentially normal electrophoretic pattern. No obvious monoclonal proteins seen. Pathologic significance requires clinical correlation.  Yon Palacios M.D., Ph.D., Pathologist.   Gamma Fraction 0.7 - 1.6 g/dL 0.8   Monoclonal Peak <=0.0 g/dL 0.0   Total Protein Serum for ELP 6.8 - 8.8 g/dL 6.9         Assessment/Plan:  Ace Navarro is a 77 year old male who returns to clinic for follow up of PD status post bilateral STN DBS.  He continues to have hallucinations so will discontinue DA.  This is being used to treat RLS so will replace with gabapentin.  If hallucinations improve, will try to increase bedtime long acting LD to improve nighttime symptoms.  No programming changes today.    - Pramipexole wean per  AVS  - Replace with gabapentin 100mg qHS  - Then increase nighttime CD/LD CR per AVS  - RTC 3 months, 1 hr DBS remote programming video      Josee Merino MD    Movement Disorders Division  Department of Neurology       Additional time spent for separate DBS programmin min DBS analyzed without reprogramming.

## 2022-11-02 ENCOUNTER — MYC MEDICAL ADVICE (OUTPATIENT)
Dept: NEUROLOGY | Facility: CLINIC | Age: 77
End: 2022-11-02

## 2022-11-02 DIAGNOSIS — G25.81 RESTLESS LEGS SYNDROME: ICD-10-CM

## 2022-11-02 DIAGNOSIS — G31.83 LEWY BODY DEMENTIA WITH BEHAVIORAL DISTURBANCE (H): ICD-10-CM

## 2022-11-02 DIAGNOSIS — G20.A1 PARKINSON'S DISEASE (H): ICD-10-CM

## 2022-11-02 DIAGNOSIS — F02.818 LEWY BODY DEMENTIA WITH BEHAVIORAL DISTURBANCE (H): ICD-10-CM

## 2022-11-02 RX ORDER — GABAPENTIN 100 MG/1
100 CAPSULE ORAL AT BEDTIME
Qty: 90 CAPSULE | Refills: 3 | Status: SHIPPED | OUTPATIENT
Start: 2022-11-02 | End: 2023-01-01

## 2022-11-02 RX ORDER — PRAMIPEXOLE DIHYDROCHLORIDE 0.12 MG/1
0.12 TABLET ORAL AT BEDTIME
Qty: 7 TABLET | Refills: 0 | Status: SHIPPED | OUTPATIENT
Start: 2022-11-02 | End: 2023-01-01

## 2022-11-03 NOTE — TELEPHONE ENCOUNTER
Please let pt know her mammogram is normal.    Received refill request for Diazepam.  This medication was prescribed for patient's MRI back in April, 2018.  Routing to research nurse to see if patient is going to undergo another MRI for research.

## 2022-11-18 NOTE — PROGRESS NOTES
Movement Disorders Botulinum Toxin Clinic Note    History of Present Illness:  Ace Navarro is a 77 year old male who presents to clinic for botulinum toxin injections for treatment of sialorrhea secondary to Parkinson Disease.      Response to Last Injection: 2/2022    Onset of effect after injections:  Uncertain     Benefit from last injections:  Reported no real benefit.     Wearing off: uncertain     Side effects: He reports none.     Additional interval history: none    Patient denies new medical diagnoses, illnesses, hospitalizations, emergency room visits, and injuries since the previous injection with botulinum neurotoxin.    ROS:  Other than that mentioned above, the remainder of 12 systems reviewed were negative.    Current Outpatient Medications   Medication Sig Dispense Refill     carbidopa-levodopa (SINEMET CR)  MG CR tablet Take 1 tablet by mouth At Bedtime 90 tablet 3     carbidopa-levodopa (SINEMET)  MG tablet Take 2 tablets by mouth 3 times daily 180 tablet 11     donepezil (ARICEPT) 10 MG tablet Take 1 tablet (10 mg) by mouth At Bedtime 90 tablet 3     folic acid 800 MCG tablet Take 800 mcg by mouth daily       gabapentin (NEURONTIN) 100 MG capsule Take 1 capsule (100 mg) by mouth At Bedtime 90 capsule 3     multivitamin w/minerals (THERA-VIT-M) tablet Take 1 tablet by mouth every morning        pramipexole (MIRAPEX) 0.125 MG tablet Take 1 tablet (0.125 mg) by mouth At Bedtime 7 tablet 0     pramipexole (MIRAPEX) 0.5 MG tablet Take 1 tablet (0.5 mg) by mouth At Bedtime 90 tablet 3     sulfaSALAzine (AZULFIDINE) 500 MG tablet TAKE 4 TABLETS BY MOUTH ONCE DAILY.  3       Allergies: He is allergic to shellfish-derived products and aspirin.    Past Medical History:   Diagnosis Date     Parkinson's disease (H) 09/2010     RLS (restless legs syndrome)      Ulcerative colitis (H)      Vitiligo        Past Surgical History:   Procedure Laterality Date     ------------OTHER-------------       nasal     HERNIA REPAIR       IMPLANT DEEP BRAIN STIMULATION GENERATOR / BATTERY Left 5/10/2019    Procedure: Deep Brain Stimulator Placement, Phase II, Placement Of Deep Brain Stimulator Generator/Battery Over The Left Chest Wall;  Surgeon: Humberto Briones MD;  Location: [facility] OR     OPTICAL TRACKING SYSTEM INSERTION DEEP BRAIN STIMULATION Left 10/23/2018    Procedure: Left Deep Brain Stimulator Placement, Phase One, Placement of Left Side Deep Brain Stimulator Electrode Target Left Subthalamic Nucleus with Microelectrode Recording;  Surgeon: Humberto Briones MD;  Location: [facility] OR     OPTICAL TRACKING SYSTEM INSERTION DEEP BRAIN STIMULATION BILATERAL Bilateral 4/26/2019    Procedure: O-Arm/Stealth Assisted Bilateral Deep Brain Stimulator Placement, Phase I, Placement Of Bilateral Deep Brain Stimulator Electrodes, Target Bilateral Subthalamic Nucleus With Microelectrode Recording;  Surgeon: Humberto Briones MD;  Location: [facility] OR     TONSILLECTOMY & ADENOIDECTOMY         Social History     Socioeconomic History     Marital status:      Spouse name: Not on file     Number of children: 4     Years of education: Not on file     Highest education level: Not on file   Occupational History     Not on file   Tobacco Use     Smoking status: Never     Smokeless tobacco: Never   Substance and Sexual Activity     Alcohol use: No     Drug use: No     Sexual activity: Never   Other Topics Concern     Parent/sibling w/ CABG, MI or angioplasty before 65F 55M? Not Asked   Social History Narrative    Since he was diagnosed with Parkinson's disease, Mr. Navarro has not had any alcohol. Before that, he would have alcohol one night a week while he was in a relationship for about seven years. He was never in any chemical dependency treatment programs and was never hospitalized for any alcohol related problems. He endorsed marijuana use when he was in college, and has tried it about once every three years. He has  not noticed any benefit for his Parkinson's disease. He was a social smoker in his early 20s, and smoked about a pack of cigarettes a week. He last smoked 40 years ago. He denied gambling or any other compulsive behaviors.         Mr. Navarro completed a Bachelor's degree at [employer]. 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     Social Determinants of Health     Financial Resource Strain: Not on file   Food Insecurity: Not on file   Transportation Needs: Not on file   Physical Activity: Not on file   Stress: Not on file   Social Connections: Not on file   Intimate Partner Violence: Not on file   Housing Stability: Not on file       Family History   Problem Relation Age of Onset     Breast Cancer Mother      Myocardial Infarction Father      Coronary Artery Disease Father      No Known Problems Sister      No Known Problems Brother      No Known Problems Sister        Physical Examination:  Vital Signs:   vitals were not taken for this visit.   Drooling     BOTULINUM NEUROTOXIN INJECTION PROCEDURES:    VERIFICATION OF PATIENT IDENTIFICATION AND PROCEDURE     Initials   Patient Name kd   Patient  kd   Procedure Verified by: shelley     Prior to the start of the procedure and with procedural staff participation, I verbally confirmed the patient s identity using two indicators, relevant allergies, that the procedure was appropriate and matched the consent or emergent situation, and that the correct equipment/implants were available. Immediately prior to starting the procedure I conducted the Time Out with the procedural staff and re-confirmed the patient s name, procedure, and site/side. (The Joint Commission universal protocol was followed.)  Yes    Sedation (Moderate or Deep): None      Above assessments performed by:      Sherlyn Mccann DO      INDICATION/S FOR PROCEDURE/S:  Ace Navarro is a 77 year old year old patient with  sialorrhea secondary to Parkinson disease with associated symptoms of trouble swallowing, coughing and decreased speech intelligibility.      His baseline symptoms have been recalcitrant to oral medications and conservative therapy.  He is here today for an injection of Myobloc.       GOAL OF PROCEDURE:  The goal of this procedure is to decrease excessive drooling  associated with sialorrhea.    TOTAL DOSE ADMINISTERED:  Dose Administered:  3000 units Myobloc    Diluent Used:  Preservative Free Normal Saline  Total Volume of Diluent Used:  1 mL/2500 units  Lot # [id number] with Expiration Date:  2/2024  NDC #: Myobloc 5000u ([address]-10)    CONSENT:  The risks, benefits, and treatment options were discussed with Ace Navarro and she agreed to proceed.      Written consent was obtained by kd.     EQUIPMENT USED:  Needle-30 gauge  Ultrasound    SKIN PREPARATION:  Skin preparation was performed using an alcohol wipe.    GUIDANCE DESCRIPTION:  Ultrasound guidance was necessary throughout the procedure to accurately identify all areas of glandular tissue muscles while avoiding injection of underlying muscles , neighboring nerves and nearby vascular structures.     AREA/MUSCLE INJECTED:    Muscles Injected Units Injected Number of Injections   Right parotid  1500 (1250) 2   Left parotid 1500 (1250) 2                   Total Units Injected: 3000     Unavoidable Waste: 2000     Total Units Billed 5000       ( ) = previous dose    The patient tolerated the injections without difficulty.      Assessment:    Ace Navarro is a 77 year old male with sialorrhea.  Today we did repeat botulinum toxin injections.    Plan  Message via [employer] or take a video of movements in 3 weeks for an update  Avoid heat and cold pack and massaging the areas injected for 24 hours post injections  Follow-up in 3 months' time to consider repeat injections  Patient will monitor for response. Discussed possible side effects - dry mouth from  excessive treatment of blockade of salivary gland secretion, dysphagia, dysarthria and neck weakness. If you have dry mouth, use Biotene mouthwash.       Sherlyn Mccann DO   of Neurology   [facility]

## 2022-11-19 ENCOUNTER — HEALTH MAINTENANCE LETTER (OUTPATIENT)
Age: 77
End: 2022-11-19

## 2022-11-23 ENCOUNTER — OFFICE VISIT (OUTPATIENT)
Dept: NEUROLOGY | Facility: CLINIC | Age: 77
End: 2022-11-23
Payer: MEDICARE

## 2022-11-23 VITALS — SYSTOLIC BLOOD PRESSURE: 111 MMHG | HEART RATE: 58 BPM | DIASTOLIC BLOOD PRESSURE: 65 MMHG

## 2022-11-23 DIAGNOSIS — K11.7 DISTURBANCE OF SALIVARY SECRETION: Primary | ICD-10-CM

## 2022-11-23 DIAGNOSIS — G20.A1 PARKINSON DISEASE (H): ICD-10-CM

## 2022-11-23 PROCEDURE — 64611 CHEMODENERV SALIV GLANDS: CPT | Performed by: PSYCHIATRY & NEUROLOGY

## 2022-11-23 NOTE — LETTER
11/23/2022         RE: Ace Navarro  [address] [facility] [place] [address]        Dear Colleague,    Thank you for referring your patient, Ace Navarro, to the [facility]. Please see a copy of my visit note below.    Movement Disorders Botulinum Toxin Clinic Note    History of Present Illness:  Ace Navarro is a 77 year old male who presents to clinic for botulinum toxin injections for treatment of sialorrhea secondary to Parkinson Disease.      Response to Last Injection: 2/2022    Onset of effect after injections:  Uncertain     Benefit from last injections:  Reported no real benefit.     Wearing off: uncertain     Side effects: He reports none.     Additional interval history: none    Patient denies new medical diagnoses, illnesses, hospitalizations, emergency room visits, and injuries since the previous injection with botulinum neurotoxin.    ROS:  Other than that mentioned above, the remainder of 12 systems reviewed were negative.    Current Outpatient Medications   Medication Sig Dispense Refill     carbidopa-levodopa (SINEMET CR)  MG CR tablet Take 1 tablet by mouth At Bedtime 90 tablet 3     carbidopa-levodopa (SINEMET)  MG tablet Take 2 tablets by mouth 3 times daily 180 tablet 11     donepezil (ARICEPT) 10 MG tablet Take 1 tablet (10 mg) by mouth At Bedtime 90 tablet 3     folic acid 800 MCG tablet Take 800 mcg by mouth daily       gabapentin (NEURONTIN) 100 MG capsule Take 1 capsule (100 mg) by mouth At Bedtime 90 capsule 3     multivitamin w/minerals (THERA-VIT-M) tablet Take 1 tablet by mouth every morning        pramipexole (MIRAPEX) 0.125 MG tablet Take 1 tablet (0.125 mg) by mouth At Bedtime 7 tablet 0     pramipexole (MIRAPEX) 0.5 MG tablet Take 1 tablet (0.5 mg) by mouth At Bedtime 90 tablet 3     sulfaSALAzine (AZULFIDINE) 500 MG tablet TAKE 4 TABLETS BY MOUTH ONCE DAILY.  3       Allergies: He is allergic to shellfish-derived  products and aspirin.    Past Medical History:   Diagnosis Date     Parkinson's disease (H) 09/2010     RLS (restless legs syndrome)      Ulcerative colitis (H)      Vitiligo        Past Surgical History:   Procedure Laterality Date     ------------OTHER-------------      nasal     HERNIA REPAIR       IMPLANT DEEP BRAIN STIMULATION GENERATOR / BATTERY Left 5/10/2019    Procedure: Deep Brain Stimulator Placement, Phase II, Placement Of Deep Brain Stimulator Generator/Battery Over The Left Chest Wall;  Surgeon: Humberto Briones MD;  Location: [facility] OR     OPTICAL TRACKING SYSTEM INSERTION DEEP BRAIN STIMULATION Left 10/23/2018    Procedure: Left Deep Brain Stimulator Placement, Phase One, Placement of Left Side Deep Brain Stimulator Electrode Target Left Subthalamic Nucleus with Microelectrode Recording;  Surgeon: Humberto Briones MD;  Location: [facility] OR     OPTICAL TRACKING SYSTEM INSERTION DEEP BRAIN STIMULATION BILATERAL Bilateral 4/26/2019    Procedure: O-Arm/Stealth Assisted Bilateral Deep Brain Stimulator Placement, Phase I, Placement Of Bilateral Deep Brain Stimulator Electrodes, Target Bilateral Subthalamic Nucleus With Microelectrode Recording;  Surgeon: Humberto Briones MD;  Location: [facility] OR     TONSILLECTOMY & ADENOIDECTOMY         Social History     Socioeconomic History     Marital status:      Spouse name: Not on file     Number of children: 4     Years of education: Not on file     Highest education level: Not on file   Occupational History     Not on file   Tobacco Use     Smoking status: Never     Smokeless tobacco: Never   Substance and Sexual Activity     Alcohol use: No     Drug use: No     Sexual activity: Never   Other Topics Concern     Parent/sibling w/ CABG, MI or angioplasty before 65F 55M? Not Asked   Social History Narrative    Since he was diagnosed with Parkinson's disease, Mr. Navarro has not had any alcohol. Before that, he would have alcohol one night a week while  he was in a relationship for about seven years. He was never in any chemical dependency treatment programs and was never hospitalized for any alcohol related problems. He endorsed marijuana use when he was in college, and has tried it about once every three years. He has not noticed any benefit for his Parkinson's disease. He was a social smoker in his early 20s, and smoked about a pack of cigarettes a week. He last smoked 40 years ago. He denied gambling or any other compulsive behaviors.         Mr. Navarro completed a Bachelor's degree at [employer]. 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     Social Determinants of Health     Financial Resource Strain: Not on file   Food Insecurity: Not on file   Transportation Needs: Not on file   Physical Activity: Not on file   Stress: Not on file   Social Connections: Not on file   Intimate Partner Violence: Not on file   Housing Stability: Not on file       Family History   Problem Relation Age of Onset     Breast Cancer Mother      Myocardial Infarction Father      Coronary Artery Disease Father      No Known Problems Sister      No Known Problems Brother      No Known Problems Sister        Physical Examination:  Vital Signs:   vitals were not taken for this visit.   Drooling     BOTULINUM NEUROTOXIN INJECTION PROCEDURES:    VERIFICATION OF PATIENT IDENTIFICATION AND PROCEDURE     Initials   Patient Name kd   Patient  kd   Procedure Verified by: shelley     Prior to the start of the procedure and with procedural staff participation, I verbally confirmed the patient s identity using two indicators, relevant allergies, that the procedure was appropriate and matched the consent or emergent situation, and that the correct equipment/implants were available. Immediately prior to starting the procedure I conducted the Time Out with the procedural staff and re-confirmed the patient s name, procedure, and  site/side. (The Joint Commission universal protocol was followed.)  Yes    Sedation (Moderate or Deep): None      Above assessments performed by:      Sherlyn Mccann DO      INDICATION/S FOR PROCEDURE/S:  Ace Navarro is a 77 year old year old patient with sialorrhea secondary to Parkinson disease with associated symptoms of trouble swallowing, coughing and decreased speech intelligibility.      His baseline symptoms have been recalcitrant to oral medications and conservative therapy.  He is here today for an injection of Myobloc.       GOAL OF PROCEDURE:  The goal of this procedure is to decrease excessive drooling  associated with sialorrhea.    TOTAL DOSE ADMINISTERED:  Dose Administered:  3000 units Myobloc    Diluent Used:  Preservative Free Normal Saline  Total Volume of Diluent Used:  1 mL/2500 units  Lot # [id number] with Expiration Date:  2/2024  NDC #: Myobloc 5000u ([address]-10)    CONSENT:  The risks, benefits, and treatment options were discussed with Ace Navarro and she agreed to proceed.      Written consent was obtained by kd.     EQUIPMENT USED:  Needle-30 gauge  Ultrasound    SKIN PREPARATION:  Skin preparation was performed using an alcohol wipe.    GUIDANCE DESCRIPTION:  Ultrasound guidance was necessary throughout the procedure to accurately identify all areas of glandular tissue muscles while avoiding injection of underlying muscles , neighboring nerves and nearby vascular structures.     AREA/MUSCLE INJECTED:    Muscles Injected Units Injected Number of Injections   Right parotid  1500 (1250) 2   Left parotid 1500 (1250) 2                   Total Units Injected: 3000     Unavoidable Waste: 2000     Total Units Billed 5000       ( ) = previous dose    The patient tolerated the injections without difficulty.      Assessment:    Ace Navarro is a 77 year old male with sialorrhea.  Today we did repeat botulinum toxin injections.    Plan  Message via Songfor or take a  video of movements in 3 weeks for an update  Avoid heat and cold pack and massaging the areas injected for 24 hours post injections  Follow-up in 3 months' time to consider repeat injections  Patient will monitor for response. Discussed possible side effects - dry mouth from excessive treatment of blockade of salivary gland secretion, dysphagia, dysarthria and neck weakness. If you have dry mouth, use Biotene mouthwash.       Sherlyn Mccann DO   of Neurology   [facility]              Again, thank you for allowing me to participate in the care of your patient.        Sincerely,        Sherlyn Mccann DO

## 2022-11-23 NOTE — PATIENT INSTRUCTIONS
Plan  Message via [employer] or take a video of movements in 3 weeks for an update  Avoid heat and cold pack and massaging the areas injected for 24 hours post injections  Follow-up in 3 months' time to consider repeat injections  Patient will monitor for response. Discussed possible side effects - dry mouth from excessive treatment of blockade of salivary gland secretion, dysphagia, dysarthria and neck weakness. If you have dry mouth, use Biotene mouthwash.

## 2022-11-23 NOTE — NURSING NOTE
Chief Complaint   Patient presents with     Botox     Sialorrhea       BOOGIE Peters on 11/23/2022 at 12:34 PM

## 2022-11-25 ENCOUNTER — MYC MEDICAL ADVICE (OUTPATIENT)
Dept: NEUROLOGY | Facility: CLINIC | Age: 77
End: 2022-11-25

## 2022-11-28 NOTE — TELEPHONE ENCOUNTER
Recommend that he remain off pramipexole but also stop gabapentin.  Will monitor RLS symptoms and hallucinations.

## 2022-12-27 DIAGNOSIS — K11.7 SIALORRHEA: ICD-10-CM

## 2022-12-27 DIAGNOSIS — K11.7 DISTURBANCE OF SALIVARY SECRETION: Primary | ICD-10-CM

## 2023-01-01 ENCOUNTER — HEALTH MAINTENANCE LETTER (OUTPATIENT)
Age: 78
End: 2023-01-01

## 2023-01-01 ENCOUNTER — TELEPHONE (OUTPATIENT)
Dept: NEUROLOGY | Facility: CLINIC | Age: 78
End: 2023-01-01

## 2023-01-01 ENCOUNTER — PATIENT OUTREACH (OUTPATIENT)
Dept: CARE COORDINATION | Facility: CLINIC | Age: 78
End: 2023-01-01
Payer: MEDICARE

## 2023-01-01 ENCOUNTER — OFFICE VISIT (OUTPATIENT)
Dept: NEUROLOGY | Facility: CLINIC | Age: 78
End: 2023-01-01
Payer: MEDICARE

## 2023-01-01 ENCOUNTER — VIRTUAL VISIT (OUTPATIENT)
Dept: NEUROLOGY | Facility: CLINIC | Age: 78
End: 2023-01-01
Payer: MEDICARE

## 2023-01-01 ENCOUNTER — PATIENT OUTREACH (OUTPATIENT)
Dept: NEUROLOGY | Facility: CLINIC | Age: 78
End: 2023-01-01

## 2023-01-01 VITALS — DIASTOLIC BLOOD PRESSURE: 77 MMHG | OXYGEN SATURATION: 99 % | HEART RATE: 47 BPM | SYSTOLIC BLOOD PRESSURE: 120 MMHG

## 2023-01-01 DIAGNOSIS — G20.A1 PARKINSON'S DISEASE (H): ICD-10-CM

## 2023-01-01 DIAGNOSIS — Z96.89 S/P DEEP BRAIN STIMULATOR PLACEMENT: ICD-10-CM

## 2023-01-01 DIAGNOSIS — G25.81 RESTLESS LEGS SYNDROME (RLS): ICD-10-CM

## 2023-01-01 DIAGNOSIS — G31.83 LEWY BODY DEMENTIA WITH BEHAVIORAL DISTURBANCE (H): ICD-10-CM

## 2023-01-01 DIAGNOSIS — F02.82: ICD-10-CM

## 2023-01-01 DIAGNOSIS — K11.7 SIALORRHEA: Primary | ICD-10-CM

## 2023-01-01 DIAGNOSIS — G20.A1: ICD-10-CM

## 2023-01-01 DIAGNOSIS — F02.818 LEWY BODY DEMENTIA WITH BEHAVIORAL DISTURBANCE (H): ICD-10-CM

## 2023-01-01 DIAGNOSIS — G20.A1 PARKINSON DISEASE (H): Primary | ICD-10-CM

## 2023-01-01 PROCEDURE — 95984 ALYS BRN NPGT PRGRMG ADDL 15: CPT | Performed by: STUDENT IN AN ORGANIZED HEALTH CARE EDUCATION/TRAINING PROGRAM

## 2023-01-01 PROCEDURE — 99215 OFFICE O/P EST HI 40 MIN: CPT | Mod: VID | Performed by: STUDENT IN AN ORGANIZED HEALTH CARE EDUCATION/TRAINING PROGRAM

## 2023-01-01 PROCEDURE — 99214 OFFICE O/P EST MOD 30 MIN: CPT | Mod: 25 | Performed by: STUDENT IN AN ORGANIZED HEALTH CARE EDUCATION/TRAINING PROGRAM

## 2023-01-01 PROCEDURE — 95983 ALYS BRN NPGT PRGRMG 15 MIN: CPT | Performed by: STUDENT IN AN ORGANIZED HEALTH CARE EDUCATION/TRAINING PROGRAM

## 2023-01-01 RX ORDER — CARBIDOPA AND LEVODOPA 25; 100 MG/1; MG/1
2 TABLET ORAL 3 TIMES DAILY
Qty: 180 TABLET | Refills: 11 | Status: SHIPPED | OUTPATIENT
Start: 2023-01-01

## 2023-01-01 RX ORDER — DONEPEZIL HYDROCHLORIDE 10 MG/1
10 TABLET, FILM COATED ORAL AT BEDTIME
Qty: 90 TABLET | Refills: 3 | Status: SHIPPED | OUTPATIENT
Start: 2023-01-01

## 2023-01-01 RX ORDER — CARBIDOPA AND LEVODOPA 25; 100 MG/1; MG/1
2 TABLET ORAL 3 TIMES DAILY
Qty: 180 TABLET | Refills: 11 | OUTPATIENT
Start: 2023-01-01

## 2023-01-01 RX ORDER — CARBIDOPA AND LEVODOPA 50; 200 MG/1; MG/1
1 TABLET, EXTENDED RELEASE ORAL AT BEDTIME
Qty: 90 TABLET | Refills: 3 | Status: CANCELLED | OUTPATIENT
Start: 2023-01-01

## 2023-01-01 RX ORDER — IPRATROPIUM BROMIDE 21 UG/1
2 SPRAY, METERED NASAL 2 TIMES DAILY PRN
Qty: 30 ML | Refills: 11 | Status: SHIPPED | OUTPATIENT
Start: 2023-01-01 | End: 2023-01-01

## 2023-01-01 RX ORDER — CARBIDOPA AND LEVODOPA 50; 200 MG/1; MG/1
1 TABLET, EXTENDED RELEASE ORAL AT BEDTIME
Qty: 90 TABLET | Refills: 3 | Status: SHIPPED | OUTPATIENT
Start: 2023-01-01

## 2023-01-01 RX ORDER — IPRATROPIUM BROMIDE 21 UG/1
1-2 SPRAY, METERED NASAL 2 TIMES DAILY PRN
Qty: 30 ML | Refills: 11 | Status: SHIPPED | OUTPATIENT
Start: 2023-01-01

## 2023-01-01 ASSESSMENT — UNIFIED PARKINSONS DISEASE RATING SCALE (UPDRS)
CONSTANCY_TREMOR_ATREST: (4) SEVERE: TREMOR AT REST IS PRESENT > 75% OF THE ENTIRE EXAMINATION PERIOD.
FREEZING_GAIT: (0) NORMAL: NO FREEZING.
SPEECH: (2) MILD: LOSS OF MODULATION, DICTION OR VOLUME, WITH A FEW WORDS UNCLEAR, BUT THE OVERALL SENTENCES EASY TO FOLLOW.
AMPLITUDE_LUE: (2) MILD: > 1 CM BUT < 3 CM IN MAXIMAL AMPLITUDE.
TOTAL_SCORE_LEFT: 11
RIGIDITY_NECK: (0) NORMAL: NO RIGIDITY.
SPONTANEITY_OF_MOVEMENT: (3) MODERATE: MODERATE GLOBAL SLOWNESS AND POVERTY OF MOVEMENTS.
POSTURE: (3) MODERATE: STOOPED POSTURE, SCOLIOSIS, OR LEANING TO ONE SIDE THAT CANNOT BE CORRECTED VOLITIONALLY TO A NORMAL POSTURE BY THE PATIENT.
HANDMOVEMENTS_RIGHT: (0) NORMAL: NO PROBLEMS.
PRONATION_SUPINATION_RIGHT: (0) NORMAL: NO PROBLEMS.
FINGER_TAPPING_LEFT: (2) MILD: ANY OF THE FOLLOWING: A) 3 TO 5 INTERRUPTIONS DURING TAPPING  B) MILD SLOWING  C) THE AMPLITUDE DECREMENTS MIDWAY IN THE 10-MOVEMENT SEQUENCE.
AMPLITUDE_LLE: (2) MILD: > 1 CM BUT < 3 CM IN MAXIMAL AMPLITUDE.
DYSKINESIAS_PRESENT: NO
LEG_AGILITY_RIGHT: (0) NORMAL: NO PROBLEMS.
ARISING_CHAIR: (1) SLIGHT: ARISING IS SLOWER THAN NORMAL, OR MAY NEED MORE THAN ONE ATTEMPT, OR MAY NEED TO MOVE FORWARD IN THE CHAIR TO ARISE. NO NEED TO USE THE ARMS OF THE CHAIR.
GAIT: (1) SLIGHT: INDEPENDENT WALKING WITH MINOR GAIT IMPAIRMENT.
AMPLITUDE_RUE: (1) SLIGHT: < 1 CM IN MAXIMAL AMPLITUDE.
TOETAPPING_RIGHT: (0) NORMAL: NO PROBLEMS.
PARKINSONS_MEDS: ON
FACIAL_EXPRESSION: (4) SEVERE: MASKED FACIES WITH LIPS PARTED MOST OF THE TIME WHEN THE MOUTH IS AT REST.
FINGER_TAPPING_RIGHT: (1) SLIGHT: ANY OF THE FOLLOWING: A) THE REGULAR RHYTHM IS BROKEN WITH ONE WITH ONE OR TWO INTERRUPTIONS OR HESITATIONS OF THE MOVEMENT  B) SLIGHT SLOWING  C) THE AMPLITUDE DECREMENTS NEAR THE END OF THE 10 MOVEMENTS.
RIGIDITY_RUE: (2) MILD: RIGIDITY DETECTED WITHOUT THE ACTIVATION MANEUVER. FULL RANGE OF MOTION IS EASILY ACHIEVED.
PRONATION_SUPINATION_LEFT: (0) NORMAL: NO PROBLEMS.
AMPLITUDE_LIP_JAW: (1) SLIGHT: < 1 CM IN MAXIMAL AMPLITUDE.
LEG_AGILITY_LEFT: (1) SLIGHT: ANY OF THE FOLLOWING: A) THE REGULAR RHYTHM IS BROKEN WITH ONE WITH ONE OR TWO INTERRUPTIONS OR HESITATIONS OF THE MOVEMENT  B) SLIGHT SLOWING  C) THE AMPLITUDE DECREMENTS NEAR THE END OF THE 10 MOVEMENTS.
AMPLITUDE_RLE: (0) NORMAL: NO TREMOR.
RIGIDITY_LUE: (2) MILD: RIGIDITY DETECTED WITHOUT THE ACTIVATION MANEUVER. FULL RANGE OF MOTION IS EASILY ACHIEVED.
RIGIDITY_RLE: (0) NORMAL: NO RIGIDITY.
RIGIDITY_LLE: (0) NORMAL: NO RIGIDITY.
TOETAPPING_LEFT: (1) SLIGHT: ANY OF THE FOLLOWING: A) THE REGULAR RHYTHM IS BROKEN WITH ONE WITH ONE OR TWO INTERRUPTIONS OR HESITATIONS OF THE MOVEMENT  B) SLIGHT SLOWING  C) THE AMPLITUDE DECREMENTS NEAR THE END OF THE 10 MOVEMENTS.
TOTAL_SCORE: 5
HANDMOVEMENTS_LEFT: (0) NORMAL: NO PROBLEMS.

## 2023-01-30 NOTE — TELEPHONE ENCOUNTER
Jose just flew in last night at 1 PM. He did not realize this was a virtual programming appointment and not a regular virtual visit. He is not with his father right now and his brother is at work. They likely wont be able to find the  in time and it likely is not charged. He would like to reschedule the virtual programming visit. Once rescheduled he would like to set up a call with the nurse or abbott 30 minutes prior to the appointment to practice virtual programming.

## 2023-01-30 NOTE — TELEPHONE ENCOUNTER
Spoke to Jose and the patient was rescheduled for his virtual programming appointment on 3/1 at 12 PM as well have a nurse visit prior at 11:15 AM, to go over Pursuit Management Programming.    Sent the patient the Pursuit Management Programming Instructions via Treato.

## 2023-02-27 NOTE — PROGRESS NOTES
Video-Visit Details  Patient has given verbal consent for Video visit? Yes    Patient would like the video invitation sent by: Send to e-mail at: [email or web address]    Type of service:  Video Visit    Video Start Time: 12:56 PM    Video End Time (time video stopped): 1:29 PM     Duration: 33 min    Originating Location (pt. Location): Home    Distant Location (provider location):  On-site, [facility] NEUROLOGY [facility]     Mode of Communication:  Video Conference via [employer]  -----------------------------------------------------------------------------------------------------------------------  Department of Neurology  Movement Disorders Division   DBS Follow-up Note    Patient: Ace Navarro  MRN: [id number]   : 1945   Date of Visit: 3/1/2023    Diagnosis: Parkinson's disease  DBS Target(s): Bilateral STN  Date(s) of DBS Lead Placement: 2019    Date(s) of IPG Placement: L chest 5/10/2019  Device: Abbott      Chief Complaint:  Ace Navarro is a 77 year old male who returns to clinic for follow up of PD status post bilateral STN DBS.   He was last seen 10/26/2022 at which time pramipexole was weaned due to hallucinations, gabapentin was started for RLS, and CD/LD CR was increased at bedtime.  He is accompanied by his son Jose who helps to provide history.    Interval History:  His PD symptoms are stable.  When they tried to switch from pramipexole to gabapentin, he became lethargic.  Not having anymore lethargy off gabapentin but his RLS symptoms are non-existent.  His hallucinations haven't improved.  Occurring daily.  Sees people or figures in his periphery.  They continue to see a decline in his cognition, for example with word finding, memory for dates, an confusion about location.  Worse overnight.  Had recent event overnight where he was trying to fight off alligators.  A few hallucinations are becoming scary.    They are happy about the control of his motor  symptoms.      Parkinson Disease Motor Symptom Review:  Wearing off: after 3-4 hours, mild and intermittent return of tremor  Tremor: stable     Parkinson's Disease Non-motor Symptom Review:  Mood - some loneliness  Cognitive impairment - worsening, see above  Sleep disturbances - sleeping much better on melatonin   GI symptoms - stable constipation, BM once every 3 days on Miralax  Urinary symptoms - one recent episode of incontinence, increased frequency   Balance - maybe worse but no falls.  Autonomic dysfunction - rare orthostasis  Hallucinations - yes, see above  Swallowing - denies dysphagia      PD Medications 9am 3:00pm 9pm 9:30pm   CD/LD IR 25-100mg 2 2 2     CD/LD CR 50-200mg       1   Donepezil 10mg       1   Melatonin 5mg       1       Medications:  Current Outpatient Medications   Medication Sig Dispense Refill     carbidopa-levodopa (SINEMET CR)  MG CR tablet Take 1 tablet by mouth At Bedtime 90 tablet 3     carbidopa-levodopa (SINEMET)  MG tablet Take 2 tablets by mouth 3 times daily 180 tablet 11     donepezil (ARICEPT) 10 MG tablet Take 1 tablet (10 mg) by mouth At Bedtime 90 tablet 3     folic acid 800 MCG tablet Take 800 mcg by mouth daily       multivitamin w/minerals (THERA-VIT-M) tablet Take 1 tablet by mouth every morning        sulfaSALAzine (AZULFIDINE) 500 MG tablet TAKE 4 TABLETS BY MOUTH ONCE DAILY.  3        Review of Systems:  Other than that noted at the end of this note, the remainder of 12 systems reviewed were negative.    Allergies: is allergic to shellfish-derived products and aspirin.    Past Medical History:  Past Medical History:   Diagnosis Date     Parkinson's disease (H) 09/2010     RLS (restless legs syndrome)      Ulcerative colitis (H)      Vitiligo        Past Surgical History:  Past Surgical History:   Procedure Laterality Date     ------------OTHER-------------      nasal     HERNIA REPAIR       IMPLANT DEEP BRAIN STIMULATION GENERATOR / BATTERY Left  5/10/2019    Procedure: Deep Brain Stimulator Placement, Phase II, Placement Of Deep Brain Stimulator Generator/Battery Over The Left Chest Wall;  Surgeon: Humberto Briones MD;  Location: [facility] OR     OPTICAL TRACKING SYSTEM INSERTION DEEP BRAIN STIMULATION Left 10/23/2018    Procedure: Left Deep Brain Stimulator Placement, Phase One, Placement of Left Side Deep Brain Stimulator Electrode Target Left Subthalamic Nucleus with Microelectrode Recording;  Surgeon: Humberto Briones MD;  Location: [facility] OR     OPTICAL TRACKING SYSTEM INSERTION DEEP BRAIN STIMULATION BILATERAL Bilateral 4/26/2019    Procedure: O-Arm/Stealth Assisted Bilateral Deep Brain Stimulator Placement, Phase I, Placement Of Bilateral Deep Brain Stimulator Electrodes, Target Bilateral Subthalamic Nucleus With Microelectrode Recording;  Surgeon: Humberto Briones MD;  Location: [facility] OR     TONSILLECTOMY & ADENOIDECTOMY         Social History:  Social History     Socioeconomic History     Marital status:      Number of children: 4   Tobacco Use     Smoking status: Never     Smokeless tobacco: Never   Substance and Sexual Activity     Alcohol use: No     Drug use: No     Sexual activity: Never   Social History Narrative    Since he was diagnosed with Parkinson's disease, Mr. Navarro has not had any alcohol. Before that, he would have alcohol one night a week while he was in a relationship for about seven years. He was never in any chemical dependency treatment programs and was never hospitalized for any alcohol related problems. He endorsed marijuana use when he was in college, and has tried it about once every three years. He has not noticed any benefit for his Parkinson's disease. He was a social smoker in his early 20s, and smoked about a pack of cigarettes a week. He last smoked 40 years ago. He denied gambling or any other compulsive behaviors.         Mr. Navarro completed a Bachelor's degree at [employer]. He worked as a  contractor and construction, retiring in 2013. He has been  twice and he has four children.       Family History:  Family History   Problem Relation Age of Onset     Breast Cancer Mother      Myocardial Infarction Father      Coronary Artery Disease Father      No Known Problems Sister      No Known Problems Brother      No Known Problems Sister          Physical Exam:  The patient's  vitals were not taken for this visit.    Neurological Examination:   Mild masked facies  Mild generalized bradykinesias  Mild hypophonia  Stooped posture while seated        Assessment/Plan:  Ace Navarro is a 77 year old male with PD s/p STN DBS who returns for follow-up.  He is doing well on the current medication regimen with regard to motor symptoms.  His dementia continues to progress and the occasional hallucination is distressing.  RLS symptoms haven't returned.    - Consider quetiapine if hallucinations worsen further  - Consider increased CD/LD CR if tremor bothersome at night  - RTC 3 months, 30 minute virtual      Josee Merino MD   of Neurology  Movement Disorders Division      46 minutes spent on the date of the encounter doing chart review, history and exam, documentation and further activities as noted above.

## 2023-03-01 NOTE — LETTER
3/1/2023       RE: Ace Navarro  [address] [facility] [address]     Dear Colleague,    Thank you for referring your patient, Ace Navarro, to the [facility] NEUROLOGY CLINIC [facility]. Please see a copy of my visit note below.    -----------------------------------------------------------------------------------------------------------------------    Department of Neurology  Movement Disorders Division   DBS Follow-up Note    Patient: Ace Navarro  MRN: [id number]   : 1945   Date of Visit: 3/1/2023    Diagnosis: Parkinson's disease  DBS Target(s): Bilateral STN  Date(s) of DBS Lead Placement: 2019    Date(s) of IPG Placement: L chest 5/10/2019  Device: Abbott      Chief Complaint:  Ace Navarro is a 77 year old male who returns to clinic for follow up of PD status post bilateral STN DBS.   He was last seen 10/26/2022 at which time pramipexole was weaned due to hallucinations, gabapentin was started for RLS, and CD/LD CR was increased at bedtime.  He is accompanied by his son Jose who helps to provide history.    Interval History:  His PD symptoms are stable.  When they tried to switch from pramipexole to gabapentin, he became lethargic.  Not having anymore lethargy off gabapentin but his RLS symptoms are non-existent.  His hallucinations haven't improved.  Occurring daily.  Sees people or figures in his periphery.  They continue to see a decline in his cognition, for example with word finding, memory for dates, an confusion about location.  Worse overnight.  Had recent event overnight where he was trying to fight off alligators.  A few hallucinations are becoming scary.    They are happy about the control of his motor symptoms.      Parkinson Disease Motor Symptom Review:  Wearing off: after 3-4 hours, mild and intermittent return of tremor  Tremor: stable     Parkinson's Disease Non-motor Symptom  Review:  Mood - some loneliness  Cognitive impairment - worsening, see above  Sleep disturbances - sleeping much better on melatonin   GI symptoms - stable constipation, BM once every 3 days on Miralax  Urinary symptoms - one recent episode of incontinence, increased frequency   Balance - maybe worse but no falls.  Autonomic dysfunction - rare orthostasis  Hallucinations - yes, see above  Swallowing - denies dysphagia      PD Medications 9am 3:00pm 9pm 9:30pm   CD/LD IR 25-100mg 2 2 2     CD/LD CR 50-200mg       1   Donepezil 10mg       1   Melatonin 5mg       1       Medications:  Current Outpatient Medications   Medication Sig Dispense Refill     carbidopa-levodopa (SINEMET CR)  MG CR tablet Take 1 tablet by mouth At Bedtime 90 tablet 3     carbidopa-levodopa (SINEMET)  MG tablet Take 2 tablets by mouth 3 times daily 180 tablet 11     donepezil (ARICEPT) 10 MG tablet Take 1 tablet (10 mg) by mouth At Bedtime 90 tablet 3     folic acid 800 MCG tablet Take 800 mcg by mouth daily       multivitamin w/minerals (THERA-VIT-M) tablet Take 1 tablet by mouth every morning        sulfaSALAzine (AZULFIDINE) 500 MG tablet TAKE 4 TABLETS BY MOUTH ONCE DAILY.  3        Review of Systems:  Other than that noted at the end of this note, the remainder of 12 systems reviewed were negative.    Allergies: is allergic to shellfish-derived products and aspirin.    Past Medical History:  Past Medical History:   Diagnosis Date     Parkinson's disease (H) 09/2010     RLS (restless legs syndrome)      Ulcerative colitis (H)      Vitiligo        Past Surgical History:  Past Surgical History:   Procedure Laterality Date     ------------OTHER-------------      nasal     HERNIA REPAIR       IMPLANT DEEP BRAIN STIMULATION GENERATOR / BATTERY Left 5/10/2019    Procedure: Deep Brain Stimulator Placement, Phase II, Placement Of Deep Brain Stimulator Generator/Battery Over The Left Chest Wall;  Surgeon: Humberto Briones MD;   Location: [facility] OR     OPTICAL TRACKING SYSTEM INSERTION DEEP BRAIN STIMULATION Left 10/23/2018    Procedure: Left Deep Brain Stimulator Placement, Phase One, Placement of Left Side Deep Brain Stimulator Electrode Target Left Subthalamic Nucleus with Microelectrode Recording;  Surgeon: Humberto Briones MD;  Location: [facility] OR     OPTICAL TRACKING SYSTEM INSERTION DEEP BRAIN STIMULATION BILATERAL Bilateral 4/26/2019    Procedure: O-Arm/Stealth Assisted Bilateral Deep Brain Stimulator Placement, Phase I, Placement Of Bilateral Deep Brain Stimulator Electrodes, Target Bilateral Subthalamic Nucleus With Microelectrode Recording;  Surgeon: Humberto Briones MD;  Location: [facility] OR     TONSILLECTOMY & ADENOIDECTOMY         Social History:  Social History     Socioeconomic History     Marital status:      Number of children: 4   Tobacco Use     Smoking status: Never     Smokeless tobacco: Never   Substance and Sexual Activity     Alcohol use: No     Drug use: No     Sexual activity: Never   Social History Narrative    Since he was diagnosed with Parkinson's disease, Mr. Navarro has not had any alcohol. Before that, he would have alcohol one night a week while he was in a relationship for about seven years. He was never in any chemical dependency treatment programs and was never hospitalized for any alcohol related problems. He endorsed marijuana use when he was in college, and has tried it about once every three years. He has not noticed any benefit for his Parkinson's disease. He was a social smoker in his early 20s, and smoked about a pack of cigarettes a week. He last smoked 40 years ago. He denied gambling or any other compulsive behaviors.         Mr. Navarro completed a Bachelor's degree at [employer]. 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       Family History:  Family History   Problem Relation Age of Onset     Breast Cancer  Mother      Myocardial Infarction Father      Coronary Artery Disease Father      No Known Problems Sister      No Known Problems Brother      No Known Problems Sister          Physical Exam:  The patient's  vitals were not taken for this visit.    Neurological Examination:   Mild masked facies  Mild generalized bradykinesias  Mild hypophonia  Stooped posture while seated        Assessment/Plan:  Ace Navarro is a 77 year old male with PD s/p STN DBS who returns for follow-up.  He is doing well on the current medication regimen with regard to motor symptoms.  His dementia continues to progress and the occasional hallucination is distressing.  RLS symptoms haven't returned.    - Consider quetiapine if hallucinations worsen further  - Consider increased CD/LD CR if tremor bothersome at night  - RTC 3 months, 30 minute virtual      Josee Merino MD   of Neurology  Movement Disorders Division      46 minutes spent on the date of the encounter doing chart review, history and exam, documentation and further activities as noted above.

## 2023-03-01 NOTE — PROGRESS NOTES
Orville Montague, IT with [employer] will need about 30 minutes to set up virtual clinic for Ace. Ace will be connected with Dr. Merino, Venecia Smith and Basia Quispe. They will call me once he is set up.

## 2023-03-01 NOTE — PROGRESS NOTES
Jose was locked out of the patient  for 5 minutes, informed him the pass code is the month and day of birth. He connected to [employer] and updated the patient controller zain. When walking through virtual clinic he got a message stating he is not set up virtual clinic and to contact the provider.    Contacted Orville with [employer] ([phone]). He will attempt to get the patient set up for virtual clinic prior to his appointment.

## 2023-04-24 NOTE — PROGRESS NOTES
Social Work Follow-Up  [facility]    Data/Intervention:  Patient Name:  Ace Navarro  /Age:  1945 (77 year old)    Reason for Follow-Up: Pt's son contacted me from our previous contact    Collaborated With:    -Jose Navarro, Pt's son    Intervention/Education/Resources Provided:  Pt's son Jose called requesting info about any resources for in home visits for 6-7 hours a few days a week while Pt's son is working. They are using a day program 2 days a week and they feel with his worsening dementia, he should have more supervision when his son returns to his seasonal employment.   He is doing ok with mobility and ADLs. He's slow but is able to manage per Jose. Pt's son living with him does his medication reminders and that is still working.  Discussed hiring a  vs a HHA/CNA and Jose indicates that the  should be sufficient. Reviewed likely costs thru an agency and different ways of finding staff.  Emailed him a few resources for finding agencies that serve his area.     Assessment/Plan:  Jose reports that his Dad is pretty happy and they continue to want to keep him at home as long as possible.   He will review the agencies info provided ad contact me if further needs.     Previously provided patient/family with writer's contact information and availability.       MIKEL Patel, [facility]    [facility]  [address] [place] [address] [place] [address]    [phone]

## 2023-05-23 NOTE — CONFIDENTIAL NOTE
Rx Authorization:    Requested Medication/ Dose: Carbidopa/Levodopa     Date last refill ordered:     Quantity ordered:     # refills:     Date of last clinic visit with ordering provider:     Date of next clinic visit with ordering provider:     All pertinent protocol data (lab date/result):     Include pertinent information from patients message:

## 2023-05-24 NOTE — TELEPHONE ENCOUNTER
Medication and strength: carbidopa/levodopa CR     Is this a [facility] patient? Yes    Last re-ordered: 10/26/22 for a 90 day supply with 3 refills    Action taken: Refills are left, no new RX needed

## 2023-06-07 NOTE — LETTER
2023       RE: Ace Navarro  [address] [facility] [address]     Dear Colleague,    Thank you for referring your patient, Ace Navarro, to the [facility] NEUROLOGY CLINIC [place] [facility]. Please see a copy of my visit note below.    Department of Neurology  Movement Disorders Division   DBS Follow-up Note    Patient: Ace Navarro  MRN: [id number]   : 1945   Date of Visit: 2023    Diagnosis: Parkinson's disease  DBS Target(s): Bilateral STN  Date(s) of DBS Lead Placement: 2019    Date(s) of IPG Placement: L chest 5/10/2019  Device: Abbott      Chief Complaint:  Ace Navarro is a 77 year old male who returns to clinic for follow up of PD status post bilateral STN DBS.  He was last seen 3/1/2023 at which time no changes were made.  He is accompanied today by son and wife.    Interval History:  His family has noticed more confusion.   Often gets his things together as though he is going somewhere.   He is continuing to have hallucinations, occurring weekly.  He reports that he is wobbly while walking.  His family notices that he is overconfident on stairs.  He has difficulty standing up and may lose his balance but catch himself.  Upon standing he feels temporarily woozy.      Lives with his son and attends adult .  He wants to remain at home so his family is looking into /supervision services.  Knobs have been removed from the stove.    He's noticing more left hand and foot tremor.  He is having a lot of drooling.  He has had botox injections with slight improvement but he felt a lot of pain.  Denies dysphagia.    Cycles of diarrhea and constipation which can lead to incontinence.  They are using Miralax.      PD Medications 9am 3:00pm 9pm 9:30pm   CD/LD IR 25-100mg 2 2 2     CD/LD CR 50-200mg       1   Donepezil 10mg       1   Melatonin 5mg       1   Last taken:  2:30pm      Medications:  Current Outpatient Medications   Medication Sig Dispense Refill    carbidopa-levodopa (SINEMET CR)  MG CR tablet Take 1 tablet by mouth At Bedtime 90 tablet 3    carbidopa-levodopa (SINEMET)  MG tablet Take 2 tablets by mouth 3 times daily 180 tablet 11    donepezil (ARICEPT) 10 MG tablet Take 1 tablet (10 mg) by mouth At Bedtime 90 tablet 3    folic acid 800 MCG tablet Take 800 mcg by mouth daily      multivitamin w/minerals (THERA-VIT-M) tablet Take 1 tablet by mouth every morning       sulfaSALAzine (AZULFIDINE) 500 MG tablet TAKE 4 TABLETS BY MOUTH ONCE DAILY.  3        Allergies: is allergic to shellfish-derived products and aspirin.    Physical Exam:  The patient's  blood pressure is 120/77 and his pulse is 47 (abnormal). His oxygen saturation is 99%.       Neurological Examination:   Last medication dose: 2:30pm   10/26/2022  12:00 PM 6/7/2023  3:00 PM   UPDRS Motor Scale     Time: 12:34 PM  3:45 PM    Medication On  On    R Brain DBS: On  On    L Brain DBS: On  On    Dyskinesia (LID) No  No    Did LID interfere     Speech 3  2    Facial Expression 2  4    Rigidity Neck 0  0    Rigidity RUE 1  2    Rigidity LUE 1  2    Rigidity RLE 0  0    Rigidity LLE 0  0    Finger Taps R 2  1    Finger Taps L 2  2    Hand Mvt R 1  0    Hand Mvt L 2  0    Pron-/Supinate R 0  0    Pron-/Supinate L 0  0    Toe Tap R 0  0    Toe Tap L 0  1    Leg Agility R 0  0    Leg Agility L 0  1    Arise From Chair 0  1    Gait 1  1    Gait Freezing 0  0    Postural Stability 1     Posture 1  3    Global Spont Mvt 2  3    Postural Tremor RUE 1  1    Postural Tremor LUE 1  1    Kinetic Tremor RUE 0  0    Kinetic Tremor LUE 0  0    Rest Tremor RUE 2  1    Rest Tremor LUE 0  2    Rest Tremor RLE 0  0    Rest Tremor LLE 0  2    Rest Tremor Lip/Jaw 0  1    Rest Tremor Constancy 1  4    Total Right 7  5    Total Left 6  11    Axial Total 10     Total 24             Procedure: DBS Interrogation &  "Programming  Lead(s):    Left Right   DBS Target STN STN   DBS Lead Type Infinity 1.5 Infinity 1.5   Lead Implant Date 4/26/2019 4/26/2019      IPG(s):    1   IPG Infinity 7   IPG Implant Date 5/10/2019   Location Left chest   Battery (V) 2.80V (2.88V)     Impedance Check: No problems found.  See scanned report for impedance details.    Program 1 Left Brain         Right Brain       Initial Final Initial Final     Active Inactive Active Inactive   Amplitude (mA) 2.8 [2-3 by 0.05] 2.8 [2.0-3.0] 2.4 [1.5-2.8 by 0.05] 2.7 [1.5-2.8]   Pulse width (usec) 60 60 60 60   Freq (Hz) 30 130 30 130   Contacts: C+3abc- C+3abc- C+10abc- C+10abc-   Impedance 1087 / 1025 /       Program 2 Left Brain         Right Brain       Initial Final Initial Final     None Active None Active   Amplitude (mA)  3.00 [0-3.0]  2.7 [1.5-2.8]   Pulse width (usec)  60  60   Freq (Hz)  130  130   Contacts:  C+3a-  C+10abc-         L STN:  Contacts Amplitude PW Frequency Effect SE   C+3a- 2.0 60 130      3.0   Tremor suppression, rigidity improved, gait stable NN    4.0    \"Spacey\"    3.5    Improving    3.0    Resolved   C+3b- 22.0    NN    3.0   Tremor suppresed \"spacey\"    2.5    less    2.0   Tremor returned less   C+3c- 3.0   Tremor suppressed ?spacey    3.5    spacey    4.0    More spacey    3.5    less    3.0    resolved             Assessment/Plan:  Ace Navarro is a 77 year old male with PD s/p bilateral STN DBS who returns for follow-up.  He is bothered by sialorrhea but didn't tolerate Myobloc injections so will trial sublingual ipratropium.  Much of the visit was spent discussing his continued cognitive decline and hallucinations.  Next step in treatment would be initiating antipsychotic treatment.  He is having restlessness at 2pm which may represent wearing off so will move up next dose of CD/LD.  Finally he was bothered by tremor so RSTN current was increased and LSTN was changed to directional with good tremor suppression " bilaterally.    - Ipratropium spray sublingual  - On new Program 2  - Move 3pm CD/LD dose to 2pm  - RTC 3 months, 1 hr DBS programming      Josee Merino MD   of Neurology  Movement Disorders Division       32 minutes spent on the date of the encounter doing chart review, history and exam, documentation and further activities as noted above.     Additional time spent for separate DBS programmin min DBS analyzed with reprogramming.

## 2023-06-07 NOTE — PATIENT INSTRUCTIONS
Today I changed your DBS to help with your tremor.  You are now Program 2.  Program 1 is the old settings if we ever need to go back.    After 1 week if you're still restless at 2pm, move your afternoon dose of carbidopa/levodopa from 3pm to 2pm.      For your drooling, try 1 or 2 sprays of ipratropium under your tongue.  You can do this up to twice per day.

## 2023-06-07 NOTE — PROGRESS NOTES
Department of Neurology  Movement Disorders Division   DBS Follow-up Note    Patient: Ace Navarro  MRN: [id number]   : 1945   Date of Visit: 2023    Diagnosis: Parkinson's disease  DBS Target(s): Bilateral STN  Date(s) of DBS Lead Placement: 2019    Date(s) of IPG Placement: L chest 5/10/2019  Device: Abbott      Chief Complaint:  Ace Navarro is a 77 year old male who returns to clinic for follow up of PD status post bilateral STN DBS.  He was last seen 3/1/2023 at which time no changes were made.  He is accompanied today by son and wife.    Interval History:  His family has noticed more confusion.   Often gets his things together as though he is going somewhere.   He is continuing to have hallucinations, occurring weekly.  He reports that he is wobbly while walking.  His family notices that he is overconfident on stairs.  He has difficulty standing up and may lose his balance but catch himself.  Upon standing he feels temporarily woozy.      Lives with his son and attends adult .  He wants to remain at home so his family is looking into /supervision services.  Knobs have been removed from the stove.    He's noticing more left hand and foot tremor.  He is having a lot of drooling.  He has had botox injections with slight improvement but he felt a lot of pain.  Denies dysphagia.    Cycles of diarrhea and constipation which can lead to incontinence.  They are using Miralax.      PD Medications 9am 3:00pm 9pm 9:30pm   CD/LD IR 25-100mg 2 2 2     CD/LD CR 50-200mg       1   Donepezil 10mg       1   Melatonin 5mg       1   Last taken: 2:30pm      Medications:  Current Outpatient Medications   Medication Sig Dispense Refill     carbidopa-levodopa (SINEMET CR)  MG CR tablet Take 1 tablet by mouth At Bedtime 90 tablet 3     carbidopa-levodopa (SINEMET)  MG tablet Take 2 tablets by mouth 3 times daily 180 tablet 11     donepezil (ARICEPT) 10 MG tablet Take 1 tablet  (10 mg) by mouth At Bedtime 90 tablet 3     folic acid 800 MCG tablet Take 800 mcg by mouth daily       multivitamin w/minerals (THERA-VIT-M) tablet Take 1 tablet by mouth every morning        sulfaSALAzine (AZULFIDINE) 500 MG tablet TAKE 4 TABLETS BY MOUTH ONCE DAILY.  3        Allergies: is allergic to shellfish-derived products and aspirin.    Physical Exam:  The patient's  blood pressure is 120/77 and his pulse is 47 (abnormal). His oxygen saturation is 99%.       Neurological Examination:   Last medication dose: 2:30pm   10/26/2022  12:00 PM 6/7/2023  3:00 PM   UPDRS Motor Scale     Time: 12:34 PM  3:45 PM    Medication On  On    R Brain DBS: On  On    L Brain DBS: On  On    Dyskinesia (LID) No  No    Did LID interfere     Speech 3  2    Facial Expression 2  4    Rigidity Neck 0  0    Rigidity RUE 1  2    Rigidity LUE 1  2    Rigidity RLE 0  0    Rigidity LLE 0  0    Finger Taps R 2  1    Finger Taps L 2  2    Hand Mvt R 1  0    Hand Mvt L 2  0    Pron-/Supinate R 0  0    Pron-/Supinate L 0  0    Toe Tap R 0  0    Toe Tap L 0  1    Leg Agility R 0  0    Leg Agility L 0  1    Arise From Chair 0  1    Gait 1  1    Gait Freezing 0  0    Postural Stability 1     Posture 1  3    Global Spont Mvt 2  3    Postural Tremor RUE 1  1    Postural Tremor LUE 1  1    Kinetic Tremor RUE 0  0    Kinetic Tremor LUE 0  0    Rest Tremor RUE 2  1    Rest Tremor LUE 0  2    Rest Tremor RLE 0  0    Rest Tremor LLE 0  2    Rest Tremor Lip/Jaw 0  1    Rest Tremor Constancy 1  4    Total Right 7  5    Total Left 6  11    Axial Total 10     Total 24             Procedure: DBS Interrogation & Programming  Lead(s):    Left Right   DBS Target STN STN   DBS Lead Type Infinity 1.5 Infinity 1.5   Lead Implant Date 4/26/2019 4/26/2019      IPG(s):    1   IPG Infinity 7   IPG Implant Date 5/10/2019   Location Left chest   Battery (V) 2.80V (2.88V)     Impedance Check: No problems found.  See scanned report for impedance details.    Program 1  "Left Brain         Right Brain       Initial Final Initial Final     Active Inactive Active Inactive   Amplitude (mA) 2.8 [2-3 by 0.05] 2.8 [2.0-3.0] 2.4 [1.5-2.8 by 0.05] 2.7 [1.5-2.8]   Pulse width (usec) 60 60 60 60   Freq (Hz) 30 130 30 130   Contacts: C+3abc- C+3abc- C+10abc- C+10abc-   Impedance 1087 / 1025 /       Program 2 Left Brain         Right Brain       Initial Final Initial Final     None Active None Active   Amplitude (mA)  3.00 [0-3.0]  2.7 [1.5-2.8]   Pulse width (usec)  60  60   Freq (Hz)  130  130   Contacts:  C+3a-  C+10abc-         L STN:  Contacts Amplitude PW Frequency Effect SE   C+3a- 2.0 60 130      3.0   Tremor suppression, rigidity improved, gait stable NN    4.0    \"Spacey\"    3.5    Improving    3.0    Resolved   C+3b- 22.0    NN    3.0   Tremor suppresed \"spacey\"    2.5    less    2.0   Tremor returned less   C+3c- 3.0   Tremor suppressed ?spacey    3.5    spacey    4.0    More spacey    3.5    less    3.0    resolved             Assessment/Plan:  Ace Navarro is a 77 year old male with PD s/p bilateral STN DBS who returns for follow-up.  He is bothered by sialorrhea but didn't tolerate Myobloc injections so will trial sublingual ipratropium.  Much of the visit was spent discussing his continued cognitive decline and hallucinations.  Next step in treatment would be initiating antipsychotic treatment.  He is having restlessness at 2pm which may represent wearing off so will move up next dose of CD/LD.  Finally he was bothered by tremor so RSTN current was increased and LSTN was changed to directional with good tremor suppression bilaterally.    - Ipratropium spray sublingual  - On new Program 2  - Move 3pm CD/LD dose to 2pm  - RTC 3 months, 1 hr DBS programming      Josee Merino MD   of Neurology  Movement Disorders Division       32 minutes spent on the date of the encounter doing chart review, history and exam, documentation and further activities " as noted above.     Additional time spent for separate DBS programmin min DBS analyzed with reprogramming.

## 2023-06-09 NOTE — TELEPHONE ENCOUNTER
[facility] Call Center    Phone Message    May a detailed message be left on voicemail: yes     Reason for Call: Medication Refill Request    Has the patient contacted the pharmacy for the refill? Yes   Name of medication being requested: carbidopa-levodopa (SINEMET CR)  MG CR tablet   Provider who prescribed the medication:Josee Merino  Pharmacy: [facility]/PHARMACY #[id number] - [place], [place] - [address]  Date medication is needed: ASAP    Patients son Jose called and stated that [facility] pharmacy will not refill the medication listed above as they are stating the order was only good for one year. [facility] is requesting a new order be sent over in order to refill this prescription.   Jose stated that the patient only has 3 pill left. Please send over a new order to [facility] as soon as possible.  Please call Jose and advise at  [phone].     Action Taken: Message routed to:  [facility] ([facility] Neurology     Travel Screening: Not Applicable

## 2023-06-12 NOTE — CONFIDENTIAL NOTE
Rx Authorization:    Requested Medication/ Dose: Carbidopa-Levodopa  MG     Date last refill ordered:     Quantity ordered:     # refills:     Date of last clinic visit with ordering provider:     Date of next clinic visit with ordering provider:     All pertinent protocol data (lab date/result):     Include pertinent information from patients message:

## 2023-06-12 NOTE — TELEPHONE ENCOUNTER
[facility] Call Center     Phone Message     May a detailed message be left on voicemail: yes      Reason for Call: Medication Refill Request    Has the patient contacted the pharmacy for the refill? Yes   Name of medication being requested: carbidopa-levodopa (SINEMET CR)  MG CR tablet   Provider who prescribed the medication:Josee Merino  Pharmacy: [facility]/PHARMACY #[id number] - [place], [place] - [address]  Date medication is needed: ASAP     Patients son Jose called and stated that [facility] pharmacy will not refill the medication listed above as they are stating the order was only good for one year. [facility] is requesting a new order be sent over in order to refill this prescription.   Jose stated that the patient is now out of medication. Jose did call on Friday but hasnt had a call back. Please call and with an update at  [phone].      Action Taken: Message routed to:  [facility] ([facility] Neurology      Travel Screening: Not Applicable

## 2023-07-24 NOTE — TELEPHONE ENCOUNTER
Called [facility], they confirmed they did not received the carbidopa/levodopa CR  mg tablets on 12/28/2021. The last RX sent was May, 2021. Gave them authorization to fill the original prescription sent on 12/28/2021.    Left a message for Jose letting him know the RX was called into [facility].   (4) excellent neck ,lower spine/yes(specify)

## 2023-11-28 NOTE — CONFIDENTIAL NOTE
RX Authorization    Medication:  donepezil HCL 10 mg tabs    Date last refill ordered:    Quantity ordered:    # refills:    Date of last clinic visit with ordering provider:    Date of next clinic visit with ordering provider:    All pertinent protocol data (lab date/result):    Include pertinent information from patients message:

## 2024-01-01 ENCOUNTER — PATIENT OUTREACH (OUTPATIENT)
Dept: CARE COORDINATION | Facility: CLINIC | Age: 79
End: 2024-01-01
Payer: MEDICARE

## 2024-01-01 DIAGNOSIS — G20.A1 PARKINSON DISEASE (H): Primary | ICD-10-CM

## 2024-01-02 NOTE — PROGRESS NOTES
Social Work Follow-Up  [facility]    Data/Intervention:  Patient Name:  Ace Navarro  /Age:  1945 (78 year old)    Reason for Follow-Up:  email request for help from son Jose    Collaborated With:    -Jose Navarro, Pt's son    Intervention/Education/Resources Provided:  Previous contact with Pt's son Jose for care planning for Ace.   Pt has been doing well attending the [employer] program in [place] 5 days/week. He has a  2 evenings a week when his son Alessandra needs to be away. 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 They have family meetings (3 brothers and their Mother, Pt's x wife) bi monthly to discuss how things are going with Uriel. They have had significant disagreements about when Ace should move into a memory care unit. They found the one they want ([facility]'s [facility] in [place]) but Alessandra who lives with Jayesh and his x wife don't want him to move yet while the other 2 brothers want him to move so he can engage in the 24 hour care and programming offered at [facility].     Jose reports his LBD is advancing and he is concerned that the window of being able to leave him at home for short periods to do errands is closing and he wants feedback about any rules, etc re leaving someone at home or locking them into the house. A few days ago he did something he's not done before while Alessandra was home. He went out the back door into the yard (not uncommon that he will go out and  sticks, etc and come back in). He went out and instead of coming back in, he climbed over the fence, went around thru the neighbors yard to the front door of the house to get back in. That was alarming to Jose and provoked his outreach to me. He is wondering about how to make the decision about moving vs remaining at home.     Assessment/Plan:  Discussed some suggestions to help them understand his changing needs. First I suggest getting a CPT assessment by OT to get an  objective assessment of current functioning/needs. Discussed that it may result in a recommendation of having 24 hour supervision. Another is considering the pros and cons of moving- I.e. leaving what's familiar, getting settled in an unfamiliar place before he is further confused, the loss of the family home, impact on Alessandra who has been living with Jayesh in exchange for providing care, etc... Discussed that the resistance may be related to Alessandra and specifically addressing where he will live that is affordable could be impactful.   Also discussed that is sounds like he should have someone in the house all the time and leaving him alone for a short time only if something urgent arose would be an acceptable risk but there are no hard and fast rules. Locking him in the house poses a risk of him needing to get out if there is a fire. They have taken many steps to assure his safety. The microwave and stove is disabled so he can't use them. They have a  there when Alessandra is gone in the evenings for hockey games. They have an alarm if the door is opened.     Jose plans to review the financial info from the estate planning to also help determine how the ownership of the house is impacted with a move to memory care.     I will ask Dr Merino about the CPT OT testing and Jose plans to address some of the above suggestions and will contact me again as needed.     Previously provided patient/family with writer's contact information and availability.       Hedy Swann MSW, [facility]    [facility] [address], [address] [place] [id number]    [phone]

## 2024-01-05 DIAGNOSIS — G20.A1: Primary | ICD-10-CM

## 2024-01-05 DIAGNOSIS — F02.82: Primary | ICD-10-CM

## (undated) DEVICE — SU VICRYL 3-0 SH 8X18" UND J864D

## (undated) DEVICE — PREP SKIN SCRUB TRAY 4461A

## (undated) DEVICE — GLOVE PROTEXIS BLUE W/NEU-THERA 8.0  2D73EB80

## (undated) DEVICE — SU MONOCRYL 4-0 PS-2 27" UND Y426H

## (undated) DEVICE — SOL WATER IRRIG 1000ML BOTTLE 07139-09

## (undated) DEVICE — PREP POVIDONE IODINE SOLUTION 10% 4OZ

## (undated) DEVICE — PAD CHUX UNDERPAD 23X24" 7136

## (undated) DEVICE — PREP CHLORAPREP 26ML TINTED ORANGE  260815

## (undated) DEVICE — CABLE 5 CHANNEL INPUT  ALPHA OMEGA SYSTEM STR-000642-55

## (undated) DEVICE — DRAPE MAYO STAND 23X54 8337

## (undated) DEVICE — PREP POVIDONE IODINE SCRUB 7.5% 4OZ APL82212

## (undated) DEVICE — NDL BLUNT 18GA 1" W/O FILTER 305181

## (undated) DEVICE — SU DERMABOND ADVANCED .7ML DNX12

## (undated) DEVICE — SUTURE BOOTS 051003PBX

## (undated) DEVICE — BLADE CLIPPER SGL USE 9680

## (undated) DEVICE — CABLE MULTI-LEAD TRIAL 3014ANS

## (undated) DEVICE — IMP BUR HOLE COVER GUARDIAN 6010ANS
Type: IMPLANTABLE DEVICE | Site: SKULL | Status: NON-FUNCTIONAL
Removed: 2018-10-23

## (undated) DEVICE — Device

## (undated) DEVICE — ADH SKIN CLOSURE PREMIERPRO EXOFIN 1.0ML 3470

## (undated) DEVICE — PATIENT CONTROLLER DBS

## (undated) DEVICE — ELEC MICROPHONICS-FREE ALPHA OMEGA STR-009080-00

## (undated) DEVICE — SUCTION MANIFOLD DORNOCH ULTRA CART UL-CL500

## (undated) DEVICE — SU ETHIBOND 2-0 SHDA 30" X563H

## (undated) DEVICE — DRSG GAUZE 4X4" TRAY 6939

## (undated) DEVICE — GLOVE PROTEXIS MICRO 7.5  2D73PM75

## (undated) DEVICE — SPONGE COTTONOID 1/2X1/2" 20-04S

## (undated) DEVICE — SYR 10ML FINGER CONTROL W/O NDL 309695

## (undated) DEVICE — NDL 25GA 2"  8881200441

## (undated) DEVICE — ESU ELEC BLADE 2.75" COATED/INSULATED E1455

## (undated) DEVICE — PACK NEURO MINOR UMMC SNE32MNMU4

## (undated) DEVICE — SU VICRYL 0 CT-1 36" J346H

## (undated) DEVICE — CATH TRAY FOLEY SURESTEP 16FR W/TMP PRB STLK LATEX A319416AM

## (undated) DEVICE — TUBE GUIDE ALPHA OMEGA SYSTEM STR-007721-00

## (undated) DEVICE — DRSG TEGADERM 4X4 3/4" 1626W

## (undated) DEVICE — HEADRING POINTS STEREOTACTIC DHRSL

## (undated) DEVICE — SU SILK 2-0 TIE 12X30" A305H

## (undated) DEVICE — SOL NACL 0.9% 10ML VIAL 0409-4888-02

## (undated) DEVICE — BATTERY PACK FOR POWERED DRIVER 05.000.007.01S

## (undated) DEVICE — DECANTER VIAL 2006S

## (undated) DEVICE — PATIENT MANUAL AND MAGNET

## (undated) DEVICE — ROSA ROBOT ARM DRAPE

## (undated) DEVICE — LABEL MEDICATION SYSTEM 3303-P

## (undated) DEVICE — PACK GOWN 3/PK DISP XL SBA32GPFCB

## (undated) DEVICE — WIPES FOLEY CARE SURESTEP PROVON DFC100

## (undated) DEVICE — PACK SET-UP STD 9102

## (undated) DEVICE — DRSG PRIMAPORE 02X3" 7133

## (undated) DEVICE — SU VICRYL 0 CT-1 27" UND J260H

## (undated) DEVICE — PREP CHLORAPREP CLEAR 3ML 260400

## (undated) DEVICE — DRAPE IOBAN ISOLATION VERTICAL 320X21CM 6617

## (undated) DEVICE — SYR EAR BULB 3OZ 0035830

## (undated) DEVICE — LINEN TOWEL PACK X30 5481

## (undated) DEVICE — LINEN TOWEL PACK X5 5464

## (undated) DEVICE — MARKER FIDUCIALS UNIBODY FM4010

## (undated) DEVICE — ESU GROUND PAD ADULT W/CORD E7507

## (undated) DEVICE — LINEN TOWEL PACK X6 WHITE 5487

## (undated) DEVICE — COVER CAMERA VIDEO LASER 9X96" 04-CC227

## (undated) DEVICE — DRAPE POUCH INSTRUMENT 1018

## (undated) DEVICE — DRAPE O ARM TUBE 9732722

## (undated) DEVICE — SYR 10ML LL W/O NDL 302995

## (undated) DEVICE — STRAP UNIVERSAL POSITIONING 2-PIECE 4X47X76" 91-287

## (undated) DEVICE — PATIENT CONTROLLER

## (undated) DEVICE — SPONGE SURGIFOAM 100 1974

## (undated) DEVICE — DRAPE C-ARM W/STRAPS 42X72" 07-CA104

## (undated) DEVICE — COMB STERILE 7" PLASTIC 14-1200

## (undated) DEVICE — NDL SPINAL 25GA 2" YALE 405078

## (undated) DEVICE — ESU GROUND PAD ADULT REM W/15' CORD E7507DB

## (undated) DEVICE — SOL WATER IRRIG 1000ML BOTTLE 2F7114

## (undated) RX ORDER — LIDOCAINE HYDROCHLORIDE AND EPINEPHRINE 10; 10 MG/ML; UG/ML
INJECTION, SOLUTION INFILTRATION; PERINEURAL
Status: DISPENSED
Start: 2018-10-23

## (undated) RX ORDER — CALCIUM CHLORIDE 100 MG/ML
INJECTION INTRAVENOUS; INTRAVENTRICULAR
Status: DISPENSED
Start: 2019-04-26

## (undated) RX ORDER — BUPIVACAINE HYDROCHLORIDE 2.5 MG/ML
INJECTION, SOLUTION EPIDURAL; INFILTRATION; INTRACAUDAL
Status: DISPENSED
Start: 2019-04-26

## (undated) RX ORDER — GLYCOPYRROLATE 0.2 MG/ML
INJECTION, SOLUTION INTRAMUSCULAR; INTRAVENOUS
Status: DISPENSED
Start: 2019-05-10

## (undated) RX ORDER — BACITRACIN 50000 [IU]/1
INJECTION, POWDER, FOR SOLUTION INTRAMUSCULAR
Status: DISPENSED
Start: 2019-05-10

## (undated) RX ORDER — LIDOCAINE HYDROCHLORIDE AND EPINEPHRINE 10; 10 MG/ML; UG/ML
INJECTION, SOLUTION INFILTRATION; PERINEURAL
Status: DISPENSED
Start: 2019-04-26

## (undated) RX ORDER — EPHEDRINE SULFATE 50 MG/ML
INJECTION, SOLUTION INTRAMUSCULAR; INTRAVENOUS; SUBCUTANEOUS
Status: DISPENSED
Start: 2019-04-26

## (undated) RX ORDER — PROPOFOL 10 MG/ML
INJECTION, EMULSION INTRAVENOUS
Status: DISPENSED
Start: 2019-04-26

## (undated) RX ORDER — ESMOLOL HYDROCHLORIDE 10 MG/ML
INJECTION INTRAVENOUS
Status: DISPENSED
Start: 2018-10-23

## (undated) RX ORDER — ACETAMINOPHEN 325 MG/1
TABLET ORAL
Status: DISPENSED
Start: 2019-05-10

## (undated) RX ORDER — LIDOCAINE HYDROCHLORIDE 20 MG/ML
INJECTION, SOLUTION EPIDURAL; INFILTRATION; INTRACAUDAL; PERINEURAL
Status: DISPENSED
Start: 2019-05-10

## (undated) RX ORDER — FENTANYL CITRATE 50 UG/ML
INJECTION, SOLUTION INTRAMUSCULAR; INTRAVENOUS
Status: DISPENSED
Start: 2019-05-10

## (undated) RX ORDER — EPHEDRINE SULFATE 50 MG/ML
INJECTION, SOLUTION INTRAMUSCULAR; INTRAVENOUS; SUBCUTANEOUS
Status: DISPENSED
Start: 2019-05-10

## (undated) RX ORDER — LIDOCAINE HYDROCHLORIDE 20 MG/ML
INJECTION, SOLUTION EPIDURAL; INFILTRATION; INTRACAUDAL; PERINEURAL
Status: DISPENSED
Start: 2019-04-26

## (undated) RX ORDER — CEFAZOLIN SODIUM 1 G/3ML
INJECTION, POWDER, FOR SOLUTION INTRAMUSCULAR; INTRAVENOUS
Status: DISPENSED
Start: 2018-10-23

## (undated) RX ORDER — ALBUTEROL SULFATE 90 UG/1
AEROSOL, METERED RESPIRATORY (INHALATION)
Status: DISPENSED
Start: 2019-04-26

## (undated) RX ORDER — METOPROLOL TARTRATE 1 MG/ML
INJECTION, SOLUTION INTRAVENOUS
Status: DISPENSED
Start: 2019-04-26

## (undated) RX ORDER — DEXAMETHASONE SODIUM PHOSPHATE 4 MG/ML
INJECTION, SOLUTION INTRA-ARTICULAR; INTRALESIONAL; INTRAMUSCULAR; INTRAVENOUS; SOFT TISSUE
Status: DISPENSED
Start: 2019-05-10

## (undated) RX ORDER — LIDOCAINE HYDROCHLORIDE 20 MG/ML
INJECTION, SOLUTION EPIDURAL; INFILTRATION; INTRACAUDAL; PERINEURAL
Status: DISPENSED
Start: 2018-10-23

## (undated) RX ORDER — ONDANSETRON 2 MG/ML
INJECTION INTRAMUSCULAR; INTRAVENOUS
Status: DISPENSED
Start: 2019-05-10

## (undated) RX ORDER — BACITRACIN 50000 [IU]/1
INJECTION, POWDER, FOR SOLUTION INTRAMUSCULAR
Status: DISPENSED
Start: 2019-04-26

## (undated) RX ORDER — LIDOCAINE HYDROCHLORIDE 20 MG/ML
JELLY TOPICAL
Status: DISPENSED
Start: 2019-04-26

## (undated) RX ORDER — CEFAZOLIN SODIUM 1 G/3ML
INJECTION, POWDER, FOR SOLUTION INTRAMUSCULAR; INTRAVENOUS
Status: DISPENSED
Start: 2019-04-26

## (undated) RX ORDER — LIDOCAINE HYDROCHLORIDE AND EPINEPHRINE 10; 10 MG/ML; UG/ML
INJECTION, SOLUTION INFILTRATION; PERINEURAL
Status: DISPENSED
Start: 2019-05-10

## (undated) RX ORDER — FENTANYL CITRATE 50 UG/ML
INJECTION, SOLUTION INTRAMUSCULAR; INTRAVENOUS
Status: DISPENSED
Start: 2019-04-26

## (undated) RX ORDER — BUPIVACAINE HYDROCHLORIDE 2.5 MG/ML
INJECTION, SOLUTION EPIDURAL; INFILTRATION; INTRACAUDAL
Status: DISPENSED
Start: 2019-05-10

## (undated) RX ORDER — CEFAZOLIN SODIUM 2 G/100ML
INJECTION, SOLUTION INTRAVENOUS
Status: DISPENSED
Start: 2019-05-10

## (undated) RX ORDER — BACITRACIN 500 [USP'U]/G
OINTMENT OPHTHALMIC
Status: DISPENSED
Start: 2019-04-26

## (undated) RX ORDER — FENTANYL CITRATE 50 UG/ML
INJECTION, SOLUTION INTRAMUSCULAR; INTRAVENOUS
Status: DISPENSED
Start: 2018-10-23

## (undated) RX ORDER — LABETALOL HYDROCHLORIDE 5 MG/ML
INJECTION, SOLUTION INTRAVENOUS
Status: DISPENSED
Start: 2018-10-23

## (undated) RX ORDER — PROPOFOL 10 MG/ML
INJECTION, EMULSION INTRAVENOUS
Status: DISPENSED
Start: 2019-05-10

## (undated) RX ORDER — BACITRACIN 500 [USP'U]/G
OINTMENT OPHTHALMIC
Status: DISPENSED
Start: 2018-10-23

## (undated) RX ORDER — HYDRALAZINE HYDROCHLORIDE 20 MG/ML
INJECTION INTRAMUSCULAR; INTRAVENOUS
Status: DISPENSED
Start: 2019-04-26

## (undated) RX ORDER — HYDRALAZINE HYDROCHLORIDE 20 MG/ML
INJECTION INTRAMUSCULAR; INTRAVENOUS
Status: DISPENSED
Start: 2018-10-23

## (undated) RX ORDER — SODIUM CHLORIDE 9 MG/ML
INJECTION, SOLUTION INTRAVENOUS
Status: DISPENSED
Start: 2019-04-26

## (undated) RX ORDER — BACITRACIN 50000 [IU]/1
INJECTION, POWDER, FOR SOLUTION INTRAMUSCULAR
Status: DISPENSED
Start: 2018-10-23

## (undated) RX ORDER — LIDOCAINE HYDROCHLORIDE 10 MG/ML
INJECTION, SOLUTION EPIDURAL; INFILTRATION; INTRACAUDAL; PERINEURAL
Status: DISPENSED
Start: 2019-04-26

## (undated) RX ORDER — CEFAZOLIN SODIUM 2 G/100ML
INJECTION, SOLUTION INTRAVENOUS
Status: DISPENSED
Start: 2018-10-23

## (undated) RX ORDER — CEFAZOLIN SODIUM 2 G/100ML
INJECTION, SOLUTION INTRAVENOUS
Status: DISPENSED
Start: 2019-04-26

## (undated) RX ORDER — PROPOFOL 10 MG/ML
INJECTION, EMULSION INTRAVENOUS
Status: DISPENSED
Start: 2018-10-23

## (undated) RX ORDER — GLYCOPYRROLATE 0.2 MG/ML
INJECTION, SOLUTION INTRAMUSCULAR; INTRAVENOUS
Status: DISPENSED
Start: 2019-04-26

## (undated) RX ORDER — SODIUM CHLORIDE 9 MG/ML
INJECTION, SOLUTION INTRAVENOUS
Status: DISPENSED
Start: 2018-10-23